# Patient Record
Sex: FEMALE | Race: WHITE | NOT HISPANIC OR LATINO | Employment: OTHER | RURAL
[De-identification: names, ages, dates, MRNs, and addresses within clinical notes are randomized per-mention and may not be internally consistent; named-entity substitution may affect disease eponyms.]

---

## 2017-03-22 RX ORDER — PRAVASTATIN SODIUM 80 MG/1
80 TABLET ORAL DAILY
Qty: 90 TABLET | Refills: 3 | Status: SHIPPED | OUTPATIENT
Start: 2017-03-22 | End: 2017-03-23 | Stop reason: SDUPTHER

## 2017-03-23 RX ORDER — PRAVASTATIN SODIUM 80 MG/1
80 TABLET ORAL DAILY
Qty: 90 TABLET | Refills: 3 | Status: SHIPPED | OUTPATIENT
Start: 2017-03-23 | End: 2017-05-23

## 2017-05-23 ENCOUNTER — OFFICE VISIT (OUTPATIENT)
Dept: CARDIOLOGY | Facility: CLINIC | Age: 57
End: 2017-05-23

## 2017-05-23 VITALS
HEIGHT: 64 IN | DIASTOLIC BLOOD PRESSURE: 87 MMHG | HEART RATE: 84 BPM | WEIGHT: 231 LBS | BODY MASS INDEX: 39.44 KG/M2 | SYSTOLIC BLOOD PRESSURE: 152 MMHG

## 2017-05-23 DIAGNOSIS — E11.65 TYPE 2 DIABETES MELLITUS WITH HYPERGLYCEMIA, WITHOUT LONG-TERM CURRENT USE OF INSULIN (HCC): ICD-10-CM

## 2017-05-23 DIAGNOSIS — I10 ESSENTIAL HYPERTENSION: ICD-10-CM

## 2017-05-23 DIAGNOSIS — E78.2 MIXED HYPERLIPIDEMIA: ICD-10-CM

## 2017-05-23 DIAGNOSIS — I20.9 ANGINA PECTORIS (HCC): Primary | ICD-10-CM

## 2017-05-23 PROCEDURE — 99214 OFFICE O/P EST MOD 30 MIN: CPT | Performed by: INTERNAL MEDICINE

## 2017-05-23 RX ORDER — ROSUVASTATIN CALCIUM 10 MG/1
10 TABLET, COATED ORAL DAILY
Qty: 90 TABLET | Refills: 3 | Status: SHIPPED | OUTPATIENT
Start: 2017-05-23 | End: 2018-07-02 | Stop reason: SDUPTHER

## 2017-05-23 RX ORDER — PREGABALIN 75 MG/1
75 CAPSULE ORAL 2 TIMES DAILY
COMMUNITY
End: 2018-07-09 | Stop reason: ALTCHOICE

## 2017-05-23 RX ORDER — MONTELUKAST SODIUM 10 MG/1
10 TABLET ORAL NIGHTLY
COMMUNITY
End: 2017-12-08

## 2017-05-23 RX ORDER — FEXOFENADINE HCL 180 MG/1
180 TABLET ORAL DAILY
COMMUNITY
End: 2017-12-08

## 2017-06-09 ENCOUNTER — HOSPITAL ENCOUNTER (OUTPATIENT)
Dept: CARDIOLOGY | Facility: HOSPITAL | Age: 57
Discharge: HOME | End: 2017-06-09
Attending: INTERNAL MEDICINE

## 2017-06-09 ENCOUNTER — APPOINTMENT (OUTPATIENT)
Dept: CARDIOLOGY | Facility: HOSPITAL | Age: 57
End: 2017-06-09
Attending: INTERNAL MEDICINE

## 2017-06-09 ENCOUNTER — HOSPITAL ENCOUNTER (OUTPATIENT)
Dept: CARDIOLOGY | Facility: HOSPITAL | Age: 57
Discharge: HOME | End: 2017-06-09
Attending: INTERNAL MEDICINE | Admitting: INTERNAL MEDICINE

## 2017-06-09 VITALS
DIASTOLIC BLOOD PRESSURE: 80 MMHG | BODY MASS INDEX: 39.27 KG/M2 | WEIGHT: 230 LBS | HEIGHT: 64 IN | HEART RATE: 96 BPM | SYSTOLIC BLOOD PRESSURE: 124 MMHG

## 2017-06-09 DIAGNOSIS — I20.9 ANGINA PECTORIS (HCC): ICD-10-CM

## 2017-06-09 LAB
BH CV STRESS BP STAGE 1: NORMAL
BH CV STRESS COMMENTS STAGE 1: NORMAL
BH CV STRESS DURATION MIN STAGE 1: 1
BH CV STRESS DURATION SEC STAGE 1: 50
BH CV STRESS GRADE STAGE 1: 10
BH CV STRESS HR STAGE 1: 137
BH CV STRESS METS STAGE 1: 5
BH CV STRESS O2 STAGE 1: 98
BH CV STRESS PROTOCOL 1: NORMAL
BH CV STRESS PROTOCOL 2 BP STAGE 1: NORMAL
BH CV STRESS PROTOCOL 2 BP STAGE 3: 114
BH CV STRESS PROTOCOL 2 BP STAGE 4: 110
BH CV STRESS PROTOCOL 2 COMMENTS STAGE 1: NORMAL
BH CV STRESS PROTOCOL 2 DOSE REGADENOSON STAGE 1: 0.4
BH CV STRESS PROTOCOL 2 DURATION MIN STAGE 1: 1
BH CV STRESS PROTOCOL 2 DURATION MIN STAGE 2: 1
BH CV STRESS PROTOCOL 2 DURATION MIN STAGE 3: 1
BH CV STRESS PROTOCOL 2 DURATION MIN STAGE 4: 1
BH CV STRESS PROTOCOL 2 DURATION SEC STAGE 1: 15
BH CV STRESS PROTOCOL 2 DURATION SEC STAGE 2: 0
BH CV STRESS PROTOCOL 2 DURATION SEC STAGE 3: 0
BH CV STRESS PROTOCOL 2 DURATION SEC STAGE 4: 0
BH CV STRESS PROTOCOL 2 HR STAGE 1: 118
BH CV STRESS PROTOCOL 2 HR STAGE 2: 116
BH CV STRESS PROTOCOL 2 HR STAGE 4: NORMAL
BH CV STRESS PROTOCOL 2 O2 STAGE 1: 98
BH CV STRESS PROTOCOL 2 O2 STAGE 2: 97
BH CV STRESS PROTOCOL 2 O2 STAGE 3: 97
BH CV STRESS PROTOCOL 2 O2 STAGE 4: 97
BH CV STRESS PROTOCOL 2 STAGE 1: 1
BH CV STRESS PROTOCOL 2 STAGE 2: 2
BH CV STRESS PROTOCOL 2 STAGE 3: 3
BH CV STRESS PROTOCOL 2 STAGE 4: 4
BH CV STRESS PROTOCOL 2: NORMAL
BH CV STRESS RECOVERY BP: NORMAL MMHG
BH CV STRESS RECOVERY HR: 103 BPM
BH CV STRESS RECOVERY O2: 97 %
BH CV STRESS SPEED STAGE 1: 1.7
BH CV STRESS STAGE 1: 1
LV EF NUC BP: 63 %
MAXIMAL PREDICTED HEART RATE: 163 BPM
PERCENT MAX PREDICTED HR: 72.39 %
STRESS BASELINE BP: NORMAL MMHG
STRESS BASELINE HR: 93 BPM
STRESS O2 SAT REST: 98 %
STRESS PERCENT HR: 85 %
STRESS POST ESTIMATED WORKLOAD: 1 METS
STRESS POST EXERCISE DUR MIN: 4 MIN
STRESS POST EXERCISE DUR SEC: 0 SEC
STRESS POST O2 SAT PEAK: 97 %
STRESS POST PEAK BP: NORMAL MMHG
STRESS POST PEAK HR: 118 BPM
STRESS TARGET HR: 139 BPM

## 2017-06-09 PROCEDURE — 78452 HT MUSCLE IMAGE SPECT MULT: CPT

## 2017-06-09 PROCEDURE — 93017 CV STRESS TEST TRACING ONLY: CPT

## 2017-06-09 PROCEDURE — 0 TECHNETIUM SESTAMIBI: Performed by: INTERNAL MEDICINE

## 2017-06-09 PROCEDURE — A9500 TC99M SESTAMIBI: HCPCS | Performed by: INTERNAL MEDICINE

## 2017-06-09 PROCEDURE — 93018 CV STRESS TEST I&R ONLY: CPT | Performed by: INTERNAL MEDICINE

## 2017-06-09 PROCEDURE — 78452 HT MUSCLE IMAGE SPECT MULT: CPT | Performed by: INTERNAL MEDICINE

## 2017-06-09 PROCEDURE — 25010000002 REGADENOSON 0.4 MG/5ML SOLUTION: Performed by: INTERNAL MEDICINE

## 2017-06-09 RX ADMIN — Medication 1 DOSE: at 09:35

## 2017-06-09 RX ADMIN — REGADENOSON 0.4 MG: 0.08 INJECTION, SOLUTION INTRAVENOUS at 09:38

## 2017-06-09 RX ADMIN — Medication 1 DOSE: at 07:45

## 2017-06-12 ENCOUNTER — TELEPHONE (OUTPATIENT)
Dept: CARDIOLOGY | Facility: CLINIC | Age: 57
End: 2017-06-12

## 2017-06-12 NOTE — TELEPHONE ENCOUNTER
Called patient and let her know her stress test was within normal limits. Patient verbalized understanding.

## 2017-08-04 RX ORDER — INDOMETHACIN 25 MG/1
CAPSULE ORAL
Qty: 90 CAPSULE | Refills: 0 | Status: SHIPPED | OUTPATIENT
Start: 2017-08-04 | End: 2017-09-10 | Stop reason: SDUPTHER

## 2017-09-11 RX ORDER — INDOMETHACIN 25 MG/1
CAPSULE ORAL
Qty: 90 CAPSULE | Refills: 0 | Status: SHIPPED | OUTPATIENT
Start: 2017-09-11 | End: 2017-11-30 | Stop reason: HOSPADM

## 2017-10-25 RX ORDER — INDOMETHACIN 25 MG/1
CAPSULE ORAL
Qty: 90 CAPSULE | Refills: 0 | OUTPATIENT
Start: 2017-10-25

## 2017-11-15 ENCOUNTER — TELEPHONE (OUTPATIENT)
Dept: CARDIOLOGY | Facility: CLINIC | Age: 57
End: 2017-11-15

## 2017-11-15 DIAGNOSIS — R06.09 DOE (DYSPNEA ON EXERTION): Primary | ICD-10-CM

## 2017-11-15 DIAGNOSIS — I25.119 CORONARY ARTERY DISEASE INVOLVING NATIVE CORONARY ARTERY OF NATIVE HEART WITH ANGINA PECTORIS (HCC): ICD-10-CM

## 2017-11-15 RX ORDER — NITROGLYCERIN 0.4 MG/1
0.4 TABLET SUBLINGUAL
Qty: 25 TABLET | Refills: 3 | Status: SHIPPED | OUTPATIENT
Start: 2017-11-15 | End: 2019-05-19 | Stop reason: SDUPTHER

## 2017-11-15 NOTE — TELEPHONE ENCOUNTER
"Pt left message to report she is having NAYLOR/CP \"walking from parking lot to building\". PCP has ordered echo and PFT's to be done at [facility]. Unable to reach pt for additional information. Normal stress 6/9/17. Further recommendations?  "

## 2017-11-29 ENCOUNTER — HOSPITAL ENCOUNTER (OUTPATIENT)
Facility: HOSPITAL | Age: 57
Setting detail: OBSERVATION
Discharge: HOME | End: 2017-11-30
Attending: INTERNAL MEDICINE | Admitting: INTERNAL MEDICINE

## 2017-11-29 ENCOUNTER — APPOINTMENT (OUTPATIENT)
Dept: GENERAL RADIOLOGY | Facility: HOSPITAL | Age: 57
End: 2017-11-29

## 2017-11-29 DIAGNOSIS — R06.09 DOE (DYSPNEA ON EXERTION): Primary | ICD-10-CM

## 2017-11-29 DIAGNOSIS — I25.119 CORONARY ARTERY DISEASE INVOLVING NATIVE CORONARY ARTERY OF NATIVE HEART WITH ANGINA PECTORIS (HCC): ICD-10-CM

## 2017-11-29 LAB
ALBUMIN SERPL-MCNC: 4.4 G/DL (ref 3.2–4.8)
ALBUMIN/GLOB SERPL: 1.5 G/DL (ref 1.5–2.5)
ALP SERPL-CCNC: 111 U/L (ref 25–100)
ALT SERPL W P-5'-P-CCNC: 19 U/L (ref 7–40)
ANION GAP SERPL CALCULATED.3IONS-SCNC: 9 MMOL/L (ref 3–11)
ANION GAP SERPL CALCULATED.3IONS-SCNC: 9 MMOL/L (ref 3–11)
AST SERPL-CCNC: 24 U/L (ref 0–33)
BASOPHILS # BLD AUTO: 0.01 10*3/MM3 (ref 0–0.2)
BASOPHILS NFR BLD AUTO: 0.1 % (ref 0–1)
BILIRUB SERPL-MCNC: 0.5 MG/DL (ref 0.3–1.2)
BNP SERPL-MCNC: 288 PG/ML (ref 0–100)
BUN BLD-MCNC: 27 MG/DL (ref 9–23)
BUN BLD-MCNC: 27 MG/DL (ref 9–23)
BUN/CREAT SERPL: 27 (ref 7–25)
BUN/CREAT SERPL: 27 (ref 7–25)
CALCIUM SPEC-SCNC: 9.5 MG/DL (ref 8.7–10.4)
CALCIUM SPEC-SCNC: 9.5 MG/DL (ref 8.7–10.4)
CHLORIDE SERPL-SCNC: 103 MMOL/L (ref 99–109)
CHLORIDE SERPL-SCNC: 103 MMOL/L (ref 99–109)
CO2 SERPL-SCNC: 27 MMOL/L (ref 20–31)
CO2 SERPL-SCNC: 27 MMOL/L (ref 20–31)
CREAT BLD-MCNC: 1 MG/DL (ref 0.6–1.3)
CREAT BLD-MCNC: 1 MG/DL (ref 0.6–1.3)
DEPRECATED RDW RBC AUTO: 48.6 FL (ref 37–54)
EOSINOPHIL # BLD AUTO: 0 10*3/MM3 (ref 0–0.3)
EOSINOPHIL NFR BLD AUTO: 0 % (ref 0–3)
ERYTHROCYTE [DISTWIDTH] IN BLOOD BY AUTOMATED COUNT: 15 % (ref 11.3–14.5)
GFR SERPL CREATININE-BSD FRML MDRD: 57 ML/MIN/1.73
GFR SERPL CREATININE-BSD FRML MDRD: 57 ML/MIN/1.73
GLOBULIN UR ELPH-MCNC: 2.9 GM/DL
GLUCOSE BLD-MCNC: 232 MG/DL (ref 70–100)
GLUCOSE BLD-MCNC: 232 MG/DL (ref 70–100)
GLUCOSE BLDC GLUCOMTR-MCNC: 225 MG/DL (ref 70–130)
GLUCOSE BLDC GLUCOMTR-MCNC: 350 MG/DL (ref 70–130)
HCT VFR BLD AUTO: 37.1 % (ref 34.5–44)
HGB BLD-MCNC: 11.9 G/DL (ref 11.5–15.5)
IMM GRANULOCYTES # BLD: 0.08 10*3/MM3 (ref 0–0.03)
IMM GRANULOCYTES NFR BLD: 0.7 % (ref 0–0.6)
LYMPHOCYTES # BLD AUTO: 1.76 10*3/MM3 (ref 0.6–4.8)
LYMPHOCYTES NFR BLD AUTO: 16.3 % (ref 24–44)
MCH RBC QN AUTO: 28.4 PG (ref 27–31)
MCHC RBC AUTO-ENTMCNC: 32.1 G/DL (ref 32–36)
MCV RBC AUTO: 88.5 FL (ref 80–99)
MONOCYTES # BLD AUTO: 0.95 10*3/MM3 (ref 0–1)
MONOCYTES NFR BLD AUTO: 8.8 % (ref 0–12)
NEUTROPHILS # BLD AUTO: 8.03 10*3/MM3 (ref 1.5–8.3)
NEUTROPHILS NFR BLD AUTO: 74.1 % (ref 41–71)
PLATELET # BLD AUTO: 243 10*3/MM3 (ref 150–450)
PMV BLD AUTO: 10 FL (ref 6–12)
POTASSIUM BLD-SCNC: 4.2 MMOL/L (ref 3.5–5.5)
POTASSIUM BLD-SCNC: 4.2 MMOL/L (ref 3.5–5.5)
PROT SERPL-MCNC: 7.3 G/DL (ref 5.7–8.2)
RBC # BLD AUTO: 4.19 10*6/MM3 (ref 3.89–5.14)
SODIUM BLD-SCNC: 139 MMOL/L (ref 132–146)
SODIUM BLD-SCNC: 139 MMOL/L (ref 132–146)
TSH SERPL DL<=0.05 MIU/L-ACNC: 0.52 MIU/ML (ref 0.35–5.35)
WBC NRBC COR # BLD: 10.83 10*3/MM3 (ref 3.5–10.8)

## 2017-11-29 PROCEDURE — 63710000001 INSULIN LISPRO (HUMAN) PER 5 UNITS: Performed by: NURSE PRACTITIONER

## 2017-11-29 PROCEDURE — 84443 ASSAY THYROID STIM HORMONE: CPT | Performed by: NURSE PRACTITIONER

## 2017-11-29 PROCEDURE — 82962 GLUCOSE BLOOD TEST: CPT

## 2017-11-29 PROCEDURE — 96372 THER/PROPH/DIAG INJ SC/IM: CPT

## 2017-11-29 PROCEDURE — 25010000002 HEPARIN (PORCINE) PER 1000 UNITS: Performed by: NURSE PRACTITIONER

## 2017-11-29 PROCEDURE — G0378 HOSPITAL OBSERVATION PER HR: HCPCS

## 2017-11-29 PROCEDURE — G0379 DIRECT REFER HOSPITAL OBSERV: HCPCS

## 2017-11-29 PROCEDURE — 99219 PR INITIAL OBSERVATION CARE/DAY 50 MINUTES: CPT | Performed by: INTERNAL MEDICINE

## 2017-11-29 PROCEDURE — 80053 COMPREHEN METABOLIC PANEL: CPT | Performed by: NURSE PRACTITIONER

## 2017-11-29 PROCEDURE — 80048 BASIC METABOLIC PNL TOTAL CA: CPT | Performed by: INTERNAL MEDICINE

## 2017-11-29 PROCEDURE — 85025 COMPLETE CBC W/AUTO DIFF WBC: CPT | Performed by: INTERNAL MEDICINE

## 2017-11-29 PROCEDURE — 71020 HC CHEST PA AND LATERAL: CPT

## 2017-11-29 PROCEDURE — 83880 ASSAY OF NATRIURETIC PEPTIDE: CPT | Performed by: INTERNAL MEDICINE

## 2017-11-29 RX ORDER — ONDANSETRON 2 MG/ML
4 INJECTION INTRAMUSCULAR; INTRAVENOUS EVERY 6 HOURS PRN
Status: DISCONTINUED | OUTPATIENT
Start: 2017-11-29 | End: 2017-11-30 | Stop reason: HOSPADM

## 2017-11-29 RX ORDER — CHLORDIAZEPOXIDE HYDROCHLORIDE 5 MG/1
5 CAPSULE, GELATIN COATED ORAL 3 TIMES DAILY PRN
Status: DISCONTINUED | OUTPATIENT
Start: 2017-11-29 | End: 2017-11-30 | Stop reason: HOSPADM

## 2017-11-29 RX ORDER — DOCUSATE SODIUM 100 MG/1
100 CAPSULE, LIQUID FILLED ORAL 2 TIMES DAILY
Status: DISCONTINUED | OUTPATIENT
Start: 2017-11-29 | End: 2017-11-30 | Stop reason: HOSPADM

## 2017-11-29 RX ORDER — DEXTROSE MONOHYDRATE 25 G/50ML
25 INJECTION, SOLUTION INTRAVENOUS
Status: DISCONTINUED | OUTPATIENT
Start: 2017-11-29 | End: 2017-11-30 | Stop reason: HOSPADM

## 2017-11-29 RX ORDER — NYSTATIN 100000 U/G
1 OINTMENT TOPICAL 3 TIMES DAILY PRN
COMMUNITY
End: 2022-03-10

## 2017-11-29 RX ORDER — CHLORDIAZEPOXIDE HYDROCHLORIDE 5 MG/1
5 CAPSULE, GELATIN COATED ORAL DAILY
COMMUNITY
End: 2018-11-20

## 2017-11-29 RX ORDER — HEPARIN SODIUM 5000 [USP'U]/ML
5000 INJECTION, SOLUTION INTRAVENOUS; SUBCUTANEOUS EVERY 12 HOURS SCHEDULED
Status: DISCONTINUED | OUTPATIENT
Start: 2017-11-29 | End: 2017-11-30 | Stop reason: HOSPADM

## 2017-11-29 RX ORDER — NYSTATIN 100000 U/G
1 OINTMENT TOPICAL 3 TIMES DAILY
Status: DISCONTINUED | OUTPATIENT
Start: 2017-11-29 | End: 2017-11-30 | Stop reason: HOSPADM

## 2017-11-29 RX ORDER — PREGABALIN 75 MG/1
75 CAPSULE ORAL EVERY 8 HOURS SCHEDULED
Status: DISCONTINUED | OUTPATIENT
Start: 2017-11-29 | End: 2017-11-30 | Stop reason: HOSPADM

## 2017-11-29 RX ORDER — NICOTINE POLACRILEX 4 MG
15 LOZENGE BUCCAL
Status: DISCONTINUED | OUTPATIENT
Start: 2017-11-29 | End: 2017-11-30 | Stop reason: HOSPADM

## 2017-11-29 RX ORDER — SPIRONOLACTONE 25 MG/1
25 TABLET ORAL 2 TIMES DAILY
Status: DISCONTINUED | OUTPATIENT
Start: 2017-11-29 | End: 2017-11-30 | Stop reason: HOSPADM

## 2017-11-29 RX ORDER — ROSUVASTATIN CALCIUM 10 MG/1
10 TABLET, COATED ORAL NIGHTLY
Status: DISCONTINUED | OUTPATIENT
Start: 2017-11-29 | End: 2017-11-30 | Stop reason: HOSPADM

## 2017-11-29 RX ORDER — CALCIUM CARBONATE 200(500)MG
2 TABLET,CHEWABLE ORAL 2 TIMES DAILY PRN
Status: DISCONTINUED | OUTPATIENT
Start: 2017-11-29 | End: 2017-11-30 | Stop reason: HOSPADM

## 2017-11-29 RX ORDER — CETIRIZINE HYDROCHLORIDE 10 MG/1
10 TABLET ORAL DAILY
Status: DISCONTINUED | OUTPATIENT
Start: 2017-11-30 | End: 2017-11-30 | Stop reason: HOSPADM

## 2017-11-29 RX ORDER — DIPHENOXYLATE HYDROCHLORIDE AND ATROPINE SULFATE 2.5; .025 MG/1; MG/1
1 TABLET ORAL DAILY
Status: DISCONTINUED | OUTPATIENT
Start: 2017-11-30 | End: 2017-11-30 | Stop reason: HOSPADM

## 2017-11-29 RX ORDER — TRIAMCINOLONE ACETONIDE 1 MG/G
1 CREAM TOPICAL 3 TIMES DAILY
Status: DISCONTINUED | OUTPATIENT
Start: 2017-11-29 | End: 2017-11-30 | Stop reason: HOSPADM

## 2017-11-29 RX ORDER — NITROGLYCERIN 0.4 MG/1
0.4 TABLET SUBLINGUAL
Status: DISCONTINUED | OUTPATIENT
Start: 2017-11-29 | End: 2017-11-30 | Stop reason: HOSPADM

## 2017-11-29 RX ORDER — SODIUM CHLORIDE 0.9 % (FLUSH) 0.9 %
1-10 SYRINGE (ML) INJECTION AS NEEDED
Status: DISCONTINUED | OUTPATIENT
Start: 2017-11-29 | End: 2017-11-30 | Stop reason: HOSPADM

## 2017-11-29 RX ORDER — ALLOPURINOL 300 MG/1
300 TABLET ORAL DAILY
Status: DISCONTINUED | OUTPATIENT
Start: 2017-11-30 | End: 2017-11-30 | Stop reason: HOSPADM

## 2017-11-29 RX ORDER — METHYLPREDNISOLONE 4 MG/1
4 TABLET ORAL DAILY
Status: DISCONTINUED | OUTPATIENT
Start: 2017-11-30 | End: 2017-11-30 | Stop reason: HOSPADM

## 2017-11-29 RX ORDER — METHYLPREDNISOLONE 4 MG/1
1 TABLET ORAL DAILY
COMMUNITY
End: 2017-11-30 | Stop reason: HOSPADM

## 2017-11-29 RX ORDER — TRIAMCINOLONE ACETONIDE 1 MG/G
1 CREAM TOPICAL DAILY PRN
COMMUNITY
End: 2018-09-12 | Stop reason: HOSPADM

## 2017-11-29 RX ORDER — FUROSEMIDE 40 MG/1
40 TABLET ORAL 2 TIMES DAILY
Status: DISCONTINUED | OUTPATIENT
Start: 2017-11-29 | End: 2017-11-30 | Stop reason: HOSPADM

## 2017-11-29 RX ORDER — ASPIRIN 81 MG/1
81 TABLET ORAL DAILY
Status: DISCONTINUED | OUTPATIENT
Start: 2017-11-29 | End: 2017-11-30 | Stop reason: HOSPADM

## 2017-11-29 RX ORDER — BISACODYL 10 MG
10 SUPPOSITORY, RECTAL RECTAL DAILY PRN
Status: DISCONTINUED | OUTPATIENT
Start: 2017-11-29 | End: 2017-11-30 | Stop reason: HOSPADM

## 2017-11-29 RX ORDER — ONDANSETRON 4 MG/1
4 TABLET, FILM COATED ORAL EVERY 6 HOURS PRN
Status: DISCONTINUED | OUTPATIENT
Start: 2017-11-29 | End: 2017-11-30 | Stop reason: HOSPADM

## 2017-11-29 RX ORDER — MONTELUKAST SODIUM 10 MG/1
10 TABLET ORAL NIGHTLY
Status: DISCONTINUED | OUTPATIENT
Start: 2017-11-29 | End: 2017-11-30 | Stop reason: HOSPADM

## 2017-11-29 RX ORDER — ACETAMINOPHEN 325 MG/1
650 TABLET ORAL EVERY 4 HOURS PRN
Status: DISCONTINUED | OUTPATIENT
Start: 2017-11-29 | End: 2017-11-30 | Stop reason: HOSPADM

## 2017-11-29 RX ADMIN — SPIRONOLACTONE 25 MG: 25 TABLET, FILM COATED ORAL at 20:50

## 2017-11-29 RX ADMIN — ROSUVASTATIN CALCIUM 10 MG: 10 TABLET, FILM COATED ORAL at 21:49

## 2017-11-29 RX ADMIN — ASPIRIN 81 MG: 81 TABLET, COATED ORAL at 20:50

## 2017-11-29 RX ADMIN — HEPARIN SODIUM 5000 UNITS: 5000 INJECTION, SOLUTION INTRAVENOUS; SUBCUTANEOUS at 21:49

## 2017-11-29 RX ADMIN — INSULIN LISPRO 7 UNITS: 100 INJECTION, SOLUTION INTRAVENOUS; SUBCUTANEOUS at 21:50

## 2017-11-29 RX ADMIN — CHLORDIAZEPOXIDE HYDROCHLORIDE 5 MG: 5 CAPSULE ORAL at 22:00

## 2017-11-30 ENCOUNTER — APPOINTMENT (OUTPATIENT)
Dept: CARDIOLOGY | Facility: HOSPITAL | Age: 57
End: 2017-11-30

## 2017-11-30 VITALS
HEART RATE: 95 BPM | HEIGHT: 64 IN | RESPIRATION RATE: 18 BRPM | DIASTOLIC BLOOD PRESSURE: 80 MMHG | TEMPERATURE: 97.5 F | WEIGHT: 235.23 LBS | BODY MASS INDEX: 40.16 KG/M2 | SYSTOLIC BLOOD PRESSURE: 126 MMHG | OXYGEN SATURATION: 97 %

## 2017-11-30 PROBLEM — I25.119 CORONARY ARTERY DISEASE INVOLVING NATIVE CORONARY ARTERY OF NATIVE HEART WITH ANGINA PECTORIS (HCC): Status: ACTIVE | Noted: 2017-11-29

## 2017-11-30 LAB
ARTICHOKE IGE QN: 66 MG/DL (ref 0–130)
BH CV ECHO MEAS - AO ROOT AREA (BSA CORRECTED): 1.5
BH CV ECHO MEAS - AO ROOT AREA: 7.5 CM^2
BH CV ECHO MEAS - AO ROOT DIAM: 3.1 CM
BH CV ECHO MEAS - BSA(HAYCOCK): 2.2 M^2
BH CV ECHO MEAS - BSA: 2.1 M^2
BH CV ECHO MEAS - BZI_BMI: 40.3 KILOGRAMS/M^2
BH CV ECHO MEAS - BZI_METRIC_HEIGHT: 162.6 CM
BH CV ECHO MEAS - BZI_METRIC_WEIGHT: 106.6 KG
BH CV ECHO MEAS - CONTRAST EF (2CH): 20.8 ML/M^2
BH CV ECHO MEAS - CONTRAST EF 4CH: 65.2 ML/M^2
BH CV ECHO MEAS - EDV(CUBED): 138.2 ML
BH CV ECHO MEAS - EDV(MOD-SP2): 125 ML
BH CV ECHO MEAS - EDV(MOD-SP4): 227 ML
BH CV ECHO MEAS - EDV(TEICH): 127.8 ML
BH CV ECHO MEAS - EF(CUBED): 27.7 %
BH CV ECHO MEAS - EF(MOD-SP2): 20.8 %
BH CV ECHO MEAS - EF(TEICH): 22.3 %
BH CV ECHO MEAS - ESV(CUBED): 99.9 ML
BH CV ECHO MEAS - ESV(MOD-SP2): 99 ML
BH CV ECHO MEAS - ESV(MOD-SP4): 79 ML
BH CV ECHO MEAS - ESV(TEICH): 99.3 ML
BH CV ECHO MEAS - FS: 10.3 %
BH CV ECHO MEAS - IVS/LVPW: 0.93
BH CV ECHO MEAS - IVSD: 1.1 CM
BH CV ECHO MEAS - LA DIMENSION: 3.9 CM
BH CV ECHO MEAS - LA/AO: 1.3
BH CV ECHO MEAS - LV DIASTOLIC VOL/BSA (35-75): 108.4 ML/M^2
BH CV ECHO MEAS - LV MASS(C)D: 232.4 GRAMS
BH CV ECHO MEAS - LV MASS(C)DI: 111 GRAMS/M^2
BH CV ECHO MEAS - LV SYSTOLIC VOL/BSA (12-30): 37.7 ML/M^2
BH CV ECHO MEAS - LVIDD: 5.2 CM
BH CV ECHO MEAS - LVIDS: 4.6 CM
BH CV ECHO MEAS - LVLD AP2: 7.9 CM
BH CV ECHO MEAS - LVLD AP4: 9 CM
BH CV ECHO MEAS - LVLS AP2: 7.8 CM
BH CV ECHO MEAS - LVLS AP4: 7.4 CM
BH CV ECHO MEAS - LVPWD: 1.2 CM
BH CV ECHO MEAS - MV A MAX VEL: 83.4 CM/SEC
BH CV ECHO MEAS - MV DEC TIME: 0.16 SEC
BH CV ECHO MEAS - MV E MAX VEL: 99.2 CM/SEC
BH CV ECHO MEAS - MV E/A: 1.2
BH CV ECHO MEAS - PA ACC SLOPE: 626 CM/SEC^2
BH CV ECHO MEAS - PA ACC TIME: 0.09 SEC
BH CV ECHO MEAS - PA PR(ACCEL): 37.2 MMHG
BH CV ECHO MEAS - RAP SYSTOLE: 10 MMHG
BH CV ECHO MEAS - RVDD: 2.5 CM
BH CV ECHO MEAS - RVSP: 41 MMHG
BH CV ECHO MEAS - SI(CUBED): 18.3 ML/M^2
BH CV ECHO MEAS - SI(MOD-SP2): 12.4 ML/M^2
BH CV ECHO MEAS - SI(MOD-SP4): 70.7 ML/M^2
BH CV ECHO MEAS - SI(TEICH): 13.6 ML/M^2
BH CV ECHO MEAS - SV(CUBED): 38.3 ML
BH CV ECHO MEAS - SV(MOD-SP2): 26 ML
BH CV ECHO MEAS - SV(MOD-SP4): 148 ML
BH CV ECHO MEAS - SV(TEICH): 28.5 ML
BH CV ECHO MEAS - TAPSE (>1.6): 1.8 CM2
BH CV ECHO MEAS - TR MAX VEL: 279 CM/SEC
BH CV XLRA - RV BASE: 2.8 CM
BH CV XLRA - RV LENGTH: 6.8 CM
BH CV XLRA - RV MID: 2.3 CM
CHOLEST SERPL-MCNC: 120 MG/DL (ref 0–200)
GLUCOSE BLDC GLUCOMTR-MCNC: 185 MG/DL (ref 70–130)
GLUCOSE BLDC GLUCOMTR-MCNC: 216 MG/DL (ref 70–130)
HBA1C MFR BLD: 8.2 % (ref 4.8–5.6)
HDLC SERPL-MCNC: 29 MG/DL (ref 40–60)
LEFT ATRIUM VOLUME INDEX: 27.3 ML/M2
LV EF 2D ECHO EST: 30 %
MAXIMAL PREDICTED HEART RATE: 162 BPM
STRESS TARGET HR: 138 BPM
TRIGL SERPL-MCNC: 262 MG/DL (ref 0–150)

## 2017-11-30 PROCEDURE — 25010000002 HEPARIN (PORCINE) PER 1000 UNITS: Performed by: INTERNAL MEDICINE

## 2017-11-30 PROCEDURE — 93306 TTE W/DOPPLER COMPLETE: CPT

## 2017-11-30 PROCEDURE — 99024 POSTOP FOLLOW-UP VISIT: CPT | Performed by: INTERNAL MEDICINE

## 2017-11-30 PROCEDURE — 82962 GLUCOSE BLOOD TEST: CPT

## 2017-11-30 PROCEDURE — 83036 HEMOGLOBIN GLYCOSYLATED A1C: CPT | Performed by: NURSE PRACTITIONER

## 2017-11-30 PROCEDURE — 0 IOPAMIDOL PER 1 ML: Performed by: INTERNAL MEDICINE

## 2017-11-30 PROCEDURE — 93458 L HRT ARTERY/VENTRICLE ANGIO: CPT | Performed by: INTERNAL MEDICINE

## 2017-11-30 PROCEDURE — C1769 GUIDE WIRE: HCPCS | Performed by: INTERNAL MEDICINE

## 2017-11-30 PROCEDURE — 25010000002 SULFUR HEXAFLUORIDE MICROSPH 60.7-25 MG RECONSTITUTED SUSPENSION: Performed by: INTERNAL MEDICINE

## 2017-11-30 PROCEDURE — G0378 HOSPITAL OBSERVATION PER HR: HCPCS

## 2017-11-30 PROCEDURE — 93306 TTE W/DOPPLER COMPLETE: CPT | Performed by: INTERNAL MEDICINE

## 2017-11-30 PROCEDURE — 80061 LIPID PANEL: CPT | Performed by: NURSE PRACTITIONER

## 2017-11-30 PROCEDURE — 93005 ELECTROCARDIOGRAM TRACING: CPT | Performed by: INTERNAL MEDICINE

## 2017-11-30 PROCEDURE — 63710000001 METHYLPREDNISOLONE 4 MG TABLET THERAPY PACK 21 EACH DISP PACK: Performed by: NURSE PRACTITIONER

## 2017-11-30 PROCEDURE — C1894 INTRO/SHEATH, NON-LASER: HCPCS | Performed by: INTERNAL MEDICINE

## 2017-11-30 RX ORDER — LOSARTAN POTASSIUM 25 MG/1
25 TABLET ORAL
Status: DISCONTINUED | OUTPATIENT
Start: 2017-11-30 | End: 2017-11-30 | Stop reason: HOSPADM

## 2017-11-30 RX ORDER — CARVEDILOL 3.12 MG/1
3.12 TABLET ORAL 2 TIMES DAILY
Qty: 60 TABLET | Refills: 3 | Status: SHIPPED | OUTPATIENT
Start: 2017-11-30 | End: 2018-02-08 | Stop reason: SDUPTHER

## 2017-11-30 RX ORDER — LIDOCAINE HYDROCHLORIDE 10 MG/ML
INJECTION, SOLUTION EPIDURAL; INFILTRATION; INTRACAUDAL; PERINEURAL AS NEEDED
Status: DISCONTINUED | OUTPATIENT
Start: 2017-11-30 | End: 2017-11-30 | Stop reason: HOSPADM

## 2017-11-30 RX ORDER — CARVEDILOL 3.12 MG/1
3.12 TABLET ORAL EVERY 12 HOURS SCHEDULED
Status: DISCONTINUED | OUTPATIENT
Start: 2017-11-30 | End: 2017-11-30 | Stop reason: HOSPADM

## 2017-11-30 RX ORDER — CARVEDILOL 3.12 MG/1
3.12 TABLET ORAL 2 TIMES DAILY
Status: DISCONTINUED | OUTPATIENT
Start: 2017-11-30 | End: 2017-11-30 | Stop reason: HOSPADM

## 2017-11-30 RX ORDER — NITROGLYCERIN 5 MG/ML
INJECTION, SOLUTION INTRAVENOUS AS NEEDED
Status: DISCONTINUED | OUTPATIENT
Start: 2017-11-30 | End: 2017-11-30 | Stop reason: HOSPADM

## 2017-11-30 RX ORDER — LOSARTAN POTASSIUM 25 MG/1
25 TABLET ORAL DAILY
Qty: 60 TABLET | Refills: 3 | Status: SHIPPED | OUTPATIENT
Start: 2017-11-30 | End: 2018-04-03 | Stop reason: SDUPTHER

## 2017-11-30 RX ADMIN — FUROSEMIDE 40 MG: 40 TABLET ORAL at 09:41

## 2017-11-30 RX ADMIN — Medication 1 TABLET: at 09:41

## 2017-11-30 RX ADMIN — SULFUR HEXAFLUORIDE 3 ML: KIT at 11:36

## 2017-11-30 RX ADMIN — METHYLPREDNISOLONE 4 MG: 4 TABLET ORAL at 09:41

## 2017-11-30 RX ADMIN — ALLOPURINOL 300 MG: 300 TABLET ORAL at 09:41

## 2017-11-30 RX ADMIN — CARVEDILOL 3.12 MG: 3.12 TABLET, FILM COATED ORAL at 19:46

## 2017-11-30 RX ADMIN — PREGABALIN 75 MG: 75 CAPSULE ORAL at 06:32

## 2017-11-30 RX ADMIN — SPIRONOLACTONE 25 MG: 25 TABLET, FILM COATED ORAL at 09:41

## 2017-11-30 RX ADMIN — ASPIRIN 81 MG: 81 TABLET, COATED ORAL at 09:41

## 2017-11-30 RX ADMIN — LOSARTAN POTASSIUM 25 MG: 25 TABLET, FILM COATED ORAL at 19:46

## 2017-12-01 ENCOUNTER — DOCUMENTATION (OUTPATIENT)
Dept: CARDIAC REHAB | Facility: HOSPITAL | Age: 57
End: 2017-12-01

## 2017-12-08 ENCOUNTER — APPOINTMENT (OUTPATIENT)
Dept: CARDIOLOGY | Facility: HOSPITAL | Age: 57
End: 2017-12-08
Attending: INTERNAL MEDICINE

## 2017-12-08 ENCOUNTER — HOSPITAL ENCOUNTER (OUTPATIENT)
Dept: CARDIOLOGY | Facility: HOSPITAL | Age: 57
Discharge: HOME | End: 2017-12-08
Admitting: NURSE PRACTITIONER

## 2017-12-08 ENCOUNTER — OFFICE VISIT (OUTPATIENT)
Dept: CARDIOLOGY | Facility: HOSPITAL | Age: 57
End: 2017-12-08

## 2017-12-08 VITALS
HEART RATE: 89 BPM | HEIGHT: 64 IN | WEIGHT: 228 LBS | RESPIRATION RATE: 19 BRPM | OXYGEN SATURATION: 99 % | BODY MASS INDEX: 38.93 KG/M2 | SYSTOLIC BLOOD PRESSURE: 118 MMHG | DIASTOLIC BLOOD PRESSURE: 69 MMHG | TEMPERATURE: 97.6 F

## 2017-12-08 DIAGNOSIS — I50.23 ACUTE ON CHRONIC SYSTOLIC CONGESTIVE HEART FAILURE (HCC): Primary | ICD-10-CM

## 2017-12-08 DIAGNOSIS — I10 ESSENTIAL HYPERTENSION: ICD-10-CM

## 2017-12-08 DIAGNOSIS — M79.10 MYALGIA: ICD-10-CM

## 2017-12-08 DIAGNOSIS — I25.10 CORONARY ARTERY DISEASE INVOLVING NATIVE CORONARY ARTERY OF NATIVE HEART WITHOUT ANGINA PECTORIS: ICD-10-CM

## 2017-12-08 DIAGNOSIS — I44.7 LEFT BUNDLE BRANCH BLOCK: ICD-10-CM

## 2017-12-08 DIAGNOSIS — I34.0 NON-RHEUMATIC MITRAL REGURGITATION: ICD-10-CM

## 2017-12-08 DIAGNOSIS — R06.02 SHORTNESS OF BREATH: ICD-10-CM

## 2017-12-08 LAB
ANION GAP SERPL CALCULATED.3IONS-SCNC: 12 MMOL/L (ref 3–11)
BUN BLD-MCNC: 20 MG/DL (ref 9–23)
BUN/CREAT SERPL: 28.6 (ref 7–25)
CALCIUM SPEC-SCNC: 9.1 MG/DL (ref 8.7–10.4)
CHLORIDE SERPL-SCNC: 99 MMOL/L (ref 99–109)
CO2 SERPL-SCNC: 25 MMOL/L (ref 20–31)
CREAT BLD-MCNC: 0.7 MG/DL (ref 0.6–1.3)
GFR SERPL CREATININE-BSD FRML MDRD: 86 ML/MIN/1.73
GLUCOSE BLD-MCNC: 103 MG/DL (ref 70–100)
MAGNESIUM SERPL-MCNC: 1.8 MG/DL (ref 1.3–2.7)
POTASSIUM BLD-SCNC: 3.7 MMOL/L (ref 3.5–5.5)
SODIUM BLD-SCNC: 136 MMOL/L (ref 132–146)

## 2017-12-08 PROCEDURE — 99214 OFFICE O/P EST MOD 30 MIN: CPT | Performed by: NURSE PRACTITIONER

## 2017-12-08 PROCEDURE — 83735 ASSAY OF MAGNESIUM: CPT | Performed by: NURSE PRACTITIONER

## 2017-12-08 PROCEDURE — 80048 BASIC METABOLIC PNL TOTAL CA: CPT | Performed by: NURSE PRACTITIONER

## 2017-12-08 PROCEDURE — 93005 ELECTROCARDIOGRAM TRACING: CPT | Performed by: NURSE PRACTITIONER

## 2017-12-08 PROCEDURE — 93010 ELECTROCARDIOGRAM REPORT: CPT | Performed by: INTERNAL MEDICINE

## 2017-12-08 RX ORDER — BUDESONIDE AND FORMOTEROL FUMARATE DIHYDRATE 80; 4.5 UG/1; UG/1
2 AEROSOL RESPIRATORY (INHALATION)
COMMUNITY
End: 2018-02-08

## 2017-12-08 NOTE — PROGRESS NOTES
Encounter Date:12/08/2017      Patient ID: Lucia Stevens is a 57 y.o. female.        Subjective:     Chief Complaint: Establish Care (s/p Hospitalization 11/29/17)     History of Present Illness  [facility] after recent hospitalization for acute systolic CHF. Echo showed a new decline in LVEF to 30%. She underewent [facility] which showed  of RCA with left to right collaterals and LVEF 25-30%. She is wearing a Life Vest. Since the hospitalization her symptoms have improved until today. She said yesterday she went shopping for 45 minutes and had some exertional dyspnea. Today she feels particularly weak and tired. She doesn't feel like she is gaining fluid but does feel more SOB today. She reports her symptoms were exacerbated by PFT 11/24/17 which showed a moderate-severe obstructive airway disease. She also reports diarrhea, which she thinks may be from the medications. She does have chronic issues with her stomach. She did brings her BP/weight log today and her //73. HR 82-95, weight this morning was 227 which is down from 234.5 on 12/2    Patient Active Problem List   Diagnosis   • Psoriasis   • Secondary Sjogren's syndrome   • Hiatal hernia   • Obesity   • Hypertension   • Ischemic heart disease   • NAYLOR (dyspnea on exertion)   • Coronary artery disease involving native coronary artery of native heart with angina pectoris         Past Surgical History:   Procedure Laterality Date   • CARDIAC CATHETERIZATION     • CARDIAC CATHETERIZATION N/A 11/30/2017    Procedure: Left Heart Cath;  Surgeon: Galina Leonard MD;  Location: [facility] CATH INVASIVE LOCATION;  Service:    • CERVICAL DISCECTOMY ANTERIOR     • CHOLECYSTECTOMY     • CORONARY ANGIOPLASTY     • CORONARY ANGIOPLASTY WITH STENT PLACEMENT     • CORONARY ANGIOPLASTY WITH STENT PLACEMENT     • POLYPECTOMY      Colon polyps status   • RECTOVAGINAL FISTULA REPAIR         Allergies   Allergen Reactions   •  Sulfa Antibiotics Anaphylaxis   • Lisinopril      Fatigue           Current Outpatient Prescriptions:   •  allopurinol (ZYLOPRIM) 300 MG tablet, Take 300 mg by mouth Daily., Disp: , Rfl:   •  aspirin 81 MG EC tablet, Take 81 mg by mouth daily., Disp: , Rfl:   •  budesonide-formoterol (SYMBICORT) 80-4.5 MCG/ACT inhaler, Inhale 2 puffs 2 (Two) Times a Day., Disp: , Rfl:   •  carvedilol (COREG) 3.125 MG tablet, Take 1 tablet by mouth 2 (Two) Times a Day., Disp: 60 tablet, Rfl: 3  •  chlordiazePOXIDE (LIBRIUM) 5 MG capsule, Take 5 mg by mouth Daily., Disp: , Rfl:   •  furosemide (LASIX) 40 MG tablet, 40 mg. 1/2 QAM, 1 AFTERNOON, Disp: , Rfl:   •  Liraglutide (VICTOZA) 18 MG/3ML solution pen-injector injection, Inject 0.6 mg under the skin Daily., Disp: , Rfl:   •  losartan (COZAAR) 25 MG tablet, Take 1 tablet by mouth Daily., Disp: 60 tablet, Rfl: 3  •  nitroglycerin (NITROSTAT) 0.4 MG SL tablet, Place 1 tablet under the tongue Every 5 (Five) Minutes As Needed for Chest Pain. Take no more than 3 doses in 15 minutes., Disp: 25 tablet, Rfl: 3  •  nystatin (MYCOSTATIN) 240465 UNIT/GM ointment, Apply 1 application topically 3 (Three) Times a Day As Needed., Disp: , Rfl:   •  nystatin (MYCOSTATIN) 619508 UNIT/ML suspension, Take 5 mL by mouth 3 (Three) Times a Day As Needed., Disp: , Rfl: 1  •  pregabalin (LYRICA) 75 MG capsule, Take 75 mg by mouth Daily., Disp: , Rfl:   •  rosuvastatin (CRESTOR) 10 MG tablet, Take 1 tablet by mouth Daily., Disp: 90 tablet, Rfl: 3  •  spironolactone (ALDACTONE) 25 MG tablet, Take 25 mg by mouth 2 (Two) Times a Day., Disp: , Rfl:   •  triamcinolone (KENALOG) 0.1 % cream, Apply 1 application topically Daily As Needed., Disp: , Rfl:     The following portions of the chart were reviewed and updated as appropriate: Allergies, current medications, past family history, social history, past medical history.     Review of Systems   Constitution: Positive for malaise/fatigue. Negative for chills,  "decreased appetite, diaphoresis, fever, weakness, night sweats, weight gain and weight loss.   HENT: Positive for congestion. Negative for hearing loss, hoarse voice and nosebleeds.    Eyes: Positive for blurred vision. Negative for visual disturbance and visual halos.   Cardiovascular: Positive for chest pain, dyspnea on exertion, leg swelling and orthopnea. Negative for claudication, cyanosis, irregular heartbeat, near-syncope, palpitations, paroxysmal nocturnal dyspnea and syncope.   Respiratory: Positive for cough and shortness of breath. Negative for hemoptysis, sleep disturbances due to breathing, snoring, sputum production and wheezing.    Endocrine: Positive for polydipsia.   Hematologic/Lymphatic: Negative for bleeding problem. Does not bruise/bleed easily.   Skin: Negative for dry skin, itching and rash.   Musculoskeletal: Positive for gout and joint pain. Negative for arthritis, joint swelling and myalgias.   Gastrointestinal: Positive for abdominal pain and diarrhea. Negative for bloating, constipation, flatus, heartburn, hematemesis, hematochezia, melena, nausea and vomiting.   Genitourinary: Negative for dysuria, frequency, hematuria, nocturia and urgency.   Neurological: Positive for excessive daytime sleepiness. Negative for dizziness, headaches, light-headedness and loss of balance.   Psychiatric/Behavioral: Negative for depression. The patient has insomnia and is nervous/anxious.    Allergic/Immunologic:        Seasonal allergies           Objective:     Vitals:    12/08/17 1520 12/08/17 1523 12/08/17 1524   BP: 121/76 138/74 118/69   BP Location: Right arm Left arm Left arm   Patient Position: Sitting Sitting Standing   Cuff Size: Adult     Pulse: 71  89   Resp: 19     Temp: 97.6 °F (36.4 °C)     TempSrc: Temporal Artery      SpO2: 99%     Weight: 103 kg (228 lb)     Height: 162.6 cm (64\")           Physical Exam   Constitutional: She is oriented to person, place, and time. She appears " well-developed and well-nourished. She is active and cooperative. No distress.   HENT:   Head: Normocephalic and atraumatic.   Mouth/Throat: Oropharynx is clear and moist.   Eyes: Conjunctivae and EOM are normal. Pupils are equal, round, and reactive to light.   Neck: Normal range of motion. Neck supple. No JVD present. No tracheal deviation present. No thyromegaly present.   Cardiovascular: Normal rate, regular rhythm, normal heart sounds and intact distal pulses.    Pulmonary/Chest: Effort normal. She has rales.   Abdominal: Soft. Bowel sounds are normal. She exhibits no distension. There is no tenderness.   Musculoskeletal: Normal range of motion.   Neurological: She is alert and oriented to person, place, and time.   Skin: Skin is warm, dry and intact.   Psychiatric: She has a normal mood and affect. Her behavior is normal.   Nursing note and vitals reviewed.      Lab and Diagnostic Review:      Echo 11/30/17  · Left ventricular systolic function is moderately decreased. Estimated EF = 30%.  · Left atrial cavity size is borderline dilated.  · Moderate-to-severe mitral valve regurgitation is present  · Mild to moderate tricuspid valve regurgitation is present.  · RVSP(TR) 41.0 mmHg      [facility] 11/30/17  FINAL IMPRESSION:  · Single-vessel CAD with chronic total occlusion of the right coronary artery served by excellent left-sided collaterals.  · Nonobstructive disease of the left coronary system.  · Cardiomyopathy with severe left regular systolic dysfunction, estimated ejection fraction 25-30%.    Labs 11/29/17  BUN 27, Cr 1.0, K 4.2, Na 139, GFR 57      EKG today shows NSR, LBBB, LAD, HR 81  Assessment and Plan:         1. Acute on chronic systolic congestive heart failure, LVEF 25-30%, NYHA III  - Mild fluid volume overload. Increase lasix to 40mg BID for 2 days  - Heart failure education today including signs and symptoms, the role of the heart failure center, daily weights, low sodium diet (less than 1500 mg per  day), and daily physical activity. Reviewed HF Zones with patient and family.  Patient to continue current medications as previously ordered.   - Continue LifeVest  - ECG 12 Lead; Future    2. Essential hypertension  - Controlled    3. Coronary artery disease involving native coronary artery of native heart without angina pectoris  - [facility] 11/30/17 showed chronic  of RCA with left-sided collaterals    4. Non-rheumatic mitral regurgitation  - Moderate to severe in the setting of acute CHF    5. Left bundle branch block  - Chronic    It has been a pleasure to participate in the care of this patient.  Patient was instructed to call the [facility] with any questions, concerns, or worsening symptoms.    * Please note that portions of this note were completed with a voice recognition program. Efforts were made to edit the dictation but occasionally words are transcribed.

## 2017-12-12 ENCOUNTER — TELEPHONE (OUTPATIENT)
Dept: CARDIOLOGY | Facility: CLINIC | Age: 57
End: 2017-12-12

## 2017-12-18 ENCOUNTER — TELEPHONE (OUTPATIENT)
Dept: CARDIOLOGY | Facility: CLINIC | Age: 57
End: 2017-12-18

## 2017-12-18 NOTE — TELEPHONE ENCOUNTER
Called patient and let her know that her STD paperwork in completed and she can  at any time. Patient stated she will  tomorrow at main office in [place].

## 2018-01-05 ENCOUNTER — OFFICE VISIT (OUTPATIENT)
Dept: CARDIOLOGY | Facility: HOSPITAL | Age: 58
End: 2018-01-05

## 2018-01-05 VITALS
BODY MASS INDEX: 39.09 KG/M2 | TEMPERATURE: 97.8 F | RESPIRATION RATE: 20 BRPM | HEART RATE: 99 BPM | OXYGEN SATURATION: 99 % | SYSTOLIC BLOOD PRESSURE: 101 MMHG | HEIGHT: 64 IN | WEIGHT: 229 LBS | DIASTOLIC BLOOD PRESSURE: 74 MMHG

## 2018-01-05 DIAGNOSIS — I10 ESSENTIAL HYPERTENSION: ICD-10-CM

## 2018-01-05 DIAGNOSIS — I34.0 NON-RHEUMATIC MITRAL REGURGITATION: ICD-10-CM

## 2018-01-05 DIAGNOSIS — I50.22 CHRONIC SYSTOLIC CONGESTIVE HEART FAILURE (HCC): Primary | ICD-10-CM

## 2018-01-05 DIAGNOSIS — I25.10 CORONARY ARTERY DISEASE INVOLVING NATIVE CORONARY ARTERY OF NATIVE HEART WITHOUT ANGINA PECTORIS: ICD-10-CM

## 2018-01-05 PROCEDURE — 99214 OFFICE O/P EST MOD 30 MIN: CPT | Performed by: NURSE PRACTITIONER

## 2018-01-05 RX ORDER — FUROSEMIDE 40 MG/1
40 TABLET ORAL DAILY
Qty: 30 TABLET
Start: 2018-01-05 | End: 2018-05-08 | Stop reason: SDUPTHER

## 2018-01-05 RX ORDER — SPIRONOLACTONE 25 MG/1
25 TABLET ORAL DAILY
Start: 2018-01-05

## 2018-01-05 RX ORDER — PHENAZOPYRIDINE HYDROCHLORIDE 200 MG/1
200 TABLET, FILM COATED ORAL 3 TIMES DAILY
COMMUNITY
End: 2018-02-08

## 2018-01-05 NOTE — PROGRESS NOTES
Encounter Date:01/05/2018      Patient ID: Lucia Stevens is a 57 y.o. female.        Subjective:     Chief Complaint: Follow-up CHF     History of Present Illness  Ms. Stevens [facility] for ongoing evaluation of systolic heart failure. She is still wearing her lifevest. She complains today of fatigue. She feels blood pressure is too low and this makes her tired. She also has a  with multiple myeloma and wants to get better so she can care for him. Her home blood pressures are low 100s with only a few of them in the 90s. HR in the 80s-90s. Weight has been stable. Although she has fatigue, she feels much better compared to last month    Patient Active Problem List   Diagnosis   • Psoriasis   • Secondary Sjogren's syndrome   • Hiatal hernia   • Obesity   • Hypertension   • Ischemic heart disease   • NAYLOR (dyspnea on exertion)   • Coronary artery disease involving native coronary artery of native heart with angina pectoris         Past Surgical History:   Procedure Laterality Date   • CARDIAC CATHETERIZATION     • CARDIAC CATHETERIZATION N/A 11/30/2017    Procedure: Left Heart Cath;  Surgeon: Galina Leonard MD;  Location: [facility] CATH INVASIVE LOCATION;  Service:    • CERVICAL DISCECTOMY ANTERIOR     • CHOLECYSTECTOMY     • CORONARY ANGIOPLASTY     • CORONARY ANGIOPLASTY WITH STENT PLACEMENT     • CORONARY ANGIOPLASTY WITH STENT PLACEMENT     • POLYPECTOMY      Colon polyps status   • RECTOVAGINAL FISTULA REPAIR         Allergies   Allergen Reactions   • Sulfa Antibiotics Anaphylaxis   • Lisinopril      Fatigue           Current Outpatient Prescriptions:   •  allopurinol (ZYLOPRIM) 300 MG tablet, Take 300 mg by mouth Daily., Disp: , Rfl:   •  aspirin 81 MG EC tablet, Take 81 mg by mouth daily., Disp: , Rfl:   •  budesonide-formoterol (SYMBICORT) 80-4.5 MCG/ACT inhaler, Inhale 2 puffs 2 (Two) Times a Day., Disp: , Rfl:   •  carvedilol (COREG) 3.125 MG tablet, Take 1 tablet by mouth 2  (Two) Times a Day., Disp: 60 tablet, Rfl: 3  •  chlordiazePOXIDE (LIBRIUM) 5 MG capsule, Take 5 mg by mouth 2 (Two) Times a Day., Disp: , Rfl:   •  furosemide (LASIX) 40 MG tablet, Take 1 tablet by mouth Daily. May take an additional tablet PRN for weight gain of 3lbs in one day or 5lbs in 1 wk, Disp: 30 tablet, Rfl:   •  Liraglutide (VICTOZA) 18 MG/3ML solution pen-injector injection, Inject 0.6 mg under the skin Daily., Disp: , Rfl:   •  losartan (COZAAR) 25 MG tablet, Take 1 tablet by mouth Daily., Disp: 60 tablet, Rfl: 3  •  nitroglycerin (NITROSTAT) 0.4 MG SL tablet, Place 1 tablet under the tongue Every 5 (Five) Minutes As Needed for Chest Pain. Take no more than 3 doses in 15 minutes., Disp: 25 tablet, Rfl: 3  •  nystatin (MYCOSTATIN) 760462 UNIT/GM ointment, Apply 1 application topically 3 (Three) Times a Day As Needed., Disp: , Rfl:   •  nystatin (MYCOSTATIN) 766540 UNIT/ML suspension, Take 5 mL by mouth 3 (Three) Times a Day As Needed., Disp: , Rfl: 1  •  phenazopyridine (PYRIDIUM) 200 MG tablet, Take 200 mg by mouth 3 (Three) Times a Day., Disp: , Rfl:   •  pregabalin (LYRICA) 75 MG capsule, Take 75 mg by mouth Daily., Disp: , Rfl:   •  rosuvastatin (CRESTOR) 10 MG tablet, Take 1 tablet by mouth Daily., Disp: 90 tablet, Rfl: 3  •  spironolactone (ALDACTONE) 25 MG tablet, Take 1 tablet by mouth Daily., Disp: , Rfl:   •  triamcinolone (KENALOG) 0.1 % cream, Apply 1 application topically Daily As Needed., Disp: , Rfl:     The following portions of the chart were reviewed and updated as appropriate: Allergies, current medications, past family history, social history, past medical history.     Review of Systems   Constitution: Positive for weakness and malaise/fatigue. Negative for chills, decreased appetite, diaphoresis, fever, night sweats, weight gain and weight loss.   HENT: Negative for congestion, hearing loss, hoarse voice and nosebleeds.    Eyes: Negative for blurred vision, visual disturbance and  "visual halos.   Cardiovascular: Positive for dyspnea on exertion. Negative for chest pain, claudication, cyanosis, irregular heartbeat, leg swelling, near-syncope, orthopnea, palpitations, paroxysmal nocturnal dyspnea and syncope.   Respiratory: Positive for cough and shortness of breath. Negative for hemoptysis, sleep disturbances due to breathing, snoring, sputum production and wheezing.    Endocrine: Positive for polyuria.   Hematologic/Lymphatic: Negative for bleeding problem. Does not bruise/bleed easily.   Skin: Negative for dry skin, itching and rash.   Musculoskeletal: Positive for gout and joint pain. Negative for arthritis, joint swelling and myalgias.   Gastrointestinal: Negative for bloating, abdominal pain, constipation, diarrhea, flatus, heartburn, hematemesis, hematochezia, melena, nausea and vomiting.   Genitourinary: Positive for dysuria and frequency. Negative for hematuria, nocturia and urgency.   Neurological: Positive for excessive daytime sleepiness. Negative for dizziness, headaches, light-headedness and loss of balance.   Psychiatric/Behavioral: Negative for depression. The patient is nervous/anxious. The patient does not have insomnia.            Objective:     Vitals:    01/05/18 1521 01/05/18 1523 01/05/18 1524 01/05/18 1611   BP: 132/63 131/66 98/55 101/74   BP Location: Right arm Left arm Left arm Left arm   Patient Position: Sitting Sitting Standing Sitting   Cuff Size: Adult      Pulse: 93 92 99    Resp: 20      Temp: 97.8 °F (36.6 °C)      TempSrc: Temporal Artery       SpO2: 99%      Weight: 104 kg (229 lb)      Height: 162.6 cm (64\")            Physical Exam   Constitutional: She is oriented to person, place, and time. She appears well-developed and well-nourished. She is active and cooperative. No distress.   HENT:   Head: Normocephalic and atraumatic.   Mouth/Throat: Oropharynx is clear and moist.   Eyes: Conjunctivae and EOM are normal. Pupils are equal, round, and reactive to " light.   Neck: Normal range of motion. Neck supple. No JVD present. No tracheal deviation present. No thyromegaly present.   Cardiovascular: Normal rate, regular rhythm, normal heart sounds and intact distal pulses.    Pulmonary/Chest: Effort normal. She has no rales.   Abdominal: Soft. Bowel sounds are normal. She exhibits no distension. There is no tenderness.   Musculoskeletal: Normal range of motion.   Neurological: She is alert and oriented to person, place, and time.   Skin: Skin is warm, dry and intact.   Psychiatric: She has a normal mood and affect. Her behavior is normal.   Nursing note and vitals reviewed.      Lab and Diagnostic Review:      Results for orders placed or performed in visit on 12/08/17   Basic Metabolic Panel   Result Value Ref Range    Glucose 103 (H) 70 - 100 mg/dL    BUN 20 9 - 23 mg/dL    Creatinine 0.70 0.60 - 1.30 mg/dL    Sodium 136 132 - 146 mmol/L    Potassium 3.7 3.5 - 5.5 mmol/L    Chloride 99 99 - 109 mmol/L    CO2 25.0 20.0 - 31.0 mmol/L    Calcium 9.1 8.7 - 10.4 mg/dL    eGFR Non African Amer 86 >60 mL/min/1.73    BUN/Creatinine Ratio 28.6 (H) 7.0 - 25.0    Anion Gap 12.0 (H) 3.0 - 11.0 mmol/L   Magnesium   Result Value Ref Range    Magnesium 1.8 1.3 - 2.7 mg/dL     Echo 11/30/17  · Left ventricular systolic function is moderately decreased. Estimated EF = 30%.  · Left atrial cavity size is borderline dilated.  · Moderate-to-severe mitral valve regurgitation is present  · Mild to moderate tricuspid valve regurgitation is present.  · RVSP(TR) 41.0 mmHg      Assessment and Plan:         1. Chronic systolic congestive heart failure  - Euvolemic  - Decrease aldactone to 25mg daily. Decrease lasix to 40mg daily    2. Essential hypertension  - Now low end of normal. Reduce meds as above    3. Coronary artery disease involving native coronary artery of native heart without angina pectoris  - No angina    4. Non-rheumatic mitral regurgitation  - No s/s of CHF    It has been a  pleasure to participate in the care of this patient.  Patient was instructed to call the [facility] with any questions, concerns, or worsening symptoms.      * Please note that portions of this note were completed with a voice recognition program. Efforts were made to edit the dictation but occasionally words are transcribed.

## 2018-01-09 DIAGNOSIS — I34.0 MITRAL VALVE INSUFFICIENCY, UNSPECIFIED ETIOLOGY: ICD-10-CM

## 2018-01-09 DIAGNOSIS — I50.22 CHRONIC SYSTOLIC HEART FAILURE (HCC): Primary | ICD-10-CM

## 2018-01-17 ENCOUNTER — DOCUMENTATION (OUTPATIENT)
Dept: CARDIAC REHAB | Facility: HOSPITAL | Age: 58
End: 2018-01-17

## 2018-01-17 NOTE — PROGRESS NOTES
Pt. Referred for Phase II Cardiac Rehab. Staff discussed benefits of exercise, program protocol, and educational material provided. Teach back verified.  Permission granted from patient for staff to fax referral information to outlying program at this time.  Staff to fax referral info to [facility].

## 2018-01-22 ENCOUNTER — TELEPHONE (OUTPATIENT)
Dept: CARDIOLOGY | Facility: CLINIC | Age: 58
End: 2018-01-22

## 2018-01-22 NOTE — TELEPHONE ENCOUNTER
Cardiac rehab called to have order sent over for cardiac rehab. Called and left VM to call heart and valve as Rosmery De Paz had ordered and they can get order faxed to them. Provided then with number

## 2018-01-25 ENCOUNTER — HOSPITAL ENCOUNTER (OUTPATIENT)
Dept: HOSPITAL 22 - PT | Age: 58
LOS: 82 days | Discharge: HOME | End: 2018-04-17
Payer: COMMERCIAL

## 2018-01-25 DIAGNOSIS — I50.9: Primary | ICD-10-CM

## 2018-01-25 PROCEDURE — 93798 PHYS/QHP OP CAR RHAB W/ECG: CPT

## 2018-02-05 ENCOUNTER — TELEPHONE (OUTPATIENT)
Dept: CARDIOLOGY | Facility: HOSPITAL | Age: 58
End: 2018-02-05

## 2018-02-05 NOTE — TELEPHONE ENCOUNTER
"----- Message from Ana Perea sent at 2/5/2018 11:25 AM EST -----  Regarding: FW: swelling  Contact: [phone]   Rosmery  I called the Patient to follow up on her phone call, she describes a 7lbs weigh in 3 days with swelling and pitting edema in her hands and feet.  She denies any SoA, orthopnea or Abdominal bloating.  Yesterday and today she has taken 60mg of lasix and 25mg of spironolactone with no relief.   (the patient reports the her  has been hospitalized and she has not been eating a \"heart healthy diet\" as she is supposed to).  Ana   ----- Message -----     From: Rufina Herrera     Sent: 2/5/2018  10:52 AM       To: Ana Perea  Subject: swelling                                         Swelling in hands, feet, legs. Been going on a couple weeks      Spoke with patient who reports no dyspnea. She was instructed to continue lasix 60 mg qd and increase aldactone back to 50 mg once daily (former dose). Patient to be seen in HF center tomorrow if no improvement in symptoms. Patient verbalized understanding.       "

## 2018-02-08 ENCOUNTER — OFFICE VISIT (OUTPATIENT)
Dept: CARDIOLOGY | Facility: HOSPITAL | Age: 58
End: 2018-02-08

## 2018-02-08 VITALS
TEMPERATURE: 97.7 F | DIASTOLIC BLOOD PRESSURE: 60 MMHG | HEART RATE: 92 BPM | BODY MASS INDEX: 39.44 KG/M2 | SYSTOLIC BLOOD PRESSURE: 110 MMHG | HEIGHT: 64 IN | RESPIRATION RATE: 18 BRPM | WEIGHT: 231 LBS | OXYGEN SATURATION: 98 %

## 2018-02-08 DIAGNOSIS — I25.10 CORONARY ARTERY DISEASE INVOLVING NATIVE CORONARY ARTERY OF NATIVE HEART WITHOUT ANGINA PECTORIS: ICD-10-CM

## 2018-02-08 DIAGNOSIS — I50.22 CHRONIC SYSTOLIC HEART FAILURE (HCC): Primary | ICD-10-CM

## 2018-02-08 DIAGNOSIS — E78.2 MIXED HYPERLIPIDEMIA: ICD-10-CM

## 2018-02-08 DIAGNOSIS — I10 ESSENTIAL HYPERTENSION: ICD-10-CM

## 2018-02-08 PROCEDURE — 99214 OFFICE O/P EST MOD 30 MIN: CPT | Performed by: NURSE PRACTITIONER

## 2018-02-08 RX ORDER — CARVEDILOL 6.25 MG/1
6.25 TABLET ORAL 2 TIMES DAILY
Qty: 60 TABLET | Refills: 11 | Status: SHIPPED | OUTPATIENT
Start: 2018-02-08 | End: 2019-02-24 | Stop reason: SDUPTHER

## 2018-02-08 RX ORDER — FLUTICASONE PROPIONATE 50 MCG
2 SPRAY, SUSPENSION (ML) NASAL DAILY PRN
COMMUNITY
End: 2022-03-10

## 2018-02-08 NOTE — PROGRESS NOTES
Encounter Date:02/08/2018      Patient ID: Lucia Stevens is a 57 y.o. female.        Subjective:     Chief Complaint: Follow-up (WEIGHT GAIN AND SHORTNESS OF AIR)     History of Present Illness patient presents to the heart and valve center today for ongoing evaluation of her NICM. She notes improvement in symptoms after recently increasing lasix. She is wearing her life vest and denies any inappropriate shocks. She is participating in cardiac rehab. She notes ongoing fatigue but notes that she has been staying at the hospital who has been very ill. She reports not following a heart healthy diet due to being in the hospital with her . She notes compliance with her medications. She does note dyspnea on exertion that improves with rest.     Patient Active Problem List   Diagnosis   • Psoriasis   • Secondary Sjogren's syndrome   • Hiatal hernia   • Obesity   • Hypertension   • Ischemic heart disease   • NAYLOR (dyspnea on exertion)   • Coronary artery disease involving native coronary artery of native heart with angina pectoris       Past Surgical History:   Procedure Laterality Date   • CARDIAC CATHETERIZATION     • CARDIAC CATHETERIZATION N/A 11/30/2017    Procedure: Left Heart Cath;  Surgeon: Galina Leonard MD;  Location: [facility] CATH INVASIVE LOCATION;  Service:    • CERVICAL DISCECTOMY ANTERIOR     • CHOLECYSTECTOMY     • CORONARY ANGIOPLASTY     • CORONARY ANGIOPLASTY WITH STENT PLACEMENT     • CORONARY ANGIOPLASTY WITH STENT PLACEMENT     • POLYPECTOMY      Colon polyps status   • RECTOVAGINAL FISTULA REPAIR         Allergies   Allergen Reactions   • Sulfa Antibiotics Anaphylaxis   • Lisinopril Other (See Comments)     Fatigue           Current Outpatient Prescriptions:   •  fluticasone (FLONASE) 50 MCG/ACT nasal spray, 1 spray by Each Nare route Daily., Disp: , Rfl:   •  allopurinol (ZYLOPRIM) 300 MG tablet, Take 300 mg by mouth Daily., Disp: , Rfl:   •  aspirin 81 MG EC tablet, Take 81 mg by mouth daily.,  Disp: , Rfl:   •  carvedilol (COREG) 3.125 MG tablet, Take 1 tablet by mouth 2 (Two) Times a Day., Disp: 60 tablet, Rfl: 11  •  chlordiazePOXIDE (LIBRIUM) 5 MG capsule, Take 5 mg by mouth 2 (Two) Times a Day., Disp: , Rfl:   •  furosemide (LASIX) 40 MG tablet, Take 1 tablet by mouth Daily. May take an additional tablet PRN for weight gain of 3lbs in one day or 5lbs in 1 wk  May take an additional tablet PRN for weight gain of 3lbs in one day or 5lbs in 1 wk), Disp: 30 tablet, Rfl:   •  Liraglutide (VICTOZA) 18 MG/3ML solution pen-injector injection, Inject 0.6 mg under the skin Daily., Disp: , Rfl:   •  losartan (COZAAR) 25 MG tablet, Take 1 tablet by mouth Daily., Disp: 60 tablet, Rfl: 3  •  nitroglycerin (NITROSTAT) 0.4 MG SL tablet, Place 1 tablet under the tongue Every 5 (Five) Minutes As Needed for Chest Pain. Take no more than 3 doses in 15 minutes., Disp: 25 tablet, Rfl: 3  •  nystatin (MYCOSTATIN) 896315 UNIT/GM ointment, Apply 1 application topically 3 (Three) Times a Day As Needed., Disp: , Rfl:   •  nystatin (MYCOSTATIN) 765913 UNIT/ML suspension, Take 5 mL by mouth 3 (Three) Times a Day As Needed., Disp: , Rfl: 1  •  pregabalin (LYRICA) 75 MG capsule, Take 75 mg by mouth 2 (Two) Times a Day., Disp: , Rfl:   •  rosuvastatin (CRESTOR) 10 MG tablet, Take 1 tablet by mouth Daily. (Patient taking differently: Take 10 mg by mouth Every Night.), Disp: 90 tablet, Rfl: 3  •  spironolactone (ALDACTONE) 25 MG tablet, Take 1 tablet by mouth Daily. (Patient taking differently: Take 50 mg by mouth Daily.), Disp: , Rfl:   •  triamcinolone (KENALOG) 0.1 % cream, Apply 1 application topically Daily As Needed., Disp: , Rfl:     The following portions of the chart were reviewed and updated as appropriate: Allergies, current medications, past family history, social history, past medical history.     Review of Systems   Constitution: Positive for malaise/fatigue and weight gain (6-7 LBS IN 1 MONTH). Negative for chills,  "decreased appetite, diaphoresis, fever, weakness, night sweats and weight loss.   HENT: Negative for congestion, hearing loss, hoarse voice and nosebleeds.    Eyes: Negative for blurred vision, visual disturbance and visual halos.   Cardiovascular: Positive for dyspnea on exertion and leg swelling. Negative for chest pain, claudication, cyanosis, irregular heartbeat, near-syncope, orthopnea, palpitations, paroxysmal nocturnal dyspnea and syncope.   Respiratory: Negative for cough, hemoptysis, shortness of breath, sleep disturbances due to breathing, snoring, sputum production and wheezing.    Endocrine: Positive for polydipsia.   Hematologic/Lymphatic: Negative for bleeding problem. Does not bruise/bleed easily.   Skin: Negative for dry skin, itching and rash.   Musculoskeletal: Positive for gout and joint pain. Negative for arthritis, falls, joint swelling and myalgias.   Gastrointestinal: Negative for bloating, abdominal pain, constipation, diarrhea, flatus, heartburn, hematemesis, hematochezia, melena, nausea and vomiting.   Genitourinary: Negative for dysuria, frequency, hematuria, nocturia and urgency.   Neurological: Positive for excessive daytime sleepiness. Negative for dizziness, headaches, light-headedness and loss of balance.   Psychiatric/Behavioral: Negative for depression. The patient does not have insomnia and is not nervous/anxious.            Objective:     Vitals:    02/08/18 0854 02/08/18 0856 02/08/18 0857   BP: 111/61 112/64 110/60   BP Location: Right arm Left arm Left arm   Patient Position: Sitting Sitting Standing   Cuff Size: Adult     Pulse: 84 85 92   Resp: 18     Temp: 97.7 °F (36.5 °C)     TempSrc: Temporal Artery      SpO2: 98%     Weight: 105 kg (231 lb)     Height: 162.6 cm (64\")           Physical Exam   Constitutional: She is oriented to person, place, and time. She appears well-developed and well-nourished. She is active and cooperative. No distress.   HENT:   Head: Normocephalic " and atraumatic.   Mouth/Throat: Oropharynx is clear and moist.   Eyes: Conjunctivae and EOM are normal. Pupils are equal, round, and reactive to light.   Neck: Normal range of motion. Neck supple. No JVD present. No tracheal deviation present. No thyromegaly present.   Cardiovascular: Normal rate, regular rhythm, normal heart sounds and intact distal pulses.    Pulmonary/Chest: Effort normal and breath sounds normal.   Abdominal: Soft. Bowel sounds are normal. She exhibits no distension. There is no tenderness.   Musculoskeletal: Normal range of motion.   Neurological: She is alert and oriented to person, place, and time.   Skin: Skin is warm, dry and intact.   Life vest in place   Psychiatric: She has a normal mood and affect. Her behavior is normal.   Nursing note and vitals reviewed.      Lab and Diagnostic Review:      Lab Results   Component Value Date    GLUCOSE 103 (H) 12/08/2017    CALCIUM 9.1 12/08/2017     12/08/2017    K 3.7 12/08/2017    CO2 25.0 12/08/2017    CL 99 12/08/2017    BUN 20 12/08/2017    CREATININE 0.70 12/08/2017    EGFRIFNONA 86 12/08/2017    BCR 28.6 (H) 12/08/2017    ANIONGAP 12.0 (H) 12/08/2017         Assessment and Plan:         1. Chronic systolic heart failure  nyha II  Continue wearing life vest  Echo in 2 weeks to assess LV function  Increase coreg 6.25 mg bid   Heart failure education today including signs and symptoms, the role of the heart failure center, daily weights, low sodium diet (less than 1500 mg per day), and daily physical activity. Reviewed HF Zones with patient and family.  Patient to continue current medications as previously ordered.     2. Essential hypertension  Well controlled  HTN Education provided today including signs and symptoms, medication management, daily blood pressure monitoring. Patient encouraged to call the Heart and Valve center with any abnormal readings.   3. Coronary artery disease involving native coronary artery of native heart without  angina pectoris  Without angina  Continue asa, coreg, crestor    4. Mixed hyperlipidemia  Currently on statin therapy    It has been a pleasure to participate in the care of this patient.  Patient was instructed to call the [facility] and Valve Center with any questions, concerns, or worsening symptoms.      * Please note that portions of this note were completed with a voice recognition program. Efforts were made to edit the dictation but occasionally words are transcribed.

## 2018-02-08 NOTE — PATIENT INSTRUCTIONS
Increase coreg to 6.25 mg twice a day  Decrease lasix to 40 mg once daily  Continue aldactone 50 mg once daily  Check bp twice a day

## 2018-02-15 ENCOUNTER — TELEPHONE (OUTPATIENT)
Dept: CARDIOLOGY | Facility: HOSPITAL | Age: 58
End: 2018-02-15

## 2018-02-15 NOTE — TELEPHONE ENCOUNTER
"----- Message from Ana Perea sent at 2/15/2018 11:18 AM EST -----  Patient reports that she feels better but feels more \"sleepy\" at times.. but overall she feels a lot better, she states \"that  now she even has energy to clean her house\".    DATE                      BP:           HR                                   BP:            HR  2/12/18      (2 pm)  108/65       95                 (10pm)        94/48         84  2/13/18    (7:30am) 108/69      77                (9:30pm )   105/68         83  2/14/18   (8:00am )  118/61     84                (7:10 pm)    108/61        81  2/15/18    (8:00am) 101/64       87    Ana  ----- Message -----     From: YEIMI Slaughter     Sent: 2/15/2018  10:50 AM       To: Ana Leonardo, will you go over bps with her? Thanks  ----- Message -----     From: YEIMI Slaughter     Sent: 2/13/2018       To: YEIMI Slaughter    Coreg 6.25  Lasix 40, aldactone 50  Review bps      "

## 2018-02-26 ENCOUNTER — HOSPITAL ENCOUNTER (OUTPATIENT)
Dept: CARDIOLOGY | Facility: HOSPITAL | Age: 58
Discharge: HOME | End: 2018-02-26
Admitting: NURSE PRACTITIONER

## 2018-02-26 DIAGNOSIS — I34.0 MITRAL VALVE INSUFFICIENCY, UNSPECIFIED ETIOLOGY: ICD-10-CM

## 2018-02-26 DIAGNOSIS — I50.22 CHRONIC SYSTOLIC HEART FAILURE (HCC): ICD-10-CM

## 2018-02-26 LAB
BH CV ECHO MEAS - AO ROOT AREA (BSA CORRECTED): 1.3
BH CV ECHO MEAS - AO ROOT AREA: 5.6 CM^2
BH CV ECHO MEAS - AO ROOT DIAM: 2.7 CM
BH CV ECHO MEAS - BSA(HAYCOCK): 2.2 M^2
BH CV ECHO MEAS - BSA: 2.1 M^2
BH CV ECHO MEAS - BZI_BMI: 39.7 KILOGRAMS/M^2
BH CV ECHO MEAS - BZI_METRIC_HEIGHT: 162.6 CM
BH CV ECHO MEAS - BZI_METRIC_WEIGHT: 104.8 KG
BH CV ECHO MEAS - CONTRAST EF (2CH): 35.5 ML/M^2
BH CV ECHO MEAS - CONTRAST EF 4CH: 27.5 ML/M^2
BH CV ECHO MEAS - EDV(CUBED): 225.1 ML
BH CV ECHO MEAS - EDV(MOD-SP2): 203 ML
BH CV ECHO MEAS - EDV(MOD-SP4): 204 ML
BH CV ECHO MEAS - EDV(TEICH): 185.8 ML
BH CV ECHO MEAS - EF(CUBED): 43.5 %
BH CV ECHO MEAS - EF(MOD-SP2): 35.5 %
BH CV ECHO MEAS - EF(MOD-SP4): 27.5 %
BH CV ECHO MEAS - EF(TEICH): 35.5 %
BH CV ECHO MEAS - ESV(CUBED): 127.1 ML
BH CV ECHO MEAS - ESV(MOD-SP2): 131 ML
BH CV ECHO MEAS - ESV(MOD-SP4): 148 ML
BH CV ECHO MEAS - ESV(TEICH): 119.8 ML
BH CV ECHO MEAS - FS: 17.3 %
BH CV ECHO MEAS - IVS/LVPW: 1
BH CV ECHO MEAS - IVSD: 1 CM
BH CV ECHO MEAS - LA DIMENSION: 4 CM
BH CV ECHO MEAS - LA/AO: 1.5
BH CV ECHO MEAS - LV DIASTOLIC VOL/BSA (35-75): 98.1 ML/M^2
BH CV ECHO MEAS - LV MASS(C)D: 257.2 GRAMS
BH CV ECHO MEAS - LV MASS(C)DI: 123.7 GRAMS/M^2
BH CV ECHO MEAS - LV MAX PG: 3.9 MMHG
BH CV ECHO MEAS - LV MEAN PG: 2.3 MMHG
BH CV ECHO MEAS - LV SYSTOLIC VOL/BSA (12-30): 71.2 ML/M^2
BH CV ECHO MEAS - LV V1 MAX: 99.1 CM/SEC
BH CV ECHO MEAS - LV V1 MEAN: 71.9 CM/SEC
BH CV ECHO MEAS - LV V1 VTI: 21 CM
BH CV ECHO MEAS - LVIDD: 6.1 CM
BH CV ECHO MEAS - LVIDS: 5 CM
BH CV ECHO MEAS - LVLD AP2: 7.5 CM
BH CV ECHO MEAS - LVLD AP4: 8.4 CM
BH CV ECHO MEAS - LVLS AP2: 6.8 CM
BH CV ECHO MEAS - LVLS AP4: 7.9 CM
BH CV ECHO MEAS - LVOT AREA (M): 3.1 CM^2
BH CV ECHO MEAS - LVOT AREA: 3.2 CM^2
BH CV ECHO MEAS - LVOT DIAM: 2 CM
BH CV ECHO MEAS - LVPWD: 1 CM
BH CV ECHO MEAS - MV A MAX VEL: 110.1 CM/SEC
BH CV ECHO MEAS - MV DEC SLOPE: 611.1 CM/SEC^2
BH CV ECHO MEAS - MV DEC TIME: 0.13 SEC
BH CV ECHO MEAS - MV E MAX VEL: 112.5 CM/SEC
BH CV ECHO MEAS - MV E/A: 1
BH CV ECHO MEAS - MV P1/2T MAX VEL: 104.3 CM/SEC
BH CV ECHO MEAS - MV P1/2T: 50 MSEC
BH CV ECHO MEAS - MVA P1/2T LCG: 2.1 CM^2
BH CV ECHO MEAS - MVA(P1/2T): 4.4 CM^2
BH CV ECHO MEAS - PA ACC SLOPE: 569.6 CM/SEC^2
BH CV ECHO MEAS - PA ACC TIME: 0.11 SEC
BH CV ECHO MEAS - PA PR(ACCEL): 29.1 MMHG
BH CV ECHO MEAS - RAP SYSTOLE: 3 MMHG
BH CV ECHO MEAS - RV MAX PG: 1.8 MMHG
BH CV ECHO MEAS - RV V1 MAX: 67.6 CM/SEC
BH CV ECHO MEAS - RVSP: 23 MMHG
BH CV ECHO MEAS - SI(CUBED): 47.1 ML/M^2
BH CV ECHO MEAS - SI(LVOT): 32.3 ML/M^2
BH CV ECHO MEAS - SI(MOD-SP2): 34.6 ML/M^2
BH CV ECHO MEAS - SI(MOD-SP4): 26.9 ML/M^2
BH CV ECHO MEAS - SI(TEICH): 31.7 ML/M^2
BH CV ECHO MEAS - SV(CUBED): 98 ML
BH CV ECHO MEAS - SV(LVOT): 67.2 ML
BH CV ECHO MEAS - SV(MOD-SP2): 72 ML
BH CV ECHO MEAS - SV(MOD-SP4): 56 ML
BH CV ECHO MEAS - SV(TEICH): 66 ML
BH CV ECHO MEAS - TAPSE (>1.6): 2 CM2
BH CV ECHO MEAS - TR MAX VEL: 221.6 CM/SEC
BH CV XLRA - RV BASE: 3.6 CM
BH CV XLRA - RV LENGTH: 7.7 CM
BH CV XLRA - RV MID: 3.4 CM
BH CV XLRA - TDI S': 8.8 CM/SEC
LV EF 2D ECHO EST: 25 %

## 2018-02-26 PROCEDURE — 93306 TTE W/DOPPLER COMPLETE: CPT | Performed by: INTERNAL MEDICINE

## 2018-02-26 PROCEDURE — 93306 TTE W/DOPPLER COMPLETE: CPT

## 2018-02-26 PROCEDURE — 25010000002 SULFUR HEXAFLUORIDE MICROSPH 60.7-25 MG RECONSTITUTED SUSPENSION: Performed by: NURSE PRACTITIONER

## 2018-02-26 RX ADMIN — SULFUR HEXAFLUORIDE 3 ML: KIT at 10:00

## 2018-02-27 ENCOUNTER — OFFICE VISIT (OUTPATIENT)
Dept: CARDIOLOGY | Facility: CLINIC | Age: 58
End: 2018-02-27

## 2018-02-27 VITALS
HEART RATE: 83 BPM | SYSTOLIC BLOOD PRESSURE: 108 MMHG | DIASTOLIC BLOOD PRESSURE: 73 MMHG | WEIGHT: 229 LBS | HEIGHT: 64 IN | BODY MASS INDEX: 39.09 KG/M2

## 2018-02-27 DIAGNOSIS — E78.5 DYSLIPIDEMIA: ICD-10-CM

## 2018-02-27 DIAGNOSIS — I10 ESSENTIAL HYPERTENSION: ICD-10-CM

## 2018-02-27 DIAGNOSIS — I25.5 ISCHEMIC CARDIOMYOPATHY: ICD-10-CM

## 2018-02-27 DIAGNOSIS — I25.119 CORONARY ARTERY DISEASE INVOLVING NATIVE CORONARY ARTERY OF NATIVE HEART WITH ANGINA PECTORIS (HCC): Primary | ICD-10-CM

## 2018-02-27 PROCEDURE — 99214 OFFICE O/P EST MOD 30 MIN: CPT | Performed by: INTERNAL MEDICINE

## 2018-02-27 NOTE — PROGRESS NOTES
Lucia Stevens  1960  [phone]  [phone]        PCP: Johan Marks MD  [address] [facility] [address]  [facility] [address]    IDENTIFICATION: A 57 y.o. female   employee of [employer] and is a resident of [place], [place].   CHIEF COMPLAINT: CAD followup.    PROBLEM LIST:  1. Ischemic heart disease:  a. GXT myocardial perfusion study, 06/07/2005, revealing a moderate sized reversible defect in anteroseptal region with diminished myocardial thickening and hypokinesis. LVEF 58%.  b. Cardiac catheterization, June 2005, Dr. Talbot, due to moderate sized reversible anteroseptal defect; LVEF 38%: JADE stenting of PDA (2.5x13 mm Cypher).  c. JADE stenting of mid-RCA, 08/08/2011: 15 mm Promus JADE; LVEF 55% to 60%.  d. 2/26/18 echo EF 25%, mild-mod MR  2. Hypertension.  3. Cardio myopathy  a. 11/30/17 echo: EF 30%, LA borderline dilated, moderate to severe MR, mild-to-moderate TR RVSP 41 mmHg  b. LifeVest initiated 11/2017  c. 2/26/18 echo: EF 25%, thin-walled ventricle, mild to moderate MR, RVSP less than 35 mmHg  4. HLD  a. 11/30/17 lipids:   HDL 29 LDL 66  5. DM  a. 11/30/17 A1c 8.2  6. Remote tobacco abuse:   a. Quit 2011  7. Hiatal hernia.  8. Sjogren’s syndrome.  9. Psoriasis.  10. Colon polyps status polypectomy.  11. Depression.  12. Surgical history:  a. Appendectomy.  b. Cholecystectomy.  c. Rectovaginal fistula repair.  d. Cervical discectomy.     Allergies  Allergies   Allergen Reactions   • Sulfa Antibiotics Anaphylaxis   • Lisinopril Other (See Comments)     Fatigue         Current Medications    Current Outpatient Prescriptions:   •  allopurinol (ZYLOPRIM) 300 MG tablet, Take 300 mg by mouth Daily., Disp: , Rfl:   •  aspirin 81 MG EC tablet, Take 81 mg by mouth daily., Disp: , Rfl:   •  carvedilol (COREG) 6.25 MG tablet, Take 1 tablet by mouth 2 (Two) Times a Day., Disp: 60 tablet, Rfl: 11  •  chlordiazePOXIDE (LIBRIUM) 5 MG capsule, Take 5 mg by mouth 2 (Two) Times a Day.,  Disp: , Rfl:   •  fluticasone (FLONASE) 50 MCG/ACT nasal spray, 1 spray by Each Nare route Daily., Disp: , Rfl:   •  furosemide (LASIX) 40 MG tablet, Take 1 tablet by mouth Daily. May take an additional tablet PRN for weight gain of 3lbs in one day or 5lbs in 1 wk (Patient taking differently: Take 60 mg by mouth Daily. May take an additional tablet PRN for weight gain of 3lbs in one day or 5lbs in 1 wk), Disp: 30 tablet, Rfl:   •  Liraglutide (VICTOZA) 18 MG/3ML solution pen-injector injection, Inject 0.6 mg under the skin Daily., Disp: , Rfl:   •  losartan (COZAAR) 25 MG tablet, Take 1 tablet by mouth Daily., Disp: 60 tablet, Rfl: 3  •  nitroglycerin (NITROSTAT) 0.4 MG SL tablet, Place 1 tablet under the tongue Every 5 (Five) Minutes As Needed for Chest Pain. Take no more than 3 doses in 15 minutes., Disp: 25 tablet, Rfl: 3  •  nystatin (MYCOSTATIN) 344948 UNIT/GM ointment, Apply 1 application topically 3 (Three) Times a Day As Needed., Disp: , Rfl:   •  nystatin (MYCOSTATIN) 237194 UNIT/ML suspension, Take 5 mL by mouth 3 (Three) Times a Day As Needed., Disp: , Rfl: 1  •  pregabalin (LYRICA) 75 MG capsule, Take 75 mg by mouth 2 (Two) Times a Day., Disp: , Rfl:   •  rosuvastatin (CRESTOR) 10 MG tablet, Take 1 tablet by mouth Daily. (Patient taking differently: Take 10 mg by mouth Every Night.), Disp: 90 tablet, Rfl: 3  •  spironolactone (ALDACTONE) 25 MG tablet, Take 1 tablet by mouth Daily. (Patient taking differently: Take 50 mg by mouth Daily.), Disp: , Rfl:   •  triamcinolone (KENALOG) 0.1 % cream, Apply 1 application topically Daily As Needed., Disp: , Rfl:     History of Present Illness     Patient is here to follow up.  She's been wearing her LifeVest for the last 3 months, and following with Rosmery JALLOH at the heart and valve center.  She had her repeat echocardiogram completed yesterday 2/26/18, which showed  EF of 25% as compared to 30% in Novemeber. She does feel less dyspneic however    ROS:  All  "systems have been reviewed and are negative with the exception of those mentioned in the HPI.    OBJECTIVE:  Vitals:    02/27/18 1347   BP: 108/73   BP Location: Left arm   Patient Position: Sitting   Pulse: 83   Weight: 104 kg (229 lb)   Height: 162.6 cm (64\")     Physical Exam   Constitutional: She appears well-developed and well-nourished.   Neck: Normal range of motion. Neck supple. No hepatojugular reflux and no JVD present. Carotid bruit is not present. No tracheal deviation present. No thyromegaly present.   Cardiovascular: Normal rate, regular rhythm, S1 normal, S2 normal, intact distal pulses and normal pulses.  PMI is not displaced.  Exam reveals no gallop, no distant heart sounds, no friction rub, no midsystolic click and no opening snap.    No murmur heard.  Pulses:       Radial pulses are 2+ on the right side, and 2+ on the left side.        Dorsalis pedis pulses are 2+ on the right side, and 2+ on the left side.        Posterior tibial pulses are 2+ on the right side, and 2+ on the left side.   Pulmonary/Chest: Effort normal and breath sounds normal. She has no wheezes. She has no rales.   Abdominal: Soft. Bowel sounds are normal. She exhibits no mass. There is no tenderness. There is no guarding.       Diagnostic Data:  Procedures      ASSESSMENT:   Diagnosis Plan   1. Coronary artery disease involving native coronary artery of native heart with angina pectoris     2. Ischemic cardiomyopathy     3. Essential hypertension     4. Dyslipidemia         PLAN:  Cad cont rf modification and medical mngmt.     Pt will need ICD. We will schedule her for this procedure. Continue current medical management otherwise w ASA< statin, BB, ARB, and spironolactone. Pt to continue to wear LifeVest until her BiV/ ICD is placed.  Needs ASAp due to potential of losing her job for which she needs for insurance.    HTN controlled at home    Hl on statin     Recommended against NSAID use. Recommended to try Tumeric for " inflammation.       Johan Marks MD, thank you for referring Ms. Stevens for evaluation.  I have forwarded my electronically generated recommendations to you for review.  Please do not hesitate to call with any questions.    Scribed for Virgilio Borden MD by Ale Sierra PA-C. 2/27/2018  1:55 PM   I, Virgilio Borden MD, personally performed the services described in this documentation as scribed by the above named individual in my presence, and it is both accurate and complete.  2/27/2018  2:20 PM      Virgilio Borden MD, FACC

## 2018-03-06 ENCOUNTER — TELEPHONE (OUTPATIENT)
Dept: CARDIOLOGY | Facility: CLINIC | Age: 58
End: 2018-03-06

## 2018-03-06 NOTE — TELEPHONE ENCOUNTER
Patient called and stated she still has not heard about setting up the procedure. Informed her that I confirmed Sandrine has her information and the MD is reviewing and they should be in touch soon. Patient is concerned if it is not soon she may need to go back to work. Advised her to give a few more days and if needed we can take to Dr Borden about her returning to work.

## 2018-03-07 ENCOUNTER — DOCUMENTATION (OUTPATIENT)
Dept: CARDIOLOGY | Facility: CLINIC | Age: 58
End: 2018-03-07

## 2018-03-07 NOTE — PROGRESS NOTES
Ok to proceed with BIV ICD per Dr. Ellison for primary prevention for ICM.    02/26/18  Echo  EF 25% thin walled LV, mild/mod MR   11/30/17  [facility]   Cath single vessel CAD, total occlusion RCA with collateral, non obstructive on left. (2011 stent to RCA, 2005 stent to PDA)   11/30/17  Echo  EF 30% mod/sev MR, mild/mod TR   06/09/17  Stress EF 63%     QRS 158ms LBBB on recent EKG, Coreg 6.25mg BID, Losartan 25mg daily for 90+ days.

## 2018-03-13 ENCOUNTER — PREP FOR SURGERY (OUTPATIENT)
Dept: OTHER | Facility: HOSPITAL | Age: 58
End: 2018-03-13

## 2018-03-13 DIAGNOSIS — I25.5 ISCHEMIC CARDIOMYOPATHY: Primary | ICD-10-CM

## 2018-03-13 RX ORDER — NITROGLYCERIN 0.4 MG/1
0.4 TABLET SUBLINGUAL
Status: CANCELLED | OUTPATIENT
Start: 2018-03-13

## 2018-03-13 RX ORDER — ACETAMINOPHEN 325 MG/1
650 TABLET ORAL EVERY 4 HOURS PRN
Status: CANCELLED | OUTPATIENT
Start: 2018-03-13

## 2018-03-13 RX ORDER — PROMETHAZINE HYDROCHLORIDE 25 MG/ML
12.5 INJECTION, SOLUTION INTRAMUSCULAR; INTRAVENOUS EVERY 4 HOURS PRN
Status: CANCELLED | OUTPATIENT
Start: 2018-03-13

## 2018-03-13 RX ORDER — SODIUM CHLORIDE 0.9 % (FLUSH) 0.9 %
1-10 SYRINGE (ML) INJECTION AS NEEDED
Status: CANCELLED | OUTPATIENT
Start: 2018-03-13

## 2018-03-13 RX ORDER — CEFAZOLIN SODIUM IN 0.9 % NACL 2 G/100 ML
2 PLASTIC BAG, INJECTION (ML) INTRAVENOUS
Status: CANCELLED | OUTPATIENT
Start: 2018-03-14 | End: 2018-03-15

## 2018-03-26 ENCOUNTER — APPOINTMENT (OUTPATIENT)
Dept: CARDIOLOGY | Facility: HOSPITAL | Age: 58
End: 2018-03-26

## 2018-03-27 ENCOUNTER — HOSPITAL ENCOUNTER (OUTPATIENT)
Dept: CARDIOLOGY | Facility: HOSPITAL | Age: 58
Discharge: HOME | End: 2018-03-27
Attending: INTERNAL MEDICINE | Admitting: INTERNAL MEDICINE

## 2018-03-27 ENCOUNTER — APPOINTMENT (OUTPATIENT)
Dept: PREADMISSION TESTING | Facility: HOSPITAL | Age: 58
End: 2018-03-27

## 2018-03-27 DIAGNOSIS — I42.8 OTHER CARDIOMYOPATHY (HCC): ICD-10-CM

## 2018-03-27 DIAGNOSIS — I42.8 OTHER CARDIOMYOPATHY (HCC): Primary | ICD-10-CM

## 2018-03-27 DIAGNOSIS — I25.5 ISCHEMIC CARDIOMYOPATHY: ICD-10-CM

## 2018-03-27 LAB
ANION GAP SERPL CALCULATED.3IONS-SCNC: 5 MMOL/L (ref 3–11)
BUN BLD-MCNC: 25 MG/DL (ref 9–23)
BUN/CREAT SERPL: 31.3 (ref 7–25)
CALCIUM SPEC-SCNC: 9.1 MG/DL (ref 8.7–10.4)
CHLORIDE SERPL-SCNC: 105 MMOL/L (ref 99–109)
CO2 SERPL-SCNC: 28 MMOL/L (ref 20–31)
CREAT BLD-MCNC: 0.8 MG/DL (ref 0.6–1.3)
DEPRECATED RDW RBC AUTO: 49.2 FL (ref 37–54)
ERYTHROCYTE [DISTWIDTH] IN BLOOD BY AUTOMATED COUNT: 14.8 % (ref 11.3–14.5)
GFR SERPL CREATININE-BSD FRML MDRD: 74 ML/MIN/1.73
GLUCOSE BLD-MCNC: 116 MG/DL (ref 70–100)
HBA1C MFR BLD: 7.3 % (ref 4.8–5.6)
HCT VFR BLD AUTO: 37.1 % (ref 34.5–44)
HGB BLD-MCNC: 11.9 G/DL (ref 11.5–15.5)
INR PPP: 1.03 (ref 0.91–1.09)
LV EF 2D ECHO EST: 20 %
MAXIMAL PREDICTED HEART RATE: 162 BPM
MCH RBC QN AUTO: 29.3 PG (ref 27–31)
MCHC RBC AUTO-ENTMCNC: 32.1 G/DL (ref 32–36)
MCV RBC AUTO: 91.4 FL (ref 80–99)
PLATELET # BLD AUTO: 212 10*3/MM3 (ref 150–450)
PMV BLD AUTO: 9.5 FL (ref 6–12)
POTASSIUM BLD-SCNC: 4.2 MMOL/L (ref 3.5–5.5)
PROTHROMBIN TIME: 10.8 SECONDS (ref 9.6–11.5)
RBC # BLD AUTO: 4.06 10*6/MM3 (ref 3.89–5.14)
SODIUM BLD-SCNC: 138 MMOL/L (ref 132–146)
STRESS TARGET HR: 138 BPM
WBC NRBC COR # BLD: 7.03 10*3/MM3 (ref 3.5–10.8)

## 2018-03-27 PROCEDURE — 36415 COLL VENOUS BLD VENIPUNCTURE: CPT

## 2018-03-27 PROCEDURE — 83036 HEMOGLOBIN GLYCOSYLATED A1C: CPT | Performed by: NURSE PRACTITIONER

## 2018-03-27 PROCEDURE — 85610 PROTHROMBIN TIME: CPT | Performed by: NURSE PRACTITIONER

## 2018-03-27 PROCEDURE — 80048 BASIC METABOLIC PNL TOTAL CA: CPT | Performed by: NURSE PRACTITIONER

## 2018-03-27 PROCEDURE — 93306 TTE W/DOPPLER COMPLETE: CPT | Performed by: INTERNAL MEDICINE

## 2018-03-27 PROCEDURE — 93306 TTE W/DOPPLER COMPLETE: CPT

## 2018-03-27 PROCEDURE — 85027 COMPLETE CBC AUTOMATED: CPT | Performed by: NURSE PRACTITIONER

## 2018-03-27 NOTE — DISCHARGE INSTRUCTIONS
The following instructions given during Pre Admission Testing visit:    Do not eat or drink anything after MN except for sips of water with your a.m. Prescription meds unless otherwise instructed by your physician.    Glasses and jewelry may be worn, but dentures must be removed prior to cath/procedure.    Leave any items you consider valuable at home.    Family members may wait in [facility] waiting area during procedure.    Bring all medications in their original containers the day of procedure.    Bring photo ID and insurance cards on the day of procedure.    Need to make arrangements for transportation prior to discharge.    The following handouts were given:     Heart Cath pathway (if applicable)   Cardiac Cath booklet published by [employer]    OR appropriate [facility] procedure booklet    If applicable, pt instructed to bring CPAP mask and tubing the day of procedure.

## 2018-03-27 NOTE — PAT
Patient to apply to surgical area (as instructed) the night before procedure and the AM of procedure.

## 2018-03-28 ENCOUNTER — HOSPITAL ENCOUNTER (OUTPATIENT)
Facility: HOSPITAL | Age: 58
Discharge: HOME | End: 2018-03-29
Attending: INTERNAL MEDICINE | Admitting: INTERNAL MEDICINE

## 2018-03-28 DIAGNOSIS — I25.5 ISCHEMIC CARDIOMYOPATHY: ICD-10-CM

## 2018-03-28 LAB
GLUCOSE BLDC GLUCOMTR-MCNC: 108 MG/DL (ref 70–130)
GLUCOSE BLDC GLUCOMTR-MCNC: 145 MG/DL (ref 70–130)
GLUCOSE BLDC GLUCOMTR-MCNC: 156 MG/DL (ref 70–130)

## 2018-03-28 PROCEDURE — 99152 MOD SED SAME PHYS/QHP 5/>YRS: CPT | Performed by: INTERNAL MEDICINE

## 2018-03-28 PROCEDURE — C1730 CATH, EP, 19 OR FEW ELECT: HCPCS | Performed by: INTERNAL MEDICINE

## 2018-03-28 PROCEDURE — 63710000001 INSULIN LISPRO (HUMAN) PER 5 UNITS: Performed by: NURSE PRACTITIONER

## 2018-03-28 PROCEDURE — 25010000003 CEFAZOLIN IN DEXTROSE 2-4 GM/100ML-% SOLUTION: Performed by: NURSE PRACTITIONER

## 2018-03-28 PROCEDURE — 25010000002 MIDAZOLAM PER 1 MG: Performed by: INTERNAL MEDICINE

## 2018-03-28 PROCEDURE — C1898 LEAD, PMKR, OTHER THAN TRANS: HCPCS | Performed by: INTERNAL MEDICINE

## 2018-03-28 PROCEDURE — 25010000002 FENTANYL CITRATE (PF) 100 MCG/2ML SOLUTION: Performed by: INTERNAL MEDICINE

## 2018-03-28 PROCEDURE — 25010000003 CEFAZOLIN IN DEXTROSE 2-4 GM/100ML-% SOLUTION: Performed by: INTERNAL MEDICINE

## 2018-03-28 PROCEDURE — C1894 INTRO/SHEATH, NON-LASER: HCPCS | Performed by: INTERNAL MEDICINE

## 2018-03-28 PROCEDURE — C1900 LEAD, CORONARY VENOUS: HCPCS | Performed by: INTERNAL MEDICINE

## 2018-03-28 PROCEDURE — 82962 GLUCOSE BLOOD TEST: CPT

## 2018-03-28 PROCEDURE — 33249 INSJ/RPLCMT DEFIB W/LEAD(S): CPT | Performed by: INTERNAL MEDICINE

## 2018-03-28 PROCEDURE — C1882 AICD, OTHER THAN SING/DUAL: HCPCS | Performed by: INTERNAL MEDICINE

## 2018-03-28 PROCEDURE — C1777 LEAD, AICD, ENDO SINGLE COIL: HCPCS | Performed by: INTERNAL MEDICINE

## 2018-03-28 PROCEDURE — 93641 EP EVL 1/2CHMB PAC CVDFB TST: CPT | Performed by: INTERNAL MEDICINE

## 2018-03-28 PROCEDURE — C1892 INTRO/SHEATH,FIXED,PEEL-AWAY: HCPCS | Performed by: INTERNAL MEDICINE

## 2018-03-28 PROCEDURE — 25010000002 ONDANSETRON PER 1 MG: Performed by: INTERNAL MEDICINE

## 2018-03-28 PROCEDURE — 33225 L VENTRIC PACING LEAD ADD-ON: CPT | Performed by: INTERNAL MEDICINE

## 2018-03-28 PROCEDURE — 93010 ELECTROCARDIOGRAM REPORT: CPT | Performed by: INTERNAL MEDICINE

## 2018-03-28 PROCEDURE — G0378 HOSPITAL OBSERVATION PER HR: HCPCS

## 2018-03-28 PROCEDURE — C1769 GUIDE WIRE: HCPCS | Performed by: INTERNAL MEDICINE

## 2018-03-28 PROCEDURE — 93005 ELECTROCARDIOGRAM TRACING: CPT | Performed by: INTERNAL MEDICINE

## 2018-03-28 DEVICE — PACE/SENSE LEAD
Type: IMPLANTABLE DEVICE | Status: FUNCTIONAL
Brand: ACUITY™ X4 SPIRAL L

## 2018-03-28 DEVICE — STEROID-ELUTING BIPOLAR PACE/SENSE AND DEFIBRILLATION LEAD
Type: IMPLANTABLE DEVICE | Status: FUNCTIONAL
Brand: ENDOTAK RELIANCE® SG

## 2018-03-28 DEVICE — IMPLANTABLE DEVICE: Type: IMPLANTABLE DEVICE | Status: FUNCTIONAL

## 2018-03-28 DEVICE — CARDIAC RESYNCHRONIZATION THERAPY DEFIBRILLATOR
Type: IMPLANTABLE DEVICE | Status: FUNCTIONAL
Brand: VIGILANT™ X4 CRT-D

## 2018-03-28 RX ORDER — FENTANYL CITRATE 50 UG/ML
INJECTION, SOLUTION INTRAMUSCULAR; INTRAVENOUS AS NEEDED
Status: DISCONTINUED | OUTPATIENT
Start: 2018-03-28 | End: 2018-03-28 | Stop reason: HOSPADM

## 2018-03-28 RX ORDER — ROSUVASTATIN CALCIUM 10 MG/1
10 TABLET, COATED ORAL NIGHTLY
Status: DISCONTINUED | OUTPATIENT
Start: 2018-03-28 | End: 2018-03-29 | Stop reason: HOSPADM

## 2018-03-28 RX ORDER — CEFAZOLIN SODIUM 2 G/100ML
2 INJECTION, SOLUTION INTRAVENOUS
Status: COMPLETED | OUTPATIENT
Start: 2018-03-28 | End: 2018-03-28

## 2018-03-28 RX ORDER — CEFAZOLIN SODIUM 2 G/100ML
2 INJECTION, SOLUTION INTRAVENOUS EVERY 8 HOURS
Status: COMPLETED | OUTPATIENT
Start: 2018-03-28 | End: 2018-03-29

## 2018-03-28 RX ORDER — SODIUM CHLORIDE 0.9 % (FLUSH) 0.9 %
1-10 SYRINGE (ML) INJECTION AS NEEDED
Status: DISCONTINUED | OUTPATIENT
Start: 2018-03-28 | End: 2018-03-29 | Stop reason: HOSPADM

## 2018-03-28 RX ORDER — NICOTINE POLACRILEX 4 MG
15 LOZENGE BUCCAL
Status: DISCONTINUED | OUTPATIENT
Start: 2018-03-28 | End: 2018-03-29 | Stop reason: HOSPADM

## 2018-03-28 RX ORDER — PREGABALIN 75 MG/1
75 CAPSULE ORAL 2 TIMES DAILY
Status: DISCONTINUED | OUTPATIENT
Start: 2018-03-28 | End: 2018-03-29 | Stop reason: HOSPADM

## 2018-03-28 RX ORDER — NITROGLYCERIN 0.4 MG/1
0.4 TABLET SUBLINGUAL
Status: DISCONTINUED | OUTPATIENT
Start: 2018-03-28 | End: 2018-03-28 | Stop reason: HOSPADM

## 2018-03-28 RX ORDER — ASPIRIN 81 MG/1
81 TABLET ORAL DAILY
Status: DISCONTINUED | OUTPATIENT
Start: 2018-03-29 | End: 2018-03-29 | Stop reason: HOSPADM

## 2018-03-28 RX ORDER — SODIUM CHLORIDE 0.9 % (FLUSH) 0.9 %
1-10 SYRINGE (ML) INJECTION AS NEEDED
Status: DISCONTINUED | OUTPATIENT
Start: 2018-03-28 | End: 2018-03-28 | Stop reason: HOSPADM

## 2018-03-28 RX ORDER — MIDAZOLAM HYDROCHLORIDE 1 MG/ML
INJECTION INTRAMUSCULAR; INTRAVENOUS AS NEEDED
Status: DISCONTINUED | OUTPATIENT
Start: 2018-03-28 | End: 2018-03-28 | Stop reason: HOSPADM

## 2018-03-28 RX ORDER — SPIRONOLACTONE 25 MG/1
25 TABLET ORAL 2 TIMES DAILY
Status: DISCONTINUED | OUTPATIENT
Start: 2018-03-29 | End: 2018-03-29 | Stop reason: HOSPADM

## 2018-03-28 RX ORDER — ONDANSETRON 2 MG/ML
4 INJECTION INTRAMUSCULAR; INTRAVENOUS EVERY 6 HOURS PRN
Status: DISCONTINUED | OUTPATIENT
Start: 2018-03-28 | End: 2018-03-29 | Stop reason: HOSPADM

## 2018-03-28 RX ORDER — DEXTROSE MONOHYDRATE 25 G/50ML
25 INJECTION, SOLUTION INTRAVENOUS
Status: DISCONTINUED | OUTPATIENT
Start: 2018-03-28 | End: 2018-03-29 | Stop reason: HOSPADM

## 2018-03-28 RX ORDER — FUROSEMIDE 40 MG/1
40 TABLET ORAL DAILY
Status: DISCONTINUED | OUTPATIENT
Start: 2018-03-29 | End: 2018-03-29 | Stop reason: HOSPADM

## 2018-03-28 RX ORDER — OXYCODONE HYDROCHLORIDE AND ACETAMINOPHEN 5; 325 MG/1; MG/1
1 TABLET ORAL EVERY 4 HOURS PRN
Status: DISCONTINUED | OUTPATIENT
Start: 2018-03-28 | End: 2018-03-29 | Stop reason: HOSPADM

## 2018-03-28 RX ORDER — LOSARTAN POTASSIUM 25 MG/1
25 TABLET ORAL DAILY
Status: DISCONTINUED | OUTPATIENT
Start: 2018-03-28 | End: 2018-03-29 | Stop reason: HOSPADM

## 2018-03-28 RX ORDER — ALLOPURINOL 300 MG/1
300 TABLET ORAL DAILY
Status: DISCONTINUED | OUTPATIENT
Start: 2018-03-28 | End: 2018-03-29 | Stop reason: HOSPADM

## 2018-03-28 RX ORDER — FLUTICASONE PROPIONATE 50 MCG
2 SPRAY, SUSPENSION (ML) NASAL DAILY
Status: DISCONTINUED | OUTPATIENT
Start: 2018-03-28 | End: 2018-03-29 | Stop reason: HOSPADM

## 2018-03-28 RX ORDER — CARVEDILOL 6.25 MG/1
6.25 TABLET ORAL 2 TIMES DAILY
Status: DISCONTINUED | OUTPATIENT
Start: 2018-03-28 | End: 2018-03-29 | Stop reason: HOSPADM

## 2018-03-28 RX ORDER — LIDOCAINE HYDROCHLORIDE 10 MG/ML
INJECTION, SOLUTION INFILTRATION; PERINEURAL AS NEEDED
Status: DISCONTINUED | OUTPATIENT
Start: 2018-03-28 | End: 2018-03-28 | Stop reason: HOSPADM

## 2018-03-28 RX ORDER — ACETAMINOPHEN 325 MG/1
650 TABLET ORAL EVERY 4 HOURS PRN
Status: DISCONTINUED | OUTPATIENT
Start: 2018-03-28 | End: 2018-03-28 | Stop reason: HOSPADM

## 2018-03-28 RX ORDER — CHLORDIAZEPOXIDE HYDROCHLORIDE 5 MG/1
5 CAPSULE, GELATIN COATED ORAL 2 TIMES DAILY
Status: DISCONTINUED | OUTPATIENT
Start: 2018-03-28 | End: 2018-03-29 | Stop reason: HOSPADM

## 2018-03-28 RX ORDER — PROMETHAZINE HYDROCHLORIDE 25 MG/ML
12.5 INJECTION, SOLUTION INTRAMUSCULAR; INTRAVENOUS EVERY 4 HOURS PRN
Status: DISCONTINUED | OUTPATIENT
Start: 2018-03-28 | End: 2018-03-28 | Stop reason: HOSPADM

## 2018-03-28 RX ORDER — ONDANSETRON 2 MG/ML
INJECTION INTRAMUSCULAR; INTRAVENOUS AS NEEDED
Status: DISCONTINUED | OUTPATIENT
Start: 2018-03-28 | End: 2018-03-28 | Stop reason: HOSPADM

## 2018-03-28 RX ORDER — BUPIVACAINE HYDROCHLORIDE 5 MG/ML
INJECTION, SOLUTION PERINEURAL AS NEEDED
Status: DISCONTINUED | OUTPATIENT
Start: 2018-03-28 | End: 2018-03-28 | Stop reason: HOSPADM

## 2018-03-28 RX ADMIN — PREGABALIN 75 MG: 75 CAPSULE ORAL at 22:33

## 2018-03-28 RX ADMIN — CEFAZOLIN SODIUM 2 G: 2 INJECTION, SOLUTION INTRAVENOUS at 15:00

## 2018-03-28 RX ADMIN — ROSUVASTATIN CALCIUM 10 MG: 10 TABLET ORAL at 19:46

## 2018-03-28 RX ADMIN — CARVEDILOL 6.25 MG: 6.25 TABLET, FILM COATED ORAL at 19:46

## 2018-03-28 RX ADMIN — FLUTICASONE PROPIONATE 2 SPRAY: 50 SPRAY, METERED NASAL at 19:45

## 2018-03-28 RX ADMIN — CEFAZOLIN SODIUM 2 G: 2 INJECTION, SOLUTION INTRAVENOUS at 19:45

## 2018-03-28 RX ADMIN — ALLOPURINOL 300 MG: 300 TABLET ORAL at 19:46

## 2018-03-28 RX ADMIN — CHLORDIAZEPOXIDE HYDROCHLORIDE 5 MG: 5 CAPSULE ORAL at 22:33

## 2018-03-28 RX ADMIN — LOSARTAN POTASSIUM 25 MG: 25 TABLET, FILM COATED ORAL at 19:47

## 2018-03-29 ENCOUNTER — APPOINTMENT (OUTPATIENT)
Dept: GENERAL RADIOLOGY | Facility: HOSPITAL | Age: 58
End: 2018-03-29

## 2018-03-29 VITALS
WEIGHT: 231.92 LBS | SYSTOLIC BLOOD PRESSURE: 104 MMHG | DIASTOLIC BLOOD PRESSURE: 69 MMHG | BODY MASS INDEX: 39.6 KG/M2 | TEMPERATURE: 97.4 F | HEIGHT: 64 IN | HEART RATE: 74 BPM | RESPIRATION RATE: 16 BRPM | OXYGEN SATURATION: 97 %

## 2018-03-29 LAB — GLUCOSE BLDC GLUCOMTR-MCNC: 117 MG/DL (ref 70–130)

## 2018-03-29 PROCEDURE — 93005 ELECTROCARDIOGRAM TRACING: CPT | Performed by: INTERNAL MEDICINE

## 2018-03-29 PROCEDURE — 25010000003 CEFAZOLIN IN DEXTROSE 2-4 GM/100ML-% SOLUTION: Performed by: INTERNAL MEDICINE

## 2018-03-29 PROCEDURE — 71046 X-RAY EXAM CHEST 2 VIEWS: CPT

## 2018-03-29 PROCEDURE — 82962 GLUCOSE BLOOD TEST: CPT

## 2018-03-29 PROCEDURE — 99024 POSTOP FOLLOW-UP VISIT: CPT | Performed by: INTERNAL MEDICINE

## 2018-03-29 PROCEDURE — 93010 ELECTROCARDIOGRAM REPORT: CPT | Performed by: INTERNAL MEDICINE

## 2018-03-29 PROCEDURE — G0378 HOSPITAL OBSERVATION PER HR: HCPCS

## 2018-03-29 RX ADMIN — SPIRONOLACTONE 25 MG: 25 TABLET, FILM COATED ORAL at 09:48

## 2018-03-29 RX ADMIN — CHLORDIAZEPOXIDE HYDROCHLORIDE 5 MG: 5 CAPSULE ORAL at 09:48

## 2018-03-29 RX ADMIN — CARVEDILOL 6.25 MG: 6.25 TABLET, FILM COATED ORAL at 09:48

## 2018-03-29 RX ADMIN — FUROSEMIDE 40 MG: 40 TABLET ORAL at 09:48

## 2018-03-29 RX ADMIN — CEFAZOLIN SODIUM 2 G: 2 INJECTION, SOLUTION INTRAVENOUS at 03:37

## 2018-03-29 RX ADMIN — PREGABALIN 75 MG: 75 CAPSULE ORAL at 09:48

## 2018-04-03 ENCOUNTER — TELEPHONE (OUTPATIENT)
Dept: CARDIOLOGY | Facility: CLINIC | Age: 58
End: 2018-04-03

## 2018-04-03 ENCOUNTER — OFFICE VISIT (OUTPATIENT)
Dept: CARDIOLOGY | Facility: HOSPITAL | Age: 58
End: 2018-04-03

## 2018-04-03 ENCOUNTER — CLINICAL SUPPORT (OUTPATIENT)
Dept: CARDIOLOGY | Facility: CLINIC | Age: 58
End: 2018-04-03

## 2018-04-03 VITALS
HEART RATE: 85 BPM | OXYGEN SATURATION: 100 % | TEMPERATURE: 98.2 F | DIASTOLIC BLOOD PRESSURE: 54 MMHG | SYSTOLIC BLOOD PRESSURE: 107 MMHG | WEIGHT: 231 LBS | HEIGHT: 64 IN | RESPIRATION RATE: 16 BRPM | BODY MASS INDEX: 39.44 KG/M2

## 2018-04-03 DIAGNOSIS — I25.10 CORONARY ARTERY DISEASE INVOLVING NATIVE CORONARY ARTERY OF NATIVE HEART WITHOUT ANGINA PECTORIS: ICD-10-CM

## 2018-04-03 DIAGNOSIS — I34.0 NON-RHEUMATIC MITRAL REGURGITATION: ICD-10-CM

## 2018-04-03 DIAGNOSIS — I42.9 CARDIOMYOPATHY, UNSPECIFIED TYPE (HCC): Primary | ICD-10-CM

## 2018-04-03 DIAGNOSIS — I50.22 CHRONIC SYSTOLIC CONGESTIVE HEART FAILURE (HCC): Primary | ICD-10-CM

## 2018-04-03 DIAGNOSIS — I95.2 HYPOTENSION DUE TO DRUGS: ICD-10-CM

## 2018-04-03 PROCEDURE — 99024 POSTOP FOLLOW-UP VISIT: CPT | Performed by: INTERNAL MEDICINE

## 2018-04-03 PROCEDURE — 99214 OFFICE O/P EST MOD 30 MIN: CPT | Performed by: NURSE PRACTITIONER

## 2018-04-03 RX ORDER — LOSARTAN POTASSIUM 25 MG/1
TABLET ORAL
Qty: 60 TABLET | Refills: 3
Start: 2018-04-03 | End: 2019-01-14 | Stop reason: SDUPTHER

## 2018-04-03 NOTE — TELEPHONE ENCOUNTER
Patient called back to see if she needs to come in today to be seen. Per Dr. Ellison she needs to have a device check today and f/u with Dr. Borden for BP issues. Dr. Borden is not in the office today so she is going to the [facility] today at 1:45.

## 2018-04-03 NOTE — TELEPHONE ENCOUNTER
"Patient had a BIV ICD placed on 3/28/18. She has been experiencing \"a twitching sensation on the left side of her chest, around her breast and back.\"She has been short of breath with BP of 77/44 (in the left arm) and 96/60 (in the right arm.)She said that she had called Lesley with research as well this morning b/c she had questions about the home monitor. She said that Lesley was going to talk to a rep with [employer] and Dr. Ellison. I instructed the patient to call me back if she does not hear anything back this morning.  "

## 2018-04-03 NOTE — PROGRESS NOTES
Encounter Date:04/03/2018      Patient ID: Lucia Stevens is a 57 y.o. female.        Subjective:     Chief Complaint: Follow-up   SHF, hypotension  History of Present Illness patient presents to the office today for ongoing evaluation of her chronic systolic heart failure and recent hypotension. Patient underwent BIV ICD implantation on 3/28/18. She notes that she had been experiencing diaphragmatic stimulation and has been evaluated by research today and changes were made to her device per device rep. She also had her wound check prior to this appt. Incision left infraclavicular space, CDI, open to air.   She reports worsening fatigue over the last few days and notes that she does not have energy to do anything. She notes daytime sleepiness. She presents today with her home hr,bp and weight log. Weight has been stable, hr 80s and bps 77//58. Patient notes significant dizziness when bp is less than 100.     Patient Active Problem List   Diagnosis   • Psoriasis   • Secondary Sjogren's syndrome   • Hiatal hernia   • Obesity   • Hypertension   • Ischemic heart disease   • NAYLOR (dyspnea on exertion)   • Coronary artery disease involving native coronary artery of native heart with angina pectoris   • Ischemic cardiomyopathy       Past Surgical History:   Procedure Laterality Date   • CARDIAC CATHETERIZATION     • CARDIAC CATHETERIZATION N/A 11/30/2017    Procedure: Left Heart Cath;  Surgeon: Galina Leonard MD;  Location:  [facility];  Service:    • CARDIAC ELECTROPHYSIOLOGY PROCEDURE N/A 3/28/2018    Procedure: Device Implant BiV ICD;  Surgeon: Sarbjit Ellison DO;  Location:  [facility];  Service: Cardiovascular   • CERVICAL DISCECTOMY ANTERIOR     • CHOLECYSTECTOMY     • CORONARY ANGIOPLASTY     • CORONARY ANGIOPLASTY WITH STENT PLACEMENT     • CORONARY ANGIOPLASTY WITH STENT PLACEMENT     • CORONARY STENT PLACEMENT     • OTHER SURGICAL HISTORY      growth removed from small  intestine as a child   • POLYPECTOMY      Colon polyps status   • RECTOVAGINAL FISTULA REPAIR     • TONSILLECTOMY         Allergies   Allergen Reactions   • Sulfa Antibiotics Anaphylaxis         Current Outpatient Prescriptions:   •  allopurinol (ZYLOPRIM) 300 MG tablet, Take 300 mg by mouth Daily., Disp: , Rfl:   •  aspirin 81 MG EC tablet, Take 81 mg by mouth daily., Disp: , Rfl:   •  carvedilol (COREG) 6.25 MG tablet, Take 1 tablet by mouth 2 (Two) Times a Day., Disp: 60 tablet, Rfl: 11  •  chlordiazePOXIDE (LIBRIUM) 5 MG capsule, Take 5 mg by mouth 2 (Two) Times a Day., Disp: , Rfl:   •  fluticasone (FLONASE) 50 MCG/ACT nasal spray, 2 sprays by Each Nare route Daily., Disp: , Rfl:   •  furosemide (LASIX) 40 MG tablet, Take 1 tablet by mouth Daily. May take an additional tablet PRN for weight gain of 3lbs in one day or 5lbs in 1 wk, Disp: 30 tablet, Rfl:   •  Liraglutide (VICTOZA) 18 MG/3ML solution pen-injector injection, Inject 1.2 mg under the skin Daily., Disp: , Rfl:   •  losartan (COZAAR) 25 MG tablet, Take 1 tablet by mouth Daily., Disp: 60 tablet, Rfl: 3  •  nitroglycerin (NITROSTAT) 0.4 MG SL tablet, Place 1 tablet under the tongue Every 5 (Five) Minutes As Needed for Chest Pain. Take no more than 3 doses in 15 minutes., Disp: 25 tablet, Rfl: 3  •  nystatin (MYCOSTATIN) 213673 UNIT/GM ointment, Apply 1 application topically 3 (Three) Times a Day As Needed., Disp: , Rfl:   •  nystatin (MYCOSTATIN) 370103 UNIT/ML suspension, Take 5 mL by mouth 3 (Three) Times a Day As Needed., Disp: , Rfl: 1  •  pregabalin (LYRICA) 75 MG capsule, Take 75 mg by mouth 2 (Two) Times a Day., Disp: , Rfl:   •  rosuvastatin (CRESTOR) 10 MG tablet, Take 1 tablet by mouth Daily. (Patient taking differently: Take 10 mg by mouth Every Night.), Disp: 90 tablet, Rfl: 3  •  spironolactone (ALDACTONE) 25 MG tablet, Take 1 tablet by mouth Daily. (Patient taking differently: Take 25 mg by mouth 2 (Two) Times a Day.), Disp: , Rfl:   •   triamcinolone (KENALOG) 0.1 % cream, Apply 1 application topically Daily As Needed., Disp: , Rfl:     The following portions of the chart were reviewed and updated as appropriate: Allergies, current medications, past family history, social history, past medical history.     Review of Systems   Constitution: Positive for weakness and malaise/fatigue. Negative for chills, decreased appetite, diaphoresis, fever, night sweats, weight gain and weight loss.   HENT: Positive for congestion. Negative for hearing loss, hoarse voice and nosebleeds.    Eyes: Negative for blurred vision, visual disturbance and visual halos.   Cardiovascular: Positive for dyspnea on exertion. Negative for chest pain, claudication, cyanosis, irregular heartbeat, leg swelling, near-syncope, orthopnea, palpitations, paroxysmal nocturnal dyspnea and syncope.   Respiratory: Positive for shortness of breath. Negative for cough, hemoptysis, sleep disturbances due to breathing, snoring, sputum production and wheezing.    Endocrine: Positive for polydipsia.   Hematologic/Lymphatic: Negative for bleeding problem. Does not bruise/bleed easily.   Skin: Negative for dry skin, itching and rash.   Musculoskeletal: Positive for gout and joint pain. Negative for arthritis, joint swelling and myalgias.   Gastrointestinal: Positive for nausea. Negative for bloating, abdominal pain, constipation, diarrhea, flatus, heartburn, hematemesis, hematochezia, melena and vomiting.   Genitourinary: Negative for dysuria, frequency, hematuria, nocturia and urgency.   Neurological: Positive for excessive daytime sleepiness and light-headedness. Negative for dizziness, headaches and loss of balance.   Psychiatric/Behavioral: Negative for depression. The patient does not have insomnia and is not nervous/anxious.            Objective:     Vitals:    04/03/18 1355 04/03/18 1356 04/03/18 1359   BP: 105/56 113/56 107/54   BP Location: Right arm Left arm Left arm   Patient Position:  "Sitting Sitting Standing   Pulse: 80 79 85   Resp: 16     Temp: 98.2 °F (36.8 °C)     TempSrc: Temporal Artery      SpO2: 100%     Weight: 105 kg (231 lb)     Height: 162.6 cm (64\")           Physical Exam   Constitutional: She is oriented to person, place, and time. She appears well-developed and well-nourished. She is active and cooperative. No distress.   HENT:   Head: Normocephalic and atraumatic.   Mouth/Throat: Oropharynx is clear and moist.   Eyes: Conjunctivae and EOM are normal. Pupils are equal, round, and reactive to light.   Neck: Normal range of motion. Neck supple. No JVD present. No tracheal deviation present. No thyromegaly present.   Cardiovascular: Normal rate, regular rhythm, normal heart sounds and intact distal pulses.    Pulmonary/Chest: Effort normal and breath sounds normal.   Abdominal: Soft. Bowel sounds are normal. She exhibits no distension. There is no tenderness.   Musculoskeletal: Normal range of motion.   Neurological: She is alert and oriented to person, place, and time.   Skin: Skin is warm, dry and intact.   Incision to left infraclavicular, cdi, no erythema, ecchymosis noted   Psychiatric: She has a normal mood and affect. Her behavior is normal.   Nursing note and vitals reviewed.      Lab and Diagnostic Review:      Lab Results   Component Value Date    GLUCOSE 116 (H) 03/27/2018    CALCIUM 9.1 03/27/2018     03/27/2018    K 4.2 03/27/2018    CO2 28.0 03/27/2018     03/27/2018    BUN 25 (H) 03/27/2018    CREATININE 0.80 03/27/2018    EGFRIFNONA 74 03/27/2018    BCR 31.3 (H) 03/27/2018    ANIONGAP 5.0 03/27/2018     Lab Results   Component Value Date    WBC 7.03 03/27/2018    HGB 11.9 03/27/2018    HCT 37.1 03/27/2018    MCV 91.4 03/27/2018     03/27/2018           Assessment and Plan:         1. Chronic systolic congestive heart failure  euvolemic  S/p BIV ICD implantation  Continue coreg, lasix, aldactone, losartan  Decrease losartan to 12. 5 mg qd   Heart " failure education today including signs and symptoms, the role of the heart failure center, daily weights, low sodium diet (less than 1500 mg per day), and daily physical activity. Reviewed HF Zones with patient and family.  Patient to continue current medications as previously ordered.   2. Hypotension due to drugs  Decrease losartan to 12. 5 mg once a day   HTN Education provided today including signs and symptoms, medication management, daily blood pressure monitoring. Patient encouraged to call the [facility] and Val[facility] center with any abnormal readings.   Patient to monitor bp readings closely. She will receive a follow up from [facility] in 3 days to review bp readings.   3. Coronary artery disease involving native coronary artery of native heart without angina pectoris  Without angina   Continue asa, coreg, crestor  4. Non-rheumatic mitral regurgitation  Will continue to monitor    It has been a pleasure to participate in the care of this patient.  Patient was instructed to call the [facility] with any questions, concerns, or worsening symptoms.        * Please note that portions of this note were completed with a voice recognition program. Efforts were made to edit the dictation but occasionally words are transcribed.

## 2018-04-10 ENCOUNTER — TELEPHONE (OUTPATIENT)
Dept: CARDIOLOGY | Facility: HOSPITAL | Age: 58
End: 2018-04-10

## 2018-04-10 ENCOUNTER — TELEPHONE (OUTPATIENT)
Dept: CARDIOLOGY | Facility: CLINIC | Age: 58
End: 2018-04-10

## 2018-04-10 NOTE — TELEPHONE ENCOUNTER
Patient called and informed me that she hopes to return to work after appt with heart and valve on 5/7/18. Informed patient we received a letter about her short term disability and would let Dr Borden review and note her next appt is on 5/7/18.

## 2018-04-10 NOTE — TELEPHONE ENCOUNTER
----- Message from Ana Perea sent at 4/6/2018  4:26 PM EDT -----  I called the patient to check on her blood pressure readings, she reports 105/52,103/50,104/55 heart rates 86,83,76, she is taking 12.5mg losartan daily and spirolactone 25mg daily, she feels tired and lightheaded.        Her appointment was moved to 5/7/18 per  He request because her short tert disability runs out on the 28th.  Ana       Patient to decrease lasix to 20 mg qd and continue previous medications as ordered. Patient verbalized understanding.

## 2018-04-16 NOTE — TELEPHONE ENCOUNTER
Patient called back today to see if paper work was faxed. Informed her I faxed on Friday and if they did not receive or if further paperwork is needed have them fax to [phone] and will determine if we or Dr Ellison needs to fill out.

## 2018-04-20 ENCOUNTER — TELEPHONE (OUTPATIENT)
Dept: CARDIOLOGY | Facility: HOSPITAL | Age: 58
End: 2018-04-20

## 2018-04-20 NOTE — TELEPHONE ENCOUNTER
----- Message from Jaylyn Thompson MA sent at 4/20/2018 10:22 AM EDT -----  Contacted pt.  She is currently with  who is admitted at [facility] so she has been out of her normal routine and mostly just sitting.  She did not have her log with her to review bps, but states her bp is normally around 112/60, with one low record of about 105/50.  She reports she is not having any shortness of breath, but did take 1 extra lasix 40mg last night due to increased pedal edema.  She hopes her  will be discharged home today or tomorrow, and will be able to resume normal routine.  Will call with further questions/concerns.  Next appointment confirmed 5/7/18 at 9:45.     ----- Message -----  From: YEIMI Slaughter  Sent: 4/20/2018   8:44 AM  To: Jaylyn Thompson MA    Doreen, can you review bps and ask how she is feeling? Dyspnea? Pedal edema? thanks  ----- Message -----  From: YEIMI Slaughter  Sent: 4/6/2018  To: YEIMI Slaughter    Review bps on losartan 12. 5  Coreg 6.25 bid, lasix 40, aldactone 25

## 2018-05-08 ENCOUNTER — OFFICE VISIT (OUTPATIENT)
Dept: CARDIOLOGY | Facility: HOSPITAL | Age: 58
End: 2018-05-08

## 2018-05-08 VITALS
RESPIRATION RATE: 18 BRPM | BODY MASS INDEX: 39.09 KG/M2 | HEART RATE: 88 BPM | WEIGHT: 229 LBS | DIASTOLIC BLOOD PRESSURE: 57 MMHG | TEMPERATURE: 98.5 F | OXYGEN SATURATION: 98 % | HEIGHT: 64 IN | SYSTOLIC BLOOD PRESSURE: 120 MMHG

## 2018-05-08 DIAGNOSIS — E78.2 MIXED HYPERLIPIDEMIA: ICD-10-CM

## 2018-05-08 DIAGNOSIS — I50.22 CHRONIC SYSTOLIC CONGESTIVE HEART FAILURE (HCC): Primary | ICD-10-CM

## 2018-05-08 DIAGNOSIS — I10 ESSENTIAL HYPERTENSION: ICD-10-CM

## 2018-05-08 DIAGNOSIS — I25.10 CORONARY ARTERY DISEASE INVOLVING NATIVE CORONARY ARTERY OF NATIVE HEART WITHOUT ANGINA PECTORIS: ICD-10-CM

## 2018-05-08 PROCEDURE — 99214 OFFICE O/P EST MOD 30 MIN: CPT | Performed by: NURSE PRACTITIONER

## 2018-05-08 RX ORDER — FUROSEMIDE 40 MG/1
20 TABLET ORAL 2 TIMES DAILY
Start: 2018-05-08

## 2018-05-08 NOTE — PROGRESS NOTES
Encounter Date:2018      Patient ID: Lucia Stevens is a 57 y.o. female.        Subjective:     Chief Complaint: Follow-up   NICM,SHF  History of Present Illness patient presents to the office today for ongoing evaluation of her systolic heart failure. She notes that she just buried her  on Friday. She notes significant swelling in BLEs after standing at the  home last week. She notes resolution after an extra dose of lasix.  She notes intermittent twinges in left upper chest near ICD site. She notes compliance with her medications. She notes fatigue, dyspnea on exertion that improves with rest. Denies chest pain, palps, syncope, orthopnea, pnd, abdominal fullness, claudication, cough.     Patient Active Problem List   Diagnosis   • Psoriasis   • Secondary Sjogren's syndrome   • Hiatal hernia   • Obesity   • Hypertension   • Ischemic heart disease   • NAYLOR (dyspnea on exertion)   • Coronary artery disease involving native coronary artery of native heart with angina pectoris   • Ischemic cardiomyopathy       Past Surgical History:   Procedure Laterality Date   • CARDIAC CATHETERIZATION     • CARDIAC CATHETERIZATION N/A 2017    Procedure: Left Heart Cath;  Surgeon: Galina Leonard MD;  Location:  [facility];  Service:    • CARDIAC ELECTROPHYSIOLOGY PROCEDURE N/A 3/28/2018    Procedure: Device Implant BiV ICD;  Surgeon: Sarbjit Ellison DO;  Location:  [facility];  Service: Cardiovascular   • CERVICAL DISCECTOMY ANTERIOR     • CHOLECYSTECTOMY     • CORONARY ANGIOPLASTY     • CORONARY ANGIOPLASTY WITH STENT PLACEMENT     • CORONARY ANGIOPLASTY WITH STENT PLACEMENT     • CORONARY STENT PLACEMENT     • OTHER SURGICAL HISTORY      growth removed from small intestine as a child   • POLYPECTOMY      Colon polyps status   • RECTOVAGINAL FISTULA REPAIR     • TONSILLECTOMY         Allergies   Allergen Reactions   • Sulfa Antibiotics Anaphylaxis         Current Outpatient  Prescriptions:   •  allopurinol (ZYLOPRIM) 300 MG tablet, Take 150 mg by mouth Daily., Disp: , Rfl:   •  aspirin 81 MG EC tablet, Take 81 mg by mouth daily., Disp: , Rfl:   •  carvedilol (COREG) 6.25 MG tablet, Take 1 tablet by mouth 2 (Two) Times a Day., Disp: 60 tablet, Rfl: 11  •  chlordiazePOXIDE (LIBRIUM) 5 MG capsule, Take 5 mg by mouth 2 (Two) Times a Day., Disp: , Rfl:   •  fluticasone (FLONASE) 50 MCG/ACT nasal spray, 2 sprays by Each Nare route Daily., Disp: , Rfl:   •  Liraglutide (VICTOZA) 18 MG/3ML solution pen-injector injection, Inject 1.2 mg under the skin Daily., Disp: , Rfl:   •  losartan (COZAAR) 25 MG tablet, 1/2 tablet per day, Disp: 60 tablet, Rfl: 3  •  nitroglycerin (NITROSTAT) 0.4 MG SL tablet, Place 1 tablet under the tongue Every 5 (Five) Minutes As Needed for Chest Pain. Take no more than 3 doses in 15 minutes., Disp: 25 tablet, Rfl: 3  •  nystatin (MYCOSTATIN) 374423 UNIT/GM ointment, Apply 1 application topically 3 (Three) Times a Day As Needed., Disp: , Rfl:   •  nystatin (MYCOSTATIN) [id number] UNIT/ML suspension, Take 5 mL by mouth 3 (Three) Times a Day As Needed., Disp: , Rfl: 1  •  pregabalin (LYRICA) 75 MG capsule, Take 75 mg by mouth 2 (Two) Times a Day., Disp: , Rfl:   •  rosuvastatin (CRESTOR) 10 MG tablet, Take 1 tablet by mouth Daily. (Patient taking differently: Take 10 mg by mouth Every Night.), Disp: 90 tablet, Rfl: 3  •  spironolactone (ALDACTONE) 25 MG tablet, Take 1 tablet by mouth Daily. (Patient taking differently: Take 25 mg by mouth 2 (Two) Times a Day.), Disp: , Rfl:   •  triamcinolone (KENALOG) 0.1 % cream, Apply 1 application topically Daily As Needed., Disp: , Rfl:   •  furosemide (LASIX) 40 MG tablet, Take 0.5 tablets by mouth 2 (Two) Times a Day. May take an additional tablet PRN for weight gain of 3lbs in one day or 5lbs in 1 wk, Disp: , Rfl:     The following portions of the chart were reviewed and updated as appropriate: Allergies, current medications, past  "family history, social history, past medical history.     Review of Systems   Constitution: Negative for chills, decreased appetite, diaphoresis, fever, weakness, malaise/fatigue, night sweats, weight gain and weight loss.   HENT: Negative for congestion, hearing loss, hoarse voice and nosebleeds.         Postnasal drip   Eyes: Negative for blurred vision, visual disturbance and visual halos.   Cardiovascular: Negative for chest pain, claudication, cyanosis, dyspnea on exertion, irregular heartbeat, leg swelling, near-syncope, orthopnea, palpitations, paroxysmal nocturnal dyspnea and syncope.   Respiratory: Negative for cough, hemoptysis, shortness of breath, sleep disturbances due to breathing, snoring, sputum production and wheezing.    Hematologic/Lymphatic: Negative for bleeding problem. Does not bruise/bleed easily.   Skin: Negative for dry skin, itching and rash.   Musculoskeletal: Negative for arthritis, joint pain, joint swelling and myalgias.   Gastrointestinal: Negative for bloating, abdominal pain, constipation, diarrhea, flatus, heartburn, hematemesis, hematochezia, melena, nausea and vomiting.   Genitourinary: Negative for dysuria, frequency, hematuria, nocturia and urgency.   Neurological: Negative for excessive daytime sleepiness, dizziness, headaches, light-headedness and loss of balance.   Psychiatric/Behavioral: Negative for depression. The patient does not have insomnia and is not nervous/anxious.            Objective:     Vitals:    05/08/18 1102 05/08/18 1105 05/08/18 1106   BP: 121/58 114/58 120/57   BP Location: Left arm Right arm Right arm   Patient Position: Sitting Sitting Standing   Cuff Size: Adult     Pulse: 83 83 88   Resp: 18     Temp: 98.5 °F (36.9 °C)     TempSrc: Temporal Artery      SpO2: 98%     Weight: 104 kg (229 lb)     Height: 162.6 cm (64\")           Physical Exam   Constitutional: She is oriented to person, place, and time. She appears well-developed and well-nourished. She " is active and cooperative. No distress.   HENT:   Head: Normocephalic and atraumatic.   Mouth/Throat: Oropharynx is clear and moist.   Eyes: Conjunctivae and EOM are normal. Pupils are equal, round, and reactive to light.   Neck: Normal range of motion. Neck supple. No JVD present. No tracheal deviation present. No thyromegaly present.   Cardiovascular: Normal rate, regular rhythm, normal heart sounds and intact distal pulses.    Pulmonary/Chest: Effort normal and breath sounds normal.   Abdominal: Soft. Bowel sounds are normal. She exhibits no distension. There is no tenderness.   Musculoskeletal: Normal range of motion.   Neurological: She is alert and oriented to person, place, and time.   Skin: Skin is warm, dry and intact.   Psychiatric: She has a normal mood and affect. Her behavior is normal.   Nursing note and vitals reviewed.      Lab and Diagnostic Review:    .  Lab Results   Component Value Date    GLUCOSE 116 (H) 03/27/2018    CALCIUM 9.1 03/27/2018     03/27/2018    K 4.2 03/27/2018    CO2 28.0 03/27/2018     03/27/2018    BUN 25 (H) 03/27/2018    CREATININE 0.80 03/27/2018    EGFRIFNONA 74 03/27/2018    BCR 31.3 (H) 03/27/2018    ANIONGAP 5.0 03/27/2018           Assessment and Plan:         1. Chronic systolic congestive heart failure  euvolemic  S/p BIV ICD  Continue coreg, losartan, aldactone, lasix  Heart failure education today including signs and symptoms, the role of the heart failure center, daily weights, low sodium diet (less than 1500 mg per day), and daily physical activity. Reviewed HF Zones with patient and family.  Patient to continue current medications as previously ordered.     2. Coronary artery disease involving native coronary artery of native heart without angina pectoris  Without angina  Continue asa,coreg, creston    3. Essential hypertension  Under good control  HTN Education provided today including signs and symptoms, medication management, daily blood pressure  monitoring. Patient encouraged to call the [facility] and Val[facility] center with any abnormal readings.     4. Mixed hyperlipidemia  Currently on statin therapy    Patient plans to return to work next week. Encouraged patient to pace herself and not overdo it when she returns to work    It has been a pleasure to participate in the care of this patient.  Patient was instructed to call the [facility] with any questions, concerns, or worsening symptoms.        * Please note that portions of this note were completed with a voice recognition program. Efforts were made to edit the dictation but occasionally words are transcribed.

## 2018-05-23 ENCOUNTER — TELEPHONE (OUTPATIENT)
Dept: CARDIOLOGY | Facility: HOSPITAL | Age: 58
End: 2018-05-23

## 2018-06-01 ENCOUNTER — TELEPHONE (OUTPATIENT)
Dept: CARDIOLOGY | Facility: HOSPITAL | Age: 58
End: 2018-06-01

## 2018-06-01 NOTE — TELEPHONE ENCOUNTER
5/25/18: patient noted improvement in her symptoms of dyspnea and pedal edema but notes that symptoms have not resolved. Patient to continue lasix 40 mg q am/20 mg qpm for one week. Patient verbalized understanding.   6/1/18: follow up call to patient: Left voicemail.

## 2018-06-04 ENCOUNTER — TELEPHONE (OUTPATIENT)
Dept: CARDIOLOGY | Facility: HOSPITAL | Age: 58
End: 2018-06-04

## 2018-06-04 NOTE — TELEPHONE ENCOUNTER
-spoke with patient who notes that she has improvement in her symptoms after taking an extra 20 mg of lasix. Patient to continue lasix 40 mg daily and may take an extra dose of lasix 20 mg if needed for worsening dyspnea, edema.       ---- Message from Ana Perea sent at 6/4/2018  4:33 PM EDT -----  Contact: patient      On the 3rd day BP went down to 100/50 ( felt tired ) on two different attempts.  She also wanted you to know that her PCP wants her to take steroids and go to the pain clinic, she wants your opinion.    Ana    ----- Message -----  From: Sharee Salinas  Sent: 6/4/2018   3:04 PM  To: Ana Perea    Patient was returning Rosmery's call. She feels better after taking fluid pill but can only take it 2 days in a row because it makes her BP go down.

## 2018-06-27 DIAGNOSIS — I25.5 ISCHEMIC CARDIOMYOPATHY: Primary | ICD-10-CM

## 2018-07-02 RX ORDER — ROSUVASTATIN CALCIUM 10 MG/1
TABLET, COATED ORAL
Qty: 90 TABLET | Refills: 3 | Status: SHIPPED | OUTPATIENT
Start: 2018-07-02 | End: 2018-07-25 | Stop reason: DRUGHIGH

## 2018-07-09 ENCOUNTER — OFFICE VISIT (OUTPATIENT)
Dept: CARDIOLOGY | Facility: CLINIC | Age: 58
End: 2018-07-09

## 2018-07-09 VITALS
BODY MASS INDEX: 38.07 KG/M2 | DIASTOLIC BLOOD PRESSURE: 74 MMHG | SYSTOLIC BLOOD PRESSURE: 110 MMHG | WEIGHT: 223 LBS | HEART RATE: 82 BPM | HEIGHT: 64 IN

## 2018-07-09 DIAGNOSIS — I25.119 CORONARY ARTERY DISEASE INVOLVING NATIVE CORONARY ARTERY OF NATIVE HEART WITH ANGINA PECTORIS (HCC): ICD-10-CM

## 2018-07-09 DIAGNOSIS — I25.5 ISCHEMIC CARDIOMYOPATHY: Primary | ICD-10-CM

## 2018-07-09 DIAGNOSIS — Z95.810 BIVENTRICULAR ICD (IMPLANTABLE CARDIOVERTER-DEFIBRILLATOR) IN PLACE: ICD-10-CM

## 2018-07-09 PROCEDURE — 93284 PRGRMG EVAL IMPLANTABLE DFB: CPT | Performed by: INTERNAL MEDICINE

## 2018-07-09 PROCEDURE — 99213 OFFICE O/P EST LOW 20 MIN: CPT | Performed by: INTERNAL MEDICINE

## 2018-07-09 NOTE — PROGRESS NOTES
Subjective:   Lucia Stevens  1960  [phone]  [phone]    07/09/2018    [facility] CARDIOLOGY    Johan Marks MD  [address] [facility] [address]  [facility], [place] [address]    REFERRING DOCTOR: Dr Virgilio Borden      Patient ID: Lucia Stevens is a 58 y.o. female.    Chief Complaint:   Chief Complaint   Patient presents with   • Shortness of Breath   • Chronic systolic congestive heart failure     Problem List:  PROBLEM LIST:  1. Ischemic heart disease:  a. GXT myocardial perfusion study, 06/07/2005, revealing a moderate sized reversible defect in anteroseptal region with diminished myocardial thickening and hypokinesis. LVEF 58%.  b. Cardiac catheterization, June 2005, Dr. Talbot, due to moderate sized reversible anteroseptal defect; LVEF 38%: JADE stenting of PDA (2.5x13 mm Cypher).  c. JADE stenting of mid-RCA, 08/08/2011: 15 mm Promus JADE; LVEF 55% to 60%.  d. 11/30/17  [facility]   Cath single vessel CAD, total occlusion RCA with collateral, non obstructive on left. (2011 stent to RCA, 2005 stent to PDA)  e. 06/09/17  Stress EF 63%  f. 2/26/18 echo EF 25%, mild-mod MR  g. EF 20% on Echo with mod MR 3/27/2018  2. Hypertension.  3. Cardiomyopathy  a. 11/30/17 echo: EF 30%, LA borderline dilated, moderate to severe MR, mild-to-moderate TR RVSP 41 mmHg  b. LifeVest initiated 11/2017  c. 2/26/18 echo: EF 25%, thin-walled ventricle, mild to moderate MR, RVSP less than 35 mmHg  d. EF 20% 3/27/2018  e. S/p BiV ICD [place] April 2018  4. HLD  a. 11/30/17 lipids:   HDL 29 LDL 66  5. DM  a. 11/30/17 A1c 8.2  6. Remote tobacco abuse:   a. Quit 2011  7. Hiatal hernia.  8. Sjogren’s syndrome.  9. Psoriasis.  10. Colon polyps status polypectomy.  11. Depression.  12. Surgical history:  a. Appendectomy.  b. Cholecystectomy.  c. Rectovaginal fistula repair.  d. Cervical discectomy.     Allergies   Allergen Reactions   • Sulfa Antibiotics Anaphylaxis       Current Outpatient Prescriptions:    •  allopurinol (ZYLOPRIM) 300 MG tablet, Take 100 mg by mouth Daily., Disp: , Rfl:   •  aspirin 81 MG EC tablet, Take 81 mg by mouth daily., Disp: , Rfl:   •  carvedilol (COREG) 6.25 MG tablet, Take 1 tablet by mouth 2 (Two) Times a Day., Disp: 60 tablet, Rfl: 11  •  chlordiazePOXIDE (LIBRIUM) 5 MG capsule, Take 5 mg by mouth 2 (Two) Times a Day., Disp: , Rfl:   •  fluticasone (FLONASE) 50 MCG/ACT nasal spray, 2 sprays by Each Nare route Daily., Disp: , Rfl:   •  furosemide (LASIX) 40 MG tablet, Take 0.5 tablets by mouth 2 (Two) Times a Day. May take an additional tablet PRN for weight gain of 3lbs in one day or 5lbs in 1 wk, Disp: , Rfl:   •  Liraglutide (VICTOZA) 18 MG/3ML solution pen-injector injection, Inject 1.2 mg under the skin Daily., Disp: , Rfl:   •  losartan (COZAAR) 25 MG tablet, 1/2 tablet per day, Disp: 60 tablet, Rfl: 3  •  nitroglycerin (NITROSTAT) 0.4 MG SL tablet, Place 1 tablet under the tongue Every 5 (Five) Minutes As Needed for Chest Pain. Take no more than 3 doses in 15 minutes., Disp: 25 tablet, Rfl: 3  •  nystatin (MYCOSTATIN) 343160 UNIT/GM ointment, Apply 1 application topically 3 (Three) Times a Day As Needed., Disp: , Rfl:   •  rosuvastatin (CRESTOR) 10 MG tablet, TAKE ONE TABLET BY MOUTH ONCE DAILY, Disp: 90 tablet, Rfl: 3  •  spironolactone (ALDACTONE) 25 MG tablet, Take 1 tablet by mouth Daily., Disp: , Rfl:   •  triamcinolone (KENALOG) 0.1 % cream, Apply 1 application topically Daily As Needed., Disp: , Rfl:   •  nystatin (MYCOSTATIN) [id number] UNIT/ML suspension, Take 5 mL by mouth 3 (Three) Times a Day As Needed., Disp: , Rfl: 1    History of Present Illness  Patient is a 58-year-old female with a history of coronary disease/ischemic Eber myopathy status post biventricular ICD in April 2018 here today for follow-up visit.  Unfortunately one month post device implantation her  passed away.  She has been dealing with some shortness of breath as well as lower extremity  "swelling.  She is currently set as biventricular configuration of 2-can. Was doing well at first post device implant but now with LE swelling and stamina decreased and SOB increased. Now back to work as well. No sig chest pain noted. LE edema improving with taking additional lasix and breathing is better as well. Functional Class II at this time , worse with stairs but tries not to do them.     No issues with chest pain, fevers, chills, night sweats, PND, orthopnea or palpitations. No recent ER visits or hospital stays.      The following portions of the patient's history were reviewed and updated as appropriate: allergies, current medications, past family history, past medical history, past social history, past surgical history and problem list.    ROS   14 point ROS negative except as outlined in problem list, HPI and other parts of the note.    Procedures       Objective:       Vitals:    07/09/18 1058   BP: 110/74   BP Location: Right arm   Patient Position: Sitting   Pulse: 82   Weight: 101 kg (223 lb)   Height: 162.6 cm (64\")       GENERAL: Well-developed, well-nourished patient in no acute distress.  HEENT: Normocephalic, atraumatic, PERRLA. Moist mucous membranes.  NECK: No JVD present at 30°. No carotid bruits auscultated.  LUNGS: Clear to auscultation.  CARDIOVASCULAR: Heart has a regular rate and rhythm. No murmurs, gallops or rubs noted.   ABDOMEN: Soft, nontender. Positive bowel sounds.  MUSCULOSKELETAL: No gross deformities. No clubbing, cyanosis, or lower extremity edema.  SKIN: Pink, warm  Neuro: Nonfocal exam. Gait intact  Ext: No bruising. Trace LE edema  ICD site ok    The patient's old records including ambulatory rhythm recordings (ECGs, Holter/event monitor) were reviewed and discussed.      Lab Review:   Results for orders placed or performed during the hospital encounter of 03/28/18   POC Glucose Once   Result Value Ref Range    Glucose 108 70 - 130 mg/dL   POC Glucose Once   Result Value Ref " Range    Glucose 156 (H) 70 - 130 mg/dL   POC Glucose Once   Result Value Ref Range    Glucose 145 (H) 70 - 130 mg/dL   POC Glucose Once   Result Value Ref Range    Glucose 117 70 - 130 mg/dL     Biventricular [employer] device check.  A sense bi-V paced.  RA paced 4% bi-V paced 100%.  Set at DDDR 70 bpm.  P waves 1.9 mV R waves 14.4 mV.  Threshold impedances within normal limits.  Shock impedance 73 ohms.  Threshold in the LV of 1.2 full at 0.5 ms.  Set at 2/can.  Impedance 778 ohms.  No events or changes made.  Decreased to chronic threshold.  We did add rate responsiveness today.        Diagnosis:   1. Ischemic cardiomyopathy  2. Coronary artery disease involving native coronary artery of native heart with angina pectoris (CMS/HCC)  3. Biventricular ICD (implantable cardioverter-defibrillator) in place      Assessment & Plan:   1. CAD , Ischemic Cardiomyopathy with EF < 35% now s/p BiV ICD. On optimal medical Rx. May need to adjust lasix further if recurrent fluid overload and consider increasing to 40 mg BID. For now continue on lasix 20 mg BID and take additional as needed per symptoms and weight since gets leg cramps at higher dosage.   2. BiV ICD [employer] with normal check today. Seems that the 2/CAN is the best in regards to RV, LV timing and as well as Threshold  3. Increased TG --> Follow with Dr Borden  4. Follow up with me in Sept as per research protocol, follow up with Dr Borden and HF clinic in the next couple weeks         CC: Virgilio Ellison,   07/09/18  12:14 PM      EMR Dragon/Transcription disclaimer:  This note was created with the use of Dragon.

## 2018-07-24 NOTE — PROGRESS NOTES
Encounter Date:07/25/2018      Patient ID: Lucia Stevens is a 58 y.o. female.        Subjective:     Chief Complaint: follow-up systolic heart failure   History of Present Illness patient presents to the office today for ongoing evaluation of her chronic systolic heart failure. She is class II-III with her activities but notes ongoing dyspnea, fatigue and pedal edema. She notes a 5 lb weight gain over the weekend. She took 40 mg of lasix and lost 5 lbs overnight. She notes that she had been doing well and was participating in cardiac rehab. She reports that she is so fatigued after working 8 hours that she is unable to complete cardiac rehab. She notes that she experiences dyspnea with exertion while walking into work each morning. She notes ongoing pedal edema but notes when she takes higher doses of lasix she experiences myalgias. Denies chest pain.   Recent device check with Dr Ellison shows appropriate functioning of BIV-ICD.   She presents today with her home bp, hr and bs log. BPs have been 104-113/50-70s, hrs 60-70s. Weight +/-2-3 lbs on average    Patient Active Problem List   Diagnosis   • Psoriasis   • Secondary Sjogren's syndrome (CMS/HCC)   • Hiatal hernia   • Obesity   • Hypertension   • Ischemic heart disease   • NAYLOR (dyspnea on exertion)   • Coronary artery disease involving native coronary artery of native heart with angina pectoris (CMS/HCC)   • Ischemic cardiomyopathy   • Biventricular ICD (implantable cardioverter-defibrillator) in place       Past Surgical History:   Procedure Laterality Date   • CARDIAC CATHETERIZATION     • CARDIAC CATHETERIZATION N/A 11/30/2017    Procedure: Left Heart Cath;  Surgeon: Galina Leonard MD;  Location:  [facility];  Service:    • CARDIAC ELECTROPHYSIOLOGY PROCEDURE N/A 3/28/2018    Procedure: Device Implant BiV ICD;  Surgeon: Sarbjit Ellison DO;  Location:  [facility];  Service: Cardiovascular   • CERVICAL DISCECTOMY ANTERIOR     •  CHOLECYSTECTOMY     • CORONARY ANGIOPLASTY     • CORONARY ANGIOPLASTY WITH STENT PLACEMENT     • CORONARY ANGIOPLASTY WITH STENT PLACEMENT     • CORONARY STENT PLACEMENT     • OTHER SURGICAL HISTORY      growth removed from small intestine as a child   • POLYPECTOMY      Colon polyps status   • RECTOVAGINAL FISTULA REPAIR     • TONSILLECTOMY         Allergies   Allergen Reactions   • Sulfa Antibiotics Anaphylaxis         Current Outpatient Prescriptions:   •  allopurinol (ZYLOPRIM) 100 MG tablet, Take 100 mg by mouth Daily., Disp: , Rfl:   •  aspirin 81 MG EC tablet, Take 81 mg by mouth daily., Disp: , Rfl:   •  carvedilol (COREG) 6.25 MG tablet, Take 1 tablet by mouth 2 (Two) Times a Day., Disp: 60 tablet, Rfl: 11  •  cefdinir (OMNICEF) 300 MG capsule, Take 300 mg by mouth Every 12 (Twelve) Hours. For 10 days, Disp: , Rfl:   •  chlordiazePOXIDE (LIBRIUM) 5 MG capsule, Take 5 mg by mouth 2 (Two) Times a Day., Disp: , Rfl:   •  fluticasone (FLONASE) 50 MCG/ACT nasal spray, 2 sprays by Each Nare route Daily., Disp: , Rfl:   •  furosemide (LASIX) 20 MG tablet, Take 20 mg by mouth As Needed. For swelling, Disp: , Rfl:   •  furosemide (LASIX) 40 MG tablet, Take 0.5 tablets by mouth 2 (Two) Times a Day. May take an additional tablet PRN for weight gain of 3lbs in one day or 5lbs in 1 wk, Disp: , Rfl:   •  Liraglutide (VICTOZA) 18 MG/3ML solution pen-injector injection, Inject 1.2 mg under the skin Daily., Disp: , Rfl:   •  losartan (COZAAR) 25 MG tablet, 1/2 tablet per day, Disp: 60 tablet, Rfl: 3  •  nitroglycerin (NITROSTAT) 0.4 MG SL tablet, Place 1 tablet under the tongue Every 5 (Five) Minutes As Needed for Chest Pain. Take no more than 3 doses in 15 minutes., Disp: 25 tablet, Rfl: 3  •  nystatin (MYCOSTATIN) 979031 UNIT/GM ointment, Apply 1 application topically 3 (Three) Times a Day As Needed., Disp: , Rfl:   •  nystatin (MYCOSTATIN) 215552 UNIT/ML suspension, Take 5 mL by mouth 3 (Three) Times a Day As Needed.,  Disp: , Rfl: 1  •  spironolactone (ALDACTONE) 25 MG tablet, Take 1 tablet by mouth Daily., Disp: , Rfl:   •  triamcinolone (KENALOG) 0.1 % cream, Apply 1 application topically Daily As Needed., Disp: , Rfl:   •  rosuvastatin (CRESTOR) 10 MG tablet, Take 1 tablet by mouth Daily., Disp: 90 tablet, Rfl: 3    The following portions of the chart were reviewed and updated as appropriate: Allergies, current medications, past family history, social history, past medical history.     Review of Systems   Constitution: Positive for weakness and malaise/fatigue. Negative for chills, decreased appetite, diaphoresis, fever, night sweats, weight gain and weight loss.   HENT: Negative for congestion, hearing loss, hoarse voice and nosebleeds.    Eyes: Negative for blurred vision, visual disturbance and visual halos.   Cardiovascular: Positive for dyspnea on exertion and leg swelling. Negative for chest pain, claudication, cyanosis, irregular heartbeat, near-syncope, orthopnea, palpitations, paroxysmal nocturnal dyspnea and syncope.   Respiratory: Positive for shortness of breath. Negative for cough, hemoptysis, sleep disturbances due to breathing, snoring, sputum production and wheezing.    Endocrine: Positive for heat intolerance.   Hematologic/Lymphatic: Negative for bleeding problem. Does not bruise/bleed easily.   Skin: Negative for dry skin, itching and rash.   Musculoskeletal: Positive for joint pain. Negative for arthritis, joint swelling and myalgias.   Gastrointestinal: Positive for diarrhea. Negative for bloating, abdominal pain, constipation, flatus, heartburn, hematemesis, hematochezia, melena, nausea and vomiting.   Genitourinary: Negative for dysuria, frequency, hematuria, nocturia and urgency.   Neurological: Positive for excessive daytime sleepiness and light-headedness. Negative for dizziness, headaches and loss of balance.   Psychiatric/Behavioral: Negative for depression. The patient does not have insomnia and is  "not nervous/anxious.            Objective:     Vitals:    07/25/18 0903 07/25/18 0905 07/25/18 0906   BP: 119/60 111/57 106/55   BP Location: Right arm Left arm Left arm   Patient Position: Sitting Sitting Standing   Pulse: 71  77   Resp: 16     Temp: 98.1 °F (36.7 °C)     TempSrc: Temporal Artery      SpO2: 99%     Weight: 102 kg (223 lb 12.8 oz)     Height: 162.6 cm (64.02\")           Physical Exam   Constitutional: She is oriented to person, place, and time. She appears well-developed and well-nourished. She is active and cooperative. No distress.   HENT:   Head: Normocephalic and atraumatic.   Mouth/Throat: Oropharynx is clear and moist.   Eyes: Pupils are equal, round, and reactive to light. Conjunctivae and EOM are normal.   Neck: Normal range of motion. Neck supple. No JVD present. No tracheal deviation present. No thyromegaly present.   Cardiovascular: Normal rate, regular rhythm, normal heart sounds and intact distal pulses.    Pulmonary/Chest: Effort normal. She has rales.   Abdominal: Soft. Bowel sounds are normal. She exhibits no distension. There is no tenderness.   Musculoskeletal: Normal range of motion. She exhibits edema (1+ pitting edema noted BLEs).   Neurological: She is alert and oriented to person, place, and time.   Skin: Skin is warm, dry and intact.   Psychiatric: She has a normal mood and affect. Her behavior is normal.   Nursing note and vitals reviewed.      Lab and Diagnostic Review:       6/20/2018: Total cholesterol 151, HDL 23, triglycerides 502, LDL unable to calculate, glucose 1:30, BUN 21, creatinine 0.75, estimated GFR 88, sodium 140, potassium 3.6, chloride 104, carbon dioxide 25, calcium 9.3, total protein 6.6, human 4.1, total bilirubin 0.5, alkaline phosphatase 87, AST 21, ALT 16, TSH 2.34    Assessment and Plan:         1. Acute on chronic systolic congestive heart failure (CMS/HCC)  Increase lasix to 40 mg q am and 20 mg qpm for next 2 days  Patient to receive a follow up " call from HF in 2 days to evaluate s/s  Unable to up titrate coreg or losartan due to hypotension on higher doses   BIV-ICD  Heart failure education today including signs and symptoms, the role of the heart failure center, daily weights, low sodium diet (less than 1500 mg per day), and daily physical activity. Reviewed HF Zones with patient and family.  Patient to continue current medications as previously ordered.     2. Essential hypertension  Well controlled on coreg, losartan  HTN Education provided today including signs and symptoms, medication management, daily blood pressure monitoring. Patient encouraged to call the [facility] center with any abnormal readings.     3. Coronary artery disease involving native coronary artery of native heart without angina pectoris  Continue asa, coreg, crestor  Without angina  4. Dyslipidemia  Increase crestor to 20 mg qd  Encouraged healthy diet, daily exercise    It has been a pleasure to participate in the care of this patient.  Patient was instructed to call the [facility] with any questions, concerns, or worsening symptoms.      * Please note that portions of this note were completed with a voice recognition program. Efforts were made to edit the dictation but occasionally words are transcribed.

## 2018-07-25 ENCOUNTER — OFFICE VISIT (OUTPATIENT)
Dept: CARDIOLOGY | Facility: HOSPITAL | Age: 58
End: 2018-07-25

## 2018-07-25 VITALS
TEMPERATURE: 98.1 F | RESPIRATION RATE: 16 BRPM | BODY MASS INDEX: 38.21 KG/M2 | HEART RATE: 77 BPM | HEIGHT: 64 IN | WEIGHT: 223.8 LBS | OXYGEN SATURATION: 99 % | SYSTOLIC BLOOD PRESSURE: 106 MMHG | DIASTOLIC BLOOD PRESSURE: 55 MMHG

## 2018-07-25 DIAGNOSIS — I10 ESSENTIAL HYPERTENSION: ICD-10-CM

## 2018-07-25 DIAGNOSIS — E78.5 DYSLIPIDEMIA: ICD-10-CM

## 2018-07-25 DIAGNOSIS — I50.23 ACUTE ON CHRONIC SYSTOLIC CONGESTIVE HEART FAILURE (HCC): Primary | ICD-10-CM

## 2018-07-25 DIAGNOSIS — I25.10 CORONARY ARTERY DISEASE INVOLVING NATIVE CORONARY ARTERY OF NATIVE HEART WITHOUT ANGINA PECTORIS: ICD-10-CM

## 2018-07-25 PROCEDURE — 99214 OFFICE O/P EST MOD 30 MIN: CPT | Performed by: NURSE PRACTITIONER

## 2018-07-25 RX ORDER — FUROSEMIDE 20 MG/1
20 TABLET ORAL AS NEEDED
COMMUNITY
End: 2018-09-12 | Stop reason: SDUPTHER

## 2018-07-25 RX ORDER — ALLOPURINOL 100 MG/1
200 TABLET ORAL DAILY
COMMUNITY

## 2018-07-25 RX ORDER — CEFDINIR 300 MG/1
300 CAPSULE ORAL EVERY 12 HOURS
COMMUNITY
Start: 2018-07-20 | End: 2018-08-04

## 2018-07-25 RX ORDER — ROSUVASTATIN CALCIUM 20 MG/1
20 TABLET, COATED ORAL DAILY
Qty: 90 TABLET | Refills: 3 | Status: SHIPPED | OUTPATIENT
Start: 2018-07-25 | End: 2019-09-16 | Stop reason: SDUPTHER

## 2018-07-27 ENCOUNTER — TELEPHONE (OUTPATIENT)
Dept: CARDIOLOGY | Facility: HOSPITAL | Age: 58
End: 2018-07-27

## 2018-07-27 NOTE — TELEPHONE ENCOUNTER
Spoke with patient who notes that her s/s of fluid overload are improving. Legs are less swollen and dypsnea is improving. Patient to continue lasix 40 mg in am and 20 mg in pm over the weekend. Patient instructed to call the office with any worsening symptoms.

## 2018-08-13 ENCOUNTER — HOSPITAL ENCOUNTER (OUTPATIENT)
Age: 58
End: 2018-08-13
Payer: COMMERCIAL

## 2018-08-13 VITALS
OXYGEN SATURATION: 98 % | SYSTOLIC BLOOD PRESSURE: 121 MMHG | DIASTOLIC BLOOD PRESSURE: 46 MMHG | HEART RATE: 77 BPM | RESPIRATION RATE: 18 BRPM

## 2018-08-13 VITALS — BODY MASS INDEX: 37.8 KG/M2

## 2018-08-13 VITALS
DIASTOLIC BLOOD PRESSURE: 46 MMHG | HEART RATE: 77 BPM | RESPIRATION RATE: 18 BRPM | SYSTOLIC BLOOD PRESSURE: 121 MMHG | OXYGEN SATURATION: 98 %

## 2018-08-13 DIAGNOSIS — M47.816: ICD-10-CM

## 2018-08-13 DIAGNOSIS — M19.90: Primary | ICD-10-CM

## 2018-08-13 DIAGNOSIS — M51.36: ICD-10-CM

## 2018-08-13 PROCEDURE — 99202 OFFICE O/P NEW SF 15 MIN: CPT

## 2018-08-13 PROCEDURE — 99213 OFFICE O/P EST LOW 20 MIN: CPT

## 2018-08-17 ENCOUNTER — TELEPHONE (OUTPATIENT)
Dept: CARDIOLOGY | Facility: CLINIC | Age: 58
End: 2018-08-17

## 2018-08-17 NOTE — PC.NURSE
verbal approval obtained for voltaren gel from pharmacist and Margarita Amin NP. Message left with Rasheed JEAN-BAPTISTE for Dr Stapleton to approve LESI. Rasheed stated it will be approx 24hrs.

## 2018-08-17 NOTE — TELEPHONE ENCOUNTER
Dr Sousa's office called and stated patient needs to have a Lumbar epidural steroid injection. Can she be cleared for this?     Fax:[phone]

## 2018-09-12 ENCOUNTER — OFFICE VISIT (OUTPATIENT)
Dept: GASTROENTEROLOGY | Facility: CLINIC | Age: 58
End: 2018-09-12

## 2018-09-12 VITALS
HEIGHT: 64 IN | OXYGEN SATURATION: 99 % | HEART RATE: 74 BPM | DIASTOLIC BLOOD PRESSURE: 82 MMHG | WEIGHT: 227 LBS | RESPIRATION RATE: 16 BRPM | TEMPERATURE: 98.5 F | SYSTOLIC BLOOD PRESSURE: 140 MMHG | BODY MASS INDEX: 38.76 KG/M2

## 2018-09-12 DIAGNOSIS — R11.2 NAUSEA AND VOMITING, INTRACTABILITY OF VOMITING NOT SPECIFIED, UNSPECIFIED VOMITING TYPE: Primary | ICD-10-CM

## 2018-09-12 PROCEDURE — 99214 OFFICE O/P EST MOD 30 MIN: CPT | Performed by: INTERNAL MEDICINE

## 2018-09-12 RX ORDER — ONDANSETRON 4 MG/1
4 TABLET, FILM COATED ORAL EVERY 8 HOURS PRN
COMMUNITY

## 2018-09-12 RX ORDER — RIZATRIPTAN BENZOATE 10 MG/1
10 TABLET ORAL ONCE AS NEEDED
COMMUNITY

## 2018-09-12 NOTE — PROGRESS NOTES
PCP: Johan Marks MD    Chief Complaint   Patient presents with   • Diarrhea   • Vomiting       History of Present Illness:   HPI  Ms. Stevens returns to the office for an evaluation.  She has experienced some issues with nausea and vomiting that can be quite severe.  She will also have some loose stool at that time.  Ms. Stevens admits to significant stress over the last year.  Her  passed and she has had more problems with heart issues.  The patient denies any difficult or painful swallowing.  She may have some bloating at times.  There is no reported unexplained weight loss.  The patient does admit to improvement in symptoms if she eats smaller portion meals.  Past Medical History:   Diagnosis Date   • Arthritis    • CHF (congestive heart failure) (CMS/HCC)    • Coronary artery disease     2 stents   • Depression    • Diabetes mellitus (CMS/[facility])     type 2 dm, 3 years, checks blood sugar every am   • Gout    • Hyperlipidemia    • Psoriasis    • Sjoegren syndrome (CMS/HCC)    • SOB (shortness of breath) on exertion    • Tobacco abuse 2011    cessation    • Wears dentures     full set   • Wears glasses        Past Surgical History:   Procedure Laterality Date   • CARDIAC CATHETERIZATION     • CARDIAC CATHETERIZATION N/A 11/30/2017    Procedure: Left Heart Cath;  Surgeon: Galina Leonard MD;  Location:  [facility];  Service:    • CARDIAC ELECTROPHYSIOLOGY PROCEDURE N/A 3/28/2018    Procedure: Device Implant BiV ICD;  Surgeon: Sarbjit Ellison DO;  Location:  [facility];  Service: Cardiovascular   • CERVICAL DISCECTOMY ANTERIOR     • CHOLECYSTECTOMY     • CORONARY ANGIOPLASTY     • CORONARY ANGIOPLASTY WITH STENT PLACEMENT     • CORONARY ANGIOPLASTY WITH STENT PLACEMENT     • CORONARY STENT PLACEMENT     • OTHER SURGICAL HISTORY      growth removed from small intestine as a child   • POLYPECTOMY      Colon polyps status   • RECTOVAGINAL FISTULA REPAIR     •  TONSILLECTOMY           Current Outpatient Prescriptions:   •  allopurinol (ZYLOPRIM) 100 MG tablet, Take 100 mg by mouth Daily., Disp: , Rfl:   •  aspirin 81 MG EC tablet, Take 81 mg by mouth daily., Disp: , Rfl:   •  carvedilol (COREG) 6.25 MG tablet, Take 1 tablet by mouth 2 (Two) Times a Day., Disp: 60 tablet, Rfl: 11  •  chlordiazePOXIDE (LIBRIUM) 5 MG capsule, Take 5 mg by mouth 2 (Two) Times a Day., Disp: , Rfl:   •  diclofenac (VOLTAREN) 1 % gel gel, Apply 4 g topically to the appropriate area as directed 4 (Four) Times a Day As Needed., Disp: , Rfl:   •  fluticasone (FLONASE) 50 MCG/ACT nasal spray, 2 sprays by Each Nare route Daily., Disp: , Rfl:   •  furosemide (LASIX) 40 MG tablet, Take 0.5 tablets by mouth 2 (Two) Times a Day. May take an additional tablet PRN for weight gain of 3lbs in one day or 5lbs in 1 wk, Disp: , Rfl:   •  Liraglutide (VICTOZA) 18 MG/3ML solution pen-injector injection, Inject 1.2 mg under the skin Daily., Disp: , Rfl:   •  losartan (COZAAR) 25 MG tablet, 1/2 tablet per day, Disp: 60 tablet, Rfl: 3  •  nitroglycerin (NITROSTAT) 0.4 MG SL tablet, Place 1 tablet under the tongue Every 5 (Five) Minutes As Needed for Chest Pain. Take no more than 3 doses in 15 minutes., Disp: 25 tablet, Rfl: 3  •  nystatin (MYCOSTATIN) 277685 UNIT/GM ointment, Apply 1 application topically 3 (Three) Times a Day As Needed., Disp: , Rfl:   •  ondansetron (ZOFRAN) 4 MG tablet, Take 4 mg by mouth Every 8 (Eight) Hours As Needed for Nausea or Vomiting., Disp: , Rfl:   •  rizatriptan (MAXALT) 10 MG tablet, Take 10 mg by mouth 1 (One) Time As Needed for Migraine. May repeat in 2 hours if needed, Disp: , Rfl:   •  rosuvastatin (CRESTOR) 20 MG tablet, Take 1 tablet by mouth Daily., Disp: 90 tablet, Rfl: 3  •  spironolactone (ALDACTONE) 25 MG tablet, Take 1 tablet by mouth Daily., Disp: , Rfl:     Allergies   Allergen Reactions   • Sulfa Antibiotics Anaphylaxis       Family History   Problem Relation Age of  Onset   • Heart disease Mother    • Heart attack Father    • No Known Problems Brother    • Cancer Maternal Grandmother    • Brain cancer Maternal Grandmother    • Stomach cancer Maternal Grandfather    • Heart failure Paternal Grandmother    • Heart attack Paternal Grandfather        Social History     Social History   • Marital status:      Spouse name: N/A   • Number of children: N/A   • Years of education: N/A     Occupational History   • Not on file.     Social History Main Topics   • Smoking status: Former Smoker     Years: 20.00     Types: Cigarettes     Quit date: 2011   • Smokeless tobacco: Never Used   • Alcohol use 1.2 - 2.4 oz/week     1 - 2 Glasses of wine, 1 - 2 Cans of beer per week      Comment: OCCAS   • Drug use: No   • Sexual activity: Defer     Other Topics Concern   • Not on file     Social History Narrative    Patient lives a at home with her     Caffeine: 2 -3 servings per day       Review of Systems   Constitutional: Positive for chills and fatigue. Negative for appetite change, fever and unexpected weight change.   HENT: Negative for dental problem, mouth sores, postnasal drip, sneezing, trouble swallowing and voice change.    Eyes: Negative for pain, redness and itching.   Respiratory: Negative for cough, shortness of breath and wheezing.    Cardiovascular: Negative for chest pain, palpitations and leg swelling.   Gastrointestinal: Positive for diarrhea (Last episodes 08/14/2018 & 08/23/2018), nausea and vomiting (Last episodes 08/14/2018 & 08/23/2018). Negative for abdominal distention, abdominal pain ([place]), anal bleeding, blood in stool, constipation (1 episode) and rectal pain.   Endocrine: Negative for cold intolerance, heat intolerance, polydipsia and polyuria.   Genitourinary: Negative for dysuria, enuresis, flank pain, hematuria and urgency.   Musculoskeletal: Negative for arthralgias, back pain, joint swelling and myalgias.   Skin: Negative for color change,  pallor and rash.   Allergic/Immunologic: Negative for environmental allergies, food allergies and immunocompromised state.   Neurological: Positive for dizziness and light-headedness. Negative for tremors, seizures, facial asymmetry, numbness and headaches.   Psychiatric/Behavioral: Negative for behavioral problems, dysphoric mood, hallucinations and self-injury.   All other systems reviewed and are negative.      Vitals:    09/12/18 0920   BP: 140/82   Pulse: 74   Resp: 16   Temp: 98.5 °F (36.9 °C)   SpO2: 99%       Physical Exam   Constitutional: She is oriented to person, place, and time. She appears well-nourished. No distress.   HENT:   Head: Normocephalic and atraumatic.   Mouth/Throat: Oropharynx is clear and moist. No oropharyngeal exudate.   Eyes: EOM are normal. No scleral icterus.   Neck: Neck supple. No thyromegaly present.   Cardiovascular: Normal rate, regular rhythm and normal heart sounds.    No murmur heard.  Pulmonary/Chest: Effort normal. She has no wheezes. She has no rales.   Decreased breath sounds in the bases   Abdominal: Soft. Bowel sounds are normal. There is no rebound and no guarding.   Musculoskeletal: She exhibits edema. She exhibits no tenderness.   Lymphadenopathy:     She has no cervical adenopathy.   Neurological: She is alert and oriented to person, place, and time. She exhibits normal muscle tone.   Skin: Skin is dry. No erythema.   Psychiatric: She has a normal mood and affect. Her behavior is normal. Thought content normal.   Vitals reviewed.      Lucia was seen today for diarrhea and vomiting.    Diagnoses and all orders for this visit:    Nausea and vomiting, intractability of vomiting not specified, unspecified vomiting type  -     NM Gastric Emptying; Future     The  clinical history suggest the possibility of gastroparesis.  Also cannot exclude medication effect.  There are no worrisome signs of unexplained weight loss or anemia.    History of heart failure- patient with  recent defibrillator placement.  She does experience some shortness of breath with exertion and has difficulty lying flat in bed.      Plan: Will proceed with a 4 hour gastric empty study.           Further recommendations pending study.      I spent over 50% of the office visit counseling and answering questions from the patient.

## 2018-09-24 ENCOUNTER — OFFICE VISIT (OUTPATIENT)
Dept: CARDIOLOGY | Facility: HOSPITAL | Age: 58
End: 2018-09-24

## 2018-09-24 ENCOUNTER — CLINICAL SUPPORT NO REQUIREMENTS (OUTPATIENT)
Dept: CARDIOLOGY | Facility: CLINIC | Age: 58
End: 2018-09-24

## 2018-09-24 ENCOUNTER — HOSPITAL ENCOUNTER (OUTPATIENT)
Dept: CARDIOLOGY | Facility: HOSPITAL | Age: 58
Discharge: HOME | End: 2018-09-24
Attending: INTERNAL MEDICINE | Admitting: INTERNAL MEDICINE

## 2018-09-24 VITALS
RESPIRATION RATE: 18 BRPM | WEIGHT: 222.4 LBS | SYSTOLIC BLOOD PRESSURE: 111 MMHG | BODY MASS INDEX: 37.97 KG/M2 | TEMPERATURE: 97.8 F | HEIGHT: 64 IN | HEART RATE: 82 BPM | DIASTOLIC BLOOD PRESSURE: 65 MMHG | OXYGEN SATURATION: 100 %

## 2018-09-24 VITALS — WEIGHT: 227 LBS | BODY MASS INDEX: 38.76 KG/M2 | HEIGHT: 64 IN

## 2018-09-24 DIAGNOSIS — E78.2 MIXED HYPERLIPIDEMIA: ICD-10-CM

## 2018-09-24 DIAGNOSIS — I25.5 ISCHEMIC CARDIOMYOPATHY: ICD-10-CM

## 2018-09-24 DIAGNOSIS — I50.22 CHRONIC SYSTOLIC CONGESTIVE HEART FAILURE (HCC): Primary | ICD-10-CM

## 2018-09-24 DIAGNOSIS — I10 ESSENTIAL HYPERTENSION: ICD-10-CM

## 2018-09-24 DIAGNOSIS — I25.10 CORONARY ARTERY DISEASE INVOLVING NATIVE CORONARY ARTERY OF NATIVE HEART WITHOUT ANGINA PECTORIS: ICD-10-CM

## 2018-09-24 LAB
BH CV ECHO MEAS - AO ROOT AREA: 7 CM^2
BH CV ECHO MEAS - AO ROOT DIAM: 3 CM
BH CV ECHO MEAS - EDV(CUBED): 68.9 ML
BH CV ECHO MEAS - EDV(TEICH): 74.2 ML
BH CV ECHO MEAS - EF(CUBED): 38.1 %
BH CV ECHO MEAS - EF(TEICH): 31.7 %
BH CV ECHO MEAS - ESV(CUBED): 42.6 ML
BH CV ECHO MEAS - ESV(TEICH): 50.6 ML
BH CV ECHO MEAS - FS: 14.8 %
BH CV ECHO MEAS - IVS/LVPW: 0.88
BH CV ECHO MEAS - IVSD: 1.2 CM
BH CV ECHO MEAS - LAT PEAK E' VEL: 5 CM/SEC
BH CV ECHO MEAS - LV MASS(C)D: 150.1 GRAMS
BH CV ECHO MEAS - LVIDD: 4.1 CM
BH CV ECHO MEAS - LVIDS: 3.5 CM
BH CV ECHO MEAS - LVOT AREA: 3.5 CM^2
BH CV ECHO MEAS - LVOT DIAM: 2.1 CM
BH CV ECHO MEAS - LVPWD: 1.2 CM
BH CV ECHO MEAS - MED PEAK E' VEL: 4.9 CM/SEC
BH CV ECHO MEAS - MV A MAX VEL: 65.4 CM/SEC
BH CV ECHO MEAS - MV DEC TIME: 0.2 SEC
BH CV ECHO MEAS - MV E MAX VEL: 62.6 CM/SEC
BH CV ECHO MEAS - MV E/A: 0.96
BH CV ECHO MEAS - SV(CUBED): 26.3 ML
BH CV ECHO MEAS - SV(TEICH): 23.6 ML
BH CV ECHO MEAS - TR MAX PG: 19.7 MMHG
BH CV ECHO MEAS - TR MAX VEL: 222 CM/SEC
BH CV ECHO MEASUREMENTS AVERAGE E/E' RATIO: 12.65
BH CV VAS BP LEFT ARM: NORMAL MMHG
BH CV XLRA - RV BASE: 2.8 CM
BH CV XLRA - RV LENGTH: 7.1 CM
BH CV XLRA - RV MID: 2.4 CM
LEFT ATRIUM VOLUME INDEX: 22.5 ML/M2
LV EF 2D ECHO EST: 40 %

## 2018-09-24 PROCEDURE — 93325 DOPPLER ECHO COLOR FLOW MAPG: CPT | Performed by: INTERNAL MEDICINE

## 2018-09-24 PROCEDURE — 93295 DEV INTERROG REMOTE 1/2/MLT: CPT | Performed by: INTERNAL MEDICINE

## 2018-09-24 PROCEDURE — 93321 DOPPLER ECHO F-UP/LMTD STD: CPT

## 2018-09-24 PROCEDURE — 93325 DOPPLER ECHO COLOR FLOW MAPG: CPT

## 2018-09-24 PROCEDURE — 93308 TTE F-UP OR LMTD: CPT

## 2018-09-24 PROCEDURE — 93321 DOPPLER ECHO F-UP/LMTD STD: CPT | Performed by: INTERNAL MEDICINE

## 2018-09-24 PROCEDURE — 93308 TTE F-UP OR LMTD: CPT | Performed by: INTERNAL MEDICINE

## 2018-09-24 PROCEDURE — 99214 OFFICE O/P EST MOD 30 MIN: CPT | Performed by: NURSE PRACTITIONER

## 2018-09-24 PROCEDURE — 93296 REM INTERROG EVL PM/IDS: CPT | Performed by: INTERNAL MEDICINE

## 2018-10-01 ENCOUNTER — HOSPITAL ENCOUNTER (OUTPATIENT)
Dept: NUCLEAR MEDICINE | Facility: HOSPITAL | Age: 58
Discharge: HOME | End: 2018-10-01
Attending: INTERNAL MEDICINE

## 2018-10-01 DIAGNOSIS — R11.2 NAUSEA AND VOMITING, INTRACTABILITY OF VOMITING NOT SPECIFIED, UNSPECIFIED VOMITING TYPE: ICD-10-CM

## 2018-10-01 PROCEDURE — 78264 GASTRIC EMPTYING IMG STUDY: CPT

## 2018-10-01 PROCEDURE — 0 TECHNETIUM SULFUR COLLOID: Performed by: INTERNAL MEDICINE

## 2018-10-01 PROCEDURE — A9541 TC99M SULFUR COLLOID: HCPCS | Performed by: INTERNAL MEDICINE

## 2018-10-01 RX ADMIN — TECHNETIUM TC 99M SULFUR COLLOID 1 DOSE: KIT at 08:35

## 2018-10-05 ENCOUNTER — TELEPHONE (OUTPATIENT)
Dept: GASTROENTEROLOGY | Facility: CLINIC | Age: 58
End: 2018-10-05

## 2018-10-05 NOTE — TELEPHONE ENCOUNTER
Dr Guevara,  Patient called back regarding her gastric emptying study results. She stated that she is still having constipation and diarrhea. Also which medication do you think can be causing the nausea.

## 2018-10-08 ENCOUNTER — TELEPHONE (OUTPATIENT)
Dept: CARDIOLOGY | Facility: CLINIC | Age: 58
End: 2018-10-08

## 2018-10-08 NOTE — TELEPHONE ENCOUNTER
I called and spoke to the patient. She has an appointment with Rosmery De Paz on Wednedsday. I instructed her to keep that appointment and send through a device transmission today when she gets home. She still is not feeling well and is feeling some tingling around the area of the ICD.

## 2018-10-08 NOTE — TELEPHONE ENCOUNTER
"Patient called requesting a device check. She states she has been feeling a stinging sensation around her pacemaker. She states when she \"sticks her hand in it\" she can feel that its warmer on one then the other. She also states\" the side that feels normal is the side that feels warm\". I checked her device and it was normal. I told her we would check her when she comes in for her appointment with Dr Borden or if it got worse to call us. She verbalized understanding.  "

## 2018-10-09 NOTE — PROGRESS NOTES
Encounter Date:10/10/2018      Patient ID: Lucia Stevens is a 58 y.o. female.        Subjective:     Chief Complaint: Follow-up   [facility]  History of Present Illness  Patient presents to the office today for ongoing evaluation of her CAD and chronic systolic heart failure. She notes significant fatigue. She reports that she does not have the energy to do simple chores at home. She notes that she has been sleeping all night and then taking a 3-4 hour nap during the day. She notes dyspnea on exertion when she overdoes it. She denies cp. BPs at home have been 110s. She notes that she is compliant with her medications.    Patient Active Problem List   Diagnosis   • Psoriasis   • Secondary Sjogren's syndrome (CMS/HCC)   • Hiatal hernia   • Obesity   • Hypertension   • Ischemic heart disease   • NAYLOR (dyspnea on exertion)   • Coronary artery disease involving native coronary artery of native heart with angina pectoris (CMS/HCC)   • Ischemic cardiomyopathy   • Biventricular ICD (implantable cardioverter-defibrillator) in place       Past Surgical History:   Procedure Laterality Date   • CARDIAC CATHETERIZATION     • CARDIAC CATHETERIZATION N/A 11/30/2017    Procedure: Left Heart Cath;  Surgeon: Galina Leonard MD;  Location:  [facility];  Service:    • CARDIAC ELECTROPHYSIOLOGY PROCEDURE N/A 3/28/2018    Procedure: Device Implant BiV ICD;  Surgeon: Sarbjit Ellison DO;  Location:  [facility];  Service: Cardiovascular   • CERVICAL DISCECTOMY ANTERIOR     • CHOLECYSTECTOMY     • CORONARY ANGIOPLASTY     • CORONARY ANGIOPLASTY WITH STENT PLACEMENT     • CORONARY ANGIOPLASTY WITH STENT PLACEMENT     • CORONARY STENT PLACEMENT     • OTHER SURGICAL HISTORY      growth removed from small intestine as a child   • POLYPECTOMY      Colon polyps status   • RECTOVAGINAL FISTULA REPAIR     • TONSILLECTOMY         Allergies   Allergen Reactions   • Sulfa Antibiotics Anaphylaxis         Current Outpatient  Prescriptions:   •  allopurinol (ZYLOPRIM) 100 MG tablet, Take 100 mg by mouth Daily., Disp: , Rfl:   •  aspirin 81 MG EC tablet, Take 81 mg by mouth daily., Disp: , Rfl:   •  carvedilol (COREG) 6.25 MG tablet, Take 1 tablet by mouth 2 (Two) Times a Day., Disp: 60 tablet, Rfl: 11  •  chlordiazePOXIDE (LIBRIUM) 5 MG capsule, Take 5 mg by mouth Daily., Disp: , Rfl:   •  diclofenac (VOLTAREN) 1 % gel gel, Apply 4 g topically to the appropriate area as directed 4 (Four) Times a Day As Needed., Disp: , Rfl:   •  fluticasone (FLONASE) 50 MCG/ACT nasal spray, 2 sprays by Each Nare route Daily As Needed., Disp: , Rfl:   •  furosemide (LASIX) 40 MG tablet, Take 0.5 tablets by mouth 2 (Two) Times a Day. May take an additional tablet PRN for weight gain of 3lbs in one day or 5lbs in 1 wk, Disp: , Rfl:   •  Liraglutide (VICTOZA) 18 MG/3ML solution pen-injector injection, Inject 1.2 mg under the skin Daily., Disp: , Rfl:   •  losartan (COZAAR) 25 MG tablet, 1/2 tablet per day, Disp: 60 tablet, Rfl: 3  •  nitroglycerin (NITROSTAT) 0.4 MG SL tablet, Place 1 tablet under the tongue Every 5 (Five) Minutes As Needed for Chest Pain. Take no more than 3 doses in 15 minutes., Disp: 25 tablet, Rfl: 3  •  nystatin (MYCOSTATIN) 798983 UNIT/GM ointment, Apply 1 application topically 3 (Three) Times a Day As Needed., Disp: , Rfl:   •  ondansetron (ZOFRAN) 4 MG tablet, Take 4 mg by mouth Every 8 (Eight) Hours As Needed for Nausea or Vomiting., Disp: , Rfl:   •  potassium chloride (MICRO-K) 10 MEQ CR capsule, Take 10 mEq by mouth Daily., Disp: , Rfl:   •  rizatriptan (MAXALT) 10 MG tablet, Take 10 mg by mouth 1 (One) Time As Needed for Migraine. May repeat in 2 hours if needed, Disp: , Rfl:   •  rosuvastatin (CRESTOR) 20 MG tablet, Take 1 tablet by mouth Daily., Disp: 90 tablet, Rfl: 3  •  spironolactone (ALDACTONE) 25 MG tablet, Take 1 tablet by mouth Daily., Disp: , Rfl:     The following portions of the chart were reviewed and updated as  "appropriate: Allergies, current medications, past family history, social history, past medical history.     Review of Systems   Constitution: Positive for malaise/fatigue. Negative for chills, decreased appetite, diaphoresis, fever, weakness, night sweats, weight gain and weight loss.   HENT: Negative for congestion, hearing loss, hoarse voice and nosebleeds.    Eyes: Negative for blurred vision, visual disturbance and visual halos.   Cardiovascular: Positive for dyspnea on exertion and leg swelling. Negative for chest pain, claudication, cyanosis, irregular heartbeat, near-syncope, orthopnea, palpitations, paroxysmal nocturnal dyspnea and syncope.   Respiratory: Negative for cough, hemoptysis, shortness of breath, sleep disturbances due to breathing, snoring, sputum production and wheezing.    Hematologic/Lymphatic: Negative for bleeding problem. Does not bruise/bleed easily.   Skin: Negative for dry skin, itching and rash.   Musculoskeletal: Negative for arthritis, falls, joint pain, joint swelling and myalgias.   Gastrointestinal: Positive for diarrhea, nausea and vomiting. Negative for bloating, abdominal pain, constipation, flatus, heartburn, hematemesis, hematochezia and melena.   Genitourinary: Negative for dysuria, frequency, hematuria, nocturia and urgency.   Neurological: Negative for excessive daytime sleepiness, dizziness, headaches, light-headedness and loss of balance.   Psychiatric/Behavioral: Negative for depression. The patient does not have insomnia and is not nervous/anxious.            Objective:     Vitals:    10/10/18 1447 10/10/18 1450 10/10/18 1451   BP: 118/57 117/56 108/51   BP Location: Right arm Left arm Left arm   Patient Position: Sitting Sitting Sitting   Pulse: 87  99   Resp: 16     Temp: 98.5 °F (36.9 °C)     TempSrc: Temporal Artery      SpO2: 100%     Weight: 102 kg (224 lb 9.6 oz)     Height: 163 cm (64.17\")           Physical Exam   Constitutional: She is oriented to person, " place, and time. She appears well-developed and well-nourished. She is active and cooperative. No distress.   HENT:   Head: Normocephalic and atraumatic.   Mouth/Throat: Oropharynx is clear and moist.   Eyes: Pupils are equal, round, and reactive to light. Conjunctivae and EOM are normal.   Neck: Normal range of motion. Neck supple. No JVD present. No tracheal deviation present. No thyromegaly present.   Cardiovascular: Normal rate, regular rhythm, normal heart sounds and intact distal pulses.    Pulmonary/Chest: Effort normal and breath sounds normal.   Abdominal: Soft. Bowel sounds are normal. She exhibits no distension. There is no tenderness.   Musculoskeletal: Normal range of motion.   Neurological: She is alert and oriented to person, place, and time.   Skin: Skin is warm, dry and intact.   Psychiatric: She has a normal mood and affect. Her behavior is normal.   Nursing note and vitals reviewed.      Lab and Diagnostic Review:      Lab Results   Component Value Date    GLUCOSE 116 (H) 03/27/2018    CALCIUM 9.1 03/27/2018     03/27/2018    K 4.2 03/27/2018    CO2 28.0 03/27/2018     03/27/2018    BUN 25 (H) 03/27/2018    CREATININE 0.80 03/27/2018    EGFRIFNONA 74 03/27/2018    BCR 31.3 (H) 03/27/2018    ANIONGAP 5.0 03/27/2018     Echo: 9/24/18  · Left ventricular systolic function is moderately decreased. Estimated EF = 40%.  · The following left ventricular wall segments are hypokinetic: mid anterior, apical anterior, basal anterolateral, mid anterolateral, apical lateral, basal inferolateral, mid inferolateral, apical inferior, mid inferior, apical septal, basal inferoseptal, mid inferoseptal, apex hypokinetic, mid anteroseptal, basal anterior, basal inferior and basal inferoseptal.  · Left ventricular diastolic dysfunction (grade I) consistent with impaired relaxation.  · Left ventricular wall thickness is consistent with mild concentric hypertrophy.  · There is no evidence of pericardial  effusion.  · No evidence of pulmonary hypertension is present.  · Normal right ventricular cavity size, wall thickness, systolic function and septal motion noted.  · No significant structural valvular abnormality demonstrated.    Assessment and Plan:         1. Chronic systolic congestive heart failure (CMS/HCC)  euvolemic  Heart failure education today including signs and symptoms, the role of the heart failure center, daily weights, low sodium diet (less than 1500 mg per day), and daily physical activity. Reviewed HF Zones with patient and family.  Patient to continue current medications as previously ordered.     2. Labile blood pressure    - 24 Hour Blood Pressure Monitor; Future    3. Coronary artery disease involving native coronary artery of native heart without angina pectoris  Without angina  Continue asa, coreg, crestor    4. Mixed hyperlipidemia  On statin therapy    It has been a pleasure to participate in the care of this patient.  Patient was instructed to call the [facility] with any questions, concerns, or worsening symptoms.        * Please note that portions of this note were completed with a voice recognition program. Efforts were made to edit the dictation but occasionally words are transcribed.

## 2018-10-10 ENCOUNTER — OFFICE VISIT (OUTPATIENT)
Dept: CARDIOLOGY | Facility: HOSPITAL | Age: 58
End: 2018-10-10

## 2018-10-10 ENCOUNTER — HOSPITAL ENCOUNTER (OUTPATIENT)
Dept: CARDIOLOGY | Facility: HOSPITAL | Age: 58
Discharge: HOME | End: 2018-10-10
Admitting: NURSE PRACTITIONER

## 2018-10-10 VITALS
DIASTOLIC BLOOD PRESSURE: 51 MMHG | RESPIRATION RATE: 16 BRPM | SYSTOLIC BLOOD PRESSURE: 108 MMHG | OXYGEN SATURATION: 100 % | BODY MASS INDEX: 38.35 KG/M2 | TEMPERATURE: 98.5 F | WEIGHT: 224.6 LBS | HEIGHT: 64 IN | HEART RATE: 99 BPM

## 2018-10-10 DIAGNOSIS — E78.2 MIXED HYPERLIPIDEMIA: ICD-10-CM

## 2018-10-10 DIAGNOSIS — I50.22 CHRONIC SYSTOLIC CONGESTIVE HEART FAILURE (HCC): Primary | ICD-10-CM

## 2018-10-10 DIAGNOSIS — R09.89 LABILE BLOOD PRESSURE: ICD-10-CM

## 2018-10-10 DIAGNOSIS — I25.10 CORONARY ARTERY DISEASE INVOLVING NATIVE CORONARY ARTERY OF NATIVE HEART WITHOUT ANGINA PECTORIS: ICD-10-CM

## 2018-10-10 PROCEDURE — 93786 AMBL BP MNTR W/SW REC ONLY: CPT

## 2018-10-10 PROCEDURE — 99214 OFFICE O/P EST MOD 30 MIN: CPT | Performed by: NURSE PRACTITIONER

## 2018-10-10 RX ORDER — POTASSIUM CHLORIDE 750 MG/1
10 CAPSULE, EXTENDED RELEASE ORAL DAILY
COMMUNITY

## 2018-10-12 ENCOUNTER — HOSPITAL ENCOUNTER (OUTPATIENT)
Age: 58
End: 2018-10-12
Payer: COMMERCIAL

## 2018-10-12 DIAGNOSIS — M51.36: Primary | ICD-10-CM

## 2018-10-12 PROCEDURE — 72131 CT LUMBAR SPINE W/O DYE: CPT

## 2018-10-15 ENCOUNTER — OFFICE VISIT (OUTPATIENT)
Dept: CARDIOLOGY | Facility: CLINIC | Age: 58
End: 2018-10-15

## 2018-10-15 VITALS
WEIGHT: 227.2 LBS | DIASTOLIC BLOOD PRESSURE: 68 MMHG | BODY MASS INDEX: 38.79 KG/M2 | HEART RATE: 90 BPM | HEIGHT: 64 IN | SYSTOLIC BLOOD PRESSURE: 110 MMHG

## 2018-10-15 DIAGNOSIS — I25.119 CORONARY ARTERY DISEASE INVOLVING NATIVE CORONARY ARTERY OF NATIVE HEART WITH ANGINA PECTORIS (HCC): Primary | ICD-10-CM

## 2018-10-15 DIAGNOSIS — I25.5 ISCHEMIC CARDIOMYOPATHY: ICD-10-CM

## 2018-10-15 DIAGNOSIS — Z95.810 BIVENTRICULAR ICD (IMPLANTABLE CARDIOVERTER-DEFIBRILLATOR) IN PLACE: ICD-10-CM

## 2018-10-15 PROCEDURE — 99213 OFFICE O/P EST LOW 20 MIN: CPT | Performed by: NURSE PRACTITIONER

## 2018-10-15 NOTE — PROGRESS NOTES
Subjective:   Lucia Stevens  1960  [phone]  [phone]    07/09/2018    [facility] CARDIOLOGY    Kendrick, Johan Islas MD  [address] [facility] [address]  [facility] [address]    REFERRING DOCTOR: Dr Virgilio Borden      Patient ID: Lucia Stevens is a 58 y.o. female.    Chief Complaint:   Chief Complaint   Patient presents with   • Cardiomyopathy     PROBLEM LIST:  1. Ischemic heart disease:  a. GXT myocardial perfusion study, 06/07/2005, revealing a moderate sized reversible defect in anteroseptal region with diminished myocardial thickening and hypokinesis. LVEF 58%.  b. Cardiac catheterization, June 2005, Dr. Talbot, due to moderate sized reversible anteroseptal defect; LVEF 38%: JADE stenting of PDA (2.5x13 mm Cypher).  c. JADE stenting of mid-RCA, 08/08/2011: 15 mm Promus JADE; LVEF 55% to 60%.  d. 11/30/17  [facility]   Cath single vessel CAD, total occlusion RCA with collateral, non obstructive on left. (2011 stent to RCA, 2005 stent to PDA)  e. 06/09/17  Stress EF 63%  f. 2/26/18 echo EF 25%, mild-mod MR  g. EF 20% on Echo with mod MR 3/27/2018  2. Hypertension.  3. Cardiomyopathy  a. 11/30/17 echo: EF 30%, LA borderline dilated, moderate to severe MR, mild-to-moderate TR RVSP 41 mmHg  b. LifeVest initiated 11/2017  c. 2/26/18 echo: EF 25%, thin-walled ventricle, mild to moderate MR, RVSP less than 35 mmHg  d. EF 20% 3/27/2018  e. S/p BiV ICD [place] April 2018  4. HLD  a. 11/30/17 lipids:   HDL 29 LDL 66  5. DM  a. 11/30/17 A1c 8.2  6. Remote tobacco abuse:   a. Quit 2011  7. Hiatal hernia.  8. Sjogren’s syndrome.  9. Psoriasis.  10. Colon polyps status polypectomy.  11. Depression.  12. Surgical history:  a. Appendectomy.  b. Cholecystectomy.  c. Rectovaginal fistula repair.  d. Cervical discectomy.     Allergies   Allergen Reactions   • Sulfa Antibiotics Anaphylaxis       Current Outpatient Prescriptions:   •  allopurinol (ZYLOPRIM) 100 MG tablet, Take 100 mg by mouth  Daily., Disp: , Rfl:   •  aspirin 81 MG EC tablet, Take 81 mg by mouth daily., Disp: , Rfl:   •  carvedilol (COREG) 6.25 MG tablet, Take 1 tablet by mouth 2 (Two) Times a Day., Disp: 60 tablet, Rfl: 11  •  chlordiazePOXIDE (LIBRIUM) 5 MG capsule, Take 5 mg by mouth Daily., Disp: , Rfl:   •  diclofenac (VOLTAREN) 1 % gel gel, Apply 4 g topically to the appropriate area as directed 4 (Four) Times a Day As Needed., Disp: , Rfl:   •  fluticasone (FLONASE) 50 MCG/ACT nasal spray, 2 sprays by Each Nare route Daily As Needed., Disp: , Rfl:   •  furosemide (LASIX) 40 MG tablet, Take 0.5 tablets by mouth 2 (Two) Times a Day. May take an additional tablet PRN for weight gain of 3lbs in one day or 5lbs in 1 wk, Disp: , Rfl:   •  Liraglutide (VICTOZA) 18 MG/3ML solution pen-injector injection, Inject 1.2 mg under the skin Daily., Disp: , Rfl:   •  losartan (COZAAR) 25 MG tablet, 1/2 tablet per day, Disp: 60 tablet, Rfl: 3  •  nitroglycerin (NITROSTAT) 0.4 MG SL tablet, Place 1 tablet under the tongue Every 5 (Five) Minutes As Needed for Chest Pain. Take no more than 3 doses in 15 minutes., Disp: 25 tablet, Rfl: 3  •  nystatin (MYCOSTATIN) 900128 UNIT/GM ointment, Apply 1 application topically 3 (Three) Times a Day As Needed., Disp: , Rfl:   •  ondansetron (ZOFRAN) 4 MG tablet, Take 4 mg by mouth Every 8 (Eight) Hours As Needed for Nausea or Vomiting., Disp: , Rfl:   •  potassium chloride (MICRO-K) 10 MEQ CR capsule, Take 10 mEq by mouth Daily., Disp: , Rfl:   •  rizatriptan (MAXALT) 10 MG tablet, Take 10 mg by mouth 1 (One) Time As Needed for Migraine. May repeat in 2 hours if needed, Disp: , Rfl:   •  rosuvastatin (CRESTOR) 20 MG tablet, Take 1 tablet by mouth Daily., Disp: 90 tablet, Rfl: 3  •  spironolactone (ALDACTONE) 25 MG tablet, Take 1 tablet by mouth Daily., Disp: , Rfl:     History of Present Illness: Ms. Stevens is a 58-year-old female with a history of coronary disease/ischemic cardiomyopathy, s/p biventricular ICD  "in April 2018. She presents today for follow up visit. She states that she has not been feeling well since BiV ICD implant. She reports also dealing with some chronic back pain and n/v which has been worked up with gastro. She is currently on short term and will follow up with YEIMI Gaxiola and is scheduled to return to work on 1025. She was last seen 7/9/18 at which time she was set at biventricular configuration of 2-can and rate responsiveness was added. No other changes were done. She still reports chronic fatigue- having to sleep up to two days in a row. She states she may be a little better since last visit. She reports some swelling in lower extremities if in dependent position for prolonged amount of time. Weight stable. No issues with chest pain, fevers, chills, night sweats, PND, orthopnea or palpitations. No recent ER visits or hospital stays. She is on optimal medical therapy with BB and ARB.     The following portions of the patient's history were reviewed and updated as appropriate: allergies, current medications, past family history, past medical history, past social history, past surgical history and problem list.    ROS   14 point ROS negative except as outlined in problem list, HPI and other parts of the note.    Procedures       Objective:       Vitals:    10/15/18 1000   BP: 110/68   BP Location: Right arm   Patient Position: Sitting   Pulse: 90   Weight: 103 kg (227 lb 3.2 oz)   Height: 162.6 cm (64\")       GENERAL: Well-developed, well-nourished patient in no acute distress.  HEENT: Normocephalic, atraumatic, PERRLA. Moist mucous membranes.  NECK: No JVD present at 30°. No carotid bruits auscultated.  LUNGS: Clear to auscultation. Non labored.  CARDIOVASCULAR: Heart has a regular rate and rhythm. No murmurs, gallops or rubs noted.   ABDOMEN: Soft, nontender. Positive bowel sounds.  MUSCULOSKELETAL: No gross deformities. No clubbing, cyanosis, or lower extremity edema.  SKIN: Pink, warm. " ICD site- no issues  Neuro: Nonfocal exam. Gait intact  Ext: No bruising. Trace LE edema    The patient's old records including ambulatory rhythm recordings (ECGs, Holter/event monitor) were reviewed and discussed.      Lab Review:   Results for orders placed or performed during the hospital encounter of 09/24/18   Adult Transthoracic Echo Limited W/ Cont if Necessary Per Protocol   Result Value Ref Range    BH CV VAS BP LEFT /64 mmHg    IVSd 1.2 cm    LVIDd 4.1 cm    LVIDs 3.5 cm    LVPWd 1.2 cm    IVS/LVPW 0.88     FS 14.8 %    EDV(Teich) 74.2 ml    ESV(Teich) 50.6 ml    EF(Teich) 31.7 %    EDV(cubed) 68.9 ml    ESV(cubed) 42.6 ml    EF(cubed) 38.1 %    LV mass(C)d 150.1 grams    SV(Teich) 23.6 ml    SV(cubed) 26.3 ml    Ao root diam 3.0 cm    Ao root area 7.0 cm^2    LVOT diam 2.1 cm    LVOT area 3.5 cm^2    MV E max kaiser 62.6 cm/sec    MV A max kaiser 65.4 cm/sec    MV E/A 0.96     MV dec time 0.2 sec    TR max kaiser 222.0 cm/sec    BH CV ECHO TONI - TR MAX PG 19.7 mmHg    Lat Peak E' Kaiser 5.0 cm/sec    Med Peak E' Kaiser 4.9 cm/sec    Avg E/e' ratio 12.65     RV Base 2.80 cm    RV Length 7.10 cm    RV Mid 2.40 cm    LA Volume Index 22.5 mL/m2    Echo EF Estimated 40 %     Biventricular Re[employer] device check 10/15/18: RA 4%, %, %, normal threshold and impedance, battery life 10+ years. No events.   Diagnosis:   1. Ischemic cardiomyopathy  2. Coronary artery disease involving native coronary artery of native heart with angina pectoris (CMS/HCC)  3. Biventricular ICD (implantable cardioverter-defibrillator) in place  Assessment & Plan:   1. CAD, Ischemic Cardiomyopathy with EF < 35% now s/p BiV ICD. On optimal medical Rx. Appears to be euvolemic.   2. BiV ICD with normal check today. Seems that the 2/CAN is the best in regards to RV, LV timing and as well as Threshold so will leave for now.   3. Follow up in 6 months. Follow up with Dr. Drew and in heart failure clinic.     Continue current  meds.   Follow up in 6 months.       CC: Virgilio Figueroa, YEIMI  10/15/18  10:12 AM

## 2018-10-16 ENCOUNTER — DOCUMENTATION (OUTPATIENT)
Dept: CARDIOLOGY | Facility: HOSPITAL | Age: 58
End: 2018-10-16

## 2018-10-16 NOTE — PROGRESS NOTES
[]A 24 Hr. Ambulatory Blood Pressure Monitor was worn from 10/10/18 to 10/11/18.  Results received and given to Rosmery JALLOH for review and management. Copy of results also sent to Medical Records.

## 2018-10-22 ENCOUNTER — TELEPHONE (OUTPATIENT)
Dept: CARDIOLOGY | Facility: HOSPITAL | Age: 58
End: 2018-10-22

## 2018-10-22 NOTE — TELEPHONE ENCOUNTER
Reviewed ambulatory bp monitor results with patient   Avg bp was 127/60 with average hr of 87 bpm   No medication changes at this time. Patient is scheduled to see Dr Borden 10/24/18.

## 2018-10-24 ENCOUNTER — OFFICE VISIT (OUTPATIENT)
Dept: CARDIOLOGY | Facility: CLINIC | Age: 58
End: 2018-10-24

## 2018-10-24 VITALS
DIASTOLIC BLOOD PRESSURE: 72 MMHG | HEIGHT: 64 IN | SYSTOLIC BLOOD PRESSURE: 122 MMHG | HEART RATE: 72 BPM | OXYGEN SATURATION: 98 % | WEIGHT: 223.4 LBS | BODY MASS INDEX: 38.14 KG/M2

## 2018-10-24 DIAGNOSIS — I50.22 CHRONIC SYSTOLIC CONGESTIVE HEART FAILURE (HCC): ICD-10-CM

## 2018-10-24 DIAGNOSIS — E11.65 TYPE 2 DIABETES MELLITUS WITH HYPERGLYCEMIA, WITHOUT LONG-TERM CURRENT USE OF INSULIN (HCC): ICD-10-CM

## 2018-10-24 DIAGNOSIS — E78.2 MIXED HYPERLIPIDEMIA: ICD-10-CM

## 2018-10-24 DIAGNOSIS — I25.10 CORONARY ARTERY DISEASE INVOLVING NATIVE CORONARY ARTERY OF NATIVE HEART WITHOUT ANGINA PECTORIS: Primary | ICD-10-CM

## 2018-10-24 DIAGNOSIS — I10 ESSENTIAL HYPERTENSION: ICD-10-CM

## 2018-10-24 DIAGNOSIS — I34.0 NON-RHEUMATIC MITRAL REGURGITATION: ICD-10-CM

## 2018-10-24 PROCEDURE — 99213 OFFICE O/P EST LOW 20 MIN: CPT | Performed by: INTERNAL MEDICINE

## 2018-10-24 NOTE — PROGRESS NOTES
Lucia CHA Hilda  1960  [phone]  [phone]    OFFICE FOLLOW UP    VISIT DATE: 10/24/2018     PCP: Johan Marks MD  [address] [facility] [address] [facility] [address]  [facility] [address]    IDENTIFICATION: A 58 y.o. female   employee of [employer] and is a resident of [place], [place].   CHIEF COMPLAINT: CAD followup.    PROBLEM LIST:  1. Ischemic heart disease:  a. GXT myocardial perfusion study, 06/07/2005, revealing a moderate sized reversible defect in anteroseptal region with diminished myocardial thickening and hypokinesis. LVEF 58%.  b. Cardiac catheterization, June 2005, Dr. Talbot, due to moderate sized reversible anteroseptal defect; LVEF 38%: JADE stenting of PDA (2.5x13 mm Cypher).  c. JADE stenting of mid-RCA, 08/08/2011: 15 mm Promus JADE; LVEF 55% to 60%.  d. 2/26/18 echo EF 25%, mild-mod MR  2. Hypertension.  3. Cardiomyopathy  a. 11/30/17 echo: EF 30%, LA borderline dilated, moderate to severe MR, mild-to-moderate TR RVSP 41 mmHg  b. LifeVest initiated 11/2017  c. 2/26/18 echo: EF 25%, thin-walled ventricle, mild to moderate MR, RVSP less than 35 mmHg  d. 30/28/18 [facility]. BiVICD implantation [place]  e. 3/27/18 echo: EF 20%  f. 9/24/18 echo: EF 40%, multiple LV wall segments hypokinetic, grade 1 diastolic dysfunction, mild concentric LVH, no significant valvular abnormalities, no pulmonary hypertension  4. HLD  a. 11/30/17 lipids:   HDL 29 LDL 66  b. 6/20/18   HDL 23 LDL cannot be calculated  5. DM  a. 11/30/17 A1c 8.2  6. Remote tobacco abuse:   a. Quit 2011  7. Hiatal hernia.  8. Sjogren’s syndrome.  9. Psoriasis.  10. Colon polyps status polypectomy.  11. Depression.  12. Surgical history:  a. Appendectomy.  b. Cholecystectomy.  c. Rectovaginal fistula repair.  d. Cervical discectomy.     Allergies  Allergies   Allergen Reactions   • Sulfa Antibiotics Anaphylaxis       Current Medications    Current Outpatient Prescriptions:   •  allopurinol (ZYLOPRIM) 100 MG tablet, Take 100  mg by mouth Daily., Disp: , Rfl:   •  aspirin 81 MG EC tablet, Take 81 mg by mouth daily., Disp: , Rfl:   •  carvedilol (COREG) 6.25 MG tablet, Take 1 tablet by mouth 2 (Two) Times a Day., Disp: 60 tablet, Rfl: 11  •  chlordiazePOXIDE (LIBRIUM) 5 MG capsule, Take 5 mg by mouth Daily., Disp: , Rfl:   •  diclofenac (VOLTAREN) 1 % gel gel, Apply 4 g topically to the appropriate area as directed 4 (Four) Times a Day As Needed., Disp: , Rfl:   •  fluticasone (FLONASE) 50 MCG/ACT nasal spray, 2 sprays by Each Nare route Daily As Needed., Disp: , Rfl:   •  furosemide (LASIX) 40 MG tablet, Take 0.5 tablets by mouth 2 (Two) Times a Day. May take an additional tablet PRN for weight gain of 3lbs in one day or 5lbs in 1 wk, Disp: , Rfl:   •  Liraglutide (VICTOZA) 18 MG/3ML solution pen-injector injection, Inject 1.2 mg under the skin Daily., Disp: , Rfl:   •  losartan (COZAAR) 25 MG tablet, 1/2 tablet per day, Disp: 60 tablet, Rfl: 3  •  nitroglycerin (NITROSTAT) 0.4 MG SL tablet, Place 1 tablet under the tongue Every 5 (Five) Minutes As Needed for Chest Pain. Take no more than 3 doses in 15 minutes., Disp: 25 tablet, Rfl: 3  •  nystatin (MYCOSTATIN) 360635 UNIT/GM ointment, Apply 1 application topically 3 (Three) Times a Day As Needed., Disp: , Rfl:   •  ondansetron (ZOFRAN) 4 MG tablet, Take 4 mg by mouth Every 8 (Eight) Hours As Needed for Nausea or Vomiting., Disp: , Rfl:   •  potassium chloride (MICRO-K) 10 MEQ CR capsule, Take 10 mEq by mouth Every Other Day., Disp: , Rfl:   •  rizatriptan (MAXALT) 10 MG tablet, Take 10 mg by mouth 1 (One) Time As Needed for Migraine. May repeat in 2 hours if needed, Disp: , Rfl:   •  rosuvastatin (CRESTOR) 20 MG tablet, Take 1 tablet by mouth Daily., Disp: 90 tablet, Rfl: 3  •  spironolactone (ALDACTONE) 25 MG tablet, Take 1 tablet by mouth Daily., Disp: , Rfl:     History of Present Illness     Patient is here to follow up.  She's been wearing her LifeVest for the last 3 months, and  [facility].  She had her repeat echocardiogram completed yesterday 2/26/18, which showed  EF of 25% as compared to 30% in Novemeber. She does feel less dyspneic however    ROS:  All systems have been reviewed and are negative with the exception of those mentioned in the HPI.    OBJECTIVE:  Vitals:    10/24/18 0843   BP: 122/72   BP Location: Right arm   Patient Position: Sitting   Pulse: 72   SpO2: 98%   Weight: 101 kg (223 lb 6.4 oz)   Height: 162.6 cm (64\")     Physical Exam   Constitutional: She appears well-developed and well-nourished.   Neck: Normal range of motion. Neck supple. No hepatojugular reflux and no JVD present. Carotid bruit is not present. No tracheal deviation present. No thyromegaly present.   Cardiovascular: Normal rate, regular rhythm, S1 normal, S2 normal, intact distal pulses and normal pulses.  PMI is not displaced.  Exam reveals no gallop, no distant heart sounds, no friction rub, no midsystolic click and no opening snap.    No murmur heard.  Pulses:       Radial pulses are 2+ on the right side, and 2+ on the left side.        Dorsalis pedis pulses are 2+ on the right side, and 2+ on the left side.        Posterior tibial pulses are 2+ on the right side, and 2+ on the left side.   Pulmonary/Chest: Effort normal and breath sounds normal. She has no wheezes. She has no rales.   Abdominal: Soft. Bowel sounds are normal. She exhibits no mass. There is no tenderness. There is no guarding.       Diagnostic Data:  Procedures      ASSESSMENT:  No diagnosis found.    PLAN:  Cad cont rf modification and medical mngmt.     Pt will need ICD. We will schedule her for this procedure. Continue current medical management otherwise w ASA< statin, BB, ARB, and spironolactone. Pt to continue to wear LifeVest until her BiV/ ICD is placed.  Needs ASAp due to potential of losing her job for which she needs for insurance.    HTN controlled at home    Hl on " statin     Recommended against NSAID use. Recommended to try Tumeric for inflammation.       Johan Marks MD, thank you for referring Ms. Stevens for evaluation.  I have forwarded my electronically generated recommendations to you for review.  Please do not hesitate to call with any questions.    Scribed for Virgilio Borden MD by Ale Sierra PA-C. 10/24/2018  8:46 AM       Virgilio Borden MD, FACC

## 2018-10-24 NOTE — PROGRESS NOTES
Subjective:   Lucia Stevens  1960  [phone]  [phone]    10/24/2018      [facility] CARDIOLOGY    Johan Addison MD  [address]  [facility] [address]    REFERRING DOCTOR: Geo addison md      Patient ID: Lucia Stevens is a 58 y.o.  2019 female from [place].    Chief Complaint:   Chief Complaint   Patient presents with   • Coronary Artery Disease     Problem List:  PROBLEM LIST:  1. Ischemic heart disease:  a. 6/05 [facility] Dr. Talbot, due to moderate sized reversible anteroseptal defect; LVEF 38%: JADE stenting of PDA (2.5x13 mm Cypher).  b. 8/8/2011 JADE stenting of mid-RCA: 15 mm Promus JADE; LVEF 55% to 60%.  c. 11/30/17  [facility]   Cath single vessel CAD, total occlusion RCA with collateral, non obstructive on left. (2011 stent to RCA, 2005 stent to PDA)    2. Hypertension.  3. Cardiomyopathy  a. 2/26/18 echo: EF 25%, thin-walled ventricle, mild to moderate MR, RVSP less than 35 mmHg  b. 4/2018 S/p BiV ICD [place] [employer]  4. HLD  a. 11/30/17 lipids:   HDL 29 LDL 66  5. DM  a. 3/18 7.3 A1c  6. Remote tobacco abuse:   a. Quit 2011  7. Hiatal hernia.  8. Sjogren’s syndrome.  9. Psoriasis.  10. Colon polyps status polypectomy.  11. Depression.  12. Surgical history:  a. Appendectomy.  b. Cholecystectomy.  c. Rectovaginal fistula repair.  d. Cervical discectomy.     Allergies   Allergen Reactions   • Sulfa Antibiotics Anaphylaxis       Current Outpatient Prescriptions:   •  allopurinol (ZYLOPRIM) 100 MG tablet, Take 100 mg by mouth Daily., Disp: , Rfl:   •  aspirin 81 MG EC tablet, Take 81 mg by mouth daily., Disp: , Rfl:   •  carvedilol (COREG) 6.25 MG tablet, Take 1 tablet by mouth 2 (Two) Times a Day., Disp: 60 tablet, Rfl: 11  •  chlordiazePOXIDE (LIBRIUM) 5 MG capsule, Take 5 mg by mouth Daily., Disp: , Rfl:   •  diclofenac (VOLTAREN) 1 % gel gel, Apply 4 g topically to the appropriate area as directed 4 (Four) Times a Day As Needed., Disp: , Rfl:    •  fluticasone (FLONASE) 50 MCG/ACT nasal spray, 2 sprays by Each Nare route Daily As Needed., Disp: , Rfl:   •  furosemide (LASIX) 40 MG tablet, Take 0.5 tablets by mouth 2 (Two) Times a Day. May take an additional tablet PRN for weight gain of 3lbs in one day or 5lbs in 1 wk, Disp: , Rfl:   •  Liraglutide (VICTOZA) 18 MG/3ML solution pen-injector injection, Inject 1.2 mg under the skin Daily., Disp: , Rfl:   •  losartan (COZAAR) 25 MG tablet, 1/2 tablet per day, Disp: 60 tablet, Rfl: 3  •  nitroglycerin (NITROSTAT) 0.4 MG SL tablet, Place 1 tablet under the tongue Every 5 (Five) Minutes As Needed for Chest Pain. Take no more than 3 doses in 15 minutes., Disp: 25 tablet, Rfl: 3  •  nystatin (MYCOSTATIN) 592614 UNIT/GM ointment, Apply 1 application topically 3 (Three) Times a Day As Needed., Disp: , Rfl:   •  ondansetron (ZOFRAN) 4 MG tablet, Take 4 mg by mouth Every 8 (Eight) Hours As Needed for Nausea or Vomiting., Disp: , Rfl:   •  potassium chloride (MICRO-K) 10 MEQ CR capsule, Take 10 mEq by mouth Every Other Day., Disp: , Rfl:   •  rizatriptan (MAXALT) 10 MG tablet, Take 10 mg by mouth 1 (One) Time As Needed for Migraine. May repeat in 2 hours if needed, Disp: , Rfl:   •  rosuvastatin (CRESTOR) 20 MG tablet, Take 1 tablet by mouth Daily., Disp: 90 tablet, Rfl: 3  •  spironolactone (ALDACTONE) 25 MG tablet, Take 1 tablet by mouth Daily., Disp: , Rfl:     History of Present Illness  Patient is a 58-year-old female with a history of coronary disease/ischemic Eber myopathy status post biventricular ICD in April 2018 here today for follow-up visit.  Unfortunately one month post device implantation her  passed away.  She has been dealing with some shortness of breath as well as lower extremity swelling.  She is currently set as biventricular configuration of 2-can. Was doing well at first post device implant but now with LE swelling and stamina decreased and SOB increased. Now back to work as well. No sig  "chest pain noted. LE edema improving with taking additional lasix and breathing is better as well. Functional Class II at this time , worse with stairs but tries not to do them.  Now with depression, fatigue and concerned she cannot return to work.  She has not looked into obtaining disability.     No issues with chest pain, fevers, chills, night sweats, PND, orthopnea or palpitations. No recent ER visits or hospital stays.      The following portions of the patient's history were reviewed and updated as appropriate: allergies, current medications, past family history, past medical history, past social history, past surgical history and problem list.    ROS   14 point ROS negative except as outlined in problem list, HPI and other parts of the note.    Procedures       Objective:       Vitals:    10/24/18 0843   BP: 122/72   BP Location: Right arm   Patient Position: Sitting   Pulse: 72   SpO2: 98%   Weight: 101 kg (223 lb 6.4 oz)   Height: 162.6 cm (64\")       GENERAL: Well-developed, well-nourished patient in no acute distress.  HEENT: Normocephalic, atraumatic, PERRLA. Moist mucous membranes.  NECK: No JVD present at 30°. No carotid bruits auscultated.  LUNGS: Clear to auscultation.  CARDIOVASCULAR: Heart has a regular rate and rhythm. No murmurs, gallops or rubs noted.   ABDOMEN: Soft, nontender. Positive bowel sounds.  MUSCULOSKELETAL: No gross deformities. No clubbing, cyanosis, or lower extremity edema.  SKIN: Pink, warm  Neuro: Nonfocal exam. Gait intact  Ext: No bruising. Trace LE edema  ICD site ok    The patient's old records including ambulatory rhythm recordings (ECGs, Holter/event monitor) were reviewed and discussed.      Lab Review:       Diagnosis:   1. Ischemic cardiomyopathy  2. Coronary artery disease involving native coronary artery of native heart with angina pectoris (CMS/HCC)  3. Biventricular ICD (implantable cardioverter-defibrillator) in place      Assessment & Plan:   1. CAD , Ischemic " Cardiomyopathy with EF < 35% now s/p BiV ICD. On optimal medical Rx. May need to adjust lasix further if recurrent fluid overload and consider increasing to 40 mg BID. For now continue on lasix 20 mg BID and take additional as needed per symptoms and weight since gets leg cramps at higher dosage.   2. BiV ICD [place] with normal check previously.   3. Increased TG --> DM control discussed.  4. Pt was given release from work until 11/25 and I discussed with her that she needs to address longer term options.               Virgilio Borden MD  10/24/18  8:50 AM      EMR Dragon/Transcription disclaimer:  This note was created with the use of Dragon.

## 2018-11-02 ENCOUNTER — TELEPHONE (OUTPATIENT)
Dept: CARDIOLOGY | Facility: CLINIC | Age: 58
End: 2018-11-02

## 2018-11-02 NOTE — TELEPHONE ENCOUNTER
Called patient and let her know I have not received and disability paper work. Please have then fax back for review and to fill out. She will have a few prior to picking up completed recs.

## 2018-11-05 ENCOUNTER — TELEPHONE (OUTPATIENT)
Dept: CARDIOLOGY | Facility: CLINIC | Age: 58
End: 2018-11-05

## 2018-11-05 NOTE — TELEPHONE ENCOUNTER
Patient called to confirm I received her disability paperwork. Informed her I did and will mail tomorrow to company as well as her a copy. Patient verbalized understanding.

## 2018-11-20 ENCOUNTER — OFFICE VISIT (OUTPATIENT)
Dept: CARDIOLOGY | Facility: HOSPITAL | Age: 58
End: 2018-11-20

## 2018-11-20 VITALS
OXYGEN SATURATION: 98 % | HEIGHT: 64 IN | HEART RATE: 77 BPM | WEIGHT: 223 LBS | BODY MASS INDEX: 38.07 KG/M2 | RESPIRATION RATE: 18 BRPM | SYSTOLIC BLOOD PRESSURE: 129 MMHG | TEMPERATURE: 98.8 F | DIASTOLIC BLOOD PRESSURE: 69 MMHG

## 2018-11-20 DIAGNOSIS — I50.22 CHRONIC SYSTOLIC CONGESTIVE HEART FAILURE (HCC): Primary | ICD-10-CM

## 2018-11-20 DIAGNOSIS — I25.10 CORONARY ARTERY DISEASE INVOLVING NATIVE CORONARY ARTERY OF NATIVE HEART WITHOUT ANGINA PECTORIS: ICD-10-CM

## 2018-11-20 DIAGNOSIS — E78.2 MIXED HYPERLIPIDEMIA: ICD-10-CM

## 2018-11-20 DIAGNOSIS — I10 ESSENTIAL HYPERTENSION: ICD-10-CM

## 2018-11-20 PROCEDURE — 99214 OFFICE O/P EST MOD 30 MIN: CPT | Performed by: NURSE PRACTITIONER

## 2018-11-20 RX ORDER — TURMERIC ROOT EXTRACT 500 MG
1 TABLET ORAL DAILY
COMMUNITY
End: 2019-02-05

## 2018-11-20 RX ORDER — DULOXETIN HYDROCHLORIDE 30 MG/1
30 CAPSULE, DELAYED RELEASE ORAL DAILY
COMMUNITY
Start: 2018-10-24 | End: 2019-02-05

## 2018-11-20 NOTE — PROGRESS NOTES
Encounter Date:11/20/2018      Patient ID: Lucia Stevens is a 58 y.o. female.        Subjective:     Chief Complaint: Follow-up   [facility]  History of Present Illness patient presents to the office today for ongoing evaluation of her chronic systolic heart failure. She notes that she is feeling better overall from a cardiac standpoint. She notes rhinorrhea, sinus congestion, pnd. She notes that she tires easily and she notes dyspnea on exertion that improves with rest. Denies chest pain, dizziness, pedal edema.     Patient Active Problem List   Diagnosis   • Psoriasis   • Secondary Sjogren's syndrome (CMS/HCC)   • Hiatal hernia   • Obesity   • Hypertension   • Ischemic heart disease   • NAYLOR (dyspnea on exertion)   • Coronary artery disease involving native coronary artery of native heart with angina pectoris (CMS/HCC)   • Ischemic cardiomyopathy   • Biventricular ICD (implantable cardioverter-defibrillator) in place       Past Surgical History:   Procedure Laterality Date   • CARDIAC CATHETERIZATION     • CERVICAL DISCECTOMY ANTERIOR     • CHOLECYSTECTOMY     • CORONARY ANGIOPLASTY     • CORONARY ANGIOPLASTY WITH STENT PLACEMENT     • CORONARY ANGIOPLASTY WITH STENT PLACEMENT     • CORONARY STENT PLACEMENT     • OTHER SURGICAL HISTORY      growth removed from small intestine as a child   • POLYPECTOMY      Colon polyps status   • RECTOVAGINAL FISTULA REPAIR     • TONSILLECTOMY         Allergies   Allergen Reactions   • Sulfa Antibiotics Anaphylaxis         Current Outpatient Medications:   •  allopurinol (ZYLOPRIM) 100 MG tablet, Take 100 mg by mouth Daily., Disp: , Rfl:   •  aspirin 81 MG EC tablet, Take 81 mg by mouth daily., Disp: , Rfl:   •  carvedilol (COREG) 6.25 MG tablet, Take 1 tablet by mouth 2 (Two) Times a Day., Disp: 60 tablet, Rfl: 11  •  diclofenac (VOLTAREN) 1 % gel gel, Apply 4 g topically to the appropriate area as directed 4 (Four) Times a Day As Needed., Disp: , Rfl:   •  DULoxetine (CYMBALTA) 30 MG  capsule, Take 30 mg by mouth Daily., Disp: , Rfl:   •  fluticasone (FLONASE) 50 MCG/ACT nasal spray, 2 sprays by Each Nare route Daily As Needed., Disp: , Rfl:   •  furosemide (LASIX) 40 MG tablet, Take 0.5 tablets by mouth 2 (Two) Times a Day. May take an additional tablet PRN for weight gain of 3lbs in one day or 5lbs in 1 wk, Disp: , Rfl:   •  Liraglutide (VICTOZA) 18 MG/3ML solution pen-injector injection, Inject 1.2 mg under the skin Daily., Disp: , Rfl:   •  losartan (COZAAR) 25 MG tablet, 1/2 tablet per day, Disp: 60 tablet, Rfl: 3  •  nitroglycerin (NITROSTAT) 0.4 MG SL tablet, Place 1 tablet under the tongue Every 5 (Five) Minutes As Needed for Chest Pain. Take no more than 3 doses in 15 minutes., Disp: 25 tablet, Rfl: 3  •  nystatin (MYCOSTATIN) 553475 UNIT/GM ointment, Apply 1 application topically 3 (Three) Times a Day As Needed., Disp: , Rfl:   •  ondansetron (ZOFRAN) 4 MG tablet, Take 4 mg by mouth Every 8 (Eight) Hours As Needed for Nausea or Vomiting., Disp: , Rfl:   •  potassium chloride (MICRO-K) 10 MEQ CR capsule, Take 10 mEq by mouth Every Other Day., Disp: , Rfl:   •  rizatriptan (MAXALT) 10 MG tablet, Take 10 mg by mouth 1 (One) Time As Needed for Migraine. May repeat in 2 hours if needed, Disp: , Rfl:   •  rosuvastatin (CRESTOR) 20 MG tablet, Take 1 tablet by mouth Daily., Disp: 90 tablet, Rfl: 3  •  spironolactone (ALDACTONE) 25 MG tablet, Take 1 tablet by mouth Daily., Disp: , Rfl:   •  Turmeric 500 MG tablet, Take 1 tablet by mouth Daily., Disp: , Rfl:     The following portions of the chart were reviewed and updated as appropriate: Allergies, current medications, past family history, social history, past medical history.     Review of Systems   Constitution: Positive for malaise/fatigue. Negative for chills, decreased appetite, diaphoresis, fever, weakness, night sweats, weight gain and weight loss.   HENT: Negative for congestion, hearing loss, hoarse voice and nosebleeds.    Eyes:  "Negative for blurred vision, visual disturbance and visual halos.   Cardiovascular: Positive for dyspnea on exertion. Negative for chest pain, claudication, cyanosis, irregular heartbeat, leg swelling, near-syncope, orthopnea, palpitations, paroxysmal nocturnal dyspnea and syncope.   Respiratory: Negative for cough, hemoptysis, shortness of breath, sleep disturbances due to breathing, snoring, sputum production and wheezing.    Hematologic/Lymphatic: Negative for bleeding problem. Does not bruise/bleed easily.   Skin: Negative for dry skin, itching and rash.   Musculoskeletal: Positive for gout. Negative for arthritis, falls, joint pain, joint swelling and myalgias.   Gastrointestinal: Positive for diarrhea. Negative for bloating, abdominal pain, constipation, flatus, heartburn, hematemesis, hematochezia, melena, nausea and vomiting.   Genitourinary: Negative for dysuria, frequency, hematuria, nocturia and urgency.   Neurological: Positive for excessive daytime sleepiness. Negative for dizziness, headaches, light-headedness and loss of balance.   Psychiatric/Behavioral: Negative for depression. The patient does not have insomnia and is not nervous/anxious.            Objective:     Vitals:    11/20/18 1407   BP: 129/69   BP Location: Left arm   Patient Position: Sitting   Cuff Size: Large Adult   Pulse: 77   Resp: 18   Temp: 98.8 °F (37.1 °C)   TempSrc: Temporal   SpO2: 98%   Weight: 101 kg (223 lb)   Height: 162.6 cm (64\")         Physical Exam   Constitutional: She is oriented to person, place, and time. She appears well-developed and well-nourished. She is active and cooperative. No distress.   HENT:   Head: Normocephalic and atraumatic.   Mouth/Throat: Oropharynx is clear and moist.   Eyes: Conjunctivae and EOM are normal. Pupils are equal, round, and reactive to light.   Neck: Normal range of motion. Neck supple. No JVD present. No tracheal deviation present. No thyromegaly present.   Cardiovascular: Normal " rate, regular rhythm, normal heart sounds and intact distal pulses.   Pulmonary/Chest: Effort normal and breath sounds normal.   Abdominal: Soft. Bowel sounds are normal. She exhibits no distension. There is no tenderness.   Musculoskeletal: Normal range of motion.   Neurological: She is alert and oriented to person, place, and time.   Skin: Skin is warm, dry and intact.   Psychiatric: She has a normal mood and affect. Her behavior is normal.   Nursing note and vitals reviewed.      Lab and Diagnostic Review:      Lab Results   Component Value Date    GLUCOSE 116 (H) 03/27/2018    CALCIUM 9.1 03/27/2018     03/27/2018    K 4.2 03/27/2018    CO2 28.0 03/27/2018     03/27/2018    BUN 25 (H) 03/27/2018    CREATININE 0.80 03/27/2018    EGFRIFNONA 74 03/27/2018    BCR 31.3 (H) 03/27/2018    ANIONGAP 5.0 03/27/2018         Assessment and Plan:         1. Chronic systolic congestive heart failure (CMS/HCC)  euvolemic  Heart failure education today including signs and symptoms, the role of the heart failure center, daily weights, low sodium diet (less than 1500 mg per day), and daily physical activity. Reviewed HF Zones with patient and family.  Patient to continue current medications as previously ordered.     2. Coronary artery disease involving native coronary artery of native heart without angina pectoris  Without angina  Continue asa, coreg, losartan,crestor    3. Essential hypertension  Under good control  HTN Education provided today including signs and symptoms, medication management, daily blood pressure monitoring. Patient encouraged to call the [facility] and Valve center with any abnormal readings.   4. Mixed hyperlipidemia  Statin therapy    It has been a pleasure to participate in the care of this patient.  Patient was instructed to call the [facility] with any questions, concerns, or worsening symptoms.        * Please note that portions of this note were completed with a voice recognition  program. Efforts were made to edit the dictation but occasionally words are transcribed.

## 2018-11-26 RX ORDER — LOSARTAN POTASSIUM 25 MG/1
TABLET ORAL
Qty: 60 TABLET | Refills: 3 | Status: SHIPPED | OUTPATIENT
Start: 2018-11-26 | End: 2019-02-05

## 2018-11-29 ENCOUNTER — CLINICAL SUPPORT NO REQUIREMENTS (OUTPATIENT)
Dept: CARDIOLOGY | Facility: CLINIC | Age: 58
End: 2018-11-29

## 2018-11-29 DIAGNOSIS — I25.5 ISCHEMIC CARDIOMYOPATHY: ICD-10-CM

## 2019-01-11 NOTE — PROGRESS NOTES
Encounter Date:01/14/2019      Patient ID: Lucia Stevens is a 58 y.o. female.        Subjective:     Chief Complaint: Follow-up (chronic systolic heart failure)     History of Present Illness patient presents to the office today for ongoing evaluation of her chronic systolic heart failure. She notes worsening dyspnea, weight gain, fatigue and pedal edema over the last week or so. She notes that she took 40 mg together yesterday of lasix instead of 20 mg bid. She notes that she voided frequently throughout the day. She notes however very little improvement in symptoms. She denies cp, presyncope, syncope, orthopnea, pnd, palpitations.     Patient Active Problem List   Diagnosis   • Psoriasis   • Secondary Sjogren's syndrome (CMS/HCC)   • Hiatal hernia   • Obesity   • Hypertension   • Ischemic heart disease   • NAYLOR (dyspnea on exertion)   • Coronary artery disease involving native coronary artery of native heart with angina pectoris (CMS/HCC)   • Ischemic cardiomyopathy   • Biventricular ICD (implantable cardioverter-defibrillator) in place       Past Surgical History:   Procedure Laterality Date   • CARDIAC CATHETERIZATION     • CARDIAC CATHETERIZATION N/A 11/30/2017    Procedure: Left Heart Cath;  Surgeon: Galina Leonard MD;  Location:  [facility];  Service:    • CARDIAC ELECTROPHYSIOLOGY PROCEDURE N/A 3/28/2018    Procedure: Device Implant BiV ICD;  Surgeon: Sarbjit Ellison DO;  Location:  [facility];  Service: Cardiovascular   • CERVICAL DISCECTOMY ANTERIOR     • CHOLECYSTECTOMY     • CORONARY ANGIOPLASTY     • CORONARY ANGIOPLASTY WITH STENT PLACEMENT     • CORONARY ANGIOPLASTY WITH STENT PLACEMENT     • CORONARY STENT PLACEMENT     • OTHER SURGICAL HISTORY      growth removed from small intestine as a child   • POLYPECTOMY      Colon polyps status   • RECTOVAGINAL FISTULA REPAIR     • TONSILLECTOMY         Allergies   Allergen Reactions   • Sulfa Antibiotics Anaphylaxis          Current Outpatient Medications:   •  allopurinol (ZYLOPRIM) 100 MG tablet, Take 100 mg by mouth Daily., Disp: , Rfl:   •  aspirin 81 MG EC tablet, Take 81 mg by mouth daily., Disp: , Rfl:   •  carvedilol (COREG) 6.25 MG tablet, Take 1 tablet by mouth 2 (Two) Times a Day., Disp: 60 tablet, Rfl: 11  •  diclofenac (VOLTAREN) 1 % gel gel, Apply 4 g topically to the appropriate area as directed 4 (Four) Times a Day As Needed., Disp: , Rfl:   •  DULoxetine (CYMBALTA) 30 MG capsule, Take 30 mg by mouth Daily., Disp: , Rfl:   •  fluticasone (FLONASE) 50 MCG/ACT nasal spray, 2 sprays by Each Nare route Daily As Needed., Disp: , Rfl:   •  furosemide (LASIX) 40 MG tablet, Take 0.5 tablets by mouth 2 (Two) Times a Day. May take an additional tablet PRN for weight gain of 3lbs in one day or 5lbs in 1 wk, Disp: , Rfl:   •  Liraglutide (VICTOZA) 18 MG/3ML solution pen-injector injection, Inject 1.2 mg under the skin Daily., Disp: , Rfl:   •  losartan (COZAAR) 25 MG tablet, TAKE ONE TABLET BY MOUTH ONCE DAILY, Disp: 60 tablet, Rfl: 3  •  nitroglycerin (NITROSTAT) 0.4 MG SL tablet, Place 1 tablet under the tongue Every 5 (Five) Minutes As Needed for Chest Pain. Take no more than 3 doses in 15 minutes., Disp: 25 tablet, Rfl: 3  •  nystatin (MYCOSTATIN) 218923 UNIT/GM ointment, Apply 1 application topically 3 (Three) Times a Day As Needed., Disp: , Rfl:   •  ondansetron (ZOFRAN) 4 MG tablet, Take 4 mg by mouth Every 8 (Eight) Hours As Needed for Nausea or Vomiting., Disp: , Rfl:   •  potassium chloride (MICRO-K) 10 MEQ CR capsule, Take 10 mEq by mouth Every Other Day., Disp: , Rfl:   •  rifaximin (XIFAXAN) 550 MG tablet, Take 550 mg by mouth 3 (Three) Times a Day., Disp: , Rfl:   •  rizatriptan (MAXALT) 10 MG tablet, Take 10 mg by mouth 1 (One) Time As Needed for Migraine. May repeat in 2 hours if needed, Disp: , Rfl:   •  rosuvastatin (CRESTOR) 20 MG tablet, Take 1 tablet by mouth Daily., Disp: 90 tablet, Rfl: 3  •   spironolactone (ALDACTONE) 25 MG tablet, Take 1 tablet by mouth Daily., Disp: , Rfl:   •  Turmeric 500 MG tablet, Take 1 tablet by mouth Daily., Disp: , Rfl:     The following portions of the chart were reviewed and updated as appropriate: Allergies, current medications, past family history, social history, past medical history.     Review of Systems   Constitution: Positive for malaise/fatigue. Negative for chills, decreased appetite, diaphoresis, fever, weakness, night sweats, weight gain and weight loss.   HENT: Positive for congestion. Negative for hearing loss, hoarse voice and nosebleeds.    Eyes: Negative for blurred vision, visual disturbance and visual halos.   Cardiovascular: Positive for dyspnea on exertion and leg swelling. Negative for chest pain, claudication, cyanosis, irregular heartbeat, near-syncope, orthopnea, palpitations, paroxysmal nocturnal dyspnea and syncope.   Respiratory: Negative for cough, hemoptysis, shortness of breath, sleep disturbances due to breathing, snoring, sputum production and wheezing.    Endocrine: Positive for polydipsia.   Hematologic/Lymphatic: Negative for bleeding problem. Does not bruise/bleed easily.   Skin: Negative for dry skin, itching and rash.   Musculoskeletal: Positive for gout and joint pain. Negative for arthritis, falls, joint swelling and myalgias.   Gastrointestinal: Positive for bloating and diarrhea. Negative for abdominal pain, constipation, flatus, heartburn, hematemesis, hematochezia, melena, nausea and vomiting.   Genitourinary: Positive for frequency. Negative for dysuria, hematuria, nocturia and urgency.   Neurological: Positive for excessive daytime sleepiness and headaches. Negative for dizziness, light-headedness and loss of balance.   Psychiatric/Behavioral: Negative for depression. The patient does not have insomnia and is not nervous/anxious.            Objective:     Vitals:    01/14/19 1013 01/14/19 1014   BP: 142/67 123/59   BP Location:  "Right arm Right arm   Patient Position: Sitting Standing   Pulse: 85 91   Resp: 14    Temp: 98 °F (36.7 °C)    SpO2: 99%    Weight: 104 kg (229 lb 6.4 oz)    Height: 162.6 cm (64\")          Physical Exam   Constitutional: She is oriented to person, place, and time. She appears well-developed and well-nourished. She is active and cooperative. No distress.   HENT:   Head: Normocephalic and atraumatic.   Mouth/Throat: Oropharynx is clear and moist.   Eyes: Conjunctivae and EOM are normal. Pupils are equal, round, and reactive to light.   Neck: Normal range of motion. Neck supple. No JVD present. No tracheal deviation present. No thyromegaly present.   Cardiovascular: Normal rate, regular rhythm, normal heart sounds and intact distal pulses.   Pulmonary/Chest: Effort normal. She has rales.   Abdominal: Soft. Bowel sounds are normal. She exhibits distension. There is no tenderness.   Musculoskeletal: Normal range of motion.   Neurological: She is alert and oriented to person, place, and time.   Skin: Skin is warm, dry and intact.   Psychiatric: She has a normal mood and affect. Her behavior is normal.   Nursing note and vitals reviewed.      Lab and Diagnostic Review:      Lab Results   Component Value Date    GLUCOSE 116 (H) 03/27/2018    CALCIUM 9.1 03/27/2018     03/27/2018    K 4.2 03/27/2018    CO2 28.0 03/27/2018     03/27/2018    BUN 25 (H) 03/27/2018    CREATININE 0.80 03/27/2018    EGFRIFNONA 74 03/27/2018    BCR 31.3 (H) 03/27/2018    ANIONGAP 5.0 03/27/2018   reviewed scanned labs from June 2018    Assessment and Plan:         1. Acute on chronic systolic congestive heart failure (CMS/HCC)  Begin alternating lasix 40 mg qam with lasix 20 mg bid for next few days  Patient unable to take lasix 40 mg daily (as a combined dose) due to hypotension  Heart failure education today including signs and symptoms, the role of the heart failure center, daily weights, low sodium diet (less than 1500 mg per day), " and daily physical activity. Reviewed HF Zones with patient and family.  Patient to continue current medications as previously ordered.     2. Essential hypertension  Under good control  HTN Education provided today including signs and symptoms, medication management, daily blood pressure monitoring. Patient encouraged to call the Heart and Val[facility] center with any abnormal readings.     3. Coronary artery disease involving native coronary artery of native heart without angina pectoris  Without angina      4. Dyslipidemia  On statin therapy    It has been a pleasure to participate in the care of this patient.  Patient was instructed to call the [facility] with any questions, concerns, or worsening symptoms.        * Please note that portions of this note were completed with a voice recognition program. Efforts were made to edit the dictation but occasionally words are transcribed.

## 2019-01-14 ENCOUNTER — OFFICE VISIT (OUTPATIENT)
Dept: CARDIOLOGY | Facility: HOSPITAL | Age: 59
End: 2019-01-14

## 2019-01-14 VITALS
WEIGHT: 229.4 LBS | RESPIRATION RATE: 14 BRPM | TEMPERATURE: 98 F | HEART RATE: 91 BPM | HEIGHT: 64 IN | SYSTOLIC BLOOD PRESSURE: 123 MMHG | OXYGEN SATURATION: 99 % | BODY MASS INDEX: 39.16 KG/M2 | DIASTOLIC BLOOD PRESSURE: 59 MMHG

## 2019-01-14 DIAGNOSIS — I25.10 CORONARY ARTERY DISEASE INVOLVING NATIVE CORONARY ARTERY OF NATIVE HEART WITHOUT ANGINA PECTORIS: ICD-10-CM

## 2019-01-14 DIAGNOSIS — I10 ESSENTIAL HYPERTENSION: ICD-10-CM

## 2019-01-14 DIAGNOSIS — E78.5 DYSLIPIDEMIA: ICD-10-CM

## 2019-01-14 DIAGNOSIS — I50.23 ACUTE ON CHRONIC SYSTOLIC CONGESTIVE HEART FAILURE (HCC): Primary | ICD-10-CM

## 2019-01-14 PROCEDURE — 99214 OFFICE O/P EST MOD 30 MIN: CPT | Performed by: NURSE PRACTITIONER

## 2019-01-24 ENCOUNTER — OFFICE VISIT (OUTPATIENT)
Dept: NEUROLOGY | Facility: CLINIC | Age: 59
End: 2019-01-24

## 2019-01-24 VITALS
BODY MASS INDEX: 39.2 KG/M2 | SYSTOLIC BLOOD PRESSURE: 126 MMHG | WEIGHT: 229.6 LBS | DIASTOLIC BLOOD PRESSURE: 74 MMHG | HEIGHT: 64 IN

## 2019-01-24 DIAGNOSIS — R11.15 INTRACTABLE CYCLICAL VOMITING WITH NAUSEA: Primary | ICD-10-CM

## 2019-01-24 PROCEDURE — 99204 OFFICE O/P NEW MOD 45 MIN: CPT | Performed by: PSYCHIATRY & NEUROLOGY

## 2019-01-24 RX ORDER — AMITRIPTYLINE HYDROCHLORIDE 25 MG/1
25 TABLET, FILM COATED ORAL NIGHTLY
Qty: 30 TABLET | Refills: 2 | Status: SHIPPED | OUTPATIENT
Start: 2019-01-24 | End: 2019-02-05

## 2019-01-24 NOTE — PROGRESS NOTES
Subjective:    CC: Lucia Stevens is seen today in consultation at the request of Roshan GALINDO MD for Severe episodic vomiting, diarrhea and  Migraine (sudden voimiting and nausea (abdominal migraines))       HPI:  A shin is a 58-year-old female with past medical history of congestive heart failure, coronary artery disease, type 2 diabetes, hypertension and depression referred to the clinic the for evaluation of sudden onset of episodes characterized by vomiting associated with diarrhea.  She started having these episodes about 5 years ago.  The first episode was the most severe one in which she had sudden onset of a spinning sensation associated with diarrhea and vomiting.  Following this, she went to ER and underwent MRI brain to rule out possibility of stroke.  MRI did not reveal any acute ischemia.  She was then discharged home.  Following this, she has been having approximately 5-6 episodes of sudden onset of the prolonged nausea, vomiting and diarrhea which would usually start in the morning.  She denies any warning signs.  The episode would last for a few hours and after that she will be really exhausted, tired and would take about one to one or 2 days for her to recover.  She denies associated headache or the any warning sign which will help her determine that it is going to come on.  She has had the ENT evaluation in the past which was unremarkable.  CT sinus was unremarkable as well.  She has seen a GI ×2 and gastric emptying study was done which was unremarkable.  Recently she underwent upper GI endoscopy as well - result of which are awaited.  She had her last episode in November and the prior to that, it was in September and  in August 2018.  No episodes in 2019 so far.  She does report the mild 2-3 out of 10 intensity headache almost on a daily basis.  He is on not associated with the light or sound sensitivity or nausea or vomiting.  It involves right or left frontal and temporal area.  She  reports that her sleep is not good and she does not feel refreshed when she wakes up.  She is tired throughout the day and feels like taking frequent naps during daytime.  She also reports sudden sleep episodes while watching TV or reading.  She has never been evaluated for sleep apnea.      The following portions of the patient's history were reviewed today and updated as of 01/24/2019  : allergies, social history and problem list.  This document will be scanned to patient's chart.      Current Outpatient Medications:   •  allopurinol (ZYLOPRIM) 100 MG tablet, Take 100 mg by mouth Daily., Disp: , Rfl:   •  aspirin 81 MG EC tablet, Take 81 mg by mouth daily., Disp: , Rfl:   •  carvedilol (COREG) 6.25 MG tablet, Take 1 tablet by mouth 2 (Two) Times a Day., Disp: 60 tablet, Rfl: 11  •  diclofenac (VOLTAREN) 1 % gel gel, Apply 4 g topically to the appropriate area as directed 4 (Four) Times a Day As Needed., Disp: , Rfl:   •  DULoxetine (CYMBALTA) 30 MG capsule, Take 30 mg by mouth Daily., Disp: , Rfl:   •  fluticasone (FLONASE) 50 MCG/ACT nasal spray, 2 sprays by Each Nare route Daily As Needed., Disp: , Rfl:   •  furosemide (LASIX) 40 MG tablet, Take 0.5 tablets by mouth 2 (Two) Times a Day. May take an additional tablet PRN for weight gain of 3lbs in one day or 5lbs in 1 wk, Disp: , Rfl:   •  Liraglutide (VICTOZA) 18 MG/3ML solution pen-injector injection, Inject 1.2 mg under the skin Daily., Disp: , Rfl:   •  losartan (COZAAR) 25 MG tablet, TAKE ONE TABLET BY MOUTH ONCE DAILY, Disp: 60 tablet, Rfl: 3  •  nitroglycerin (NITROSTAT) 0.4 MG SL tablet, Place 1 tablet under the tongue Every 5 (Five) Minutes As Needed for Chest Pain. Take no more than 3 doses in 15 minutes., Disp: 25 tablet, Rfl: 3  •  nystatin (MYCOSTATIN) 235956 UNIT/GM ointment, Apply 1 application topically 3 (Three) Times a Day As Needed., Disp: , Rfl:   •  ondansetron (ZOFRAN) 4 MG tablet, Take 4 mg by mouth Every 8 (Eight) Hours As Needed for  Nausea or Vomiting., Disp: , Rfl:   •  potassium chloride (MICRO-K) 10 MEQ CR capsule, Take 10 mEq by mouth Every Other Day., Disp: , Rfl:   •  rifaximin (XIFAXAN) 550 MG tablet, Take 550 mg by mouth 3 (Three) Times a Day., Disp: , Rfl:   •  rizatriptan (MAXALT) 10 MG tablet, Take 10 mg by mouth 1 (One) Time As Needed for Migraine. May repeat in 2 hours if needed, Disp: , Rfl:   •  rosuvastatin (CRESTOR) 20 MG tablet, Take 1 tablet by mouth Daily., Disp: 90 tablet, Rfl: 3  •  spironolactone (ALDACTONE) 25 MG tablet, Take 1 tablet by mouth Daily., Disp: , Rfl:   •  Turmeric 500 MG tablet, Take 1 tablet by mouth Daily., Disp: , Rfl:   •  amitriptyline (ELAVIL) 25 MG tablet, Take 1 tablet by mouth Every Night., Disp: 30 tablet, Rfl: 2   Past Medical History:   Diagnosis Date   • Arthritis    • CHF (congestive heart failure) (CMS/HCC)    • Coronary artery disease     2 stents   • Depression    • Diabetes mellitus (CMS/HCC)     type 2 dm, 3 years, checks blood sugar every am   • Gout    • Hyperlipidemia    • Psoriasis    • Sjoegren syndrome (CMS/HCC)    • SOB (shortness of breath) on exertion    • Tobacco abuse 2011    cessation    • Wears dentures     full set   • Wears glasses       Past Surgical History:   Procedure Laterality Date   • CARDIAC CATHETERIZATION     • CARDIAC CATHETERIZATION N/A 11/30/2017    Procedure: Left Heart Cath;  Surgeon: Galina Leonard MD;  Location:  [facility];  Service:    • CARDIAC ELECTROPHYSIOLOGY PROCEDURE N/A 3/28/2018    Procedure: Device Implant BiV ICD;  Surgeon: Sarbjit Ellison DO;  Location:  [facility];  Service: Cardiovascular   • CERVICAL DISCECTOMY ANTERIOR     • CHOLECYSTECTOMY     • CORONARY ANGIOPLASTY     • CORONARY ANGIOPLASTY WITH STENT PLACEMENT     • CORONARY ANGIOPLASTY WITH STENT PLACEMENT     • CORONARY STENT PLACEMENT     • OTHER SURGICAL HISTORY      growth removed from small intestine as a child   • POLYPECTOMY      Colon polyps  "status   • RECTOVAGINAL FISTULA REPAIR     • TONSILLECTOMY        Family History   Problem Relation Age of Onset   • Heart disease Mother    • Heart attack Father    • No Known Problems Brother    • Cancer Maternal Grandmother    • Brain cancer Maternal Grandmother    • Stomach cancer Maternal Grandfather    • Heart failure Paternal Grandmother    • Heart attack Paternal Grandfather       Review of Systems   Constitutional: Positive for activity change and fatigue.   HENT: Positive for ear pain, nosebleeds and rhinorrhea.    Eyes: Positive for discharge.   Respiratory: Positive for cough, chest tightness and wheezing.    Cardiovascular: Positive for chest pain and leg swelling.   Gastrointestinal: Positive for diarrhea, nausea and vomiting.   Endocrine: Positive for cold intolerance, heat intolerance and polydipsia.   Musculoskeletal: Positive for back pain, neck pain and neck stiffness.   Neurological: Positive for dizziness, headache and confusion.   Psychiatric/Behavioral: Positive for sleep disturbance and depressed mood. The patient is nervous/anxious.        All other systems reviewed and are negative     Objective:    /74   Ht 162.6 cm (64\")   Wt 104 kg (229 lb 9.6 oz)   BMI 39.41 kg/m²     Neurology Exam:    General apperance: NAD.     Mental status: Alert, awake and oriented to time place and person.    Recent and Remote memory: Can recall 3/3 objects at 5 minutes. Can recall historical events.     Attention span and Concentration: Serial 7s: Normal.     Fund of knowledge:  Normal.     Language and Speech: No aphasia or dysarthria.    Naming , Repitition and Comprehension:  Can name objects, repeat a sentence and follow commands. Speech is clear and fluent with good repetition, comprehension, and naming.    Cranial Nerves:   CN II: Visual fields are full. Intact. Fundi - Normal, No papillederma, Pupils - SIVA  CN III, IV and VI: Extraocular movements are intact. Normal saccades.   CN V: Facial " sensation is intact.   CN VII: Muscles of facial expression reveal no asymmetry. Intact.   CN VIII: Hearing is intact. Whispered voice intact.   CN IX and X: Palate elevates symmetrically. Intact  CN XI: Shoulder shrug is intact.   CN XII: Tongue is midline without evidence of atrophy or fasciculation.     Motor:  Right UE muscle strength 5/5. Normal tone.     Left UE muscle strength 5/5. Normal tone.      Right LE muscle strength5/5. Normal tone.     Left LE muscle strength 5/5. Normal tone.      Sensory: Normal light touch, vibration and pinprick sensation bilaterally.    DTRs: 2+ bilaterally in upper and lower extremities.    Babinski: Negative bilaterally.    Co-ordination: Normal finger-to-nose, heel to shin B/L.    Rhomberg: Negative.    Gait: Normal.    Cardiovascular: Regular rate and rhythm without murmur, gallop or rub.    Assessment and Plan:  1. Intractable cyclical vomiting with nausea  Likely cyclical vomiting syndrome.  Since she is experiencing intractable episodes approximately 6 times in a year, I will start her on amitriptyline 25 mg at bedtime as a preventative therapy.  Since she does not have any warning sign, it will be difficult for her to take  any medications at the time of onset of the this to prevent it from coming.  She has been prescribed Zofran sublingually which is okay to take to reduce nausea and vomiting during the episode.  MRI brain was reviewed and it was unremarkable.  She likely has untreated sleep apnea which can be evaluated the in future as well.  I'll see her back in 2 months for reassessment.  If she happens to have the any more such episodes in between then I have advised her to call my office.    No Follow-up on file.     Shahid iLnares MD

## 2019-01-25 ENCOUNTER — TELEPHONE (OUTPATIENT)
Dept: CARDIOLOGY | Facility: HOSPITAL | Age: 59
End: 2019-01-25

## 2019-01-25 ENCOUNTER — HOSPITAL ENCOUNTER (OUTPATIENT)
Age: 59
End: 2019-01-25
Payer: COMMERCIAL

## 2019-01-25 DIAGNOSIS — M81.0: ICD-10-CM

## 2019-01-25 DIAGNOSIS — N60.19: ICD-10-CM

## 2019-01-25 DIAGNOSIS — Z12.31: Primary | ICD-10-CM

## 2019-01-25 PROCEDURE — 77067 SCR MAMMO BI INCL CAD: CPT

## 2019-01-25 PROCEDURE — 77080 DXA BONE DENSITY AXIAL: CPT

## 2019-01-25 NOTE — TELEPHONE ENCOUNTER
Patient notes that she had been alternating lasix 40/20 mg with improvement in symptoms. She notes however that she recently stopped that due to other health issues. She notes worsening pedal edema over the last few days. She will resume alternating lasix 40 mg with 20 mg every other day. Patient instructed to call office with any questions, concerns or change in symptoms.

## 2019-02-04 ENCOUNTER — TELEPHONE (OUTPATIENT)
Dept: CARDIOLOGY | Facility: HOSPITAL | Age: 59
End: 2019-02-04

## 2019-02-04 NOTE — TELEPHONE ENCOUNTER
----- Message from Ana Perea sent at 2/4/2019  3:10 PM EST -----  Patient gained 8 lbs in 3 days. Legs and ankles are swollen, she gets very short of breath with walking around her house (and she sounds short of breath while talking to me on the phone), she has been taking and extra 1/2 dose of her Lasix every other day with her regular scheduled dose but it has not helped.    I gave her an appointment to come in to the office to see you to be evaluated tomorrow for fluid overload and possible in office IV diuresis.      Ana Perea/[facility]

## 2019-02-04 NOTE — PROGRESS NOTES
Encounter Date:02/05/2019      Patient ID: Lucia Stevens is a 58 y.o. female.        Subjective:     Chief Complaint: Follow-up (c/o of fluid overload)     History of Present Illness patient presents to the office today for ongoing evaluation of her chronic systolic heart failure. She notes that she has been experiencing worsening dyspnea, abdominal fullness,fatigue and pedal edema since Friday. She notes that she increased her lasix dose by 20 mg as she had done in the past with no relief in symptoms. She also notes 2 nights of orthopnea. Denies cp.   Patient Active Problem List   Diagnosis   • Psoriasis   • Secondary Sjogren's syndrome (CMS/HCC)   • Hiatal hernia   • Obesity   • Hypertension   • Ischemic heart disease   • NAYLOR (dyspnea on exertion)   • Coronary artery disease involving native coronary artery of native heart with angina pectoris (CMS/HCC)   • Ischemic cardiomyopathy   • Biventricular ICD (implantable cardioverter-defibrillator) in place       Past Surgical History:   Procedure Laterality Date   • CARDIAC CATHETERIZATION     • CARDIAC CATHETERIZATION N/A 11/30/2017    Procedure: Left Heart Cath;  Surgeon: Galina Leonard MD;  Location:  [facility];  Service:    • CARDIAC ELECTROPHYSIOLOGY PROCEDURE N/A 3/28/2018    Procedure: Device Implant BiV ICD;  Surgeon: Sarbjit Ellison DO;  Location:  [facility];  Service: Cardiovascular   • CERVICAL DISCECTOMY ANTERIOR     • CHOLECYSTECTOMY     • CORONARY ANGIOPLASTY     • CORONARY ANGIOPLASTY WITH STENT PLACEMENT     • CORONARY ANGIOPLASTY WITH STENT PLACEMENT     • CORONARY STENT PLACEMENT     • OTHER SURGICAL HISTORY      growth removed from small intestine as a child   • POLYPECTOMY      Colon polyps status   • RECTOVAGINAL FISTULA REPAIR     • TONSILLECTOMY         Allergies   Allergen Reactions   • Sulfa Antibiotics Anaphylaxis         Current Outpatient Medications:   •  chlordiazePOXIDE (LIBRIUM) 5 MG capsule, Take 5 mg  by mouth Daily., Disp: , Rfl:   •  losartan (COZAAR) 25 MG tablet, Take 12.5 mg by mouth Daily., Disp: , Rfl:   •  allopurinol (ZYLOPRIM) 100 MG tablet, Take 100 mg by mouth Daily., Disp: , Rfl:   •  aspirin 81 MG EC tablet, Take 81 mg by mouth daily., Disp: , Rfl:   •  carvedilol (COREG) 6.25 MG tablet, Take 1 tablet by mouth 2 (Two) Times a Day., Disp: 60 tablet, Rfl: 11  •  fluticasone (FLONASE) 50 MCG/ACT nasal spray, 2 sprays by Each Nare route Daily As Needed., Disp: , Rfl:   •  furosemide (LASIX) 40 MG tablet, Take 0.5 tablets by mouth 2 (Two) Times a Day. May take an additional tablet PRN for weight gain of 3lbs in one day or 5lbs in 1 wk, Disp: , Rfl:   •  Liraglutide (VICTOZA) 18 MG/3ML solution pen-injector injection, Inject 1.2 mg under the skin Daily., Disp: , Rfl:   •  nitroglycerin (NITROSTAT) 0.4 MG SL tablet, Place 1 tablet under the tongue Every 5 (Five) Minutes As Needed for Chest Pain. Take no more than 3 doses in 15 minutes., Disp: 25 tablet, Rfl: 3  •  nystatin (MYCOSTATIN) 250912 UNIT/GM ointment, Apply 1 application topically 3 (Three) Times a Day As Needed., Disp: , Rfl:   •  ondansetron (ZOFRAN) 4 MG tablet, Take 4 mg by mouth Every 8 (Eight) Hours As Needed for Nausea or Vomiting., Disp: , Rfl:   •  potassium chloride (MICRO-K) 10 MEQ CR capsule, Take 10 mEq by mouth Daily., Disp: , Rfl:   •  rizatriptan (MAXALT) 10 MG tablet, Take 10 mg by mouth 1 (One) Time As Needed for Migraine. May repeat in 2 hours if needed, Disp: , Rfl:   •  rosuvastatin (CRESTOR) 20 MG tablet, Take 1 tablet by mouth Daily., Disp: 90 tablet, Rfl: 3  •  spironolactone (ALDACTONE) 25 MG tablet, Take 1 tablet by mouth Daily., Disp: , Rfl:     The following portions of the chart were reviewed and updated as appropriate: Allergies, current medications, past family history, social history, past medical history.     Review of Systems   Constitution: Positive for malaise/fatigue. Negative for chills, decreased appetite,  "diaphoresis, fever, weakness, night sweats, weight gain and weight loss.   HENT: Positive for congestion. Negative for hearing loss, hoarse voice and nosebleeds.    Eyes: Negative for blurred vision, visual disturbance and visual halos.   Cardiovascular: Positive for dyspnea on exertion, leg swelling and orthopnea. Negative for chest pain, claudication, cyanosis, irregular heartbeat, near-syncope, palpitations, paroxysmal nocturnal dyspnea and syncope.   Respiratory: Positive for cough and shortness of breath. Negative for hemoptysis, sleep disturbances due to breathing, snoring, sputum production and wheezing.    Endocrine: Positive for polydipsia.   Hematologic/Lymphatic: Negative for bleeding problem. Does not bruise/bleed easily.   Skin: Negative for dry skin, itching and rash.   Musculoskeletal: Positive for gout. Negative for arthritis, joint pain, joint swelling and myalgias.   Gastrointestinal: Positive for bloating. Negative for abdominal pain, constipation, diarrhea, flatus, heartburn, hematemesis, hematochezia, melena, nausea and vomiting.   Genitourinary: Positive for frequency. Negative for dysuria, hematuria, nocturia and urgency.   Neurological: Positive for excessive daytime sleepiness and dizziness. Negative for headaches, light-headedness and loss of balance.   Psychiatric/Behavioral: Positive for altered mental status and memory loss. Negative for depression. The patient does not have insomnia and is not nervous/anxious.            Objective:     Vitals:    02/05/19 1022 02/05/19 1026   BP: 125/73 143/67   BP Location: Left arm Left arm   Patient Position: Sitting Standing   Pulse: 86 101   Resp: 20    Temp: 98.3 °F (36.8 °C)    TempSrc: Temporal    SpO2: 99%    Weight: 105 kg (232 lb)    Height: 162.6 cm (64.02\")          Physical Exam   Constitutional: She is oriented to person, place, and time. She appears well-developed and well-nourished. She is active and cooperative. No distress.   HENT: "   Head: Normocephalic and atraumatic.   Mouth/Throat: Oropharynx is clear and moist.   Eyes: Conjunctivae and EOM are normal. Pupils are equal, round, and reactive to light.   Neck: Normal range of motion. Neck supple. No JVD present. No tracheal deviation present. No thyromegaly present.   Cardiovascular: Normal rate, regular rhythm, normal heart sounds and intact distal pulses.   Pulmonary/Chest: Effort normal. She has rales.   Abdominal: Bowel sounds are normal. She exhibits distension. There is tenderness.   Musculoskeletal: Normal range of motion. She exhibits edema (2+ pitting edema noted BLES).   Neurological: She is alert and oriented to person, place, and time.   Skin: Skin is warm, dry and intact.   Psychiatric: She has a normal mood and affect. Her behavior is normal.   Nursing note and vitals reviewed.      Lab and Diagnostic Review:   Lab Results   Component Value Date    WBC 6.79 02/05/2019    HGB 11.8 02/05/2019    HCT 36.5 02/05/2019    MCV 88.8 02/05/2019     02/05/2019     BNP 7  Lab Results   Component Value Date    GLUCOSE 110 (H) 02/05/2019    CALCIUM 9.7 02/05/2019     02/05/2019    K 3.7 02/05/2019    CO2 26.0 02/05/2019     02/05/2019    BUN 19 02/05/2019    CREATININE 0.82 02/05/2019    EGFRIFNONA 71 02/05/2019    BCR 23.2 02/05/2019    ANIONGAP 10.0 02/05/2019           Assessment and Plan:         1. Acute on chronic systolic heart failure (CMS/HCC)  IV diuresis today in office. Patient received 60 mg lasix (NDC 1484-4302-30)  today through a # butterfly in the left AC over slow IV push. During IV diuresis, vitals were monitored and stable. Please see IV diuresis record for those vitals. Patient voided 1300 ml in the office prior to discharge from the office. Butterfly was d/c'd and area was free of erythema, ecchymosis, or drainage.  Patient was  instructed to record urinary output for the next 24 hours. Patient will receive a follow up call from the HF center in 24  hours to evaluate urinary output and reassess signs and symptoms.   - Basic Metabolic Panel; Future  - BNP  - Basic Metabolic Panel    2. Coronary artery disease involving native coronary artery of native heart without angina pectoris  Without angina   Continue asa, coreg, crestor    3. Other fatigue    - CBC & Differential  - CBC Auto Differential    4. Essential hypertension  Well controlled  HTN Education provided today including signs and symptoms, medication management, daily blood pressure monitoring. Patient encouraged to call the [facility] with any abnormal readings.     It has been a pleasure to participate in the care of this patient.  Patient was instructed to call the [facility] with any questions, concerns, or worsening symptoms.    * Please note that portions of this note were completed with a voice recognition program. Efforts were made to edit the dictation but occasionally words are transcribed.

## 2019-02-05 ENCOUNTER — OFFICE VISIT (OUTPATIENT)
Dept: CARDIOLOGY | Facility: HOSPITAL | Age: 59
End: 2019-02-05

## 2019-02-05 VITALS
RESPIRATION RATE: 20 BRPM | WEIGHT: 232 LBS | SYSTOLIC BLOOD PRESSURE: 143 MMHG | TEMPERATURE: 98.3 F | BODY MASS INDEX: 39.61 KG/M2 | HEIGHT: 64 IN | OXYGEN SATURATION: 99 % | DIASTOLIC BLOOD PRESSURE: 67 MMHG | HEART RATE: 101 BPM

## 2019-02-05 DIAGNOSIS — R53.83 OTHER FATIGUE: ICD-10-CM

## 2019-02-05 DIAGNOSIS — I25.10 CORONARY ARTERY DISEASE INVOLVING NATIVE CORONARY ARTERY OF NATIVE HEART WITHOUT ANGINA PECTORIS: ICD-10-CM

## 2019-02-05 DIAGNOSIS — I50.23 ACUTE ON CHRONIC SYSTOLIC HEART FAILURE (HCC): Primary | ICD-10-CM

## 2019-02-05 DIAGNOSIS — I10 ESSENTIAL HYPERTENSION: ICD-10-CM

## 2019-02-05 LAB
ANION GAP SERPL CALCULATED.3IONS-SCNC: 10 MMOL/L (ref 3–11)
BASOPHILS # BLD AUTO: 0.02 10*3/MM3 (ref 0–0.2)
BASOPHILS NFR BLD AUTO: 0.3 % (ref 0–1)
BNP SERPL-MCNC: 7 PG/ML (ref 0–100)
BUN BLD-MCNC: 19 MG/DL (ref 9–23)
BUN/CREAT SERPL: 23.2 (ref 7–25)
CALCIUM SPEC-SCNC: 9.7 MG/DL (ref 8.7–10.4)
CHLORIDE SERPL-SCNC: 100 MMOL/L (ref 99–109)
CO2 SERPL-SCNC: 26 MMOL/L (ref 20–31)
CREAT BLD-MCNC: 0.82 MG/DL (ref 0.6–1.3)
DEPRECATED RDW RBC AUTO: 45.5 FL (ref 37–54)
EOSINOPHIL # BLD AUTO: 0.2 10*3/MM3 (ref 0–0.3)
EOSINOPHIL NFR BLD AUTO: 2.9 % (ref 0–3)
ERYTHROCYTE [DISTWIDTH] IN BLOOD BY AUTOMATED COUNT: 14 % (ref 11.3–14.5)
GFR SERPL CREATININE-BSD FRML MDRD: 71 ML/MIN/1.73
GLUCOSE BLD-MCNC: 110 MG/DL (ref 70–100)
HCT VFR BLD AUTO: 36.5 % (ref 34.5–44)
HGB BLD-MCNC: 11.8 G/DL (ref 11.5–15.5)
IMM GRANULOCYTES # BLD AUTO: 0.02 10*3/MM3 (ref 0–0.03)
IMM GRANULOCYTES NFR BLD AUTO: 0.3 % (ref 0–0.6)
LYMPHOCYTES # BLD AUTO: 1.47 10*3/MM3 (ref 0.6–4.8)
LYMPHOCYTES NFR BLD AUTO: 21.6 % (ref 24–44)
MCH RBC QN AUTO: 28.7 PG (ref 27–31)
MCHC RBC AUTO-ENTMCNC: 32.3 G/DL (ref 32–36)
MCV RBC AUTO: 88.8 FL (ref 80–99)
MONOCYTES # BLD AUTO: 0.71 10*3/MM3 (ref 0–1)
MONOCYTES NFR BLD AUTO: 10.5 % (ref 0–12)
NEUTROPHILS # BLD AUTO: 4.39 10*3/MM3 (ref 1.5–8.3)
NEUTROPHILS NFR BLD AUTO: 64.7 % (ref 41–71)
PLATELET # BLD AUTO: 186 10*3/MM3 (ref 150–450)
PMV BLD AUTO: 10 FL (ref 6–12)
POTASSIUM BLD-SCNC: 3.7 MMOL/L (ref 3.5–5.5)
RBC # BLD AUTO: 4.11 10*6/MM3 (ref 3.89–5.14)
SODIUM BLD-SCNC: 136 MMOL/L (ref 132–146)
WBC NRBC COR # BLD: 6.79 10*3/MM3 (ref 3.5–10.8)

## 2019-02-05 PROCEDURE — 85025 COMPLETE CBC W/AUTO DIFF WBC: CPT | Performed by: NURSE PRACTITIONER

## 2019-02-05 PROCEDURE — 83880 ASSAY OF NATRIURETIC PEPTIDE: CPT | Performed by: NURSE PRACTITIONER

## 2019-02-05 PROCEDURE — 80048 BASIC METABOLIC PNL TOTAL CA: CPT | Performed by: NURSE PRACTITIONER

## 2019-02-05 PROCEDURE — 99214 OFFICE O/P EST MOD 30 MIN: CPT | Performed by: NURSE PRACTITIONER

## 2019-02-05 RX ORDER — LOSARTAN POTASSIUM 25 MG/1
12.5 TABLET ORAL DAILY
COMMUNITY
End: 2019-02-20

## 2019-02-05 RX ORDER — CHLORDIAZEPOXIDE HYDROCHLORIDE 5 MG/1
5 CAPSULE, GELATIN COATED ORAL DAILY
COMMUNITY
End: 2022-03-10 | Stop reason: ALTCHOICE

## 2019-02-06 ENCOUNTER — TELEPHONE (OUTPATIENT)
Dept: CARDIOLOGY | Facility: HOSPITAL | Age: 59
End: 2019-02-06

## 2019-02-06 NOTE — TELEPHONE ENCOUNTER
S/p iv diuresis  She notes that pedal edema has improved somewhat. She notes that she voided roughly 500 ml after leaving the office yesterday for a total of 1800 ml. She is to take lasix 40 mg q am and 20 mg qpm for next 2 days. Patient verbalized understanding.

## 2019-02-07 ENCOUNTER — TELEPHONE (OUTPATIENT)
Dept: CARDIOLOGY | Facility: HOSPITAL | Age: 59
End: 2019-02-07

## 2019-02-07 NOTE — TELEPHONE ENCOUNTER
Patient notes mild improvement in her dypsnea, abdominal fullness. She notes that she is voiding frequently throughout the day. Patient to continue medications as previously ordered.

## 2019-02-13 ENCOUNTER — HOSPITAL ENCOUNTER (OUTPATIENT)
Age: 59
End: 2019-02-13
Payer: COMMERCIAL

## 2019-02-13 DIAGNOSIS — R06.02: Primary | ICD-10-CM

## 2019-02-13 PROCEDURE — 71046 X-RAY EXAM CHEST 2 VIEWS: CPT

## 2019-02-14 NOTE — PROGRESS NOTES
Encounter Date:02/15/2019      Patient ID: Lucia Stevens is a 58 y.o. female.        Subjective:     Chief Complaint: Follow-up   F  History of Present Illness patient presents to the office today for follow up on IV diuresis. Patient notes after Iv diuresis, she voided frequently but notes no real improvement in her symptoms. She notes ongoing fatigue, dyspnea. She notes minimal pedal edema. She denies cp. She notes that she recently had repeat labs and chest xray with her pcp earlier this week. She notes pedal edema has improved significantly. She notes however that she has no energy to do anything. She notes that she is exhausted even after bathing or dressing.     Patient Active Problem List   Diagnosis   • Psoriasis   • Secondary Sjogren's syndrome (CMS/HCC)   • Hiatal hernia   • Obesity   • Hypertension   • Ischemic heart disease   • NAYLOR (dyspnea on exertion)   • Coronary artery disease involving native coronary artery of native heart with angina pectoris (CMS/HCC)   • Ischemic cardiomyopathy   • Biventricular ICD (implantable cardioverter-defibrillator) in place        Past Surgical History:   Procedure Laterality Date   • CARDIAC CATHETERIZATION     • CARDIAC CATHETERIZATION N/A 11/30/2017    Procedure: Left Heart Cath;  Surgeon: Galina Leonard MD;  Location:  [facility];  Service:    • CARDIAC ELECTROPHYSIOLOGY PROCEDURE N/A 3/28/2018    Procedure: Device Implant BiV ICD;  Surgeon: Sarbjit Ellison DO;  Location:  [facility];  Service: Cardiovascular   • CERVICAL DISCECTOMY ANTERIOR     • CHOLECYSTECTOMY     • CORONARY ANGIOPLASTY     • CORONARY ANGIOPLASTY WITH STENT PLACEMENT     • CORONARY ANGIOPLASTY WITH STENT PLACEMENT     • CORONARY STENT PLACEMENT     • OTHER SURGICAL HISTORY      growth removed from small intestine as a child   • POLYPECTOMY      Colon polyps status   • RECTOVAGINAL FISTULA REPAIR     • TONSILLECTOMY         Allergies   Allergen Reactions   • Sulfa  Antibiotics Anaphylaxis         Current Outpatient Medications:   •  allopurinol (ZYLOPRIM) 100 MG tablet, Take 100 mg by mouth Daily., Disp: , Rfl:   •  aspirin 81 MG EC tablet, Take 81 mg by mouth daily., Disp: , Rfl:   •  carvedilol (COREG) 6.25 MG tablet, Take 1 tablet by mouth 2 (Two) Times a Day., Disp: 60 tablet, Rfl: 11  •  chlordiazePOXIDE (LIBRIUM) 5 MG capsule, Take 5 mg by mouth Daily., Disp: , Rfl:   •  fluticasone (FLONASE) 50 MCG/ACT nasal spray, 2 sprays by Each Nare route Daily As Needed., Disp: , Rfl:   •  furosemide (LASIX) 40 MG tablet, Take 0.5 tablets by mouth 2 (Two) Times a Day. May take an additional tablet PRN for weight gain of 3lbs in one day or 5lbs in 1 wk, Disp: , Rfl:   •  Liraglutide (VICTOZA) 18 MG/3ML solution pen-injector injection, Inject 1.2 mg under the skin Daily., Disp: , Rfl:   •  losartan (COZAAR) 25 MG tablet, Take 12.5 mg by mouth Daily., Disp: , Rfl:   •  nitroglycerin (NITROSTAT) 0.4 MG SL tablet, Place 1 tablet under the tongue Every 5 (Five) Minutes As Needed for Chest Pain. Take no more than 3 doses in 15 minutes., Disp: 25 tablet, Rfl: 3  •  nystatin (MYCOSTATIN) 505526 UNIT/GM ointment, Apply 1 application topically 3 (Three) Times a Day As Needed., Disp: , Rfl:   •  ondansetron (ZOFRAN) 4 MG tablet, Take 4 mg by mouth Every 8 (Eight) Hours As Needed for Nausea or Vomiting., Disp: , Rfl:   •  potassium chloride (MICRO-K) 10 MEQ CR capsule, Take 10 mEq by mouth Daily., Disp: , Rfl:   •  rizatriptan (MAXALT) 10 MG tablet, Take 10 mg by mouth 1 (One) Time As Needed for Migraine. May repeat in 2 hours if needed, Disp: , Rfl:   •  rosuvastatin (CRESTOR) 20 MG tablet, Take 1 tablet by mouth Daily., Disp: 90 tablet, Rfl: 3  •  spironolactone (ALDACTONE) 25 MG tablet, Take 1 tablet by mouth Daily., Disp: , Rfl:     The following portions of the chart were reviewed and updated as appropriate: Allergies, current medications, past family history, social history, past medical  "history.     Review of Systems   Constitution: Positive for malaise/fatigue. Negative for chills, decreased appetite, diaphoresis, fever, weakness, night sweats, weight gain and weight loss.   HENT: Positive for congestion. Negative for hearing loss, hoarse voice and nosebleeds.    Eyes: Negative for blurred vision, visual disturbance and visual halos.   Cardiovascular: Positive for dyspnea on exertion and leg swelling. Negative for chest pain, claudication, cyanosis, irregular heartbeat, near-syncope, orthopnea, palpitations, paroxysmal nocturnal dyspnea and syncope.   Respiratory: Positive for cough. Negative for hemoptysis, shortness of breath, sleep disturbances due to breathing, snoring, sputum production and wheezing.    Endocrine: Positive for cold intolerance, heat intolerance and polyuria.   Hematologic/Lymphatic: Negative for bleeding problem. Does not bruise/bleed easily.   Skin: Negative for dry skin, itching and rash.   Musculoskeletal: Positive for gout. Negative for arthritis, falls, joint pain, joint swelling and myalgias.   Gastrointestinal: Positive for bloating and abdominal pain. Negative for constipation, diarrhea, flatus, heartburn, hematemesis, hematochezia, melena, nausea and vomiting.   Genitourinary: Positive for frequency. Negative for dysuria, hematuria, nocturia and urgency.   Neurological: Positive for excessive daytime sleepiness, dizziness, headaches and light-headedness. Negative for loss of balance.   Psychiatric/Behavioral: Positive for altered mental status and memory loss. Negative for depression. The patient has insomnia. The patient is not nervous/anxious.            Objective:     Vitals:    02/15/19 1236   BP: 132/73   BP Location: Right arm   Patient Position: Sitting   Pulse: 91   Resp: 16   Temp: 98.8 °F (37.1 °C)   TempSrc: Temporal   SpO2: 99%   Weight: 106 kg (233 lb)   Height: 162.6 cm (64.02\")         Physical Exam   Constitutional: She is oriented to person, place, " and time. She appears well-developed and well-nourished. She is active and cooperative. No distress.   HENT:   Head: Normocephalic and atraumatic.   Mouth/Throat: Oropharynx is clear and moist.   Eyes: Conjunctivae and EOM are normal. Pupils are equal, round, and reactive to light.   Neck: Normal range of motion. Neck supple. No JVD present. No tracheal deviation present. No thyromegaly present.   Cardiovascular: Normal rate, regular rhythm, normal heart sounds and intact distal pulses.   Pulmonary/Chest: Effort normal and breath sounds normal.   Abdominal: Soft. Bowel sounds are normal. She exhibits no distension. There is no tenderness.   Musculoskeletal: Normal range of motion.   Neurological: She is alert and oriented to person, place, and time.   Skin: Skin is warm, dry and intact.   Psychiatric: She has a normal mood and affect. Her behavior is normal.   Nursing note and vitals reviewed.      Lab and Diagnostic Review:      Lab Results   Component Value Date    GLUCOSE 110 (H) 02/05/2019    CALCIUM 9.7 02/05/2019     02/05/2019    K 3.7 02/05/2019    CO2 26.0 02/05/2019     02/05/2019    BUN 19 02/05/2019    CREATININE 0.82 02/05/2019    EGFRIFNONA 71 02/05/2019    BCR 23.2 02/05/2019    ANIONGAP 10.0 02/05/2019     Lab Results   Component Value Date    WBC 6.79 02/05/2019    HGB 11.8 02/05/2019    HCT 36.5 02/05/2019    MCV 88.8 02/05/2019     02/05/2019       Will request labs and cxr from PCP to review  Assessment and Plan:         1. Chronic systolic congestive heart failure (CMS/HCC)  euvolemic  Heart failure education today including signs and symptoms, the role of the heart failure center, daily weights, low sodium diet (less than 1500 mg per day), and daily physical activity. Reviewed HF Zones with patient and family.  Patient to continue current medications as previously ordered.   2. Essential hypertension  Under good control  HTN Education provided today including signs and  symptoms, medication management, daily blood pressure monitoring. Patient encouraged to call the [facility] with any abnormal readings.     3. Dyslipidemia  Currently on statin therapy    4. Biventricular ICD (implantable cardioverter-defibrillator) in place  Device check on Monday with Nik Luke PA-C    It has been a pleasure to participate in the care of this patient.  Patient was instructed to call the [facility] with any questions, concerns, or worsening symptoms.        * Please note that portions of this note were completed with a voice recognition program. Efforts were made to edit the dictation but occasionally words are transcribed.

## 2019-02-15 ENCOUNTER — OFFICE VISIT (OUTPATIENT)
Dept: CARDIOLOGY | Facility: HOSPITAL | Age: 59
End: 2019-02-15

## 2019-02-15 VITALS
RESPIRATION RATE: 16 BRPM | HEIGHT: 64 IN | DIASTOLIC BLOOD PRESSURE: 73 MMHG | OXYGEN SATURATION: 99 % | SYSTOLIC BLOOD PRESSURE: 132 MMHG | TEMPERATURE: 98.8 F | BODY MASS INDEX: 39.78 KG/M2 | WEIGHT: 233 LBS | HEART RATE: 91 BPM

## 2019-02-15 DIAGNOSIS — I50.22 CHRONIC SYSTOLIC CONGESTIVE HEART FAILURE (HCC): Primary | ICD-10-CM

## 2019-02-15 DIAGNOSIS — Z95.810 BIVENTRICULAR ICD (IMPLANTABLE CARDIOVERTER-DEFIBRILLATOR) IN PLACE: ICD-10-CM

## 2019-02-15 DIAGNOSIS — I10 ESSENTIAL HYPERTENSION: ICD-10-CM

## 2019-02-15 DIAGNOSIS — E78.5 DYSLIPIDEMIA: ICD-10-CM

## 2019-02-15 PROCEDURE — 99214 OFFICE O/P EST MOD 30 MIN: CPT | Performed by: NURSE PRACTITIONER

## 2019-02-18 ENCOUNTER — OFFICE VISIT (OUTPATIENT)
Dept: CARDIOLOGY | Facility: CLINIC | Age: 59
End: 2019-02-18

## 2019-02-18 VITALS
DIASTOLIC BLOOD PRESSURE: 72 MMHG | SYSTOLIC BLOOD PRESSURE: 112 MMHG | WEIGHT: 232 LBS | HEIGHT: 64 IN | BODY MASS INDEX: 39.61 KG/M2 | HEART RATE: 89 BPM

## 2019-02-18 DIAGNOSIS — I25.5 ISCHEMIC CARDIOMYOPATHY: ICD-10-CM

## 2019-02-18 DIAGNOSIS — R06.09 DOE (DYSPNEA ON EXERTION): ICD-10-CM

## 2019-02-18 DIAGNOSIS — Z95.810 BIVENTRICULAR ICD (IMPLANTABLE CARDIOVERTER-DEFIBRILLATOR) IN PLACE: Primary | ICD-10-CM

## 2019-02-18 PROCEDURE — 93284 PRGRMG EVAL IMPLANTABLE DFB: CPT | Performed by: PHYSICIAN ASSISTANT

## 2019-02-18 PROCEDURE — 99214 OFFICE O/P EST MOD 30 MIN: CPT | Performed by: PHYSICIAN ASSISTANT

## 2019-02-18 NOTE — PROGRESS NOTES
[facility]   OFFICE NOTE      Lucia Stevens  1960  PCP: Johan Marks MD    SUBJECTIVE:   Lucia Stevens is a 58 y.o. female seen for a follow up visit regarding the following:     CC:IHD    PROBLEM LIST:  1. Ischemic heart disease:  a. GXT myocardial perfusion study, 06/07/2005, revealing a moderate sized reversible defect in anteroseptal region with diminished myocardial thickening and hypokinesis. LVEF 58%.  b. Cardiac catheterization, June 2005, Dr. Talbot, due to moderate sized reversible anteroseptal defect; LVEF 38%: JADE stenting of PDA (2.5x13 mm Cypher).  c. JADE stenting of mid-RCA, 08/08/2011: 15 mm Promus JADE; LVEF 55% to 60%.  d. 11/30/17  [facility]   Cath single vessel CAD, total occlusion RCA with collateral, non obstructive on left. (2011 stent to RCA, 2005 stent to PDA)  e. 06/09/17  Stress EF 63%  f. 2/26/18 echo EF 25%, mild-mod MR  g. EF 20% on Echo with mod MR 3/27/2018  2. Hypertension.  3. Cardiomyopathy  a. 11/30/17 echo: EF 30%, LA borderline dilated, moderate to severe MR, mild-to-moderate TR RVSP 41 mmHg  b. LifeVest initiated 11/2017  c. 2/26/18 echo: EF 25%, thin-walled ventricle, mild to moderate MR, RVSP less than 35 mmHg  d. EF 20% 3/27/2018  e. EF 40% 9/18  4. HLD  a. 11/30/17 lipids:   HDL 29 LDL 66  5. DM  a. 11/30/17 A1c 8.2  6. Remote tobacco abuse:   a. Quit 2011  7. Hiatal hernia.  8. Sjogren’s syndrome.  9. Psoriasis.  10. Colon polyps status polypectomy.  11. Depression.  12. Surgical history:  a. Appendectomy.  b. Cholecystectomy.  c. Rectovaginal fistula repair.  d. Cervical discectomy  HPI:   The patient is a 58-year-old feel presents today for a follow-up regarding history ICM, chronic congestive heart failure, hypertension, and [employer] BIVICD.  The patient states that she was doing well up into approximately 2 months ago she began having worsening shortness of breath fatigue and Lotrimin edema.  She does  try to previous weight in cardiac rehabilitation.  She feels that she was on the right track but then things have become more difficult for her.  She has been following freely with heart failure clinic and adjusting her Lasix therapy.  She did try to watch her sodium.  She reports her blood pressure seems to be controlled.  She denies any palpitations, ICD shocks or syncope events.  She denies any chest pain that she had previously with her RCA stent.  She feels that she is not making significant progress with her exercise and she actually feels worse and is unable to walk from one room to another without becoming short of breath.        ROS:  Review of Symptoms:  General: no recent weight loss/gain, weakness or fatigue  Skin: no rashes, lumps, or other skin changes  HEENT: no dizziness, lightheadedness, or vision changes  Respiratory: no cough or hemoptysis  Cardiovascular: no palpitations, and tachycardia  Gastrointestinal: no black/tarry stools or diarrhea  Urinary: no change in frequency or urgency  Peripheral Vascular: no claudication or leg cramps  Musculoskeletal: no muscle or joint pain/stiffness  Psychiatric: no depression or excessive stress  Neurological: no sensory or motor loss, no syncope  Hematologic: no anemia, easy bruising or bleeding  Endocrine: no thyroid problems, nor heat or cold intolerance    Cardiac PMH: (Old records have been reviewed and summarized below)      Past Medical History, Past Surgical History, Family history, Social History, and Medications were all reviewed with the patient today and updated as necessary.       Current Outpatient Medications:   •  allopurinol (ZYLOPRIM) 100 MG tablet, Take 100 mg by mouth Daily., Disp: , Rfl:   •  aspirin 81 MG EC tablet, Take 81 mg by mouth daily., Disp: , Rfl:   •  carvedilol (COREG) 6.25 MG tablet, Take 1 tablet by mouth 2 (Two) Times a Day., Disp: 60 tablet, Rfl: 11  •  chlordiazePOXIDE (LIBRIUM) 5 MG capsule, Take 5 mg by mouth Daily.,  Disp: , Rfl:   •  fluticasone (FLONASE) 50 MCG/ACT nasal spray, 2 sprays by Each Nare route Daily As Needed., Disp: , Rfl:   •  furosemide (LASIX) 40 MG tablet, Take 0.5 tablets by mouth 2 (Two) Times a Day. May take an additional tablet PRN for weight gain of 3lbs in one day or 5lbs in 1 wk, Disp: , Rfl:   •  Liraglutide (VICTOZA) 18 MG/3ML solution pen-injector injection, Inject 1.2 mg under the skin Daily., Disp: , Rfl:   •  losartan (COZAAR) 25 MG tablet, Take 12.5 mg by mouth Daily., Disp: , Rfl:   •  nitroglycerin (NITROSTAT) 0.4 MG SL tablet, Place 1 tablet under the tongue Every 5 (Five) Minutes As Needed for Chest Pain. Take no more than 3 doses in 15 minutes., Disp: 25 tablet, Rfl: 3  •  nystatin (MYCOSTATIN) 601774 UNIT/GM ointment, Apply 1 application topically 3 (Three) Times a Day As Needed., Disp: , Rfl:   •  ondansetron (ZOFRAN) 4 MG tablet, Take 4 mg by mouth Every 8 (Eight) Hours As Needed for Nausea or Vomiting., Disp: , Rfl:   •  potassium chloride (MICRO-K) 10 MEQ CR capsule, Take 10 mEq by mouth Daily., Disp: , Rfl:   •  rizatriptan (MAXALT) 10 MG tablet, Take 10 mg by mouth 1 (One) Time As Needed for Migraine. May repeat in 2 hours if needed, Disp: , Rfl:   •  rosuvastatin (CRESTOR) 20 MG tablet, Take 1 tablet by mouth Daily., Disp: 90 tablet, Rfl: 3  •  spironolactone (ALDACTONE) 25 MG tablet, Take 1 tablet by mouth Daily., Disp: , Rfl:       Allergies   Allergen Reactions   • Sulfa Antibiotics Anaphylaxis     Patient Active Problem List   Diagnosis   • Psoriasis   • Secondary Sjogren's syndrome (CMS/HCC)   • Hiatal hernia   • Obesity   • Hypertension   • Ischemic heart disease   • NAYLOR (dyspnea on exertion)   • Coronary artery disease involving native coronary artery of native heart with angina pectoris (CMS/HCC)   • Ischemic cardiomyopathy   • Biventricular ICD (implantable cardioverter-defibrillator) in place     Past Medical History:   Diagnosis Date   • Arthritis    • CHF (congestive  heart failure) (CMS/[facility])    • Coronary artery disease     2 stents   • Depression    • Diabetes mellitus (CMS/[facility])     type 2 dm, 3 years, checks blood sugar every am   • Gout    • Hyperlipidemia    • Psoriasis    • Sjoegren syndrome (CMS/HCC)    • SOB (shortness of breath) on exertion    • Tobacco abuse 2011    cessation    • Wears dentures     full set   • Wears glasses      Past Surgical History:   Procedure Laterality Date   • CARDIAC CATHETERIZATION     • CARDIAC CATHETERIZATION N/A 2017    Procedure: Left Heart Cath;  Surgeon: Galina Leonard MD;  Location:  [facility];  Service:    • CARDIAC ELECTROPHYSIOLOGY PROCEDURE N/A 3/28/2018    Procedure: Device Implant BiV ICD;  Surgeon: Sarbjit Ellison DO;  Location:  [facility];  Service: Cardiovascular   • CERVICAL DISCECTOMY ANTERIOR     • CHOLECYSTECTOMY     • CORONARY ANGIOPLASTY     • CORONARY ANGIOPLASTY WITH STENT PLACEMENT     • CORONARY ANGIOPLASTY WITH STENT PLACEMENT     • CORONARY STENT PLACEMENT     • OTHER SURGICAL HISTORY      growth removed from small intestine as a child   • POLYPECTOMY      Colon polyps status   • RECTOVAGINAL FISTULA REPAIR     • TONSILLECTOMY       Family History   Problem Relation Age of Onset   • Heart disease Mother    • Heart attack Father    • No Known Problems Brother    • Cancer Maternal Grandmother    • Brain cancer Maternal Grandmother    • Stomach cancer Maternal Grandfather    • Heart failure Paternal Grandmother    • Heart attack Paternal Grandfather      Social History     Tobacco Use   • Smoking status: Former Smoker     Years: 20.00     Types: Cigarettes     Last attempt to quit: 2011     Years since quittin.1   • Smokeless tobacco: Never Used   Substance Use Topics   • Alcohol use: Yes     Alcohol/week: 1.2 - 2.4 oz     Types: 1 - 2 Glasses of wine, 1 - 2 Cans of beer per week     Comment: OCCAS         PHYSICAL EXAM:    /72 (BP Location: Left arm, Patient Position:  "Sitting)   Pulse 89   Ht 162.6 cm (64\")   Wt 105 kg (232 lb)   BMI 39.82 kg/m²        Wt Readings from Last 5 Encounters:   02/18/19 105 kg (232 lb)   02/15/19 106 kg (233 lb)   02/05/19 105 kg (232 lb)   01/24/19 104 kg (229 lb 9.6 oz)   01/14/19 104 kg (229 lb 6.4 oz)       BP Readings from Last 5 Encounters:   02/18/19 112/72   02/15/19 132/73   02/05/19 143/67   01/24/19 126/74   01/14/19 123/59       General appearance - Alert, well appearing, and in no distress   Mental status - Affect appropriate to mood.  Eyes - Sclerae anicteric,  ENMT - Hearing grossly normal bilaterally, Dental hygiene good.  Neck - Carotids upstroke normal bilaterally, no bruits, no JVD.  Resp - Clear to auscultation, no wheezes, rales or rhonchi, symmetric air entry.  Heart - Normal rate, regular rhythm, normal S1, S2, no murmurs, rubs, clicks or gallops.  GI - Soft, nontender, nondistended, no masses or organomegaly.  Neurological - Grossly intact - normal speech, no focal findings  Musculoskeletal - No joint tenderness, deformity or swelling, no muscular tenderness noted.  Extremities - Peripheral pulses normal, +  pedal edema, no clubbing or cyanosis.  Skin - Normal coloration and turgor.  Psych -  oriented to person, place, and time.    Medical problems and test results were reviewed with the patient today.     Recent Results (from the past 672 hour(s))   BNP    Collection Time: 02/05/19 11:19 AM   Result Value Ref Range    BNP 7.0 0.0 - 100.0 pg/mL   CBC Auto Differential    Collection Time: 02/05/19 11:20 AM   Result Value Ref Range    WBC 6.79 3.50 - 10.80 10*3/mm3    RBC 4.11 3.89 - 5.14 10*6/mm3    Hemoglobin 11.8 11.5 - 15.5 g/dL    Hematocrit 36.5 34.5 - 44.0 %    MCV 88.8 80.0 - 99.0 fL    MCH 28.7 27.0 - 31.0 pg    MCHC 32.3 32.0 - 36.0 g/dL    RDW 14.0 11.3 - 14.5 %    RDW-SD 45.5 37.0 - 54.0 fl    MPV 10.0 6.0 - 12.0 fL    Platelets 186 150 - 450 10*3/mm3    Neutrophil % 64.7 41.0 - 71.0 %    Lymphocyte % 21.6 (L) " 24.0 - 44.0 %    Monocyte % 10.5 0.0 - 12.0 %    Eosinophil % 2.9 0.0 - 3.0 %    Basophil % 0.3 0.0 - 1.0 %    Immature Grans % 0.3 0.0 - 0.6 %    Neutrophils, Absolute 4.39 1.50 - 8.30 10*3/mm3    Lymphocytes, Absolute 1.47 0.60 - 4.80 10*3/mm3    Monocytes, Absolute 0.71 0.00 - 1.00 10*3/mm3    Eosinophils, Absolute 0.20 0.00 - 0.30 10*3/mm3    Basophils, Absolute 0.02 0.00 - 0.20 10*3/mm3    Immature Grans, Absolute 0.02 0.00 - 0.03 10*3/mm3   Basic Metabolic Panel    Collection Time: 02/05/19 12:20 PM   Result Value Ref Range    Glucose 110 (H) 70 - 100 mg/dL    BUN 19 9 - 23 mg/dL    Creatinine 0.82 0.60 - 1.30 mg/dL    Sodium 136 132 - 146 mmol/L    Potassium 3.7 3.5 - 5.5 mmol/L    Chloride 100 99 - 109 mmol/L    CO2 26.0 20.0 - 31.0 mmol/L    Calcium 9.7 8.7 - 10.4 mg/dL    eGFR Non African Amer 71 >60 mL/min/1.73    BUN/Creatinine Ratio 23.2 7.0 - 25.0    Anion Gap 10.0 3.0 - 11.0 mmol/L     Echocardiogram 6/18    · Left ventricular systolic function is moderately decreased. Estimated EF = 40%.  · The following left ventricular wall segments are hypokinetic: mid anterior, apical anterior, basal anterolateral, mid anterolateral, apical lateral, basal inferolateral, mid inferolateral, apical inferior, mid inferior, apical septal, basal inferoseptal, mid inferoseptal, apex hypokinetic, mid anteroseptal, basal anterior, basal inferior and basal inferoseptal.  · Left ventricular diastolic dysfunction (grade I) consistent with impaired relaxation.  · Left ventricular wall thickness is consistent with mild concentric hypertrophy.  · There is no evidence of pericardial effusion.  · No evidence of pulmonary hypertension is present.  · Normal right ventricular cavity size, wall thickness, systolic function and septal motion noted.  · No significant structural valvular abnormality demonstrated         Device Interrogation:  [employer] BIVICD.  A paced 4%.  RV and LV paced 100%.  DDDR 70.  P wave 2.5 mV.  R wave  13.5 mV.  Atrial threshold 1.5 V at 0.5 ms.  RV threshold 0.5 V at 0.5 ms.  LV threshold 0.8 V at 0.5 ms.  Atrial impedance 572 ohms.  RV impedance 823 ohms.  LV impedance 839 ohms.  Shock impedance of 79 ohms.  Battery voltage 10 years remaining.  No mode switching.  Charge time 10.1 seconds.  Smart delay recommends LV only pacing.    ASSESSMENT   1. ICM: Echo 6/18 40%, Coreg, Cozaar and Aldactone  2. Chronic SHF Class III: Limit sodium, fluid intake. Monitor daily weights.    3. BIVICD: Normal function. Reprogrammed for CRT pacing with smart delay.  4. HTN: Controlled.     PLAN  · Continue current medical therapy with follow-up with heart failure clinic. Continue lasix with extra dose as needed.   · Maximize medical therapy, consider Entresto.   · Follow up 2 months with Echocardiogram.   2/18/2019  2:30 PM    Will Ti DESAI

## 2019-02-20 ENCOUNTER — TELEPHONE (OUTPATIENT)
Dept: CARDIOLOGY | Facility: HOSPITAL | Age: 59
End: 2019-02-20

## 2019-02-20 NOTE — TELEPHONE ENCOUNTER
Patient notes that she is feeling better after her changes to her BIV ICD. Patient to discontinue losartan, begin entresto 24/26 bid. RX sent to patient's pharmacy.

## 2019-02-25 RX ORDER — CARVEDILOL 6.25 MG/1
TABLET ORAL
Qty: 180 TABLET | Refills: 3 | Status: SHIPPED | OUTPATIENT
Start: 2019-02-25 | End: 2020-02-12

## 2019-02-25 NOTE — TELEPHONE ENCOUNTER
Patient requesting refill of carvedilol. Patient last seen here 2/15/19 with follow up appointment scheduled 3/4/19. Will refill for 90 days with refills for year per YEIMI Gaxiola.    Romeo Romero, PharmD  Pharmacy Resident  2/25/2019  9:29 AM

## 2019-02-28 ENCOUNTER — DOCUMENTATION (OUTPATIENT)
Dept: CARDIOLOGY | Facility: HOSPITAL | Age: 59
End: 2019-02-28

## 2019-02-28 NOTE — PROGRESS NOTES
PA has been denied for entresto 24/26 mg bid.   Requested peer to peer with [employer] for approval of medication.

## 2019-03-01 ENCOUNTER — TELEPHONE (OUTPATIENT)
Dept: CARDIOLOGY | Facility: HOSPITAL | Age: 59
End: 2019-03-01

## 2019-03-01 NOTE — TELEPHONE ENCOUNTER
Notified patient that entresto has been approved through insurance for one year  Confirmation number # [id number]

## 2019-03-04 ENCOUNTER — OFFICE VISIT (OUTPATIENT)
Dept: CARDIOLOGY | Facility: HOSPITAL | Age: 59
End: 2019-03-04

## 2019-03-04 VITALS
BODY MASS INDEX: 38.76 KG/M2 | DIASTOLIC BLOOD PRESSURE: 64 MMHG | TEMPERATURE: 97.8 F | SYSTOLIC BLOOD PRESSURE: 110 MMHG | WEIGHT: 227 LBS | HEIGHT: 64 IN | RESPIRATION RATE: 18 BRPM | OXYGEN SATURATION: 99 % | HEART RATE: 94 BPM

## 2019-03-04 DIAGNOSIS — I50.22 CHRONIC SYSTOLIC HEART FAILURE (HCC): Primary | ICD-10-CM

## 2019-03-04 DIAGNOSIS — I10 ESSENTIAL HYPERTENSION: ICD-10-CM

## 2019-03-04 DIAGNOSIS — E78.2 MIXED HYPERLIPIDEMIA: ICD-10-CM

## 2019-03-04 DIAGNOSIS — I25.10 CORONARY ARTERY DISEASE INVOLVING NATIVE CORONARY ARTERY OF NATIVE HEART WITHOUT ANGINA PECTORIS: ICD-10-CM

## 2019-03-04 PROCEDURE — 99214 OFFICE O/P EST MOD 30 MIN: CPT | Performed by: NURSE PRACTITIONER

## 2019-03-04 NOTE — PROGRESS NOTES
Encounter Date:03/04/2019      Patient ID: Lucia Stevens is a 58 y.o. female.        Subjective:     Chief Complaint: Follow-up   [facility]  History of Present Illness patient presents to the office today for ongoing evaluation of her chronic systolic heart failure. She notes she is feeling better since her changes have been made to her BIV. Changed to CRT pacing with smart delay.  She just started her entresto over the weekend. She notes fatigue and dyspnea on exertion but reports improvement in those symptoms overall. She notes occasional dizziness with sudden position changes. Denies cp, syncope, palpitations, pedal edema.     Patient Active Problem List   Diagnosis   • Psoriasis   • Secondary Sjogren's syndrome (CMS/HCC)   • Hiatal hernia   • Obesity   • Hypertension   • Ischemic heart disease   • NAYLOR (dyspnea on exertion)   • Coronary artery disease involving native coronary artery of native heart with angina pectoris (CMS/HCC)   • Ischemic cardiomyopathy   • Biventricular ICD (implantable cardioverter-defibrillator) in place       Past Surgical History:   Procedure Laterality Date   • CARDIAC CATHETERIZATION     • CARDIAC CATHETERIZATION N/A 11/30/2017    Procedure: Left Heart Cath;  Surgeon: Galina Leonard MD;  Location:  [facility] CATH INVASIVE [facility];  Service:    • CARDIAC ELECTROPHYSIOLOGY PROCEDURE N/A 3/28/2018    Procedure: Device Implant BiV ICD;  Surgeon: Sarbjit Ellison DO;  Location: [facility] EP INVASIVE LOCATION;  Service: Cardiovascular   • CERVICAL DISCECTOMY ANTERIOR     • CHOLECYSTECTOMY     • CORONARY ANGIOPLASTY     • CORONARY ANGIOPLASTY WITH STENT PLACEMENT     • CORONARY ANGIOPLASTY WITH STENT PLACEMENT     • CORONARY STENT PLACEMENT     • OTHER SURGICAL HISTORY      growth removed from small intestine as a child   • POLYPECTOMY      Colon polyps status   • RECTOVAGINAL FISTULA REPAIR     • TONSILLECTOMY         Allergies   Allergen Reactions   • Sulfa Antibiotics Anaphylaxis         Current  Outpatient Medications:   •  allopurinol (ZYLOPRIM) 100 MG tablet, Take 100 mg by mouth Daily., Disp: , Rfl:   •  aspirin 81 MG EC tablet, Take 81 mg by mouth daily., Disp: , Rfl:   •  carvedilol (COREG) 6.25 MG tablet, TAKE 1 TABLET BY MOUTH TWICE DAILY, Disp: 180 tablet, Rfl: 3  •  chlordiazePOXIDE (LIBRIUM) 5 MG capsule, Take 5 mg by mouth Daily., Disp: , Rfl:   •  fluticasone (FLONASE) 50 MCG/ACT nasal spray, 2 sprays by Each Nare route Daily As Needed., Disp: , Rfl:   •  furosemide (LASIX) 40 MG tablet, Take 0.5 tablets by mouth 2 (Two) Times a Day. May take an additional tablet PRN for weight gain of 3lbs in one day or 5lbs in 1 wk, Disp: , Rfl:   •  Liraglutide (VICTOZA) 18 MG/3ML solution pen-injector injection, Inject 1.2 mg under the skin Daily., Disp: , Rfl:   •  nitroglycerin (NITROSTAT) 0.4 MG SL tablet, Place 1 tablet under the tongue Every 5 (Five) Minutes As Needed for Chest Pain. Take no more than 3 doses in 15 minutes., Disp: 25 tablet, Rfl: 3  •  nystatin (MYCOSTATIN) 707910 UNIT/GM ointment, Apply 1 application topically 3 (Three) Times a Day As Needed., Disp: , Rfl:   •  ondansetron (ZOFRAN) 4 MG tablet, Take 4 mg by mouth Every 8 (Eight) Hours As Needed for Nausea or Vomiting., Disp: , Rfl:   •  potassium chloride (MICRO-K) 10 MEQ CR capsule, Take 10 mEq by mouth Daily., Disp: , Rfl:   •  rizatriptan (MAXALT) 10 MG tablet, Take 10 mg by mouth 1 (One) Time As Needed for Migraine. May repeat in 2 hours if needed, Disp: , Rfl:   •  rosuvastatin (CRESTOR) 20 MG tablet, Take 1 tablet by mouth Daily., Disp: 90 tablet, Rfl: 3  •  sacubitril-valsartan (ENTRESTO) 24-26 MG tablet, Take 1 tablet by mouth 2 (Two) Times a Day., Disp: 60 tablet, Rfl: 3  •  spironolactone (ALDACTONE) 25 MG tablet, Take 1 tablet by mouth Daily., Disp: , Rfl:     The following portions of the chart were reviewed and updated as appropriate: Allergies, current medications, past family history, social history, past medical  "history.     Review of Systems   Constitution: Positive for malaise/fatigue. Negative for chills, decreased appetite, diaphoresis, fever, weakness, night sweats, weight gain and weight loss.   HENT: Negative for congestion, hearing loss, hoarse voice and nosebleeds.    Eyes: Negative for blurred vision, visual disturbance and visual halos.   Cardiovascular: Positive for dyspnea on exertion. Negative for chest pain, claudication, cyanosis, irregular heartbeat, leg swelling, near-syncope, orthopnea, palpitations, paroxysmal nocturnal dyspnea and syncope.   Respiratory: Negative for cough, hemoptysis, shortness of breath, sleep disturbances due to breathing, snoring, sputum production and wheezing.    Hematologic/Lymphatic: Negative for bleeding problem. Does not bruise/bleed easily.   Skin: Negative for dry skin, itching and rash.   Musculoskeletal: Positive for gout. Negative for arthritis, falls, joint pain, joint swelling and myalgias.   Gastrointestinal: Negative for bloating, abdominal pain, constipation, diarrhea, flatus, heartburn, hematemesis, hematochezia, melena, nausea and vomiting.   Genitourinary: Negative for dysuria, frequency, hematuria, nocturia and urgency.   Neurological: Positive for dizziness. Negative for excessive daytime sleepiness, headaches, light-headedness and loss of balance.   Psychiatric/Behavioral: Negative for depression. The patient does not have insomnia and is not nervous/anxious.            Objective:     Vitals:    03/04/19 1047 03/04/19 1048   BP: 117/58 110/64   BP Location: Left arm Left arm   Patient Position: Sitting Standing   Pulse: 88 94   Resp: 18    Temp: 97.8 °F (36.6 °C)    TempSrc: Temporal    SpO2: 99%    Weight: 103 kg (227 lb)    Height: 162.6 cm (64.02\")          Physical Exam   Constitutional: She is oriented to person, place, and time. She appears well-developed and well-nourished. She is active and cooperative. No distress.   HENT:   Head: Normocephalic and " atraumatic.   Mouth/Throat: Oropharynx is clear and moist.   Eyes: Conjunctivae and EOM are normal. Pupils are equal, round, and reactive to light.   Neck: Normal range of motion. Neck supple. No JVD present. No tracheal deviation present. No thyromegaly present.   Cardiovascular: Normal rate, regular rhythm, normal heart sounds and intact distal pulses.   Pulmonary/Chest: Effort normal and breath sounds normal.   Abdominal: Soft. Bowel sounds are normal. She exhibits no distension. There is no tenderness.   Musculoskeletal: Normal range of motion.   Neurological: She is alert and oriented to person, place, and time.   Skin: Skin is warm, dry and intact.   Psychiatric: She has a normal mood and affect. Her behavior is normal.   Nursing note and vitals reviewed.      Lab and Diagnostic Review:      Lab Results   Component Value Date    GLUCOSE 110 (H) 02/05/2019    CALCIUM 9.7 02/05/2019     02/05/2019    K 3.7 02/05/2019    CO2 26.0 02/05/2019     02/05/2019    BUN 19 02/05/2019    CREATININE 0.82 02/05/2019    EGFRIFNONA 71 02/05/2019    BCR 23.2 02/05/2019    ANIONGAP 10.0 02/05/2019         Assessment and Plan:         1. Chronic systolic heart failure (CMS/HCC)  euvolemic   Continue entresto, coreg, aldactone, lasix   - Basic Metabolic Panel; Future at end of this week (too soon to check labs as entresto was started less than 48 hours ago)  Heart failure education today including signs and symptoms, the role of the heart failure center, daily weights, low sodium diet (less than 1500 mg per day), and daily physical activity. Reviewed HF Zones with patient and family.  Patient to continue current medications as previously ordered.   Echo upcoming 4/22/19  2. Coronary artery disease involving native coronary artery of native heart without angina pectoris  Without angina   Continue asa, coreg, crestor    3. Essential hypertension  Under good control  HTN Education provided today including signs and  symptoms, medication management, daily blood pressure monitoring. Patient encouraged to call the [facility] center with any abnormal readings.     4. Mixed hyperlipidemia  Statin therapy    It has been a pleasure to participate in the care of this patient.  Patient was instructed to call the [facility] with any questions, concerns, or worsening symptoms.        * Please note that portions of this note were completed with a voice recognition program. Efforts were made to edit the dictation but occasionally words are transcribed.

## 2019-03-08 ENCOUNTER — HOSPITAL ENCOUNTER (OUTPATIENT)
Age: 59
End: 2019-03-08
Payer: COMMERCIAL

## 2019-03-08 DIAGNOSIS — I50.22: Primary | ICD-10-CM

## 2019-03-08 LAB
ANION GAP SERPL CALC-SCNC: 14.5 MEQ/L (ref 5–15)
BUN SERPL-MCNC: 16 MG/DL (ref 7–18)
CALCIUM SPEC-MCNC: 9.2 MG/DL (ref 8.5–10.1)
CHLORIDE SPEC-SCNC: 102 MMOL/L (ref 98–107)
CO2 SERPL-SCNC: 29 MMOL/L (ref 21–32)
CREAT BLD-SCNC: 0.94 MG/DL (ref 0.55–1.02)
ESTIMATED GLOMERULAR FILT RATE: 61 ML/MIN (ref 60–?)
GFR (AFRICAN AMERICAN): 74 ML/MIN (ref 60–?)
GLUCOSE: 104 MG/DL (ref 74–106)
POTASSIUM: 4.5 MMOL/L (ref 3.5–5.1)
SODIUM SPEC-SCNC: 141 MMOL/L (ref 136–145)

## 2019-03-08 PROCEDURE — 36415 COLL VENOUS BLD VENIPUNCTURE: CPT

## 2019-03-08 PROCEDURE — 80048 BASIC METABOLIC PNL TOTAL CA: CPT

## 2019-03-09 ENCOUNTER — RESULTS ENCOUNTER (OUTPATIENT)
Dept: CARDIOLOGY | Facility: HOSPITAL | Age: 59
End: 2019-03-09

## 2019-03-09 DIAGNOSIS — I50.22 CHRONIC SYSTOLIC HEART FAILURE (HCC): ICD-10-CM

## 2019-03-12 ENCOUNTER — TELEPHONE (OUTPATIENT)
Dept: CARDIOLOGY | Facility: HOSPITAL | Age: 59
End: 2019-03-12

## 2019-03-12 NOTE — TELEPHONE ENCOUNTER
Labs at [facility]: 3/8/19  Na 141, K 4.5, Chloride 102, C02 29, Bun 16, Creatinine 0.94, GFR 61, Glucose 104, Calcium 9.2   Reviewed labs with patient. Continue medications as ordered.

## 2019-04-11 ENCOUNTER — OFFICE VISIT (OUTPATIENT)
Dept: NEUROLOGY | Facility: CLINIC | Age: 59
End: 2019-04-11

## 2019-04-11 VITALS
BODY MASS INDEX: 38.41 KG/M2 | SYSTOLIC BLOOD PRESSURE: 118 MMHG | HEIGHT: 64 IN | WEIGHT: 225 LBS | DIASTOLIC BLOOD PRESSURE: 74 MMHG

## 2019-04-11 DIAGNOSIS — R11.15 INTRACTABLE CYCLICAL VOMITING WITH NAUSEA: Primary | ICD-10-CM

## 2019-04-11 PROCEDURE — 99213 OFFICE O/P EST LOW 20 MIN: CPT | Performed by: PSYCHIATRY & NEUROLOGY

## 2019-04-11 RX ORDER — AMITRIPTYLINE HYDROCHLORIDE 25 MG/1
25 TABLET, FILM COATED ORAL NIGHTLY
Qty: 30 TABLET | Refills: 6 | Status: SHIPPED | OUTPATIENT
Start: 2019-04-11 | End: 2020-07-06

## 2019-04-11 NOTE — PROGRESS NOTES
Subjective:    CC: Lcuia Stevens is in clinic today for follow up for cyclical vomiting syndrome    HPI:  She is in clinic for regular follow-up.  Since her last visit, she has not had any episodes of vomiting associated with diarrhea.  She has been taking amitriptyline 25 mg at bedtime and that has helped significantly with improving sleep.  She also has not had mild to moderate frequent headaches that she was experiencing after starting amitriptyline.  Overall she is happy with amitriptyline use.  She also had upper GI endoscopy as well as abdominal ultrasound since the last visit and both were unremarkable.    The following portions of the patient's history were reviewed and updated as of 04/11/2019: allergies, social history and problem list.       Current Outpatient Medications:   •  allopurinol (ZYLOPRIM) 100 MG tablet, Take 100 mg by mouth Daily., Disp: , Rfl:   •  aspirin 81 MG EC tablet, Take 81 mg by mouth daily., Disp: , Rfl:   •  carvedilol (COREG) 6.25 MG tablet, TAKE 1 TABLET BY MOUTH TWICE DAILY, Disp: 180 tablet, Rfl: 3  •  chlordiazePOXIDE (LIBRIUM) 5 MG capsule, Take 5 mg by mouth Daily., Disp: , Rfl:   •  fluticasone (FLONASE) 50 MCG/ACT nasal spray, 2 sprays by Each Nare route Daily As Needed., Disp: , Rfl:   •  furosemide (LASIX) 40 MG tablet, Take 0.5 tablets by mouth 2 (Two) Times a Day. May take an additional tablet PRN for weight gain of 3lbs in one day or 5lbs in 1 wk, Disp: , Rfl:   •  Liraglutide (VICTOZA) 18 MG/3ML solution pen-injector injection, Inject 1.2 mg under the skin Daily., Disp: , Rfl:   •  nitroglycerin (NITROSTAT) 0.4 MG SL tablet, Place 1 tablet under the tongue Every 5 (Five) Minutes As Needed for Chest Pain. Take no more than 3 doses in 15 minutes., Disp: 25 tablet, Rfl: 3  •  nystatin (MYCOSTATIN) 407252 UNIT/GM ointment, Apply 1 application topically 3 (Three) Times a Day As Needed., Disp: , Rfl:   •  ondansetron (ZOFRAN) 4 MG tablet, Take 4 mg by mouth Every 8 (Eight)  Hours As Needed for Nausea or Vomiting., Disp: , Rfl:   •  potassium chloride (MICRO-K) 10 MEQ CR capsule, Take 10 mEq by mouth Daily., Disp: , Rfl:   •  rizatriptan (MAXALT) 10 MG tablet, Take 10 mg by mouth 1 (One) Time As Needed for Migraine. May repeat in 2 hours if needed, Disp: , Rfl:   •  rosuvastatin (CRESTOR) 20 MG tablet, Take 1 tablet by mouth Daily., Disp: 90 tablet, Rfl: 3  •  sacubitril-valsartan (ENTRESTO) 24-26 MG tablet, Take 1 tablet by mouth 2 (Two) Times a Day., Disp: 60 tablet, Rfl: 3  •  spironolactone (ALDACTONE) 25 MG tablet, Take 1 tablet by mouth Daily., Disp: , Rfl:   •  amitriptyline (ELAVIL) 25 MG tablet, Take 1 tablet by mouth Every Night., Disp: 30 tablet, Rfl: 6   Past Medical History:   Diagnosis Date   • Arthritis    • CHF (congestive heart failure) (CMS/HCC)    • Coronary artery disease     2 stents   • Depression    • Diabetes mellitus (CMS/HCC)     type 2 dm, 3 years, checks blood sugar every am   • Gout    • Hyperlipidemia    • Psoriasis    • Sjoegren syndrome (CMS/HCC)    • SOB (shortness of breath) on exertion    • Tobacco abuse 2011    cessation    • Wears dentures     full set   • Wears glasses       Past Surgical History:   Procedure Laterality Date   • CARDIAC CATHETERIZATION     • CARDIAC CATHETERIZATION N/A 11/30/2017    Procedure: Left Heart Cath;  Surgeon: Galina Leonard MD;  Location:  [facility] CATH INVASIVE LOCATION;  Service:    • CARDIAC ELECTROPHYSIOLOGY PROCEDURE N/A 3/28/2018    Procedure: Device Implant BiV ICD;  Surgeon: Sarbjit Ellison DO;  Location:  [facility] EP INVASIVE LOCATION;  Service: Cardiovascular   • CERVICAL DISCECTOMY ANTERIOR     • CHOLECYSTECTOMY     • CORONARY ANGIOPLASTY     • CORONARY ANGIOPLASTY WITH STENT PLACEMENT     • CORONARY ANGIOPLASTY WITH STENT PLACEMENT     • CORONARY STENT PLACEMENT     • OTHER SURGICAL HISTORY      growth removed from small intestine as a child   • POLYPECTOMY      Colon polyps status   • RECTOVAGINAL FISTULA REPAIR    "  • TONSILLECTOMY        Family History   Problem Relation Age of Onset   • Heart disease Mother    • Heart attack Father    • No Known Problems Brother    • Cancer Maternal Grandmother    • Brain cancer Maternal Grandmother    • Stomach cancer Maternal Grandfather    • Heart failure Paternal Grandmother    • Heart attack Paternal Grandfather         Review of Systems   Constitutional: Positive for fatigue.   HENT: Positive for ear pain.    Respiratory: Positive for shortness of breath.    Cardiovascular: Positive for leg swelling.   Gastrointestinal: Positive for abdominal pain.   Endocrine: Positive for polydipsia.   Musculoskeletal: Positive for joint swelling and neck stiffness.   Neurological: Positive for dizziness and light-headedness.   Psychiatric/Behavioral: Positive for decreased concentration.     Objective:    /74   Ht 162.6 cm (64\")   Wt 102 kg (225 lb)   BMI 38.62 kg/m²     Neurology Exam:  General apperance: NAD.     Mental status: Alert, awake and oriented to time place and person.    Recent and Remote memory: Can recall 3/3 objects at 5 minutes. Can recall historical events.     Attention span and Concentration: Serial 7s: Normal.     Fund of knowledge:  Normal.     Language and Speech: No aphasia or dysarthria.    Naming , Repitition and Comprehension:  Can name objects, repeat a sentence and follow commands. Speech is clear and fluent with good repetition, comprehension, and naming.    CN II to XII: Intact.    Opthalmoscopic Exam: No papilledema.    Motor:  Right UE muscle strength 5/5. Normal tone.     Left UE muscle strength 5/5. Normal tone.      Right LE muscle strength5/5. Normal tone.     Left LE muscle strength 5/5. Normal tone.      Sensory: Normal light touch, vibration and pinprick sensation bilaterally.    DTRs: 2+ bilaterally.    Babinski: Negative bilaterally.    Co-ordination: Normal finger-to-nose, heel to shin B/L.    Rhomberg: Negative.    Gait: " Normal.    Cardiovascular: Regular rate and rhythm without murmur, gallop or rub.    Assessment and Plan:    1. Intractable cyclical vomiting with nausea  No more episodes in year 2019.  Tolerating amitriptyline well and it has helped significantly with improving the sleep.  She reports that mild to moderate headache that she was getting has resolved as well.  I have advised her to continue with amitriptyline for now.  I will see her back in 6 months for follow-up.       I spent 15 minutes face to face with the patient and spent 10 minutes of this time  in management, instructions and education regarding above mentioned diagnosis and also on counseling and discussing about taking medication regularly, possible side effects with medication use, importance of good sleep hygiene, good hydration and regular exercise.    No Follow-up on file.

## 2019-04-22 ENCOUNTER — HOSPITAL ENCOUNTER (OUTPATIENT)
Dept: CARDIOLOGY | Facility: HOSPITAL | Age: 59
Discharge: HOME | End: 2019-04-22
Admitting: PHYSICIAN ASSISTANT

## 2019-04-22 ENCOUNTER — OFFICE VISIT (OUTPATIENT)
Dept: CARDIOLOGY | Facility: CLINIC | Age: 59
End: 2019-04-22

## 2019-04-22 ENCOUNTER — TELEPHONE (OUTPATIENT)
Dept: CARDIOLOGY | Facility: HOSPITAL | Age: 59
End: 2019-04-22

## 2019-04-22 VITALS
BODY MASS INDEX: 38.58 KG/M2 | WEIGHT: 226 LBS | HEIGHT: 64 IN | SYSTOLIC BLOOD PRESSURE: 98 MMHG | DIASTOLIC BLOOD PRESSURE: 68 MMHG | HEART RATE: 78 BPM

## 2019-04-22 VITALS — HEIGHT: 64 IN | WEIGHT: 225 LBS | BODY MASS INDEX: 38.41 KG/M2

## 2019-04-22 DIAGNOSIS — R06.09 DOE (DYSPNEA ON EXERTION): Primary | ICD-10-CM

## 2019-04-22 DIAGNOSIS — I10 ESSENTIAL HYPERTENSION: ICD-10-CM

## 2019-04-22 DIAGNOSIS — R06.09 DOE (DYSPNEA ON EXERTION): ICD-10-CM

## 2019-04-22 DIAGNOSIS — Z95.810 BIVENTRICULAR ICD (IMPLANTABLE CARDIOVERTER-DEFIBRILLATOR) IN PLACE: ICD-10-CM

## 2019-04-22 DIAGNOSIS — I25.5 ISCHEMIC CARDIOMYOPATHY: ICD-10-CM

## 2019-04-22 DIAGNOSIS — I25.9 ISCHEMIC HEART DISEASE: ICD-10-CM

## 2019-04-22 LAB
BH CV ECHO MEAS - AO ROOT AREA (BSA CORRECTED): 1.6
BH CV ECHO MEAS - AO ROOT AREA: 8.7 CM^2
BH CV ECHO MEAS - AO ROOT DIAM: 3.3 CM
BH CV ECHO MEAS - BSA(HAYCOCK): 2.2 M^2
BH CV ECHO MEAS - BSA: 2.1 M^2
BH CV ECHO MEAS - BZI_BMI: 38.6 KILOGRAMS/M^2
BH CV ECHO MEAS - BZI_METRIC_HEIGHT: 162.6 CM
BH CV ECHO MEAS - BZI_METRIC_WEIGHT: 102.1 KG
BH CV ECHO MEAS - EDV(CUBED): 85.3 ML
BH CV ECHO MEAS - EDV(MOD-SP2): 80 ML
BH CV ECHO MEAS - EDV(MOD-SP4): 85 ML
BH CV ECHO MEAS - EDV(TEICH): 87.8 ML
BH CV ECHO MEAS - EF(CUBED): 48.8 %
BH CV ECHO MEAS - EF(MOD-BP): 45 %
BH CV ECHO MEAS - EF(MOD-SP2): 46.3 %
BH CV ECHO MEAS - EF(MOD-SP4): 45.9 %
BH CV ECHO MEAS - EF(TEICH): 41.2 %
BH CV ECHO MEAS - ESV(CUBED): 43.7 ML
BH CV ECHO MEAS - ESV(MOD-SP2): 43 ML
BH CV ECHO MEAS - ESV(MOD-SP4): 46 ML
BH CV ECHO MEAS - ESV(TEICH): 51.6 ML
BH CV ECHO MEAS - FS: 20 %
BH CV ECHO MEAS - IVS/LVPW: 1
BH CV ECHO MEAS - IVSD: 1.1 CM
BH CV ECHO MEAS - LV DIASTOLIC VOL/BSA (35-75): 41.3 ML/M^2
BH CV ECHO MEAS - LV MASS(C)D: 158.2 GRAMS
BH CV ECHO MEAS - LV MASS(C)DI: 76.9 GRAMS/M^2
BH CV ECHO MEAS - LV SYSTOLIC VOL/BSA (12-30): 22.4 ML/M^2
BH CV ECHO MEAS - LVIDD: 4.4 CM
BH CV ECHO MEAS - LVIDS: 3.5 CM
BH CV ECHO MEAS - LVLD AP2: 7.3 CM
BH CV ECHO MEAS - LVLD AP4: 7.4 CM
BH CV ECHO MEAS - LVLS AP2: 6.5 CM
BH CV ECHO MEAS - LVLS AP4: 6.7 CM
BH CV ECHO MEAS - LVPWD: 1 CM
BH CV ECHO MEAS - RAP SYSTOLE: 3 MMHG
BH CV ECHO MEAS - RVSP: 24 MMHG
BH CV ECHO MEAS - SI(CUBED): 20.2 ML/M^2
BH CV ECHO MEAS - SI(MOD-SP2): 18 ML/M^2
BH CV ECHO MEAS - SI(MOD-SP4): 19 ML/M^2
BH CV ECHO MEAS - SI(TEICH): 17.6 ML/M^2
BH CV ECHO MEAS - SV(CUBED): 41.6 ML
BH CV ECHO MEAS - SV(MOD-SP2): 37 ML
BH CV ECHO MEAS - SV(MOD-SP4): 39 ML
BH CV ECHO MEAS - SV(TEICH): 36.2 ML
BH CV ECHO MEAS - TR MAX PG: 21 MMHG
BH CV ECHO MEAS - TR MAX VEL: 226.7 CM/SEC
BH CV VAS BP LEFT ARM: NORMAL MMHG
LV EF 2D ECHO EST: 50 %

## 2019-04-22 PROCEDURE — 93321 DOPPLER ECHO F-UP/LMTD STD: CPT | Performed by: INTERNAL MEDICINE

## 2019-04-22 PROCEDURE — 93308 TTE F-UP OR LMTD: CPT | Performed by: INTERNAL MEDICINE

## 2019-04-22 PROCEDURE — 93308 TTE F-UP OR LMTD: CPT

## 2019-04-22 PROCEDURE — 93321 DOPPLER ECHO F-UP/LMTD STD: CPT

## 2019-04-22 PROCEDURE — 93325 DOPPLER ECHO COLOR FLOW MAPG: CPT | Performed by: INTERNAL MEDICINE

## 2019-04-22 PROCEDURE — 93325 DOPPLER ECHO COLOR FLOW MAPG: CPT

## 2019-04-22 PROCEDURE — 99214 OFFICE O/P EST MOD 30 MIN: CPT | Performed by: PHYSICIAN ASSISTANT

## 2019-04-22 NOTE — TELEPHONE ENCOUNTER
[facility] Telephone Note for:    Lucia Stevens  Home Phone [phone]   Mobile [phone]       Reason for Call: Patient states that she saw Dr. Cruz today and states that she thought she would have a monitor check but states that when she had her Echo today that her blood pressure was running low.    Symptoms:  Dizziness when she bends over    Other Pertinent Information (Weight, Vitals, etc.):  Patient states that she was told to play around with the medication times and see if that helps. Patient wants to double check with [facility].

## 2019-04-22 NOTE — TELEPHONE ENCOUNTER
Spoke with patient and instructed patient to cut entresto in half and talk 1/2 tablet bid. Patient to continue recording bp readings and call the office if bps remain low.

## 2019-04-22 NOTE — PROGRESS NOTES
[facility] at [facility]   OFFICE NOTE      Lucai Stevens  1960  PCP: Johan Marks MD    SUBJECTIVE:   Lucia Stevens is a 58 y.o. female seen for a follow up visit regarding the following:     CC:CHF  PROBLEM LIST:  1. Ischemic heart disease:  a. GXT myocardial perfusion study, 06/07/2005, revealing a moderate sized reversible defect in anteroseptal region with diminished myocardial thickening and hypokinesis. LVEF 58%.  b. Cardiac catheterization, June 2005, Dr. Talbot, due to moderate sized reversible anteroseptal defect; LVEF 38%: JADE stenting of PDA (2.5x13 mm Cypher).  c. JADE stenting of mid-RCA, 08/08/2011: 15 mm Promus JADE; LVEF 55% to 60%.  d. 11/30/17  [facility]   Cath single vessel CAD, total occlusion RCA with collateral, non obstructive on left. (2011 stent to RCA, 2005 stent to PDA)  e. 06/09/17  Stress EF 63%  f. 2/26/18 echo EF 25%, mild-mod MR  g. EF 20% on Echo with mod MR 3/27/2018  2. Hypertension.  3. Cardiomyopathy  a. 11/30/17 echo: EF 30%, LA borderline dilated, moderate to severe MR, mild-to-moderate TR RVSP 41 mmHg  b. LifeVest initiated 11/2017  c. 2/26/18 echo: EF 25%, thin-walled ventricle, mild to moderate MR, RVSP less than 35 mmHg  d. EF 20% 3/27/2018  e. EF 40% 9/18  4. HLD  a. 11/30/17 lipids:   HDL 29 LDL 66  b. Crestor therapy  5. DM  a. 11/30/17 A1c 8.2  6. Remote tobacco abuse:   a. Quit 2011  7. Hiatal hernia.  8. Sjogren’s syndrome.  9. Psoriasis.  10. Colon polyps status polypectomy.  11. Depression.  12. Surgical history:  a. Appendectomy.  b. Cholecystectomy.  c. Rectovaginal fistula repair.  d. Cervical discectomy      HPI:   58-year-old female seen for active treatment of chronic congestive heart failure, cardiomyopathy,CAD,  hypertension, and recent addition of Entresto therapy.  She has started Entresto since last visit with her office.  Her blood pressure has been somewhat marginal but she seems to be tolerating other than  occasionally she gets dizzy and going from sitting to standing position.  She states her shortness of breath is greatly improved.  She denies any worsening orthopnea PND or peripheral edema.  She denies any chest pain suggesting his pectoris.  She states otherwise her medication she is tolerating well.  She checks in with heart failure clinic on a regular basis.  She has the ICD shocks.  She denies palpitations.        ROS:  Review of Symptoms:  General: no recent weight loss/gain, weakness or fatigue  Skin: no rashes, lumps, or other skin changes  HEENT: + dizziness, lightheadedness, or vision changes  Respiratory: no cough or hemoptysis  Cardiovascular: no palpitations, and tachycardia  Gastrointestinal: no black/tarry stools or diarrhea  Urinary: no change in frequency or urgency  Peripheral Vascular: no claudication or leg cramps  Musculoskeletal: no muscle or joint pain/stiffness  Psychiatric: no depression or excessive stress  Neurological: no sensory or motor loss, no syncope  Hematologic: no anemia, easy bruising or bleeding  Endocrine: no thyroid problems, nor heat or cold intolerance    Cardiac PMH: (Old records have been reviewed and summarized below)      Past Medical History, Past Surgical History, Family history, Social History, and Medications were all reviewed with the patient today and updated as necessary.       Current Outpatient Medications:   •  allopurinol (ZYLOPRIM) 100 MG tablet, Take 100 mg by mouth Daily., Disp: , Rfl:   •  amitriptyline (ELAVIL) 25 MG tablet, Take 1 tablet by mouth Every Night., Disp: 30 tablet, Rfl: 6  •  aspirin 81 MG EC tablet, Take 81 mg by mouth daily., Disp: , Rfl:   •  carvedilol (COREG) 6.25 MG tablet, TAKE 1 TABLET BY MOUTH TWICE DAILY, Disp: 180 tablet, Rfl: 3  •  chlordiazePOXIDE (LIBRIUM) 5 MG capsule, Take 5 mg by mouth Daily., Disp: , Rfl:   •  fluticasone (FLONASE) 50 MCG/ACT nasal spray, 2 sprays by Each Nare route Daily As Needed., Disp: , Rfl:   •   furosemide (LASIX) 40 MG tablet, Take 0.5 tablets by mouth 2 (Two) Times a Day. May take an additional tablet PRN for weight gain of 3lbs in one day or 5lbs in 1 wk, Disp: , Rfl:   •  Liraglutide (VICTOZA) 18 MG/3ML solution pen-injector injection, Inject 1.2 mg under the skin Daily., Disp: , Rfl:   •  nitroglycerin (NITROSTAT) 0.4 MG SL tablet, Place 1 tablet under the tongue Every 5 (Five) Minutes As Needed for Chest Pain. Take no more than 3 doses in 15 minutes., Disp: 25 tablet, Rfl: 3  •  nystatin (MYCOSTATIN) 922992 UNIT/GM ointment, Apply 1 application topically 3 (Three) Times a Day As Needed., Disp: , Rfl:   •  ondansetron (ZOFRAN) 4 MG tablet, Take 4 mg by mouth Every 8 (Eight) Hours As Needed for Nausea or Vomiting., Disp: , Rfl:   •  potassium chloride (MICRO-K) 10 MEQ CR capsule, Take 10 mEq by mouth Daily., Disp: , Rfl:   •  rizatriptan (MAXALT) 10 MG tablet, Take 10 mg by mouth 1 (One) Time As Needed for Migraine. May repeat in 2 hours if needed, Disp: , Rfl:   •  rosuvastatin (CRESTOR) 20 MG tablet, Take 1 tablet by mouth Daily., Disp: 90 tablet, Rfl: 3  •  sacubitril-valsartan (ENTRESTO) 24-26 MG tablet, Take 1 tablet by mouth 2 (Two) Times a Day., Disp: 60 tablet, Rfl: 3  •  spironolactone (ALDACTONE) 25 MG tablet, Take 1 tablet by mouth Daily., Disp: , Rfl:       Allergies   Allergen Reactions   • Sulfa Antibiotics Anaphylaxis     Patient Active Problem List   Diagnosis   • Psoriasis   • Secondary Sjogren's syndrome (CMS/HCC)   • Hiatal hernia   • Obesity   • Hypertension   • Ischemic heart disease   • NAYLOR (dyspnea on exertion)   • Coronary artery disease involving native coronary artery of native heart with angina pectoris (CMS/HCC)   • Ischemic cardiomyopathy   • Biventricular ICD (implantable cardioverter-defibrillator) in place     Past Medical History:   Diagnosis Date   • Arthritis    • CHF (congestive heart failure) (CMS/HCC)    • Coronary artery disease     2 stents   • Depression    •  "Diabetes mellitus (CMS/HCC)     type 2 dm, 3 years, checks blood sugar every am   • Gout    • Hyperlipidemia    • Psoriasis    • Sjoegren syndrome (CMS/HCC)    • SOB (shortness of breath) on exertion    • Tobacco abuse 2011    cessation    • Wears dentures     full set   • Wears glasses      Past Surgical History:   Procedure Laterality Date   • CARDIAC CATHETERIZATION     • CARDIAC CATHETERIZATION N/A 2017    Procedure: Left Heart Cath;  Surgeon: Galina Leonard MD;  Location:  [facility];  Service:    • CARDIAC ELECTROPHYSIOLOGY PROCEDURE N/A 3/28/2018    Procedure: Device Implant BiV ICD;  Surgeon: Sarbjit Ellison DO;  Location:  [facility];  Service: Cardiovascular   • CERVICAL DISCECTOMY ANTERIOR     • CHOLECYSTECTOMY     • CORONARY ANGIOPLASTY     • CORONARY ANGIOPLASTY WITH STENT PLACEMENT     • CORONARY ANGIOPLASTY WITH STENT PLACEMENT     • CORONARY STENT PLACEMENT     • OTHER SURGICAL HISTORY      growth removed from small intestine as a child   • POLYPECTOMY      Colon polyps status   • RECTOVAGINAL FISTULA REPAIR     • TONSILLECTOMY       Family History   Problem Relation Age of Onset   • Heart disease Mother    • Heart attack Father    • No Known Problems Brother    • Cancer Maternal Grandmother    • Brain cancer Maternal Grandmother    • Stomach cancer Maternal Grandfather    • Heart failure Paternal Grandmother    • Heart attack Paternal Grandfather      Social History     Tobacco Use   • Smoking status: Former Smoker     Years: 20.00     Types: Cigarettes     Last attempt to quit: 2011     Years since quittin.3   • Smokeless tobacco: Never Used   Substance Use Topics   • Alcohol use: Yes     Alcohol/week: 1.2 - 2.4 oz     Types: 1 - 2 Glasses of wine, 1 - 2 Cans of beer per week     Comment: OCCAS         PHYSICAL EXAM:    BP 98/68 (BP Location: Left arm, Patient Position: Sitting)   Pulse 78   Ht 162.6 cm (64\")   Wt 103 kg (226 lb)   BMI 38.79 kg/m²  "       Wt Readings from Last 5 Encounters:   04/22/19 103 kg (226 lb)   04/22/19 102 kg (225 lb)   04/11/19 102 kg (225 lb)   03/04/19 103 kg (227 lb)   02/18/19 105 kg (232 lb)       BP Readings from Last 5 Encounters:   04/22/19 98/68   04/11/19 118/74   03/04/19 110/64   02/18/19 112/72   02/15/19 132/73       General appearance - Alert, well appearing, and in no distress   Mental status - Affect appropriate to mood.  Eyes - Sclerae anicteric,  ENMT - Hearing grossly normal bilaterally, Dental hygiene good.  Neck - Carotids upstroke normal bilaterally, no bruits, no JVD.  Resp - Clear to auscultation, no wheezes, rales or rhonchi, symmetric air entry.  Heart - Normal rate, regular rhythm, normal S1, S2, no murmurs, rubs, clicks or gallops.  GI - Soft, nontender, nondistended, no masses or organomegaly.  Neurological - Grossly intact - normal speech, no focal findings  Musculoskeletal - No joint tenderness, deformity or swelling, no muscular tenderness noted.  Extremities - Peripheral pulses normal, trace pedal edema, no clubbing or cyanosis.  Skin - Normal coloration and turgor.  Psych -  oriented to person, place, and time.    Medical problems and test results were reviewed with the patient today.       Echocardiogram Findings today      · Estimated EF = 50%.  · Mild tricuspid valve regurgitation is present.  · RVSP(TR) 24 mmHg          ASSESSMENT   1. ICM:  EF now improved to 50% on Coreg, Entresto, and Aldactone  2. Chronic SHF:  Continue Entresto, monitor daily weights, restrict sodium.  3. HTN: Controlled, recently marginal SBP  4. BIVICD: Normal function on Last interrogation CRT smart delay turned on   5. Mild Obesity.     PLAN  · The patient has had marginal blood pressure on Entresto we have asked her to stagger her dosing of Coreg and Entresto to see if she can tolerate better.  If she is unable to remains hypotensive we will hold her Entresto to once a day.  Overall the patient's symptoms have greatly  improved her ejection fraction has improved to 50%.  She has no progressive heart failure symptoms.  · Increase activity level, limit sodium daily activities and monitor blood pressure on a regular basis.  · Return for follow-up for device interrogation fall 2019.    4/22/2019  4:15 PM    Will Ti DESAI

## 2019-05-03 ENCOUNTER — TELEPHONE (OUTPATIENT)
Dept: CARDIOLOGY | Facility: HOSPITAL | Age: 59
End: 2019-05-03

## 2019-05-03 NOTE — TELEPHONE ENCOUNTER
Patient informs me that she had been taking 1/2 tablet of Entresto twice a day.  Continued to have dizziness and fatigue.  Low blood pressures as noted below.  Patient will stop morning dose of Entresto and continue Entresto at night 1/2 tablet.  She will follow-up with Dr. Borden in 1 week as scheduled.  Encouraged to keep home blood pressure and heart rate log for review at that time.  ----- Message from Ana Perea sent at 5/3/2019  4:12 PM EDT -----  Regarding: patient reporting low blood pressure  ..[facility] Telephone Note for:    Lucia Stevens  Home Phone      [phone]  Mobile          [phone]      Reason for Call:  Patient reporting low blood pressure    Symptoms:  Severe fatigue and dizziness  Onset::  Past couple of weeks    Anything Tried?: Recent   Entresto medication adjustment by Rosmery De Paz    Other Pertinent Information (Weight, Vitals, etc.):    BP in the morning high 90's/ high 50's.   As the day goes  Her BP keep dropping  To low 90's/40's   today's vitals: BP 93/43   HR 83    Please advise    Ana

## 2019-05-08 ENCOUNTER — OFFICE VISIT (OUTPATIENT)
Dept: CARDIOLOGY | Facility: CLINIC | Age: 59
End: 2019-05-08

## 2019-05-08 VITALS
SYSTOLIC BLOOD PRESSURE: 129 MMHG | HEART RATE: 87 BPM | HEIGHT: 64 IN | WEIGHT: 225 LBS | OXYGEN SATURATION: 98 % | DIASTOLIC BLOOD PRESSURE: 63 MMHG | BODY MASS INDEX: 38.41 KG/M2

## 2019-05-08 DIAGNOSIS — I25.5 ISCHEMIC CARDIOMYOPATHY: ICD-10-CM

## 2019-05-08 DIAGNOSIS — I10 ESSENTIAL HYPERTENSION: ICD-10-CM

## 2019-05-08 DIAGNOSIS — I25.119 CORONARY ARTERY DISEASE INVOLVING NATIVE CORONARY ARTERY OF NATIVE HEART WITH ANGINA PECTORIS (HCC): Primary | ICD-10-CM

## 2019-05-08 DIAGNOSIS — E78.5 DYSLIPIDEMIA: ICD-10-CM

## 2019-05-08 PROCEDURE — 99214 OFFICE O/P EST MOD 30 MIN: CPT | Performed by: INTERNAL MEDICINE

## 2019-05-08 NOTE — PROGRESS NOTES
Lucia CHA Hilda  1960  [phone]  [phone]    OFFICE FOLLOW UP    VISIT DATE: 5/8/2019     PCP: Johan Marks MD  [address] [facility] [address]  [facility] [address]    IDENTIFICATION: A 58 y.o. female   employee of [employer] and is a resident of [place], [place].     CHIEF COMPLAINT:    Chief Complaint   Patient presents with   • Coronary Artery Disease   • Fatigue      PROBLEM LIST:  1. CAD/ischemic cardiomyopathy  a. GXT myocardial perfusion study, 06/07/2005, revealing a moderate sized reversible defect in anteroseptal region with diminished myocardial thickening and hypokinesis. LVEF 58%.  b. Cardiac catheterization, June 2005, Dr. Talbot, due to moderate sized reversible anteroseptal defect; LVEF 38%: JADE stenting of PDA (2.5x13 mm Cypher).  c. JADE stenting of mid-RCA, 08/08/2011: 15 mm Promus JADE; LVEF 55% to 60%.  d. 11/30/17 echo: EF 30%, LA borderline dilated, moderate to severe MR, mild-to-moderate TR RVSP 41 mmHg  e. 11/30/2017 LHC: Chronic total occlusion of the RCA served by left-sided collaterals, nonobstructive disease of the left coronary system, cardiomyopathy with EF 25 to 30%.  f. LifeVest initiated 11/2017  g. 2/26/18 echo EF 25%, mild-mod MR  h. 3/28/18 BSc. BiVICD implantation Terra  i. 3/27/2018 echo EF 20%, moderate MR  j. 9/24/18 echo: EF 40%, multiple LV wall segments hypokinetic, grade 1 diastolic dysfunction, mild concentric LVH, no significant valvular abnormalities, no pulmonary hypertension  k. 4/20/2019 EF 50%, mild TR, RVSP 24  2. HTN  a. 10/10/2000 1824-hour BP monitor average 127/60  3. HLD  a. 11/30/17 lipids:   HDL 29 LDL 66  b. 6/20/18   HDL 23 LDL cannot be calculated  4. DM  a. 11/30/17 A1c 8.2  b. 3/27/18 A1c 7.3  5. Remote tobacco abuse:   a. Quit 2011  6. Hiatal hernia.  7. Sjogren’s syndrome.  8. Psoriasis.  9. Colon polyps status polypectomy.  10. Depression.  11. Surgical  history:  a. Appendectomy.  b. Cholecystectomy.  c. Rectovaginal fistula repair.  d. Cervical discectomy.     Allergies  Allergies   Allergen Reactions   • Sulfa Antibiotics Anaphylaxis       Current Medications    Current Outpatient Medications:   •  allopurinol (ZYLOPRIM) 100 MG tablet, Take 100 mg by mouth Daily., Disp: , Rfl:   •  amitriptyline (ELAVIL) 25 MG tablet, Take 1 tablet by mouth Every Night. (Patient taking differently: Take 25 mg by mouth Every Night. 0.5 tablet daily), Disp: 30 tablet, Rfl: 6  •  aspirin 81 MG EC tablet, Take 81 mg by mouth daily., Disp: , Rfl:   •  carvedilol (COREG) 6.25 MG tablet, TAKE 1 TABLET BY MOUTH TWICE DAILY, Disp: 180 tablet, Rfl: 3  •  chlordiazePOXIDE (LIBRIUM) 5 MG capsule, Take 5 mg by mouth Daily., Disp: , Rfl:   •  fluticasone (FLONASE) 50 MCG/ACT nasal spray, 2 sprays by Each Nare route Daily As Needed., Disp: , Rfl:   •  furosemide (LASIX) 40 MG tablet, Take 0.5 tablets by mouth 2 (Two) Times a Day. May take an additional tablet PRN for weight gain of 3lbs in one day or 5lbs in 1 wk, Disp: , Rfl:   •  Liraglutide (VICTOZA) 18 MG/3ML solution pen-injector injection, Inject 1.2 mg under the skin Daily., Disp: , Rfl:   •  nitroglycerin (NITROSTAT) 0.4 MG SL tablet, Place 1 tablet under the tongue Every 5 (Five) Minutes As Needed for Chest Pain. Take no more than 3 doses in 15 minutes., Disp: 25 tablet, Rfl: 3  •  nystatin (MYCOSTATIN) 209502 UNIT/GM ointment, Apply 1 application topically 3 (Three) Times a Day As Needed., Disp: , Rfl:   •  ondansetron (ZOFRAN) 4 MG tablet, Take 4 mg by mouth Every 8 (Eight) Hours As Needed for Nausea or Vomiting., Disp: , Rfl:   •  potassium chloride (MICRO-K) 10 MEQ CR capsule, Take 10 mEq by mouth Daily., Disp: , Rfl:   •  rizatriptan (MAXALT) 10 MG tablet, Take 10 mg by mouth 1 (One) Time As Needed for Migraine. May repeat in 2 hours if needed, Disp: , Rfl:   •  rosuvastatin (CRESTOR) 20 MG tablet, Take 1 tablet by mouth Daily.,  "Disp: 90 tablet, Rfl: 3  •  sacubitril-valsartan (ENTRESTO) 24-26 MG tablet, Take 0.5 tablets by mouth every night at bedtime., Disp: 15 tablet, Rfl: 3  •  spironolactone (ALDACTONE) 25 MG tablet, Take 1 tablet by mouth Daily., Disp: , Rfl:     History of Present Illness     Patient is here to follow up.  She had a by BiVICD implanted per Dr. Ellison last spring.  Has not been able to tolerate Entresto, has been experiencing hypotension and fatigue.  Is currently only taking 1/2 tablet of Entresto 24-26 at night. Reports she is staying hydrated. Just started this decreased dose of Entresto. Has been increasing her activity when her BP isn't too low. She has been having a lot of fatigue, and postural dizziness.     ROS:  All systems have been reviewed and are negative with the exception of those mentioned in the HPI.    OBJECTIVE:  Vitals:    05/08/19 1316   BP: 129/63   BP Location: Left arm   Patient Position: Sitting   Pulse: 87   SpO2: 98%   Weight: 102 kg (225 lb)   Height: 162.6 cm (64\")     Physical Exam   Constitutional: She appears well-developed and well-nourished.   Neck: Normal range of motion. Neck supple. No hepatojugular reflux and no JVD present. Carotid bruit is not present. No tracheal deviation present. No thyromegaly present.   Cardiovascular: Normal rate, regular rhythm, S1 normal, S2 normal, intact distal pulses and normal pulses. PMI is not displaced. Exam reveals no gallop, no distant heart sounds, no friction rub, no midsystolic click and no opening snap.   No murmur heard.  Pulses:       Radial pulses are 2+ on the right side, and 2+ on the left side.        Dorsalis pedis pulses are 2+ on the right side, and 2+ on the left side.        Posterior tibial pulses are 2+ on the right side, and 2+ on the left side.   Pulmonary/Chest: Effort normal and breath sounds normal. She has no wheezes. She has no rales.   Abdominal: Soft. Bowel sounds are normal. She exhibits no mass. There is no " tenderness. There is no guarding.       Diagnostic Data:  Procedures      ASSESSMENT:   Diagnosis Plan   1. Coronary artery disease involving native coronary artery of native heart with angina pectoris (CMS/HCC)     2. Ischemic cardiomyopathy     3. Dyslipidemia     4. Essential hypertension         PLAN:    CAD , Ischemic Cardiomyopathy with EF < 35% now s/p BiV ICD. On optimal medical Rx. May need to adjust lasix further if recurrent fluid overload and consider increasing to 40 mg BID. For now continue on lasix 20 mg BID and take additional as needed per symptoms and weight since gets leg cramps at higher dosage.     BiV ICD [place] with normal check previously. Pt counseled to only take morning dose on lasix and follow weights, and if she gains >2lbs in 24 hours she could then take the second dose.     Increased TG --> DM control discussed.    Pt was given release from work until 11/25 and I discussed with her that she needs to address longer term options.    HTN controlled at home    Recommended against NSAID use. Recommended to try Tumeric for inflammation.       Johan Marks MD, thank you for referring Ms. Stevens for evaluation.  I have forwarded my electronically generated recommendations to you for review.  Please do not hesitate to call with any questions.    Scribed for Virgilio Borden MD by Ale Sierra PA-C. 5/8/2019  1:53 PM   Virgilio Borden MD, [facility]  I, Virgilio Borden MD, personally performed the services described in this documentation as scribed by the above named individual in my presence, and it is both accurate and complete.  5/8/2019  2:56 PM

## 2019-05-13 ENCOUNTER — TELEPHONE (OUTPATIENT)
Dept: CARDIOLOGY | Facility: HOSPITAL | Age: 59
End: 2019-05-13

## 2019-05-13 NOTE — TELEPHONE ENCOUNTER
-spoke with patient and instructed her to take 60 mg of lasix today and tomorrow only, then resume 40 mg of lasix daily. Patient verbalized understanding.       ---- Message from Ana Perea sent at 5/13/2019  9:29 AM EDT -----  Regarding: rapid weight gain  ..[facility] Telephone Note for:    Lucia Stevens  Home Phone      [phone]  Mobile          [phone]      Reason for Call:  Patient has gained 8lbs in 5 days since taking Lasix 20 daily per Dr. Borden Instructions (please see notes below)  # Her BP has been normal -since the medication changes-l 116/68.#    Symptoms:  Swelling in ankles, legs and abdominal bloating, she is also having shortness of air with activity.      Other Pertinent Information (Weight, Vitals, etc.):      PLAN:(Dr. Borden's visit from 5/8/19)     CAD , Ischemic Cardiomyopathy with EF < 35% now s/p BiV ICD. On optimal medical Rx. May need to adjust lasix further if recurrent fluid overload and consider increasing to 40 mg BID. For now continue on lasix 20 mg BID and take additional as needed per symptoms and weight since gets leg cramps at higher dosage.     PHONE NOTE: (Moise Graves from 5/3/19)    Patient informs me that she had been taking 1/2 tablet of Entresto twice a day.  Continued to have dizziness and fatigue.  Low blood pressures ( 90's-50's).  Patient will stop morning dose of Entresto and continue Entresto at night 1/2 tablet.

## 2019-05-19 DIAGNOSIS — I25.119 CORONARY ARTERY DISEASE INVOLVING NATIVE CORONARY ARTERY OF NATIVE HEART WITH ANGINA PECTORIS (HCC): ICD-10-CM

## 2019-05-19 DIAGNOSIS — R06.09 DOE (DYSPNEA ON EXERTION): ICD-10-CM

## 2019-05-21 RX ORDER — NITROGLYCERIN 0.4 MG/1
TABLET SUBLINGUAL
Qty: 25 TABLET | Refills: 3 | Status: SHIPPED | OUTPATIENT
Start: 2019-05-21 | End: 2022-03-10 | Stop reason: DRUGHIGH

## 2019-06-06 ENCOUNTER — CLINICAL SUPPORT NO REQUIREMENTS (OUTPATIENT)
Dept: CARDIOLOGY | Facility: CLINIC | Age: 59
End: 2019-06-06

## 2019-06-06 DIAGNOSIS — I25.5 ISCHEMIC CARDIOMYOPATHY: ICD-10-CM

## 2019-06-06 PROCEDURE — 93296 REM INTERROG EVL PM/IDS: CPT | Performed by: INTERNAL MEDICINE

## 2019-06-06 PROCEDURE — 93295 DEV INTERROG REMOTE 1/2/MLT: CPT | Performed by: INTERNAL MEDICINE

## 2019-06-17 ENCOUNTER — OFFICE VISIT (OUTPATIENT)
Dept: CARDIOLOGY | Facility: HOSPITAL | Age: 59
End: 2019-06-17

## 2019-06-17 VITALS
TEMPERATURE: 97.6 F | WEIGHT: 230.25 LBS | BODY MASS INDEX: 39.31 KG/M2 | DIASTOLIC BLOOD PRESSURE: 64 MMHG | RESPIRATION RATE: 20 BRPM | HEART RATE: 77 BPM | HEIGHT: 64 IN | SYSTOLIC BLOOD PRESSURE: 133 MMHG | OXYGEN SATURATION: 96 %

## 2019-06-17 DIAGNOSIS — I95.2 HYPOTENSION DUE TO DRUGS: ICD-10-CM

## 2019-06-17 DIAGNOSIS — F32.A DEPRESSION, UNSPECIFIED DEPRESSION TYPE: ICD-10-CM

## 2019-06-17 DIAGNOSIS — I50.22 CHRONIC SYSTOLIC HEART FAILURE (HCC): ICD-10-CM

## 2019-06-17 DIAGNOSIS — I25.10 CORONARY ARTERY DISEASE INVOLVING NATIVE CORONARY ARTERY OF NATIVE HEART WITHOUT ANGINA PECTORIS: Primary | ICD-10-CM

## 2019-06-17 PROCEDURE — 99214 OFFICE O/P EST MOD 30 MIN: CPT | Performed by: NURSE PRACTITIONER

## 2019-06-17 NOTE — PROGRESS NOTES
Encounter Date:06/17/2019      Patient ID: Lucia Stevens is a 58 y.o. female.        Subjective:     Chief Complaint: Follow-up (f)     History of Present Illness patient presents the office today for ongoing evaluation of her chronic systolic heart failure.  She reports she is experiencing significant depression over the last few months.  She notes it is very hard to make herself get out of bed to do housework or even take a bath.  She notes that she quit taking her Librium because it made her feel poorly.  She reports ongoing fatigue, dyspnea on exertion and near syncope.  She reports blood pressures at home have been 90s to low 100s over 50s.  She reports when blood pressures are that low she does experience presyncope and dizziness.  She is taking 1/2 tablet of Entresto 24-26 mg nightly only and is inquiring about stopping Entresto.  She reports blood pressures are lower since restarting Entresto.  She does deny chest pain, pedal edema, syncope, orthopnea or PND.    Patient Active Problem List   Diagnosis   • Psoriasis   • Secondary Sjogren's syndrome (CMS/HCC)   • Hiatal hernia   • Obesity   • Hypertension   • Ischemic heart disease   • NAYLOR (dyspnea on exertion)   • Coronary artery disease involving native coronary artery of native heart with angina pectoris (CMS/HCC)   • Ischemic cardiomyopathy   • Biventricular ICD (implantable cardioverter-defibrillator) in place   • Dyslipidemia       Past Surgical History:   Procedure Laterality Date   • CARDIAC CATHETERIZATION     • CARDIAC CATHETERIZATION N/A 11/30/2017    Procedure: Left Heart Cath;  Surgeon: Galina Leonard MD;  Location:  [facility];  Service:    • CARDIAC ELECTROPHYSIOLOGY PROCEDURE N/A 3/28/2018    Procedure: Device Implant BiV ICD;  Surgeon: Sarbjit Ellison DO;  Location:  [facility];  Service: Cardiovascular   • CERVICAL DISCECTOMY ANTERIOR     • CHOLECYSTECTOMY     • CORONARY ANGIOPLASTY     • CORONARY  ANGIOPLASTY WITH STENT PLACEMENT     • CORONARY ANGIOPLASTY WITH STENT PLACEMENT     • CORONARY STENT PLACEMENT     • OTHER SURGICAL HISTORY      growth removed from small intestine as a child   • POLYPECTOMY      Colon polyps status   • RECTOVAGINAL FISTULA REPAIR     • TONSILLECTOMY         Allergies   Allergen Reactions   • Sulfa Antibiotics Anaphylaxis         Current Outpatient Medications:   •  allopurinol (ZYLOPRIM) 100 MG tablet, Take 100 mg by mouth Daily., Disp: , Rfl:   •  amitriptyline (ELAVIL) 25 MG tablet, Take 1 tablet by mouth Every Night. (Patient taking differently: Take 25 mg by mouth Every Night. 0.5 tablet daily), Disp: 30 tablet, Rfl: 6  •  aspirin 81 MG EC tablet, Take 81 mg by mouth daily., Disp: , Rfl:   •  carvedilol (COREG) 6.25 MG tablet, TAKE 1 TABLET BY MOUTH TWICE DAILY, Disp: 180 tablet, Rfl: 3  •  chlordiazePOXIDE (LIBRIUM) 5 MG capsule, Take 5 mg by mouth Daily., Disp: , Rfl: not taking  •  fluticasone (FLONASE) 50 MCG/ACT nasal spray, 2 sprays by Each Nare route Daily As Needed., Disp: , Rfl:   •  furosemide (LASIX) 40 MG tablet, Take 0.5 tablets by mouth 2 (Two) Times a Day. May take an additional tablet PRN for weight gain of 3lbs in one day or 5lbs in 1 wk, Disp: , Rfl:   •  Liraglutide (VICTOZA) 18 MG/3ML solution pen-injector injection, Inject 1.2 mg under the skin Daily., Disp: , Rfl:   •  nitroglycerin (NITROSTAT) 0.4 MG SL tablet, DISSOLVE ONE TABLET UNDER THE TONGUE EVERY 5 MINUTES AS NEEDED FOR CHEST PAIN.  DO NOT EXCEED A TOTAL OF 3 DOSES IN 15 MINUTES, Disp: 25 tablet, Rfl: 3  •  nystatin (MYCOSTATIN) 507412 UNIT/GM ointment, Apply 1 application topically 3 (Three) Times a Day As Needed., Disp: , Rfl:   •  ondansetron (ZOFRAN) 4 MG tablet, Take 4 mg by mouth Every 8 (Eight) Hours As Needed for Nausea or Vomiting., Disp: , Rfl:   •  potassium chloride (MICRO-K) 10 MEQ CR capsule, Take 10 mEq by mouth Daily., Disp: , Rfl:   •  rizatriptan (MAXALT) 10 MG tablet, Take 10 mg  by mouth 1 (One) Time As Needed for Migraine. May repeat in 2 hours if needed, Disp: , Rfl:   •  rosuvastatin (CRESTOR) 20 MG tablet, Take 1 tablet by mouth Daily., Disp: 90 tablet, Rfl: 3  •  sacubitril-valsartan (ENTRESTO) 24-26 MG tablet, Take 0.5 tablets by mouth every night at bedtime., Disp: 15 tablet, Rfl: 3  •  spironolactone (ALDACTONE) 25 MG tablet, Take 1 tablet by mouth Daily., Disp: , Rfl:     The following portions of the chart were reviewed today and updated as appropriate: Allergies, current medications, past family history, social history, past medical history.     Review of Systems   Constitution: Positive for malaise/fatigue and weight gain. Negative for chills, decreased appetite, diaphoresis, fever, weakness, night sweats and weight loss.   HENT: Negative for congestion, hearing loss, hoarse voice and nosebleeds.    Eyes: Negative for blurred vision, visual disturbance and visual halos.   Cardiovascular: Positive for near-syncope. Negative for chest pain, claudication, cyanosis, dyspnea on exertion, irregular heartbeat, leg swelling, orthopnea, palpitations, paroxysmal nocturnal dyspnea and syncope.   Respiratory: Negative for cough, hemoptysis, shortness of breath, sleep disturbances due to breathing, snoring, sputum production and wheezing.    Hematologic/Lymphatic: Negative for bleeding problem. Does not bruise/bleed easily.   Skin: Negative for dry skin, itching and rash.   Musculoskeletal: Negative for arthritis, falls, joint pain, joint swelling and myalgias.   Gastrointestinal: Negative for bloating, abdominal pain, constipation, diarrhea, flatus, heartburn, hematemesis, hematochezia, melena, nausea and vomiting.   Genitourinary: Negative for dysuria, frequency, hematuria, nocturia and urgency.   Neurological: Positive for dizziness and light-headedness. Negative for excessive daytime sleepiness, headaches and loss of balance.   Psychiatric/Behavioral: Positive for depression. The patient  "does not have insomnia and is not nervous/anxious.            Objective:     Vitals:    06/17/19 1002   BP: 133/64   BP Location: Right arm   Patient Position: Sitting   Cuff Size: Adult   Pulse: 77   Resp: 20   Temp: 97.6 °F (36.4 °C)   TempSrc: Temporal   SpO2: 96%   Weight: 104 kg (230 lb 4 oz)   Height: 162.6 cm (64\")         Physical Exam   Constitutional: She is oriented to person, place, and time. She appears well-developed and well-nourished. She is active and cooperative. No distress.   HENT:   Head: Normocephalic and atraumatic.   Mouth/Throat: Oropharynx is clear and moist.   Eyes: Conjunctivae and EOM are normal. Pupils are equal, round, and reactive to light.   Neck: Normal range of motion. Neck supple. No JVD present. No tracheal deviation present. No thyromegaly present.   Cardiovascular: Normal rate, regular rhythm, normal heart sounds and intact distal pulses.   Pulmonary/Chest: Effort normal and breath sounds normal.   Abdominal: Soft. Bowel sounds are normal. She exhibits no distension. There is no tenderness.   Musculoskeletal: Normal range of motion.   Neurological: She is alert and oriented to person, place, and time.   Skin: Skin is warm, dry and intact.   Psychiatric: She has a normal mood and affect. Her behavior is normal.   Nursing note and vitals reviewed.      Lab and Diagnostic Review:      Lab Results   Component Value Date    GLUCOSE 110 (H) 02/05/2019    CALCIUM 9.7 02/05/2019     02/05/2019    K 3.7 02/05/2019    CO2 26.0 02/05/2019     02/05/2019    BUN 19 02/05/2019    CREATININE 0.82 02/05/2019    EGFRIFNONA 71 02/05/2019    BCR 23.2 02/05/2019    ANIONGAP 10.0 02/05/2019         Assessment and Plan:         1. Chronic systolic heart failure (CMS/HCC)  euvolemic  Heart failure education today including signs and symptoms, the role of the heart failure center, daily weights, low sodium diet (less than 1500 mg per day), and daily physical activity. Reviewed HF Zones " with patient and family.  Patient to continue current medications as previously ordered.   Discontinue Entresto at this time due to hypotension; will consider reinitiation in the future  2. Coronary artery disease involving native coronary artery of native heart without angina pectoris  Without angina  Continue asa, coreg, crestor    3. Hypotension due to drugs  We will discontinue Entresto at this time due to hypotension  Patient to closely watch blood pressure at home and will consider reinitiation in the future.    4. Depression, unspecified depression type    She has stopped taking Librium on her own due to side effects.  Patient encouraged to follow-up with primary care regarding care of her ongoing depression.    It has been a pleasure to participate in the care of this patient.  Patient was instructed to call the [facility] with any questions, concerns, or worsening symptoms.        * Please note that portions of this note were completed with a voice recognition program. Efforts were made to edit the dictation but occasionally words are transcribed.

## 2019-08-15 ENCOUNTER — TELEPHONE (OUTPATIENT)
Dept: CARDIOLOGY | Facility: HOSPITAL | Age: 59
End: 2019-08-15

## 2019-08-15 NOTE — TELEPHONE ENCOUNTER
Patient called the office today noting a 15 lb weight gain over the last few weeks. She notes that she recently increased her lasix with no improvement in her symptoms. She notes that she recently increased aldactone to 50 mg daily. BPs over the last few days have been 90/50s. Patient is symptomatic with dizziness. Patient notes dyspnea, pedal edema . Take coreg once daily for next few days as fluid is diuresing off.

## 2019-09-05 ENCOUNTER — CLINICAL SUPPORT NO REQUIREMENTS (OUTPATIENT)
Dept: CARDIOLOGY | Facility: CLINIC | Age: 59
End: 2019-09-05

## 2019-09-05 DIAGNOSIS — I25.5 ISCHEMIC CARDIOMYOPATHY: ICD-10-CM

## 2019-09-05 PROCEDURE — 93296 REM INTERROG EVL PM/IDS: CPT | Performed by: INTERNAL MEDICINE

## 2019-09-05 PROCEDURE — 93295 DEV INTERROG REMOTE 1/2/MLT: CPT | Performed by: INTERNAL MEDICINE

## 2019-09-18 RX ORDER — ROSUVASTATIN CALCIUM 20 MG/1
TABLET, COATED ORAL
Qty: 90 TABLET | Refills: 3 | Status: SHIPPED | OUTPATIENT
Start: 2019-09-18 | End: 2020-10-12

## 2019-09-23 NOTE — PROGRESS NOTES
Encounter Date:06/17/2019      Patient ID: Lucia Stevens is a 59 y.o. female.        Subjective:     Chief Complaint: Follow-up   [facility]   History of Present Illness patient presents the office today for ongoing evaluation of her chronic systolic heart failure.  She reports she is not experiencing depression but anger about her situation (death of her , her health). She notes it is very hard to make herself get out of bed to do housework or even take a bath.   She reports ongoing fatigue, dyspnea on exertion that improves with rest.  She reports blood pressures at home have been 120-130s.    She does deny chest pain, pedal edema, syncope, orthopnea or PND. She notes weight gain but reports that she is no longer physically active.    Patient Active Problem List   Diagnosis   • Psoriasis   • Secondary Sjogren's syndrome (CMS/HCC)   • Hiatal hernia   • Obesity   • Hypertension   • Ischemic heart disease   • NAYLOR (dyspnea on exertion)   • Coronary artery disease involving native coronary artery of native heart with angina pectoris (CMS/HCC)   • Ischemic cardiomyopathy   • Biventricular ICD (implantable cardioverter-defibrillator) in place   • Dyslipidemia       Past Surgical History:   Procedure Laterality Date   • CARDIAC CATHETERIZATION     • CARDIAC CATHETERIZATION N/A 11/30/2017    Procedure: Left Heart Cath;  Surgeon: Galina Leonard MD;  Location:  [facility];  Service:    • CARDIAC ELECTROPHYSIOLOGY PROCEDURE N/A 3/28/2018    Procedure: Device Implant BiV ICD;  Surgeon: Sarbjit Ellison DO;  Location:  [facility];  Service: Cardiovascular   • CERVICAL DISCECTOMY ANTERIOR     • CHOLECYSTECTOMY     • CORONARY ANGIOPLASTY     • CORONARY ANGIOPLASTY WITH STENT PLACEMENT     • CORONARY ANGIOPLASTY WITH STENT PLACEMENT     • CORONARY STENT PLACEMENT     • OTHER SURGICAL HISTORY      growth removed from small intestine as a child   • POLYPECTOMY      Colon polyps status   • RECTOVAGINAL  FISTULA REPAIR     • TONSILLECTOMY         Allergies   Allergen Reactions   • Sulfa Antibiotics Anaphylaxis         Current Outpatient Medications:   •  allopurinol (ZYLOPRIM) 100 MG tablet, Take 100 mg by mouth Daily., Disp: , Rfl:   •  amitriptyline (ELAVIL) 25 MG tablet, Take 1 tablet by mouth Every Night. (Patient taking differently: Take 25 mg by mouth Every Night. 0.5 tablet daily), Disp: 30 tablet, Rfl: 6  •  aspirin 81 MG EC tablet, Take 81 mg by mouth daily., Disp: , Rfl:   •  carvedilol (COREG) 6.25 MG tablet, TAKE 1 TABLET BY MOUTH TWICE DAILY, Disp: 180 tablet, Rfl: 3  •  chlordiazePOXIDE (LIBRIUM) 5 MG capsule, Take 5 mg by mouth Daily., Disp: , Rfl: not taking  •  fluticasone (FLONASE) 50 MCG/ACT nasal spray, 2 sprays by Each Nare route Daily As Needed., Disp: , Rfl:   •  furosemide (LASIX) 40 MG tablet, Take 0.5 tablets by mouth 2 (Two) Times a Day. May take an additional tablet PRN for weight gain of 3lbs in one day or 5lbs in 1 wk, Disp: , Rfl:   •  Liraglutide (VICTOZA) 18 MG/3ML solution pen-injector injection, Inject 1.2 mg under the skin Daily., Disp: , Rfl:   •  nitroglycerin (NITROSTAT) 0.4 MG SL tablet, DISSOLVE ONE TABLET UNDER THE TONGUE EVERY 5 MINUTES AS NEEDED FOR CHEST PAIN.  DO NOT EXCEED A TOTAL OF 3 DOSES IN 15 MINUTES, Disp: 25 tablet, Rfl: 3  •  nystatin (MYCOSTATIN) 290103 UNIT/GM ointment, Apply 1 application topically 3 (Three) Times a Day As Needed., Disp: , Rfl:   •  ondansetron (ZOFRAN) 4 MG tablet, Take 4 mg by mouth Every 8 (Eight) Hours As Needed for Nausea or Vomiting., Disp: , Rfl:   •  potassium chloride (MICRO-K) 10 MEQ CR capsule, Take 10 mEq by mouth Daily., Disp: , Rfl:   •  rizatriptan (MAXALT) 10 MG tablet, Take 10 mg by mouth 1 (One) Time As Needed for Migraine. May repeat in 2 hours if needed, Disp: , Rfl:   •  rosuvastatin (CRESTOR) 20 MG tablet, Take 1 tablet by mouth Daily., Disp: 90 tablet, Rfl: 3  •  spironolactone (ALDACTONE) 25 MG tablet, Take 1 tablet by  "mouth Daily., Disp: , Rfl:     The following portions of the chart were reviewed today and updated as appropriate: Allergies, current medications, past family history, social history, past medical history.     Review of Systems   Constitution: Positive for malaise/fatigue and weight gain. Negative for chills, decreased appetite, diaphoresis, fever, weakness, night sweats and weight loss.   HENT: Negative for congestion, hearing loss, hoarse voice and nosebleeds.    Eyes: Negative for blurred vision, visual disturbance and visual halos.   Cardiovascular: Positive for dyspnea on exertion. Negative for chest pain, claudication, cyanosis, irregular heartbeat, leg swelling, near-syncope, orthopnea, palpitations, paroxysmal nocturnal dyspnea and syncope.   Respiratory: Negative for cough, hemoptysis, shortness of breath, sleep disturbances due to breathing, snoring, sputum production and wheezing.    Hematologic/Lymphatic: Negative for bleeding problem. Does not bruise/bleed easily.   Skin: Negative for dry skin, itching and rash.   Musculoskeletal: Negative for arthritis, joint pain, joint swelling and myalgias.   Gastrointestinal: Negative for bloating, abdominal pain, constipation, diarrhea, flatus, heartburn, hematemesis, hematochezia, melena, nausea and vomiting.   Genitourinary: Negative for dysuria, frequency, hematuria, nocturia and urgency.   Neurological: Negative for excessive daytime sleepiness, dizziness, headaches, light-headedness and loss of balance.   Psychiatric/Behavioral: Positive for depression. The patient does not have insomnia and is not nervous/anxious.            Objective:     Vitals:    09/24/19 0944   BP: 131/71   BP Location: Right arm   Patient Position: Sitting   Cuff Size: Large Adult   Pulse: 76   Resp: 18   Temp: 97.8 °F (36.6 °C)   TempSrc: Temporal   SpO2: 99%   Weight: 110 kg (243 lb 4 oz)   Height: 162.6 cm (64\")         Physical Exam   Constitutional: She is oriented to person, " place, and time. She appears well-developed and well-nourished. She is active and cooperative. No distress.   HENT:   Head: Normocephalic and atraumatic.   Mouth/Throat: Oropharynx is clear and moist.   Eyes: Conjunctivae and EOM are normal. Pupils are equal, round, and reactive to light.   Neck: Normal range of motion. Neck supple. No JVD present. No tracheal deviation present. No thyromegaly present.   Cardiovascular: Normal rate, regular rhythm, normal heart sounds and intact distal pulses.   Pulmonary/Chest: Effort normal and breath sounds normal.   Abdominal: Soft. Bowel sounds are normal. She exhibits no distension. There is no tenderness.   Musculoskeletal: Normal range of motion.   Neurological: She is alert and oriented to person, place, and time.   Skin: Skin is warm, dry and intact.   Psychiatric: She has a normal mood and affect. Her behavior is normal.   Nursing note and vitals reviewed.      Lab and Diagnostic Review:      Lab Results   Component Value Date    GLUCOSE 110 (H) 02/05/2019    CALCIUM 9.7 02/05/2019     02/05/2019    K 3.7 02/05/2019    CO2 26.0 02/05/2019     02/05/2019    BUN 19 02/05/2019    CREATININE 0.82 02/05/2019    EGFRIFNONA 71 02/05/2019    BCR 23.2 02/05/2019    ANIONGAP 10.0 02/05/2019         Assessment and Plan:         1. Chronic systolic heart failure (CMS/[facility])  euvolemic  Heart failure education today including signs and symptoms, the role of the heart failure center, daily weights, low sodium diet (less than 1500 mg per day), and daily physical activity. Reviewed HF Zones with patient and family.  Patient to continue current medications as previously ordered.     2. Coronary artery disease involving native coronary artery of native heart without angina pectoris  Without angina  Continue asa, coreg, crestor    3. Hypertension  Well controlled  HTN Education provided today including signs and symptoms, medication management, daily blood pressure monitoring.  Patient encouraged to call the [facility] with any abnormal readings.     4. Dyslipidemia  Statin therapy   It has been a pleasure to participate in the care of this patient.  Patient was instructed to call the [facility] with any questions, concerns, or worsening symptoms.        * Please note that portions of this note were completed with a voice recognition program. Efforts were made to edit the dictation but occasionally words are transcribed.

## 2019-09-24 ENCOUNTER — OFFICE VISIT (OUTPATIENT)
Dept: CARDIOLOGY | Facility: HOSPITAL | Age: 59
End: 2019-09-24

## 2019-09-24 VITALS
TEMPERATURE: 97.8 F | WEIGHT: 243.25 LBS | RESPIRATION RATE: 18 BRPM | BODY MASS INDEX: 41.53 KG/M2 | HEART RATE: 76 BPM | HEIGHT: 64 IN | SYSTOLIC BLOOD PRESSURE: 131 MMHG | OXYGEN SATURATION: 99 % | DIASTOLIC BLOOD PRESSURE: 71 MMHG

## 2019-09-24 DIAGNOSIS — I50.22 CHRONIC SYSTOLIC CONGESTIVE HEART FAILURE (HCC): Primary | ICD-10-CM

## 2019-09-24 DIAGNOSIS — I25.10 CORONARY ARTERY DISEASE INVOLVING NATIVE CORONARY ARTERY OF NATIVE HEART WITHOUT ANGINA PECTORIS: ICD-10-CM

## 2019-09-24 DIAGNOSIS — E78.5 DYSLIPIDEMIA: ICD-10-CM

## 2019-09-24 DIAGNOSIS — I10 ESSENTIAL HYPERTENSION: ICD-10-CM

## 2019-09-24 PROCEDURE — 99214 OFFICE O/P EST MOD 30 MIN: CPT | Performed by: NURSE PRACTITIONER

## 2019-09-24 RX ORDER — GLIMEPIRIDE 2 MG/1
4 TABLET ORAL DAILY
Refills: 1 | COMMUNITY
Start: 2019-07-23

## 2019-10-07 ENCOUNTER — TELEPHONE (OUTPATIENT)
Dept: CARDIOLOGY | Facility: CLINIC | Age: 59
End: 2019-10-07

## 2019-10-07 NOTE — TELEPHONE ENCOUNTER
Pt doesn't think she needs to see you in clinic on 10/29/2019 because she was seen by Rosmery JALLOH on 9/24/2049 who recommended a 6 month check up. Also she had her device checked on 9/5/2019 with less than 1% mode switch. No VHR. She just wanted to make sure she didn't need to see you this soon.

## 2019-10-25 NOTE — TELEPHONE ENCOUNTER
Pt calling back she is upset because she never received a call back from her 10/7/2019 telephone call to [facility]. I tried to call her no answer. I left her a voicemail to call the office.

## 2019-11-20 ENCOUNTER — HOSPITAL ENCOUNTER (OUTPATIENT)
Age: 59
End: 2019-11-20
Payer: COMMERCIAL

## 2019-11-20 DIAGNOSIS — M25.551: Primary | ICD-10-CM

## 2019-11-20 PROCEDURE — 73502 X-RAY EXAM HIP UNI 2-3 VIEWS: CPT

## 2019-11-20 PROCEDURE — 73552 X-RAY EXAM OF FEMUR 2/>: CPT

## 2019-11-20 PROCEDURE — 72190 X-RAY EXAM OF PELVIS: CPT

## 2020-01-08 ENCOUNTER — CLINICAL SUPPORT NO REQUIREMENTS (OUTPATIENT)
Dept: CARDIOLOGY | Facility: CLINIC | Age: 60
End: 2020-01-08

## 2020-01-08 DIAGNOSIS — I50.22 CHRONIC SYSTOLIC CONGESTIVE HEART FAILURE (HCC): ICD-10-CM

## 2020-01-08 DIAGNOSIS — I25.119 CORONARY ARTERY DISEASE INVOLVING NATIVE CORONARY ARTERY OF NATIVE HEART WITH ANGINA PECTORIS (HCC): Primary | ICD-10-CM

## 2020-01-08 DIAGNOSIS — I25.5 ISCHEMIC CARDIOMYOPATHY: ICD-10-CM

## 2020-01-08 PROCEDURE — 93295 DEV INTERROG REMOTE 1/2/MLT: CPT | Performed by: INTERNAL MEDICINE

## 2020-01-08 PROCEDURE — 93296 REM INTERROG EVL PM/IDS: CPT | Performed by: INTERNAL MEDICINE

## 2020-02-10 NOTE — PROGRESS NOTES
[facility]  Office Progress Note  Lucia Stevens  1960      Visit Date: 02/12/20    PCP: Johan Marks MD  [address]  [place] [address]    IDENTIFICATION: A 59 y.o. female disabled from [employer] and is a resident of [place], [place].     PROBLEM LIST:  1. CAD/ischemic cardiomyopathy  a. GXT myocardial perfusion study, 06/07/2005, revealing a moderate sized reversible defect in anteroseptal region with diminished myocardial thickening and hypokinesis. LVEF 58%.  b. Cardiac catheterization, June 2005, Dr. Talbot, due to moderate sized reversible anteroseptal defect; LVEF 38%: JADE stenting of PDA (2.5x13 mm Cypher).  c. JADE stenting of mid-RCA, 08/08/2011: 15 mm Promus JADE; LVEF 55% to 60%.  d. 11/30/17 echo: EF 30%, LA borderline dilated, moderate to severe MR, mild-to-moderate TR RVSP 41 mmHg  e. 11/30/2017 LHC: Chronic total occlusion of the RCA served by left-sided collaterals, nonobstructive disease of the left coronary system, cardiomyopathy with EF 25 to 30%.  f. LifeVest initiated 11/2017  g. 2/26/18 echo EF 25%, mild-mod MR  h. 3/28/18 BSc. BiVICD implantation Terra  i. 3/27/2018 echo EF 20%, moderate MR  j. 9/24/18 echo: EF 40%, multiple LV wall segments hypokinetic, grade 1 diastolic dysfunction, mild concentric LVH, no significant valvular abnormalities, no pulmonary hypertension  k. 4/20/2019 EF 50%, mild TR, RVSP 24  2. HTN  a. 10/10/2000 1824-hour BP monitor average 127/60  3. HLD  a. 11/30/17 lipids:   HDL 29 LDL 66  b. 6/20/18   HDL 23 LDL cannot be calculated  4. DM  a. 11/30/17 A1c 8.2  b. 3/27/18 A1c 7.3  5. Remote tobacco abuse:   a. Quit 2011  6. Hiatal hernia.  7. Sjogren’s syndrome.  8. Psoriasis.  9. Colon polyps status polypectomy.  10. Depression.  11. Surgical history:  a. Appendectomy.  b. Cholecystectomy.  c. Rectovaginal fistula repair.  d. Cervical discectomy.       Chief Complaint   Patient  presents with   • Coronary Artery Disease       Allergies  Allergies   Allergen Reactions   • Sulfa Antibiotics Anaphylaxis       Current Medications    Current Outpatient Medications:   •  allopurinol (ZYLOPRIM) 100 MG tablet, Take 200 mg by mouth Daily., Disp: , Rfl:   •  amitriptyline (ELAVIL) 25 MG tablet, Take 1 tablet by mouth Every Night. (Patient taking differently: Take 25 mg by mouth Every Night. 0.5 tablet daily), Disp: 30 tablet, Rfl: 6  •  aspirin 81 MG EC tablet, Take 81 mg by mouth daily., Disp: , Rfl:   •  carvedilol (COREG) 6.25 MG tablet, TAKE 1 TABLET BY MOUTH TWICE DAILY, Disp: 180 tablet, Rfl: 3  •  chlordiazePOXIDE (LIBRIUM) 5 MG capsule, Take 5 mg by mouth Daily., Disp: , Rfl:   •  fluticasone (FLONASE) 50 MCG/ACT nasal spray, 2 sprays by Each Nare route Daily As Needed., Disp: , Rfl:   •  furosemide (LASIX) 40 MG tablet, Take 0.5 tablets by mouth 2 (Two) Times a Day. May take an additional tablet PRN for weight gain of 3lbs in one day or 5lbs in 1 wk (Patient taking differently: Take 20 mg by mouth Daily. May take an additional tablet PRN for weight gain of 3lbs in one day or 5lbs in 1 wk), Disp: , Rfl:   •  glimepiride (AMARYL) 2 MG tablet, Take 2 mg by mouth Daily., Disp: , Rfl: 1  •  nitroglycerin (NITROSTAT) 0.4 MG SL tablet, DISSOLVE ONE TABLET UNDER THE TONGUE EVERY 5 MINUTES AS NEEDED FOR CHEST PAIN.  DO NOT EXCEED A TOTAL OF 3 DOSES IN 15 MINUTES, Disp: 25 tablet, Rfl: 3  •  nystatin (MYCOSTATIN) 503164 UNIT/GM ointment, Apply 1 application topically 3 (Three) Times a Day As Needed., Disp: , Rfl:   •  ondansetron (ZOFRAN) 4 MG tablet, Take 4 mg by mouth Every 8 (Eight) Hours As Needed for Nausea or Vomiting., Disp: , Rfl:   •  potassium chloride (MICRO-K) 10 MEQ CR capsule, Take 10 mEq by mouth Daily., Disp: , Rfl:   •  rizatriptan (MAXALT) 10 MG tablet, Take 10 mg by mouth 1 (One) Time As Needed for Migraine. May repeat in 2 hours if needed, Disp: , Rfl:   •  rosuvastatin (CRESTOR)  "20 MG tablet, TAKE 1 TABLET BY MOUTH ONCE DAILY, Disp: 90 tablet, Rfl: 3  •  spironolactone (ALDACTONE) 25 MG tablet, Take 1 tablet by mouth Daily., Disp: , Rfl:   •  TRULICITY 1.5 MG/0.5ML solution pen-injector, 1 (One) Time Per Week., Disp: , Rfl:       History of Present Illness   Lucia Stevens is a 59 y.o. year old female here for follow up.  No crescendo pattern of shortness of breath unfortunately she has gained weight in the last 1 year.  She is largely noncompliant with dietary intake she states.  She has no new chest pain issues but has had elevated blood sugars and is scheduled to see her primary care provider in short order      OBJECTIVE:  Vitals:    02/12/20 1056   BP: 110/72   BP Location: Left arm   Patient Position: Sitting   Pulse: 95   SpO2: 97%   Weight: 108 kg (237 lb)   Height: 162.6 cm (64\")     Physical Exam   Constitutional: She appears well-developed and well-nourished.   Neck: Normal range of motion. Neck supple. No hepatojugular reflux and no JVD present. Carotid bruit is not present. No tracheal deviation present. No thyromegaly present.   Cardiovascular: Normal rate, regular rhythm, S1 normal, S2 normal, intact distal pulses and normal pulses. PMI is not displaced. Exam reveals no gallop, no distant heart sounds, no friction rub, no midsystolic click and no opening snap.   Murmur heard.  Pulses:       Radial pulses are 2+ on the right side, and 2+ on the left side.        Dorsalis pedis pulses are 2+ on the right side, and 2+ on the left side.        Posterior tibial pulses are 2+ on the right side, and 2+ on the left side.   Pulmonary/Chest: Effort normal and breath sounds normal. She has no wheezes. She has no rales.   ICD clean dry and intact   Abdominal: Soft. Bowel sounds are normal. She exhibits no mass. There is no tenderness. There is no guarding.   Musculoskeletal: She exhibits edema.       Diagnostic Data:  Procedures      ASSESSMENT:   Diagnosis Plan   1. Coronary artery " disease involving native coronary artery of native heart without angina pectoris     2. Chronic systolic congestive heart failure (CMS/[facility])     3. Essential hypertension     4. Mixed hyperlipidemia     5. Type 2 diabetes mellitus with hyperglycemia, without long-term current use of insulin (CMS/[facility])         PLAN:  CAD post remote revascularization no overt anginal equivalent continued medical management    CHF chronic systolic will titrate carvedilol 12.5 twice daily for better heart rate suppression.  She was intolerant to ACE/ARB with hypotension    Hypertension controlled Spironolactone carvedilol again with titration of carvedilol    Dyslipidemia on statin therapy    Diabetes per PCP with ideal A1c less than 7 counseled regarding need for carbohydrate restriction        Johan Marks MD, thank you for referring Ms. Stevens for evaluation.  I have forwarded my electronically generated recommendations to you for review.  Please do not hesitate to call with any questions.      Virgilio Borden MD, FACC

## 2020-02-12 ENCOUNTER — OFFICE VISIT (OUTPATIENT)
Dept: CARDIOLOGY | Facility: CLINIC | Age: 60
End: 2020-02-12

## 2020-02-12 VITALS
HEIGHT: 64 IN | BODY MASS INDEX: 40.46 KG/M2 | OXYGEN SATURATION: 97 % | WEIGHT: 237 LBS | HEART RATE: 95 BPM | SYSTOLIC BLOOD PRESSURE: 110 MMHG | DIASTOLIC BLOOD PRESSURE: 72 MMHG

## 2020-02-12 DIAGNOSIS — I25.10 CORONARY ARTERY DISEASE INVOLVING NATIVE CORONARY ARTERY OF NATIVE HEART WITHOUT ANGINA PECTORIS: Primary | ICD-10-CM

## 2020-02-12 DIAGNOSIS — I50.22 CHRONIC SYSTOLIC CONGESTIVE HEART FAILURE (HCC): ICD-10-CM

## 2020-02-12 DIAGNOSIS — E11.65 TYPE 2 DIABETES MELLITUS WITH HYPERGLYCEMIA, WITHOUT LONG-TERM CURRENT USE OF INSULIN (HCC): ICD-10-CM

## 2020-02-12 DIAGNOSIS — E78.2 MIXED HYPERLIPIDEMIA: ICD-10-CM

## 2020-02-12 DIAGNOSIS — I10 ESSENTIAL HYPERTENSION: ICD-10-CM

## 2020-02-12 PROCEDURE — 99214 OFFICE O/P EST MOD 30 MIN: CPT | Performed by: INTERNAL MEDICINE

## 2020-02-12 RX ORDER — DULAGLUTIDE 1.5 MG/.5ML
INJECTION, SOLUTION SUBCUTANEOUS WEEKLY
COMMUNITY
Start: 2020-02-05 | End: 2020-03-10 | Stop reason: SINTOL

## 2020-02-12 RX ORDER — CARVEDILOL 6.25 MG/1
12.5 TABLET ORAL 2 TIMES DAILY
Qty: 180 TABLET | Refills: 3 | Status: SHIPPED | OUTPATIENT
Start: 2020-02-12 | End: 2020-06-12 | Stop reason: SDUPTHER

## 2020-03-03 ENCOUNTER — HOSPITAL ENCOUNTER (OUTPATIENT)
Age: 60
End: 2020-03-03
Payer: COMMERCIAL

## 2020-03-03 DIAGNOSIS — R19.7: Primary | ICD-10-CM

## 2020-03-03 PROCEDURE — 87507 IADNA-DNA/RNA PROBE TQ 12-25: CPT

## 2020-03-10 ENCOUNTER — TELEPHONE (OUTPATIENT)
Dept: CARDIOLOGY | Facility: HOSPITAL | Age: 60
End: 2020-03-10

## 2020-03-12 ENCOUNTER — TELEPHONE (OUTPATIENT)
Dept: CARDIOLOGY | Facility: CLINIC | Age: 60
End: 2020-03-12

## 2020-03-12 NOTE — TELEPHONE ENCOUNTER
Patient called with concerns of needing to switch of Coreg due to [facility] informing her that it maybe causing her sugars to go up. Pt would like us to call her back and inform her of what she should do with her medications. Please advise

## 2020-03-13 ENCOUNTER — TELEPHONE (OUTPATIENT)
Dept: CARDIOLOGY | Facility: HOSPITAL | Age: 60
End: 2020-03-13

## 2020-03-13 ENCOUNTER — TELEPHONE (OUTPATIENT)
Dept: CARDIOLOGY | Facility: CLINIC | Age: 60
End: 2020-03-13

## 2020-03-13 NOTE — TELEPHONE ENCOUNTER
Spoke with patient regarding her concerns over Coreg causing her blood sugar to go up.  advises that he disagrees and does not believe that is the case. Pt verbalized understanding and advised to follow up with PCP doctor for sugar monitoring.

## 2020-03-13 NOTE — TELEPHONE ENCOUNTER
Patient is calling to speak with provider about a medication that Dr. Borden has increased.  She believes it is causing her sugars to go out of whack  And she would like to speak with her about it because Dr. Borden doesn't believe it is the source of the sugar changes.  She wants to know if she needs to move up her appt in order to get the does changes or what does she need to do.

## 2020-03-13 NOTE — TELEPHONE ENCOUNTER
Spoke with patient who notes that her blood sugars have been elevated in the upper 300s-low 400s. Continue amaryl 4 mg daily.

## 2020-04-07 ENCOUNTER — OFFICE VISIT (OUTPATIENT)
Dept: CARDIOLOGY | Facility: HOSPITAL | Age: 60
End: 2020-04-07

## 2020-04-07 VITALS
DIASTOLIC BLOOD PRESSURE: 70 MMHG | HEART RATE: 78 BPM | WEIGHT: 232 LBS | SYSTOLIC BLOOD PRESSURE: 106 MMHG | BODY MASS INDEX: 39.82 KG/M2

## 2020-04-07 DIAGNOSIS — E11.65 TYPE 2 DIABETES MELLITUS WITH HYPERGLYCEMIA, WITHOUT LONG-TERM CURRENT USE OF INSULIN (HCC): ICD-10-CM

## 2020-04-07 DIAGNOSIS — E78.5 DYSLIPIDEMIA: Primary | ICD-10-CM

## 2020-04-07 DIAGNOSIS — I50.22 CHRONIC SYSTOLIC HEART FAILURE (HCC): Primary | ICD-10-CM

## 2020-04-07 DIAGNOSIS — E78.5 DYSLIPIDEMIA: ICD-10-CM

## 2020-04-07 DIAGNOSIS — I25.5 ISCHEMIC CARDIOMYOPATHY: ICD-10-CM

## 2020-04-07 PROCEDURE — 99213 OFFICE O/P EST LOW 20 MIN: CPT | Performed by: NURSE PRACTITIONER

## 2020-04-07 RX ORDER — OMEPRAZOLE 40 MG/1
40 CAPSULE, DELAYED RELEASE ORAL DAILY
COMMUNITY
Start: 2020-03-29 | End: 2022-10-05

## 2020-04-07 NOTE — PROGRESS NOTES
You have chosen to receive care through the use of telemedicine. Telemedicine enables health care providers at different locations to provide safe, effective, and convenient care through the use of technology. As with any health care service, there are risks associated with the use of telemedicine, including equipment failure, poor connections, and  issues.    • Do you understand the risks and benefits of telemedicine as I have explained them to you? Yes  • Have your questions regarding telemedicine been answered? Yes  • Do you consent to the use of telemedicine in your medical care today? Yes    [facility]  [facility]  Telemedicine note    04/07/2020         Lucia EUBANKS Hilda  [address] [place] [address]  [unfilled]    1960    Johan Marks MD [facility] Hilda is a 59 y.o. female.      Subjective:     Chief Complaint:  Congestive Heart Failure and Follow-up       This was an telephone enabled telemedicine encounter.    HPI 59 year old female with known SHF, now improved EF and CAD for telephone video visit today. She notes that she is feeling significantly better with initiation of jardiance. Blood sugars are now in 200 range down from 400. She notes that her energy level has improved significantly. She notes that she is more active than she has been in quite some time. She also reports that she does not tire as easily as she had in the past.   She notes she is now only experiencing dyspnea with moderate exertion, If she walks for a long distance. She denies chest pain. Notes that she did have one day when her bp was 95/53 and she experienced dizziness and lightheadedness. She notes that improved when her bp returned to her normal readings of low 100s systolic. She reports that her pcp ernst her cholesterol levels recently which were Total cholesterol 130, HDL 24,  and LDL not calculated.She denies any swelling in her legs. She also reports that  her PCP stopped her crestor 3 months ago due to stomach issues.       Patient Active Problem List   Diagnosis   • Psoriasis   • Secondary Sjogren's syndrome (CMS/HCC)   • Hiatal hernia   • Obesity   • Hypertension   • Ischemic heart disease   • NAYLOR (dyspnea on exertion)   • Coronary artery disease involving native coronary artery of native heart with angina pectoris (CMS/HCC)   • Ischemic cardiomyopathy   • Biventricular ICD (implantable cardioverter-defibrillator) in place   • Dyslipidemia       Past Medical History:   Diagnosis Date   • Arthritis    • CHF (congestive heart failure) (CMS/HCC)    • Coronary artery disease     2 stents   • Depression    • Diabetes mellitus (CMS/HCC)     type 2 dm, 3 years, checks blood sugar every am   • Gout    • Hyperlipidemia    • Psoriasis    • Sjoegren syndrome    • SOB (shortness of breath) on exertion    • Tobacco abuse 2011    cessation    • Wears dentures     full set   • Wears glasses        Past Surgical History:   Procedure Laterality Date   • CARDIAC CATHETERIZATION     • CARDIAC CATHETERIZATION N/A 11/30/2017    Procedure: Left Heart Cath;  Surgeon: Galina Leonard MD;  Location:  [facility];  Service:    • CARDIAC ELECTROPHYSIOLOGY PROCEDURE N/A 3/28/2018    Procedure: Device Implant BiV ICD;  Surgeon: Sarbjit Ellison DO;  Location:  [facility];  Service: Cardiovascular   • CERVICAL DISCECTOMY ANTERIOR     • CHOLECYSTECTOMY     • CORONARY ANGIOPLASTY     • CORONARY ANGIOPLASTY WITH STENT PLACEMENT     • CORONARY ANGIOPLASTY WITH STENT PLACEMENT     • CORONARY STENT PLACEMENT     • OTHER SURGICAL HISTORY      growth removed from small intestine as a child   • POLYPECTOMY      Colon polyps status   • RECTOVAGINAL FISTULA REPAIR     • TONSILLECTOMY         Family History   Problem Relation Age of Onset   • Heart disease Mother    • Heart attack Father    • No Known Problems Brother    • Cancer Maternal Grandmother    • Brain cancer Maternal  Grandmother    • Stomach cancer Maternal Grandfather    • Heart failure Paternal Grandmother    • Heart attack Paternal Grandfather        Social History     Socioeconomic History   • Marital status:      Spouse name: Not on file   • Number of children: 2   • Years of education: 13   • Highest education level: Some college, no degree   Social Needs   • Financial resource strain: Not very hard   • Food insecurity:     Worry: Never true     Inability: Never true   • Transportation needs:     Medical: No     Non-medical: No   Tobacco Use   • Smoking status: Former Smoker     Years: 20.00     Types: Cigarettes     Last attempt to quit:      Years since quittin.2   • Smokeless tobacco: Never Used   Substance and Sexual Activity   • Alcohol use: Yes     Alcohol/week: 2.0 - 4.0 standard drinks     Types: 1 - 2 Glasses of wine, 1 - 2 Cans of beer per week     Frequency: Monthly or less     Drinks per session: 1 or 2     Comment: OCCAS   • Drug use: No   • Sexual activity: Defer     Partners: Male   Lifestyle   • Physical activity:     Days per week: 0 days     Minutes per session: 0 min   • Stress: Only a little   Relationships   • Social connections:     Talks on phone: More than three times a week     Gets together: Twice a week     Attends Orthodox service: 1 to 4 times per year     Active member of club or organization: No     Attends meetings of clubs or organizations: Never     Relationship status:    Social History Narrative    Patient lives a at her home alone, she is a     Caffeine: 2 -3 servings per day       Allergies   Allergen Reactions   • Sulfa Antibiotics Anaphylaxis         Current Outpatient Medications:   •  allopurinol (ZYLOPRIM) 100 MG tablet, Take 200 mg by mouth Daily., Disp: , Rfl:   •  amitriptyline (ELAVIL) 25 MG tablet, Take 1 tablet by mouth Every Night. (Patient taking differently: Take 25 mg by mouth Every Night. 0.5 tablet daily), Disp: 30 tablet, Rfl: 6  •  aspirin  81 MG EC tablet, Take 81 mg by mouth daily., Disp: , Rfl:   •  carvedilol (COREG) 6.25 MG tablet, Take 2 tablets by mouth 2 (Two) Times a Day., Disp: 180 tablet, Rfl: 3  •  Empagliflozin 10 MG tablet, Take 10 mg by mouth Daily., Disp: 30 tablet, Rfl: 3  •  fluticasone (FLONASE) 50 MCG/ACT nasal spray, 2 sprays by Each Nare route Daily As Needed., Disp: , Rfl:   •  furosemide (LASIX) 40 MG tablet, Take 0.5 tablets by mouth 2 (Two) Times a Day. May take an additional tablet PRN for weight gain of 3lbs in one day or 5lbs in 1 wk (Patient taking differently: Take 20 mg by mouth Daily. May take an additional tablet PRN for weight gain of 3lbs in one day or 5lbs in 1 wk), Disp: , Rfl:   •  glimepiride (AMARYL) 2 MG tablet, Take 2 mg by mouth Daily., Disp: , Rfl: 1  •  Lactobacillus Rhamnosus, GG, (PROBIOTIC COLIC PO), Take 1 tablet by mouth Daily., Disp: , Rfl:   •  nitroglycerin (NITROSTAT) 0.4 MG SL tablet, DISSOLVE ONE TABLET UNDER THE TONGUE EVERY 5 MINUTES AS NEEDED FOR CHEST PAIN.  DO NOT EXCEED A TOTAL OF 3 DOSES IN 15 MINUTES, Disp: 25 tablet, Rfl: 3  •  nystatin (MYCOSTATIN) 360529 UNIT/GM ointment, Apply 1 application topically 3 (Three) Times a Day As Needed., Disp: , Rfl:   •  omeprazole (priLOSEC) 40 MG capsule, Take 40 mg by mouth Daily., Disp: , Rfl:   •  ondansetron (ZOFRAN) 4 MG tablet, Take 4 mg by mouth Every 8 (Eight) Hours As Needed for Nausea or Vomiting., Disp: , Rfl:   •  potassium chloride (MICRO-K) 10 MEQ CR capsule, Take 10 mEq by mouth Daily., Disp: , Rfl:   •  rizatriptan (MAXALT) 10 MG tablet, Take 10 mg by mouth 1 (One) Time As Needed for Migraine. May repeat in 2 hours if needed, Disp: , Rfl:   •  spironolactone (ALDACTONE) 25 MG tablet, Take 1 tablet by mouth Daily., Disp: , Rfl:   •  chlordiazePOXIDE (LIBRIUM) 5 MG capsule, Take 5 mg by mouth Daily., Disp: , Rfl:   •  rosuvastatin (CRESTOR) 20 MG tablet, TAKE 1 TABLET BY MOUTH ONCE DAILY, Disp: 90 tablet, Rfl: 3    The following portions  of the patient's history were reviewed today and updated as appropriate: allergies, current medications, past family history, past medical history, past social history, past surgical history and problem list     Review of Systems   Constitution: Positive for malaise/fatigue (improved). Negative for chills, decreased appetite, diaphoresis, fever, night sweats, weight gain and weight loss.   HENT: Negative for congestion, hearing loss, hoarse voice and nosebleeds.    Eyes: Negative for blurred vision, visual disturbance and visual halos.   Cardiovascular: Positive for dyspnea on exertion (with moderate exertion). Negative for chest pain, claudication, cyanosis, irregular heartbeat, leg swelling, near-syncope, orthopnea, palpitations, paroxysmal nocturnal dyspnea and syncope.   Respiratory: Negative for cough, hemoptysis, shortness of breath, sleep disturbances due to breathing, snoring, sputum production and wheezing.    Hematologic/Lymphatic: Negative for bleeding problem. Does not bruise/bleed easily.   Skin: Negative for dry skin, itching and rash.   Musculoskeletal: Negative for arthritis, falls, joint pain, joint swelling and myalgias.   Gastrointestinal: Negative for bloating, abdominal pain, constipation, diarrhea, flatus, heartburn, hematemesis, hematochezia, melena, nausea and vomiting.   Genitourinary: Negative for dysuria, frequency, hematuria, nocturia and urgency.   Neurological: Negative for excessive daytime sleepiness, dizziness, headaches, light-headedness, loss of balance and weakness.   Psychiatric/Behavioral: Positive for depression. The patient does not have insomnia and is not nervous/anxious.        Objective:     Vitals:    04/07/20 0947   BP: 106/70   BP Location: Left arm   Patient Position: Sitting   Cuff Size: Adult   Pulse: 78   Weight: 105 kg (232 lb)       Body mass index is 39.82 kg/m².    Physical Exam   Constitutional: No distress.   Alert and oriented   Pulmonary/Chest:   nonlabored  breathing    Musculoskeletal:   Patient states no edema in BLES   Psychiatric: She has a normal mood and affect. Thought content normal.       Lab and Diagnostic Review:  · Echo: Estimated EF = 50%.  · Mild tricuspid valve regurgitation is present.  · RVSP(TR) 24 mmHg           Assessment and Plan:   1. Chronic systolic heart failure (CMS/HCC)  Now with normalized ef  Continue coreg, lasix, aldactone  entresto had been stopped in past due to hypotension  Heart failure education today including signs and symptoms, the role of the heart failure center, daily weights, low sodium diet (less than 1500 mg per day), and daily physical activity. Reviewed HF Zones with patient and family.  Patient to continue current medications as previously ordered.   2. Ischemic cardiomyopathy  Without angina  Continue asa, coreg  Restart crestor     3. Dyslipidemia  Restart crestor 20 mg nightly   Will recheck FLP in 3 months     4. Type 2 diabetes mellitus with hyperglycemia, without long-term current use of insulin (CMS/HCC)  Increase jardiance to 25 mg daily   Watch bs closely         This visit has been rescheduled as a telephone visit to comply with patient safety concerns in accordance with CDC recommendations. Total time of discussion was 16  minutes.      It has been a pleasure to participate in the care of this patient.  Patient was instructed to call the [facility] with any questions, concerns, or worsening symptoms.    *Please note that portions of this note were completed with a voice recognition program. Efforts were made to edit the dictations, but occasionally words are mistranscribed.

## 2020-04-07 NOTE — PATIENT INSTRUCTIONS
Increase jardiance to 25 mg daily (rx at pharmacy)  Restart crestor 20 mg daily  Will recheck fasting lipid profile in 3 months

## 2020-05-19 ENCOUNTER — TELEPHONE (OUTPATIENT)
Dept: CARDIOLOGY | Facility: HOSPITAL | Age: 60
End: 2020-05-19

## 2020-05-19 NOTE — TELEPHONE ENCOUNTER
Patient states that when she had her heart surgery that she was told to not take any ibuprofen or naproxen. Patient states that this past month she has been having to take those medications for wrist and hand pain. She was wanting to know what she needs to take

## 2020-06-09 ENCOUNTER — TELEPHONE (OUTPATIENT)
Dept: CARDIOLOGY | Facility: HOSPITAL | Age: 60
End: 2020-06-09

## 2020-06-09 ENCOUNTER — HOSPITAL ENCOUNTER (EMERGENCY)
Age: 60
Discharge: HOME | End: 2020-06-09
Payer: COMMERCIAL

## 2020-06-09 VITALS
TEMPERATURE: 98.2 F | OXYGEN SATURATION: 98 % | HEART RATE: 83 BPM | DIASTOLIC BLOOD PRESSURE: 86 MMHG | RESPIRATION RATE: 16 BRPM | SYSTOLIC BLOOD PRESSURE: 160 MMHG

## 2020-06-09 VITALS
HEART RATE: 85 BPM | OXYGEN SATURATION: 99 % | RESPIRATION RATE: 19 BRPM | TEMPERATURE: 97.8 F | DIASTOLIC BLOOD PRESSURE: 89 MMHG | SYSTOLIC BLOOD PRESSURE: 133 MMHG

## 2020-06-09 VITALS — BODY MASS INDEX: 38.6 KG/M2

## 2020-06-09 DIAGNOSIS — Z88.2: ICD-10-CM

## 2020-06-09 DIAGNOSIS — I10: ICD-10-CM

## 2020-06-09 DIAGNOSIS — R03.1: Primary | ICD-10-CM

## 2020-06-09 DIAGNOSIS — E78.5: ICD-10-CM

## 2020-06-09 DIAGNOSIS — Z79.899: ICD-10-CM

## 2020-06-09 DIAGNOSIS — Z95.0: ICD-10-CM

## 2020-06-09 DIAGNOSIS — E11.65: ICD-10-CM

## 2020-06-09 DIAGNOSIS — I50.9: ICD-10-CM

## 2020-06-09 LAB
ALBUMIN LEVEL: 3.8 G/DL (ref 3.5–5)
ALBUMIN/GLOB SERPL: 1.1 {RATIO} (ref 1.1–1.8)
ALP ISO SERPL-ACNC: 150 U/L (ref 38–126)
ALT SERPLBLD-CCNC: 18 U/L (ref 12–78)
ANION GAP SERPL CALC-SCNC: 12.8 MEQ/L (ref 5–15)
AST SERPL QL: 34 U/L (ref 14–36)
BILIRUBIN,TOTAL: 0.7 MG/DL (ref 0.2–1.3)
BUN SERPL-MCNC: 15 MG/DL (ref 7–17)
CALCIUM SPEC-MCNC: 8.8 MG/DL (ref 8.4–10.2)
CHLORIDE SPEC-SCNC: 103 MMOL/L (ref 98–107)
CO2 SERPL-SCNC: 23 MMOL/L (ref 22–30)
CREAT BLD-SCNC: 0.8 MG/DL (ref 0.52–1.04)
CREATININE CLEARANCE ESTIMATED: 120 ML/MIN (ref 50–200)
ESTIMATED GLOMERULAR FILT RATE: 73 ML/MIN (ref 60–?)
GFR (AFRICAN AMERICAN): 89 ML/MIN (ref 60–?)
GLOBULIN SER CALC-MCNC: 3.4 G/DL (ref 1.3–3.2)
GLUCOSE: 240 MG/DL (ref 74–100)
HCT VFR BLD CALC: 36.5 % (ref 37–47)
HGB BLD-MCNC: 12.6 G/DL (ref 12.2–16.2)
MCHC RBC-ENTMCNC: 34.5 G/DL (ref 31.8–35.4)
MCV RBC: 88.6 FL (ref 81–99)
MEAN CORPUSCULAR HEMOGLOBIN: 30.6 PG (ref 27–31.2)
PLATELET # BLD: 197 K/MM3 (ref 142–424)
POTASSIUM: 3.8 MMOL/L (ref 3.5–5.1)
PROT SERPL-MCNC: 7.2 G/DL (ref 6.3–8.2)
RBC # BLD AUTO: 4.12 M/MM3 (ref 4.2–5.4)
SODIUM SPEC-SCNC: 135 MMOL/L (ref 136–145)
TROPONIN I: < 0.01 NG/ML (ref 0–0.03)
WBC # BLD AUTO: 7.3 K/MM3 (ref 4.8–10.8)

## 2020-06-09 PROCEDURE — 85025 COMPLETE CBC W/AUTO DIFF WBC: CPT

## 2020-06-09 PROCEDURE — 99283 EMERGENCY DEPT VISIT LOW MDM: CPT

## 2020-06-09 PROCEDURE — 71046 X-RAY EXAM CHEST 2 VIEWS: CPT

## 2020-06-09 PROCEDURE — 84484 ASSAY OF TROPONIN QUANT: CPT

## 2020-06-09 PROCEDURE — 93005 ELECTROCARDIOGRAM TRACING: CPT

## 2020-06-09 PROCEDURE — 80053 COMPREHEN METABOLIC PANEL: CPT

## 2020-06-09 PROCEDURE — 96365 THER/PROPH/DIAG IV INF INIT: CPT

## 2020-06-09 NOTE — TELEPHONE ENCOUNTER
Patient was wondering if taking bendryl twice yesterday could have lowered her blood pressure this morning at  83/46. Asked patient if she had taking her blood again she had  Not, but felt it was it was low  she took it while we were on the phone and it read 94/49.  She stated she felt lighted headed.  Advised if she had been feeling this worse she should be evaluated this evening. She verbalized understanding and stated she would get a ride to urgent care in her area.

## 2020-06-10 NOTE — TELEPHONE ENCOUNTER
Returned patient's call. Patient seen at [facility] yesterday. She notes that she is feeling better today. Notes no abnormal findings at ED.

## 2020-06-12 ENCOUNTER — TELEPHONE (OUTPATIENT)
Dept: CARDIOLOGY | Facility: HOSPITAL | Age: 60
End: 2020-06-12

## 2020-06-12 RX ORDER — CARVEDILOL 6.25 MG/1
6.25 TABLET ORAL 2 TIMES DAILY
Qty: 180 TABLET | Refills: 3
Start: 2020-06-12 | End: 2020-10-19

## 2020-06-12 NOTE — TELEPHONE ENCOUNTER
Pt states her bp is still running low she has checked it with two bp cuffs,  At las checked it was 99/59 hr 90.  She mentioned medication to increase bp.

## 2020-06-19 ENCOUNTER — TELEPHONE (OUTPATIENT)
Dept: CARDIOLOGY | Facility: HOSPITAL | Age: 60
End: 2020-06-19

## 2020-07-06 ENCOUNTER — TELEPHONE (OUTPATIENT)
Dept: NEUROLOGY | Facility: CLINIC | Age: 60
End: 2020-07-06

## 2020-07-06 RX ORDER — AMITRIPTYLINE HYDROCHLORIDE 25 MG/1
25 TABLET, FILM COATED ORAL NIGHTLY
Qty: 60 TABLET | Refills: 0 | Status: SHIPPED | OUTPATIENT
Start: 2020-07-06 | End: 2020-09-21

## 2020-07-06 NOTE — TELEPHONE ENCOUNTER
MANUELA FROM [facility] CALLED IN STATING SHE NEEDS CLARIFICATION ON WHICH SIG TO GO WITH ON THIS MEDICATION     amitriptyline (ELAVIL) 25 MG tablet    PLEASE ADVISE    [phone]

## 2020-08-10 RX ORDER — EMPAGLIFLOZIN 25 MG/1
TABLET, FILM COATED ORAL
Qty: 30 TABLET | Refills: 3 | Status: SHIPPED | OUTPATIENT
Start: 2020-08-10 | End: 2020-12-07

## 2020-08-10 NOTE — TELEPHONE ENCOUNTER
Last seen on 4/7/20 and will follow up on 10/7/20. Sent refills for Jardiance 25mg daily to [facility] in [place].     Brooklyn Chaudhari, MarkD

## 2020-08-27 ENCOUNTER — OFFICE VISIT (OUTPATIENT)
Dept: CARDIOLOGY | Facility: CLINIC | Age: 60
End: 2020-08-27

## 2020-08-27 VITALS
BODY MASS INDEX: 37.39 KG/M2 | DIASTOLIC BLOOD PRESSURE: 64 MMHG | WEIGHT: 219 LBS | HEART RATE: 88 BPM | SYSTOLIC BLOOD PRESSURE: 116 MMHG | HEIGHT: 64 IN | OXYGEN SATURATION: 97 %

## 2020-08-27 DIAGNOSIS — E78.2 MIXED HYPERLIPIDEMIA: ICD-10-CM

## 2020-08-27 DIAGNOSIS — I50.22 CHRONIC SYSTOLIC CONGESTIVE HEART FAILURE (HCC): ICD-10-CM

## 2020-08-27 DIAGNOSIS — I25.10 CORONARY ARTERY DISEASE INVOLVING NATIVE CORONARY ARTERY OF NATIVE HEART WITHOUT ANGINA PECTORIS: Primary | ICD-10-CM

## 2020-08-27 DIAGNOSIS — I10 ESSENTIAL HYPERTENSION: ICD-10-CM

## 2020-08-27 PROCEDURE — 99214 OFFICE O/P EST MOD 30 MIN: CPT | Performed by: INTERNAL MEDICINE

## 2020-08-27 PROCEDURE — 93284 PRGRMG EVAL IMPLANTABLE DFB: CPT | Performed by: INTERNAL MEDICINE

## 2020-08-27 NOTE — PROGRESS NOTES
[facility] at [facility]  Office Progress Note  Lucia Stevens  1960      Visit Date: 08/27/20    PCP: Johan Marks MD  [address]  [place] [address]    IDENTIFICATION: A 60 y.o. female disabled from [employer] and is a resident of [place], [place].     PROBLEM LIST:  1. CAD/ischemic cardiomyopathy  a. GXT myocardial perfusion study, 06/07/2005, revealing a moderate sized reversible defect in anteroseptal region with diminished myocardial thickening and hypokinesis. LVEF 58%.  b. Cardiac catheterization, June 2005, Dr. Talbot, due to moderate sized reversible anteroseptal defect; LVEF 38%: JADE stenting of PDA (2.5x13 mm Cypher).  c. JADE stenting of mid-RCA, 08/08/2011: 15 mm Promus JADE; LVEF 55% to 60%.  d. 11/30/17 echo: EF 30%, LA borderline dilated, moderate to severe MR, mild-to-moderate TR RVSP 41 mmHg  e. 11/30/2017 LHC: Chronic total occlusion of the RCA served by left-sided collaterals, nonobstructive disease of the left coronary system, cardiomyopathy with EF 25 to 30%.  f. LifeVest initiated 11/2017  g. 2/26/18 echo EF 25%, mild-mod MR  h. 3/28/18 BSc. BiVICD implantation Terra  i. 3/27/2018 echo EF 20%, moderate MR  j. 9/24/18 echo: EF 40%, multiple LV wall segments hypokinetic, grade 1 diastolic dysfunction, mild concentric LVH, no significant valvular abnormalities, no pulmonary hypertension  k. 4/20/2019 EF 50%, mild TR, RVSP 24  2. HTN  a. 10/10/2000 1824-hour BP monitor average 127/60  b. Intolerant to higher doses coreg  3. HLD  a. 11/30/17 lipids:   HDL 29 LDL 66  b. 6/20/18   HDL 23 LDL cannot be calculated  4. DM  a. 11/30/17 A1c 8.2  b. 3/27/18 A1c 7.3  5. Remote tobacco abuse:   a. Quit 2011  6. Hiatal hernia.  7. Sjogren’s syndrome.  8. Psoriasis.  9. Colon polyps status polypectomy.  10. Depression.  11. Surgical history:  a. Appendectomy.  b. Cholecystectomy.  c. Rectovaginal fistula repair.  d. Cervical  discectomy.       Chief Complaint   Patient presents with   • Coronary artery disease involving native coronary artery of        Allergies  Allergies   Allergen Reactions   • Sulfa Antibiotics Anaphylaxis       Current Medications    Current Outpatient Medications:   •  allopurinol (ZYLOPRIM) 100 MG tablet, Take 200 mg by mouth Daily., Disp: , Rfl:   •  amitriptyline (ELAVIL) 25 MG tablet, Take 1 tablet by mouth Every Night. 0.5 tablet daily, Disp: 60 tablet, Rfl: 0  •  aspirin 81 MG EC tablet, Take 81 mg by mouth daily., Disp: , Rfl:   •  carvedilol (COREG) 6.25 MG tablet, Take 1 tablet by mouth 2 (Two) Times a Day., Disp: 180 tablet, Rfl: 3  •  chlordiazePOXIDE (LIBRIUM) 5 MG capsule, Take 5 mg by mouth Daily., Disp: , Rfl:   •  fluticasone (FLONASE) 50 MCG/ACT nasal spray, 2 sprays by Each Nare route Daily As Needed., Disp: , Rfl:   •  furosemide (LASIX) 40 MG tablet, Take 0.5 tablets by mouth 2 (Two) Times a Day. May take an additional tablet PRN for weight gain of 3lbs in one day or 5lbs in 1 wk (Patient taking differently: Take 20 mg by mouth Daily. May take an additional tablet PRN for weight gain of 3lbs in one day or 5lbs in 1 wk), Disp: , Rfl:   •  glimepiride (AMARYL) 2 MG tablet, Take 2 mg by mouth Daily., Disp: , Rfl: 1  •  JARDIANCE 25 MG tablet, Take 1 tablet by mouth once daily, Disp: 30 tablet, Rfl: 3  •  Lactobacillus Rhamnosus, GG, (PROBIOTIC COLIC PO), Take 1 tablet by mouth Daily., Disp: , Rfl:   •  nitroglycerin (NITROSTAT) 0.4 MG SL tablet, DISSOLVE ONE TABLET UNDER THE TONGUE EVERY 5 MINUTES AS NEEDED FOR CHEST PAIN.  DO NOT EXCEED A TOTAL OF 3 DOSES IN 15 MINUTES, Disp: 25 tablet, Rfl: 3  •  nystatin (MYCOSTATIN) 516261 UNIT/GM ointment, Apply 1 application topically 3 (Three) Times a Day As Needed., Disp: , Rfl:   •  omeprazole (priLOSEC) 40 MG capsule, Take 40 mg by mouth Daily., Disp: , Rfl:   •  ondansetron (ZOFRAN) 4 MG tablet, Take 4 mg by mouth Every 8 (Eight) Hours As Needed for  "Nausea or Vomiting., Disp: , Rfl:   •  potassium chloride (MICRO-K) 10 MEQ CR capsule, Take 10 mEq by mouth Daily., Disp: , Rfl:   •  rizatriptan (MAXALT) 10 MG tablet, Take 10 mg by mouth 1 (One) Time As Needed for Migraine. May repeat in 2 hours if needed, Disp: , Rfl:   •  rosuvastatin (CRESTOR) 20 MG tablet, TAKE 1 TABLET BY MOUTH ONCE DAILY, Disp: 90 tablet, Rfl: 3  •  spironolactone (ALDACTONE) 25 MG tablet, Take 1 tablet by mouth Daily., Disp: , Rfl:       History of Present Illness   Lucia Stevens is a 60 y.o. year old female here for follow up.  No crescendo pattern of shortness of breath .  Did have issues with higher dose carvedilol noted to be somewhat volume contracted on concomitant Jardiance.  They have titrated her dose and she states she feels better now than she has in some time.  Unfortunately somewhat noncompliant with her diabetic diet and has been eating significant amounts of watermelon this summer    OBJECTIVE:  Vitals:    08/27/20 1128   BP: 116/64   BP Location: Right arm   Patient Position: Sitting   Pulse: 88   SpO2: 97%   Weight: 99.3 kg (219 lb)   Height: 162.6 cm (64\")     Physical Exam   Constitutional: She appears well-developed and well-nourished.   Neck: Normal range of motion. Neck supple. No hepatojugular reflux and no JVD present. Carotid bruit is not present. No tracheal deviation present. No thyromegaly present.   Cardiovascular: Normal rate, regular rhythm, S1 normal, S2 normal, intact distal pulses and normal pulses. PMI is not displaced. Exam reveals no gallop, no distant heart sounds, no friction rub, no midsystolic click and no opening snap.   Murmur heard.  Pulses:       Radial pulses are 2+ on the right side, and 2+ on the left side.        Dorsalis pedis pulses are 2+ on the right side, and 2+ on the left side.        Posterior tibial pulses are 2+ on the right side, and 2+ on the left side.   Pulmonary/Chest: Effort normal and breath sounds normal. She has no " wheezes. She has no rales.   ICD clean dry and intact   Abdominal: Soft. Bowel sounds are normal. She exhibits no mass. There is no tenderness. There is no guarding.   Musculoskeletal: She exhibits edema.       Diagnostic Data:  Procedures  Device check  Acceptable threshold and impedances  100% LV pacing   oversensing far field noted as mode switch  Beginning of life generator        ASSESSMENT:   Diagnosis Plan   1. Coronary artery disease involving native coronary artery of native heart without angina pectoris     2. Chronic systolic congestive heart failure (CMS/HCC)     3. Essential hypertension     4. Mixed hyperlipidemia         PLAN:  CAD post remote revascularization no overt anginal equivalent continued medical management    CHF chronic systolic will titrate carvedilol 12.5 twice daily for better heart rate suppression.  She was intolerant to ACE/ARB with hypotension    Hypertension controlled Spironolactone carvedilol again with titration of carvedilol    Dyslipidemia on statin therapy     Diabetes per PCP with ideal A1c less than 7 counseled regarding need for carbohydrate restriction        Johan Marks MD, thank you for referring Ms. Stevens for evaluation.  I have forwarded my electronically generated recommendations to you for review.  Please do not hesitate to call with any questions.      Virgilio Borden MD, [facility]

## 2020-09-03 ENCOUNTER — HOSPITAL ENCOUNTER (OUTPATIENT)
Age: 60
End: 2020-09-03
Payer: COMMERCIAL

## 2020-09-03 DIAGNOSIS — Z03.818: Primary | ICD-10-CM

## 2020-09-03 LAB
HCT VFR BLD CALC: 39.9 % (ref 37–47)
HGB BLD-MCNC: 13.9 G/DL (ref 12.2–16.2)
MCHC RBC-ENTMCNC: 34.8 G/DL (ref 31.8–35.4)
MCV RBC: 88.2 FL (ref 81–99)
MEAN CORPUSCULAR HEMOGLOBIN: 30.6 PG (ref 27–31.2)
PLATELET # BLD: 160 K/MM3 (ref 142–424)
RBC # BLD AUTO: 4.52 M/MM3 (ref 4.2–5.4)
WBC # BLD AUTO: 10.4 K/MM3 (ref 4.8–10.8)

## 2020-09-03 PROCEDURE — 85025 COMPLETE CBC W/AUTO DIFF WBC: CPT

## 2020-09-03 PROCEDURE — 36415 COLL VENOUS BLD VENIPUNCTURE: CPT

## 2020-09-03 PROCEDURE — U0004 COV-19 TEST NON-CDC HGH THRU: HCPCS

## 2020-09-21 RX ORDER — AMITRIPTYLINE HYDROCHLORIDE 25 MG/1
TABLET, FILM COATED ORAL
Qty: 60 TABLET | Refills: 0 | Status: SHIPPED | OUTPATIENT
Start: 2020-09-21 | End: 2021-01-04

## 2020-10-12 RX ORDER — ROSUVASTATIN CALCIUM 20 MG/1
TABLET, COATED ORAL
Qty: 30 TABLET | Refills: 5 | Status: SHIPPED | OUTPATIENT
Start: 2020-10-12 | End: 2021-03-11 | Stop reason: SDUPTHER

## 2020-10-14 ENCOUNTER — OFFICE VISIT (OUTPATIENT)
Dept: CARDIOLOGY | Facility: HOSPITAL | Age: 60
End: 2020-10-14

## 2020-10-14 VITALS
OXYGEN SATURATION: 98 % | SYSTOLIC BLOOD PRESSURE: 129 MMHG | HEART RATE: 90 BPM | DIASTOLIC BLOOD PRESSURE: 60 MMHG | BODY MASS INDEX: 37.39 KG/M2 | TEMPERATURE: 97.6 F | WEIGHT: 219 LBS | RESPIRATION RATE: 18 BRPM | HEIGHT: 64 IN

## 2020-10-14 DIAGNOSIS — I10 ESSENTIAL HYPERTENSION: ICD-10-CM

## 2020-10-14 DIAGNOSIS — E11.65 TYPE 2 DIABETES MELLITUS WITH HYPERGLYCEMIA, WITHOUT LONG-TERM CURRENT USE OF INSULIN (HCC): ICD-10-CM

## 2020-10-14 DIAGNOSIS — I50.22 CHRONIC SYSTOLIC CONGESTIVE HEART FAILURE (HCC): Primary | ICD-10-CM

## 2020-10-14 DIAGNOSIS — I25.10 CORONARY ARTERY DISEASE INVOLVING NATIVE CORONARY ARTERY OF NATIVE HEART WITHOUT ANGINA PECTORIS: ICD-10-CM

## 2020-10-14 PROCEDURE — 99214 OFFICE O/P EST MOD 30 MIN: CPT | Performed by: NURSE PRACTITIONER

## 2020-10-14 NOTE — PROGRESS NOTES
[facility]  [facility]      10/14/2020         Lucia Stevens  [address] [place] [address]  [unfilled]    1960    Johan Marks MD    Lucia Stevens is a 60 y.o. female.      Subjective:     Chief Complaint:  Follow-up and Congestive Heart Failure         HPI 60-year-old female presents the office today for ongoing evaluation of her ischemic cardiomyopathy.  She reports she is been doing well from a cardiac standpoint.  Notes mild pedal edema as the day goes on.  She does report she has more cognizant of elevating her legs when seated.  She has lost 24 pounds since last office visit.  Her biggest concern today is her A1c.  She reports her A1c 1 week ago was 10.3.  Patient reports that her blood sugar was 485 prior to this appointment.  She does deny chest pain, dyspnea, palpitations, presyncope, syncope.  Does note fatigue.      Patient Active Problem List   Diagnosis   • Psoriasis   • Secondary Sjogren's syndrome (CMS/HCC)   • Hiatal hernia   • Obesity   • Hypertension   • Ischemic heart disease   • NAYLOR (dyspnea on exertion)   • Coronary artery disease involving native coronary artery of native heart with angina pectoris (CMS/HCC)   • Ischemic cardiomyopathy   • Biventricular ICD (implantable cardioverter-defibrillator) in place   • Dyslipidemia       Past Medical History:   Diagnosis Date   • Arthritis    • CHF (congestive heart failure) (CMS/HCC)    • Coronary artery disease     2 stents   • Depression    • Diabetes mellitus (CMS/HCC)     type 2 dm, 3 years, checks blood sugar every am   • Gout    • Hyperlipidemia    • Psoriasis    • Sjoegren syndrome    • SOB (shortness of breath) on exertion    • Tobacco abuse 2011    cessation    • Wears dentures     full set   • Wears glasses        Past Surgical History:   Procedure Laterality Date   • CARDIAC CATHETERIZATION     • CARDIAC CATHETERIZATION N/A 11/30/2017    Procedure: Left Heart Cath;  Surgeon: Galina Leonard MD;   Location:  [facility];  Service:    • CARDIAC ELECTROPHYSIOLOGY PROCEDURE N/A 3/28/2018    Procedure: Device Implant BiV ICD;  Surgeon: Sarbjit Ellison DO;  Location:  [facility];  Service: Cardiovascular   • CERVICAL DISCECTOMY ANTERIOR     • CHOLECYSTECTOMY     • CORONARY ANGIOPLASTY     • CORONARY ANGIOPLASTY WITH STENT PLACEMENT     • CORONARY ANGIOPLASTY WITH STENT PLACEMENT     • CORONARY STENT PLACEMENT     • OTHER SURGICAL HISTORY      growth removed from small intestine as a child   • POLYPECTOMY      Colon polyps status   • RECTOVAGINAL FISTULA REPAIR     • TONSILLECTOMY         Family History   Problem Relation Age of Onset   • Heart disease Mother    • Heart attack Father    • No Known Problems Brother    • Cancer Maternal Grandmother    • Brain cancer Maternal Grandmother    • Stomach cancer Maternal Grandfather    • Heart failure Paternal Grandmother    • Heart attack Paternal Grandfather        Social History     Socioeconomic History   • Marital status:      Spouse name: Not on file   • Number of children: 2   • Years of education: 13   • Highest education level: Some college, no degree   Social Needs   • Financial resource strain: Not very hard   • Food insecurity     Worry: Never true     Inability: Never true   • Transportation needs     Medical: No     Non-medical: No   Tobacco Use   • Smoking status: Former Smoker     Years: 20.00     Types: Cigarettes     Quit date:      Years since quittin.7   • Smokeless tobacco: Never Used   Substance and Sexual Activity   • Alcohol use: Yes     Alcohol/week: 2.0 - 4.0 standard drinks     Types: 1 - 2 Glasses of wine, 1 - 2 Cans of beer per week     Frequency: Monthly or less     Drinks per session: 1 or 2     Comment: OCCAS   • Drug use: No   • Sexual activity: Defer     Partners: Male   Lifestyle   • Physical activity     Days per week: 0 days     Minutes per session: 0 min   • Stress: Only a little    Relationships   • Social connections     Talks on phone: More than three times a week     Gets together: Twice a week     Attends [employer] service: 1 to 4 times per year     Active member of club or organization: No     Attends meetings of clubs or organizations: Never     Relationship status:    Social History Narrative    Patient lives a at her home alone, she is a     Caffeine: 2 -3 servings per day       Allergies   Allergen Reactions   • Sulfa Antibiotics Anaphylaxis         Current Outpatient Medications:   •  allopurinol (ZYLOPRIM) 100 MG tablet, Take 200 mg by mouth Daily., Disp: , Rfl:   •  amitriptyline (ELAVIL) 25 MG tablet, TAKE 1 TABLET BY MOUTH AT BEDTIME, Disp: 60 tablet, Rfl: 0  •  aspirin 81 MG EC tablet, Take 81 mg by mouth daily., Disp: , Rfl:   •  carvedilol (COREG) 6.25 MG tablet, Take 1 tablet by mouth 2 (Two) Times a Day., Disp: 180 tablet, Rfl: 3  •  chlordiazePOXIDE (LIBRIUM) 5 MG capsule, Take 5 mg by mouth Daily., Disp: , Rfl:   •  fluticasone (FLONASE) 50 MCG/ACT nasal spray, 2 sprays by Each Nare route Daily As Needed., Disp: , Rfl:   •  furosemide (LASIX) 40 MG tablet, Take 0.5 tablets by mouth 2 (Two) Times a Day. May take an additional tablet PRN for weight gain of 3lbs in one day or 5lbs in 1 wk (Patient taking differently: Take 20 mg by mouth Daily. May take an additional tablet PRN for weight gain of 3lbs in one day or 5lbs in 1 wk), Disp: , Rfl:   •  glimepiride (AMARYL) 2 MG tablet, Take 2 mg by mouth Daily., Disp: , Rfl: 1  •  JARDIANCE 25 MG tablet, Take 1 tablet by mouth once daily, Disp: 30 tablet, Rfl: 3  •  metFORMIN (GLUCOPHAGE) 500 MG tablet, Take 500 mg by mouth 2 (Two) Times a Day., Disp: , Rfl:   •  nystatin (MYCOSTATIN) 104335 UNIT/GM ointment, Apply 1 application topically 3 (Three) Times a Day As Needed., Disp: , Rfl:   •  omeprazole (priLOSEC) 40 MG capsule, Take 40 mg by mouth Daily., Disp: , Rfl:   •  ondansetron (ZOFRAN) 4 MG tablet, Take 4 mg by  mouth Every 8 (Eight) Hours As Needed for Nausea or Vomiting., Disp: , Rfl:   •  potassium chloride (MICRO-K) 10 MEQ CR capsule, Take 10 mEq by mouth Daily., Disp: , Rfl:   •  rizatriptan (MAXALT) 10 MG tablet, Take 10 mg by mouth 1 (One) Time As Needed for Migraine. May repeat in 2 hours if needed, Disp: , Rfl:   •  rosuvastatin (CRESTOR) 20 MG tablet, Take 1 tablet by mouth once daily, Disp: 30 tablet, Rfl: 5  •  spironolactone (ALDACTONE) 25 MG tablet, Take 1 tablet by mouth Daily., Disp: , Rfl:   •  Lactobacillus Rhamnosus, GG, (PROBIOTIC COLIC PO), Take 1 tablet by mouth Daily., Disp: , Rfl:   •  nitroglycerin (NITROSTAT) 0.4 MG SL tablet, DISSOLVE ONE TABLET UNDER THE TONGUE EVERY 5 MINUTES AS NEEDED FOR CHEST PAIN.  DO NOT EXCEED A TOTAL OF 3 DOSES IN 15 MINUTES, Disp: 25 tablet, Rfl: 3    The following portions of the patient's history were reviewed today and updated as appropriate: allergies, current medications, past family history, past medical history, past social history, past surgical history and problem list     Review of Systems   Constitution: Positive for malaise/fatigue. Negative for chills, decreased appetite, diaphoresis, fever, night sweats, weight gain and weight loss.   HENT: Negative for congestion, hearing loss, hoarse voice and nosebleeds.    Eyes: Negative for blurred vision, visual disturbance and visual halos.   Cardiovascular: Positive for dyspnea on exertion (with moderate exertion ) and leg swelling (mild ). Negative for chest pain, claudication, cyanosis, irregular heartbeat, near-syncope, orthopnea, palpitations, paroxysmal nocturnal dyspnea and syncope.   Respiratory: Negative for cough, hemoptysis, shortness of breath, sleep disturbances due to breathing, snoring, sputum production and wheezing.    Hematologic/Lymphatic: Negative for bleeding problem. Does not bruise/bleed easily.   Skin: Negative for dry skin, itching and rash.   Musculoskeletal: Negative for arthritis, falls,  "joint pain, joint swelling and myalgias.   Gastrointestinal: Negative for bloating, abdominal pain, constipation, diarrhea, flatus, heartburn, hematemesis, hematochezia, melena, nausea and vomiting.   Genitourinary: Negative for dysuria, frequency, hematuria, nocturia and urgency.   Neurological: Negative for excessive daytime sleepiness, dizziness, headaches, light-headedness, loss of balance and weakness.   Psychiatric/Behavioral: Negative for depression. The patient does not have insomnia and is not nervous/anxious.        Objective:     Vitals:    10/14/20 1354   BP: 129/60   BP Location: Left arm   Patient Position: Sitting   Cuff Size: Large Adult   Pulse: 90   Resp: 18   Temp: 97.6 °F (36.4 °C)   TempSrc: Temporal   SpO2: 98%   Weight: 99.3 kg (219 lb)   Height: 162.6 cm (64\")       Body mass index is 37.59 kg/m².    Vitals signs and nursing note reviewed.   Constitutional:       General: Not in acute distress.     Appearance: Well-developed.   Eyes:      Conjunctiva/sclera: Conjunctivae normal.      Pupils: Pupils are equal, round, and reactive to light.   HENT:      Head: Normocephalic and atraumatic.   Neck:      Musculoskeletal: Normal range of motion and neck supple.      Thyroid: No thyromegaly.      Vascular: No JVD.      Trachea: No tracheal deviation.   Pulmonary:      Effort: Pulmonary effort is normal.      Breath sounds: Normal breath sounds.   Cardiovascular:      PMI at left midclavicular line. Normal rate. Regular rhythm. Normal S1. Normal S2.      Murmurs: There is no murmur.      No gallop. No click. No rub.   Pulses:     Intact distal pulses.   Edema:     Peripheral edema absent.   Abdominal:      General: Bowel sounds are normal. There is no distension.      Palpations: Abdomen is soft.      Tenderness: There is no abdominal tenderness.   Musculoskeletal: Normal range of motion.   Skin:     General: Skin is warm and dry.   Neurological:      Mental Status: Alert and oriented to person, " place, and time.   Psychiatric:         Behavior: Behavior normal. Behavior is cooperative.         Lab and Diagnostic Review:  Echo: April 2019   · Estimated EF = 50%.  · Mild tricuspid valve regurgitation is present.  · RVSP(TR) 24 mmHg      Assessment and Plan:   1. Chronic systolic congestive heart failure (CMS/HCC)  Normalized ef  Continue coreg, lasix, aldactone   No ace, arb or arni due to history of hypotension  Heart failure education today including signs and symptoms, the role of the heart failure center, daily weights, low sodium diet (less than 1500 mg per day), and daily physical activity. Reviewed HF Zones with patient and family.  Patient to continue current medications as previously ordered.   2. Coronary artery disease involving native coronary artery of native heart without angina pectoris  Currently without angina  Continue asa, coreg, crestor     3. Essential hypertension  Well controlled on coreg, lasix, aldactone   Heart failure education today including signs and symptoms, the role of the heart failure center, daily weights, low sodium diet (less than 1500 mg per day), and daily physical activity. Reviewed HF Zones with patient and family.  Patient to continue current medications as previously ordered.   4. Type 2 diabetes mellitus with hyperglycemia, without long-term current use of insulin (CMS/[facility])  Strongly encouraged patient to follow up with her pcp in the near future            It has been a pleasure to participate in the care of this patient.  Patient was instructed to call the [facility] with any questions, concerns, or worsening symptoms.    *Please note that portions of this note were completed with a voice recognition program. Efforts were made to edit the dictations, but occasionally words are mistranscribed.

## 2020-10-19 RX ORDER — CARVEDILOL 6.25 MG/1
TABLET ORAL
Qty: 180 TABLET | Refills: 0 | Status: SHIPPED | OUTPATIENT
Start: 2020-10-19 | End: 2021-01-18

## 2020-10-24 NOTE — PROGRESS NOTES
Encounter Date:09/24/2018      Patient ID: Lucia Stevens is a 58 y.o. female.        Subjective:     Chief Complaint: Follow-up   [facility]  History of Present Illness patient presents to the office for ongoing evaluation of her chronic systolic heart failure. She notes ongoing fatigue, generalized weakness, dyspnea at rest and with exertion and pedal edema. She had an echo prior to this appointment but results are not available yet. She does report intermittent dizziness/lightheadedness. She reports bps at home 110-120s/60-70s.    Patient Active Problem List   Diagnosis   • Psoriasis   • Secondary Sjogren's syndrome (CMS/HCC)   • Hiatal hernia   • Obesity   • Hypertension   • Ischemic heart disease   • NAYLOR (dyspnea on exertion)   • Coronary artery disease involving native coronary artery of native heart with angina pectoris (CMS/HCC)   • Ischemic cardiomyopathy   • Biventricular ICD (implantable cardioverter-defibrillator) in place       Past Surgical History:   Procedure Laterality Date   • CARDIAC CATHETERIZATION     • CARDIAC CATHETERIZATION N/A 11/30/2017    Procedure: Left Heart Cath;  Surgeon: Galina Leonard MD;  Location:  [facility];  Service:    • CARDIAC ELECTROPHYSIOLOGY PROCEDURE N/A 3/28/2018    Procedure: Device Implant BiV ICD;  Surgeon: Sarbjit Ellison DO;  Location:  [facility];  Service: Cardiovascular   • CERVICAL DISCECTOMY ANTERIOR     • CHOLECYSTECTOMY     • CORONARY ANGIOPLASTY     • CORONARY ANGIOPLASTY WITH STENT PLACEMENT     • CORONARY ANGIOPLASTY WITH STENT PLACEMENT     • CORONARY STENT PLACEMENT     • OTHER SURGICAL HISTORY      growth removed from small intestine as a child   • POLYPECTOMY      Colon polyps status   • RECTOVAGINAL FISTULA REPAIR     • TONSILLECTOMY         Allergies   Allergen Reactions   • Sulfa Antibiotics Anaphylaxis         Current Outpatient Prescriptions:   •  allopurinol (ZYLOPRIM) 100 MG tablet, Take 100 mg by mouth Daily., Disp: ,  Rfl:   •  aspirin 81 MG EC tablet, Take 81 mg by mouth daily., Disp: , Rfl:   •  carvedilol (COREG) 6.25 MG tablet, Take 1 tablet by mouth 2 (Two) Times a Day., Disp: 60 tablet, Rfl: 11  •  chlordiazePOXIDE (LIBRIUM) 5 MG capsule, Take 5 mg by mouth 2 (Two) Times a Day., Disp: , Rfl:   •  diclofenac (VOLTAREN) 1 % gel gel, Apply 4 g topically to the appropriate area as directed 4 (Four) Times a Day As Needed., Disp: , Rfl:   •  fluticasone (FLONASE) 50 MCG/ACT nasal spray, 2 sprays by Each Nare route Daily As Needed., Disp: , Rfl:   •  furosemide (LASIX) 40 MG tablet, Take 0.5 tablets by mouth 2 (Two) Times a Day. May take an additional tablet PRN for weight gain of 3lbs in one day or 5lbs in 1 wk, Disp: , Rfl:   •  Liraglutide (VICTOZA) 18 MG/3ML solution pen-injector injection, Inject 1.2 mg under the skin Daily., Disp: , Rfl:   •  losartan (COZAAR) 25 MG tablet, 1/2 tablet per day, Disp: 60 tablet, Rfl: 3  •  nitroglycerin (NITROSTAT) 0.4 MG SL tablet, Place 1 tablet under the tongue Every 5 (Five) Minutes As Needed for Chest Pain. Take no more than 3 doses in 15 minutes., Disp: 25 tablet, Rfl: 3  •  nystatin (MYCOSTATIN) 404929 UNIT/GM ointment, Apply 1 application topically 3 (Three) Times a Day As Needed., Disp: , Rfl:   •  ondansetron (ZOFRAN) 4 MG tablet, Take 4 mg by mouth Every 8 (Eight) Hours As Needed for Nausea or Vomiting., Disp: , Rfl:   •  rizatriptan (MAXALT) 10 MG tablet, Take 10 mg by mouth 1 (One) Time As Needed for Migraine. May repeat in 2 hours if needed, Disp: , Rfl:   •  rosuvastatin (CRESTOR) 20 MG tablet, Take 1 tablet by mouth Daily., Disp: 90 tablet, Rfl: 3  •  spironolactone (ALDACTONE) 25 MG tablet, Take 1 tablet by mouth Daily., Disp: , Rfl:     The following portions of the chart were reviewed and updated as appropriate: Allergies, current medications, past family history, social history, past medical history.     Review of Systems   Constitution: Positive for weakness and  "malaise/fatigue. Negative for chills, decreased appetite, diaphoresis, fever, night sweats, weight gain and weight loss.   HENT: Negative for congestion, hearing loss, hoarse voice and nosebleeds.    Eyes: Negative for blurred vision, visual disturbance and visual halos.   Cardiovascular: Positive for dyspnea on exertion, leg swelling and near-syncope. Negative for chest pain, claudication, cyanosis, irregular heartbeat, orthopnea, palpitations, paroxysmal nocturnal dyspnea and syncope.   Respiratory: Positive for shortness of breath. Negative for cough, hemoptysis, sleep disturbances due to breathing, snoring, sputum production and wheezing.    Hematologic/Lymphatic: Negative for bleeding problem. Does not bruise/bleed easily.   Skin: Negative for dry skin, itching and rash.   Musculoskeletal: Positive for gout and joint pain. Negative for arthritis, falls, joint swelling and myalgias.   Gastrointestinal: Positive for abdominal pain and diarrhea. Negative for bloating, constipation, flatus, heartburn, hematemesis, hematochezia, melena, nausea and vomiting.   Genitourinary: Positive for frequency. Negative for dysuria, hematuria, nocturia and urgency.   Neurological: Positive for excessive daytime sleepiness, dizziness, light-headedness and loss of balance. Negative for headaches.   Psychiatric/Behavioral: Negative for depression. The patient has insomnia. The patient is not nervous/anxious.            Objective:     Vitals:    09/24/18 0952 09/24/18 0954 09/24/18 0955   BP: 116/61 112/61 111/65   BP Location: Right arm Left arm Left arm   Patient Position: Sitting Sitting Standing   Pulse: 77  82   Resp: 18     Temp: 97.8 °F (36.6 °C)     TempSrc: Temporal Artery      SpO2: 100%     Weight: 101 kg (222 lb 6.4 oz)     Height: 163 cm (64.17\")           Physical Exam   Constitutional: She is oriented to person, place, and time. She appears well-developed and well-nourished. She is active and cooperative. No distress. "   HENT:   Head: Normocephalic and atraumatic.   Mouth/Throat: Oropharynx is clear and moist.   Eyes: Pupils are equal, round, and reactive to light. Conjunctivae and EOM are normal.   Neck: Normal range of motion. Neck supple. No JVD present. No tracheal deviation present. No thyromegaly present.   Cardiovascular: Normal rate, regular rhythm, normal heart sounds and intact distal pulses.    Pulmonary/Chest: Effort normal and breath sounds normal.   Abdominal: Soft. Bowel sounds are normal. She exhibits no distension. There is no tenderness.   Musculoskeletal: Normal range of motion. She exhibits edema (trace pedal edema noted BLEs).   Neurological: She is alert and oriented to person, place, and time.   Skin: Skin is warm, dry and intact.   Psychiatric: She has a normal mood and affect. Her behavior is normal.   Nursing note and vitals reviewed.      Lab and Diagnostic Review:      Lab Results   Component Value Date    GLUCOSE 116 (H) 03/27/2018    CALCIUM 9.1 03/27/2018     03/27/2018    K 4.2 03/27/2018    CO2 28.0 03/27/2018     03/27/2018    BUN 25 (H) 03/27/2018    CREATININE 0.80 03/27/2018    EGFRIFNONA 74 03/27/2018    BCR 31.3 (H) 03/27/2018    ANIONGAP 5.0 03/27/2018         Assessment and Plan:         1. Chronic systolic congestive heart failure (CMS/HCC)  euvolemic  ECHO pending from today  Heart failure education today including signs and symptoms, the role of the heart failure center, daily weights, low sodium diet (less than 1500 mg per day), and daily physical activity. Reviewed HF Zones with patient and family.  Patient to continue current medications as previously ordered.     2. Essential hypertension  Well controlled  HTN Education provided today including signs and symptoms, medication management, daily blood pressure monitoring. Patient encouraged to call the [facility] and Val[facility] center with any abnormal readings.     3. Coronary artery disease involving native coronary artery of native  heart without angina pectoris  Without angina   Continue asa, coreg, crestor    4. Mixed hyperlipidemia  Statin therapy    F/u 8 weeks or sooner if necessary    It has been a pleasure to participate in the care of this patient.  Patient was instructed to call the [facility] with any questions, concerns, or worsening symptoms.        * Please note that portions of this note were completed with a voice recognition program. Efforts were made to edit the dictation but occasionally words are transcribed.    Plastic Surgery

## 2020-12-07 RX ORDER — EMPAGLIFLOZIN 25 MG/1
TABLET, FILM COATED ORAL
Qty: 30 TABLET | Refills: 5 | Status: SHIPPED | OUTPATIENT
Start: 2020-12-07 | End: 2022-01-05 | Stop reason: SDUPTHER

## 2020-12-21 ENCOUNTER — TELEPHONE (OUTPATIENT)
Dept: CARDIOLOGY | Facility: HOSPITAL | Age: 60
End: 2020-12-21

## 2020-12-21 DIAGNOSIS — I25.5 ISCHEMIC CARDIOMYOPATHY: Primary | ICD-10-CM

## 2020-12-21 DIAGNOSIS — I50.22 CHRONIC SYSTOLIC CONGESTIVE HEART FAILURE (HCC): ICD-10-CM

## 2020-12-21 NOTE — TELEPHONE ENCOUNTER
Patient called stating she saw her PCP today who advised her to stop taking spirolactone and the patient stated he was going to schedule for her to get an echo done. Patient stated she would not get it done in [place] and stated she wants your opinion on whether she should get it done or not. Patient stated her BP has been dropping when she does any activity.Patient stated her BP when shes moving around ranges from a systolic of low to mid 90s and a diastolic of low to mid 40s. Patient stated if you believe she needs the echo she would need you to write the order for it.

## 2021-01-03 DIAGNOSIS — R11.15 INTRACTABLE CYCLICAL VOMITING WITH NAUSEA: Primary | ICD-10-CM

## 2021-01-04 RX ORDER — AMITRIPTYLINE HYDROCHLORIDE 25 MG/1
TABLET, FILM COATED ORAL
Qty: 90 TABLET | Refills: 1 | Status: SHIPPED | OUTPATIENT
Start: 2021-01-04 | End: 2021-05-27

## 2021-01-19 RX ORDER — CARVEDILOL 6.25 MG/1
TABLET ORAL
Qty: 180 TABLET | Refills: 3 | Status: SHIPPED | OUTPATIENT
Start: 2021-01-19 | End: 2021-03-11 | Stop reason: SDUPTHER

## 2021-01-25 ENCOUNTER — TELEPHONE (OUTPATIENT)
Dept: CARDIOLOGY | Facility: HOSPITAL | Age: 61
End: 2021-01-25

## 2021-01-25 NOTE — TELEPHONE ENCOUNTER
Patient called and stated that she is cancelling her echo that was scheduled for tomorrow due to financial reasons. Stated she is getting Medicare in March and will reschedule after she get she gets the Medicare. She just wanted to let know why she isn't getting it done now.

## 2021-01-26 ENCOUNTER — APPOINTMENT (OUTPATIENT)
Dept: CARDIOLOGY | Facility: HOSPITAL | Age: 61
End: 2021-01-26

## 2021-02-26 ENCOUNTER — TELEPHONE (OUTPATIENT)
Dept: CARDIOLOGY | Facility: HOSPITAL | Age: 61
End: 2021-02-26

## 2021-02-26 NOTE — TELEPHONE ENCOUNTER
Patient needs to know if you will be able to help her get another discount card for her jardiance because she is switching insurance companies and will have problems affording it.

## 2021-03-11 RX ORDER — ROSUVASTATIN CALCIUM 20 MG/1
20 TABLET, COATED ORAL DAILY
Qty: 90 TABLET | Refills: 3 | Status: SHIPPED | OUTPATIENT
Start: 2021-03-11 | End: 2021-10-18 | Stop reason: SDUPTHER

## 2021-03-11 RX ORDER — CARVEDILOL 6.25 MG/1
12.5 TABLET ORAL 2 TIMES DAILY
Qty: 180 TABLET | Refills: 3 | Status: SHIPPED | OUTPATIENT
Start: 2021-03-11 | End: 2021-05-27

## 2021-03-15 PROCEDURE — 93295 DEV INTERROG REMOTE 1/2/MLT: CPT | Performed by: INTERNAL MEDICINE

## 2021-03-15 PROCEDURE — 93296 REM INTERROG EVL PM/IDS: CPT | Performed by: INTERNAL MEDICINE

## 2021-05-19 DIAGNOSIS — I25.9 ISCHEMIC HEART DISEASE: ICD-10-CM

## 2021-05-19 DIAGNOSIS — E78.5 DYSLIPIDEMIA: Primary | ICD-10-CM

## 2021-05-19 DIAGNOSIS — I25.5 ISCHEMIC CARDIOMYOPATHY: ICD-10-CM

## 2021-05-21 ENCOUNTER — HOSPITAL ENCOUNTER (OUTPATIENT)
Age: 61
End: 2021-05-21
Payer: MEDICARE

## 2021-05-21 DIAGNOSIS — M25.551: ICD-10-CM

## 2021-05-21 DIAGNOSIS — I25.5: ICD-10-CM

## 2021-05-21 DIAGNOSIS — I10: ICD-10-CM

## 2021-05-21 DIAGNOSIS — E78.5: ICD-10-CM

## 2021-05-21 DIAGNOSIS — E11.59: Primary | ICD-10-CM

## 2021-05-21 DIAGNOSIS — I25.9: ICD-10-CM

## 2021-05-21 LAB
ALBUMIN LEVEL: 4.5 G/DL (ref 3.5–5)
ALBUMIN/GLOB SERPL: 1.7 {RATIO} (ref 1.1–1.8)
ALP ISO SERPL-ACNC: 102 U/L (ref 38–126)
ALT SERPLBLD-CCNC: 16 U/L (ref 12–78)
ANION GAP SERPL CALC-SCNC: 15.1 MEQ/L (ref 5–15)
AST SERPL QL: 25 U/L (ref 14–36)
BILIRUBIN,TOTAL: 0.7 MG/DL (ref 0.2–1.3)
BUN SERPL-MCNC: 27 MG/DL (ref 7–17)
CALCIUM SPEC-MCNC: 9.6 MG/DL (ref 8.4–10.2)
CHLORIDE SPEC-SCNC: 106 MMOL/L (ref 98–107)
CHOLEST SPEC-SCNC: 177 MG/DL (ref 140–200)
CO2 SERPL-SCNC: 24 MMOL/L (ref 22–30)
CREAT BLD-SCNC: 0.9 MG/DL (ref 0.52–1.04)
ESTIMATED GLOMERULAR FILT RATE: 64 ML/MIN (ref 60–?)
GFR (AFRICAN AMERICAN): 77 ML/MIN (ref 60–?)
GLOBULIN SER CALC-MCNC: 2.7 G/DL (ref 1.3–3.2)
GLUCOSE: 193 MG/DL (ref 74–100)
HBA1C MFR BLD: 7.4 % (ref 4–6)
HDLC SERPL-MCNC: 28 MG/DL (ref 40–60)
POTASSIUM: 4.1 MMOL/L (ref 3.5–5.1)
PROT SERPL-MCNC: 7.2 G/DL (ref 6.3–8.2)
SODIUM SPEC-SCNC: 141 MMOL/L (ref 136–145)
TRIGLYCERIDES: 776 MG/DL (ref 30–150)
URATE SERPL-SCNC: 4.6 MG/DL (ref 2.5–6.2)

## 2021-05-21 PROCEDURE — 83036 HEMOGLOBIN GLYCOSYLATED A1C: CPT

## 2021-05-21 PROCEDURE — 84550 ASSAY OF BLOOD/URIC ACID: CPT

## 2021-05-21 PROCEDURE — 80061 LIPID PANEL: CPT

## 2021-05-21 PROCEDURE — 36415 COLL VENOUS BLD VENIPUNCTURE: CPT

## 2021-05-21 PROCEDURE — 80053 COMPREHEN METABOLIC PANEL: CPT

## 2021-05-27 ENCOUNTER — OFFICE VISIT (OUTPATIENT)
Dept: CARDIOLOGY | Facility: CLINIC | Age: 61
End: 2021-05-27

## 2021-05-27 VITALS
DIASTOLIC BLOOD PRESSURE: 72 MMHG | OXYGEN SATURATION: 97 % | SYSTOLIC BLOOD PRESSURE: 110 MMHG | HEIGHT: 64 IN | HEART RATE: 85 BPM | BODY MASS INDEX: 36.54 KG/M2 | WEIGHT: 214 LBS

## 2021-05-27 DIAGNOSIS — E78.2 MIXED HYPERLIPIDEMIA: ICD-10-CM

## 2021-05-27 DIAGNOSIS — I10 ESSENTIAL HYPERTENSION: ICD-10-CM

## 2021-05-27 DIAGNOSIS — Z95.810 BIVENTRICULAR ICD (IMPLANTABLE CARDIOVERTER-DEFIBRILLATOR) IN PLACE: ICD-10-CM

## 2021-05-27 DIAGNOSIS — I50.22 CHRONIC SYSTOLIC CONGESTIVE HEART FAILURE (HCC): ICD-10-CM

## 2021-05-27 DIAGNOSIS — I25.10 CORONARY ARTERY DISEASE INVOLVING NATIVE CORONARY ARTERY OF NATIVE HEART WITHOUT ANGINA PECTORIS: Primary | ICD-10-CM

## 2021-05-27 PROCEDURE — 93284 PRGRMG EVAL IMPLANTABLE DFB: CPT | Performed by: INTERNAL MEDICINE

## 2021-05-27 PROCEDURE — 99214 OFFICE O/P EST MOD 30 MIN: CPT | Performed by: INTERNAL MEDICINE

## 2021-05-27 RX ORDER — FENOFIBRATE 48 MG/1
48 TABLET, COATED ORAL DAILY
Qty: 30 TABLET | Refills: 11 | Status: SHIPPED | OUTPATIENT
Start: 2021-05-27 | End: 2021-10-07 | Stop reason: SDUPTHER

## 2021-05-27 RX ORDER — CARVEDILOL 12.5 MG/1
6.25 TABLET ORAL 2 TIMES DAILY WITH MEALS
COMMUNITY
End: 2021-06-21 | Stop reason: SINTOL

## 2021-05-27 NOTE — PROGRESS NOTES
[facility]  Office Progress Note  Lucia Stevens  1960      Visit Date: 05/27/21    PCP: Johan Marks MD  [address]  [place] [address]    IDENTIFICATION: A 60 y.o. female disabled from [place] and is a resident of [place], [place].     PROBLEM LIST:  1. CAD/ischemic cardiomyopathy  a. GXT myocardial perfusion study, 06/07/2005, revealing a moderate sized reversible defect in anteroseptal region with diminished myocardial thickening and hypokinesis. LVEF 58%.  b. Cardiac catheterization, June 2005, Dr. Talbot, due to moderate sized reversible anteroseptal defect; LVEF 38%: JADE stenting of PDA (2.5x13 mm Cypher).  c. JADE stenting of mid-RCA, 08/08/2011: 15 mm Promus JADE; LVEF 55% to 60%.  d. 11/30/17 echo: EF 30%, LA borderline dilated, moderate to severe MR, mild-to-moderate TR RVSP 41 mmHg  e. 11/30/2017 LHC: Chronic total occlusion of the RCA served by left-sided collaterals, nonobstructive disease of the left coronary system, cardiomyopathy with EF 25 to 30%.  f. LifeVest initiated 11/2017  g. 2/26/18 echo EF 25%, mild-mod MR  h. 3/28/18 BSc. BiVICD implantation Terra  i. 3/27/2018 echo EF 20%, moderate MR  j. 9/24/18 echo: EF 40%, multiple LV wall segments hypokinetic, grade 1 diastolic dysfunction, mild concentric LVH, no significant valvular abnormalities, no pulmonary hypertension  k. 4/20/2019 EF 50%, mild TR, RVSP 24  2. HTN  a. 10/10/2000 1824-hour BP monitor average 127/60  b. Intolerant to higher doses coreg  3. HLD  a. 11/30/17 lipids:   HDL 29 LDL 66  b. 6/20/18   HDL 23 LDL cannot be calculated  4. DM  a. 11/30/17 A1c 8.2  b. 3/27/18 A1c 7.3  5. Remote tobacco abuse:   a. Quit 2011  6. Hiatal hernia.  7. Sjogren’s syndrome.  8. Psoriasis.  9. Colon polyps status polypectomy.  10. Depression.  11. Surgical history:  a. Appendectomy.  b. Cholecystectomy.  c. Rectovaginal fistula repair.  d. Cervical  discectomy.       Chief Complaint   Patient presents with   • Coronary artery disease involving native coronary artery of        Allergies  Allergies   Allergen Reactions   • Sulfa Antibiotics Anaphylaxis       Current Medications    Current Outpatient Medications:   •  allopurinol (ZYLOPRIM) 100 MG tablet, Take 200 mg by mouth Daily., Disp: , Rfl:   •  aspirin 81 MG EC tablet, Take 81 mg by mouth daily., Disp: , Rfl:   •  carvedilol (COREG) 12.5 MG tablet, Take 12.5 mg by mouth 2 (Two) Times a Day With Meals., Disp: , Rfl:   •  chlordiazePOXIDE (LIBRIUM) 5 MG capsule, Take 5 mg by mouth Daily., Disp: , Rfl:   •  fluticasone (FLONASE) 50 MCG/ACT nasal spray, 2 sprays by Each Nare route Daily As Needed., Disp: , Rfl:   •  furosemide (LASIX) 40 MG tablet, Take 0.5 tablets by mouth 2 (Two) Times a Day. May take an additional tablet PRN for weight gain of 3lbs in one day or 5lbs in 1 wk (Patient taking differently: Take 20 mg by mouth Daily. May take an additional tablet PRN for weight gain of 3lbs in one day or 5lbs in 1 wk), Disp: , Rfl:   •  glimepiride (AMARYL) 2 MG tablet, Take 2 mg by mouth Daily., Disp: , Rfl: 1  •  Jardiance 25 MG tablet, Take 1 tablet by mouth once daily, Disp: 30 tablet, Rfl: 5  •  metFORMIN (GLUCOPHAGE) 500 MG tablet, Take 500 mg by mouth 2 (Two) Times a Day., Disp: , Rfl:   •  nitroglycerin (NITROSTAT) 0.4 MG SL tablet, DISSOLVE ONE TABLET UNDER THE TONGUE EVERY 5 MINUTES AS NEEDED FOR CHEST PAIN.  DO NOT EXCEED A TOTAL OF 3 DOSES IN 15 MINUTES, Disp: 25 tablet, Rfl: 3  •  nystatin (MYCOSTATIN) 305952 UNIT/GM ointment, Apply 1 application topically 3 (Three) Times a Day As Needed., Disp: , Rfl:   •  omeprazole (priLOSEC) 40 MG capsule, Take 40 mg by mouth Daily., Disp: , Rfl:   •  ondansetron (ZOFRAN) 4 MG tablet, Take 4 mg by mouth Every 8 (Eight) Hours As Needed for Nausea or Vomiting., Disp: , Rfl:   •  potassium chloride (MICRO-K) 10 MEQ CR capsule, Take 10 mEq by mouth Daily., Disp: ,  "Rfl:   •  rizatriptan (MAXALT) 10 MG tablet, Take 10 mg by mouth 1 (One) Time As Needed for Migraine. May repeat in 2 hours if needed, Disp: , Rfl:   •  rosuvastatin (CRESTOR) 20 MG tablet, Take 1 tablet by mouth Daily., Disp: 90 tablet, Rfl: 3  •  spironolactone (ALDACTONE) 25 MG tablet, Take 1 tablet by mouth Daily., Disp: , Rfl:       History of Present Illness   Lucia Stevens is a 60 y.o. year old female here for follow up.  Issues with triglycerides per last lab work.  States her A1c had improved to 7.4 from greater than 12 range.    OBJECTIVE:  Vitals:    05/27/21 1426   BP: 110/72   BP Location: Left arm   Patient Position: Sitting   Pulse: 85   SpO2: 97%   Weight: 97.1 kg (214 lb)   Height: 162.6 cm (64\")     Physical Exam  Constitutional:       Appearance: She is well-developed.   Neck:      Thyroid: No thyromegaly.      Vascular: No carotid bruit, hepatojugular reflux or JVD.      Trachea: No tracheal deviation.   Cardiovascular:      Rate and Rhythm: Normal rate and regular rhythm.      Chest Wall: PMI is not displaced.      Pulses: Normal pulses and intact distal pulses. No midsystolic click and no opening snap.           Radial pulses are 2+ on the right side and 2+ on the left side.        Dorsalis pedis pulses are 2+ on the right side and 2+ on the left side.        Posterior tibial pulses are 2+ on the right side and 2+ on the left side.      Heart sounds: S1 normal and S2 normal. Heart sounds not distant. Murmur heard.   No friction rub. No gallop.    Pulmonary:      Effort: Pulmonary effort is normal.      Breath sounds: Normal breath sounds. No wheezing or rales.   Abdominal:      General: Bowel sounds are normal.      Palpations: Abdomen is soft. There is no mass.      Tenderness: There is no abdominal tenderness. There is no guarding.   Musculoskeletal:      Cervical back: Normal range of motion and neck supple.         Diagnostic Data:  Procedures  Device check  Acceptable threshold and " impedances  100% LV pacing  <5sec mode switch  Beginning of life generator        ASSESSMENT:   Diagnosis Plan   1. Coronary artery disease involving native coronary artery of native heart without angina pectoris     2. Biventricular ICD (implantable cardioverter-defibrillator) in place     3. Essential hypertension     4. Mixed hyperlipidemia     5. Chronic systolic congestive heart failure (CMS/HCC)         PLAN:  CAD post remote revascularization no overt anginal equivalent continued medical management    CHF chronic systolic will titrate carvedilol 12.5 twice daily for better heart rate suppression.  She was intolerant to ACE/ARB with hypotension    Hypertension controlled Spironolactone carvedilol again with titration of carvedilol    Dyslipidemia on statin therapy we will add fenofibrate    Diabetes per PCP with ideal A1c less than 7 counseled regarding need for carbohydrate restriction        Johan Marks MD, thank you for referring Ms. Stevens for evaluation.  I have forwarded my electronically generated recommendations to you for review.  Please do not hesitate to call with any questions.      Virgilio Borden MD, FACC

## 2021-06-03 ENCOUNTER — TELEPHONE (OUTPATIENT)
Dept: CARDIOLOGY | Facility: CLINIC | Age: 61
End: 2021-06-03

## 2021-06-14 PROCEDURE — 93295 DEV INTERROG REMOTE 1/2/MLT: CPT | Performed by: INTERNAL MEDICINE

## 2021-06-14 PROCEDURE — 93296 REM INTERROG EVL PM/IDS: CPT | Performed by: INTERNAL MEDICINE

## 2021-06-16 ENCOUNTER — TELEPHONE (OUTPATIENT)
Dept: CARDIOLOGY | Facility: CLINIC | Age: 61
End: 2021-06-16

## 2021-06-16 NOTE — TELEPHONE ENCOUNTER
Pt called with side effect complaints from Carvedilol and per  she is decrease to half a pill for total of 3.125 mg BID. PT had already decreased dose to 6.25 mg BID. Pt will call in a couple of days with update

## 2021-06-18 ENCOUNTER — TELEPHONE (OUTPATIENT)
Dept: CARDIOLOGY | Facility: CLINIC | Age: 61
End: 2021-06-18

## 2021-06-18 NOTE — TELEPHONE ENCOUNTER
Pt called and LVM with an update after cutting dose in half per previous telephone note stating it had helped a little but not enough. Pt states she will continue doing this over the weekend and call back next week.

## 2021-06-21 ENCOUNTER — OFFICE VISIT (OUTPATIENT)
Dept: CARDIOLOGY | Facility: HOSPITAL | Age: 61
End: 2021-06-21

## 2021-06-21 VITALS
SYSTOLIC BLOOD PRESSURE: 121 MMHG | DIASTOLIC BLOOD PRESSURE: 58 MMHG | TEMPERATURE: 96.8 F | HEIGHT: 64 IN | BODY MASS INDEX: 36.37 KG/M2 | WEIGHT: 213 LBS | OXYGEN SATURATION: 98 % | HEART RATE: 82 BPM | RESPIRATION RATE: 20 BRPM

## 2021-06-21 DIAGNOSIS — I25.5 ISCHEMIC CARDIOMYOPATHY: Primary | ICD-10-CM

## 2021-06-21 DIAGNOSIS — I10 ESSENTIAL HYPERTENSION: ICD-10-CM

## 2021-06-21 DIAGNOSIS — I25.10 CORONARY ARTERY DISEASE INVOLVING NATIVE CORONARY ARTERY OF NATIVE HEART WITHOUT ANGINA PECTORIS: ICD-10-CM

## 2021-06-21 PROCEDURE — 99214 OFFICE O/P EST MOD 30 MIN: CPT | Performed by: NURSE PRACTITIONER

## 2021-06-21 RX ORDER — BISOPROLOL FUMARATE 5 MG/1
TABLET, FILM COATED ORAL
Qty: 30 TABLET | Refills: 1 | Status: SHIPPED | OUTPATIENT
Start: 2021-06-21 | End: 2021-10-07 | Stop reason: SDUPTHER

## 2021-06-21 NOTE — PATIENT INSTRUCTIONS
Stop coreg and begin bisoprolol 2.5 mg daily (1/2 tablet ) daily   Continue monitoring blood pressure  You will be called with the echo

## 2021-06-23 NOTE — PROGRESS NOTES
"Chief Complaint  Follow-up and Hypertension    Subjective    History of Present Illness {CC  Problem List  Visit  Diagnosis   Encounters  Notes  Medications  Labs  Result Review Imaging  Media :23}       History of Present Illness   60-year-old female presents the office today for ongoing evaluation of her ischemic cardiomyopathy and hypotension.  Patient saw Dr. Ellis recently and her carvedilol was cut down to 3.125 mg twice daily.  Patient reports compliance with that medication.  She reports however that she is still in experiencing hypotension with systolic readings in the 90s.  She reports that when she is hypotensive, she experiences significant fatigue as well as intermittent dizziness and lightheadedness.  Currently denies chest pain.  Notes dyspnea on exertion that improves with rest.  Patient reports that she has not had the energy for the past couple of months to do anything.  Objective     Vital Signs:   Vitals:    06/21/21 1541   BP: 121/58   BP Location: Left arm   Patient Position: Sitting   Cuff Size: Large Adult   Pulse: 82   Resp: 20   Temp: 96.8 °F (36 °C)   TempSrc: Temporal   SpO2: 98%   Weight: 96.6 kg (213 lb)   Height: 162.6 cm (64\")     Body mass index is 36.56 kg/m².  Physical Exam  Vitals and nursing note reviewed.   Constitutional:       Appearance: Normal appearance.   HENT:      Head: Normocephalic.   Eyes:      Pupils: Pupils are equal, round, and reactive to light.   Cardiovascular:      Rate and Rhythm: Normal rate and regular rhythm.      Pulses: Normal pulses.      Heart sounds: Normal heart sounds. No murmur heard.     Pulmonary:      Effort: Pulmonary effort is normal.      Breath sounds: Normal breath sounds.   Abdominal:      General: Bowel sounds are normal.      Palpations: Abdomen is soft.   Musculoskeletal:         General: Normal range of motion.      Cervical back: Normal range of motion.      Right lower leg: No edema.      Left lower leg: No edema.   Skin:   "   General: Skin is warm and dry.      Capillary Refill: Capillary refill takes less than 2 seconds.   Neurological:      Mental Status: She is alert and oriented to person, place, and time.   Psychiatric:         Mood and Affect: Mood normal.         Thought Content: Thought content normal.              Result Review  Data Reviewed:{ Labs  Result Review  Imaging  Med Tab  Media :23}   SCANNED - CARDIOLOGY (06/04/2021)               Assessment and Plan {CC Problem List  Visit Diagnosis  ROS  Review (Popup)  Health Maintenance  Quality  BestPractice  Medications  SmartSets  SnapShot Encounters  Media :23}   1. Ischemic cardiomyopathy    - Adult Transthoracic Echo Complete W/ Cont if Necessary Per Protocol; Future    2. Essential hypertension  Discontinue  carvedilol due to significant fatigue and hypotension  - Adult Transthoracic Echo Complete W/ Cont if Necessary Per Protocol; Future  Begin bisoprolol 2.5 mg daily  Monitor blood pressure closely  3. Coronary artery disease involving native coronary artery of native heart without angina pectoris  Currently without angina          Follow Up {Instructions Charge Capture  Follow-up Communications :23}   No follow-ups on file.    Patient was given instructions and counseling regarding her condition or for health maintenance advice. Please see specific information pulled into the AVS if appropriate.  Patient was instructed to call the [facility] with any questions, concerns, or worsening symptoms.    *Please note that portions of this note were completed with a voice recognition program. Efforts were made to edit the dictations, but occasionally words are mistranscribed.

## 2021-06-25 ENCOUNTER — TELEPHONE (OUTPATIENT)
Dept: CARDIOLOGY | Facility: HOSPITAL | Age: 61
End: 2021-06-25

## 2021-06-25 DIAGNOSIS — R06.09 DOE (DYSPNEA ON EXERTION): ICD-10-CM

## 2021-06-25 DIAGNOSIS — I25.119 CORONARY ARTERY DISEASE INVOLVING NATIVE CORONARY ARTERY OF NATIVE HEART WITH ANGINA PECTORIS (HCC): ICD-10-CM

## 2021-06-25 RX ORDER — NITROGLYCERIN 0.4 MG/1
TABLET SUBLINGUAL
Qty: 100 TABLET | Refills: 11 | Status: SHIPPED | OUTPATIENT
Start: 2021-06-25

## 2021-06-25 NOTE — TELEPHONE ENCOUNTER
Pt called stating that the medication has not brought her blood pressure up and over all not feeling. Having some chest discomfort. Would like to know what she should do because of the weekend coming up.    bp currently 103/55. Hold BB over the weekend. May use prn ntg. Hold lasix over the weekend as well.

## 2021-06-30 ENCOUNTER — HOSPITAL ENCOUNTER (OUTPATIENT)
Dept: CARDIOLOGY | Facility: HOSPITAL | Age: 61
Discharge: HOME | End: 2021-06-30
Admitting: NURSE PRACTITIONER

## 2021-06-30 VITALS — BODY MASS INDEX: 36.37 KG/M2 | WEIGHT: 213 LBS | HEIGHT: 64 IN

## 2021-06-30 DIAGNOSIS — I25.5 ISCHEMIC CARDIOMYOPATHY: ICD-10-CM

## 2021-06-30 DIAGNOSIS — I50.22 CHRONIC SYSTOLIC CONGESTIVE HEART FAILURE (HCC): ICD-10-CM

## 2021-06-30 LAB
BH CV ECHO MEAS - AO MAX PG (FULL): 6.4 MMHG
BH CV ECHO MEAS - AO MAX PG: 9 MMHG
BH CV ECHO MEAS - AO MEAN PG (FULL): 3 MMHG
BH CV ECHO MEAS - AO MEAN PG: 4 MMHG
BH CV ECHO MEAS - AO ROOT AREA (BSA CORRECTED): 1.7
BH CV ECHO MEAS - AO ROOT AREA: 9.6 CM^2
BH CV ECHO MEAS - AO ROOT DIAM: 3.5 CM
BH CV ECHO MEAS - AO V2 MAX: 146 CM/SEC
BH CV ECHO MEAS - AO V2 MEAN: 91.3 CM/SEC
BH CV ECHO MEAS - AO V2 VTI: 26.4 CM
BH CV ECHO MEAS - AVA(I,A): 2.3 CM^2
BH CV ECHO MEAS - AVA(I,D): 2.3 CM^2
BH CV ECHO MEAS - AVA(V,A): 1.9 CM^2
BH CV ECHO MEAS - AVA(V,D): 1.9 CM^2
BH CV ECHO MEAS - BSA(HAYCOCK): 2.2 M^2
BH CV ECHO MEAS - BSA: 2 M^2
BH CV ECHO MEAS - BZI_BMI: 37.2 KILOGRAMS/M^2
BH CV ECHO MEAS - BZI_METRIC_HEIGHT: 162.6 CM
BH CV ECHO MEAS - BZI_METRIC_WEIGHT: 98.4 KG
BH CV ECHO MEAS - EDV(CUBED): 149.7 ML
BH CV ECHO MEAS - EDV(MOD-SP2): 117 ML
BH CV ECHO MEAS - EDV(MOD-SP4): 126 ML
BH CV ECHO MEAS - EDV(TEICH): 135.9 ML
BH CV ECHO MEAS - EF(CUBED): 58.2 %
BH CV ECHO MEAS - EF(MOD-BP): 45 %
BH CV ECHO MEAS - EF(MOD-SP2): 53.8 %
BH CV ECHO MEAS - EF(MOD-SP4): 43.7 %
BH CV ECHO MEAS - EF(TEICH): 49.4 %
BH CV ECHO MEAS - ESV(CUBED): 62.6 ML
BH CV ECHO MEAS - ESV(MOD-SP2): 54 ML
BH CV ECHO MEAS - ESV(MOD-SP4): 71 ML
BH CV ECHO MEAS - ESV(TEICH): 68.8 ML
BH CV ECHO MEAS - FS: 25.2 %
BH CV ECHO MEAS - IVS/LVPW: 0.9
BH CV ECHO MEAS - IVSD: 0.91 CM
BH CV ECHO MEAS - LA DIMENSION: 3.7 CM
BH CV ECHO MEAS - LA/AO: 1.1
BH CV ECHO MEAS - LAD MAJOR: 5.2 CM
BH CV ECHO MEAS - LAT PEAK E' VEL: 4.2 CM/SEC
BH CV ECHO MEAS - LATERAL E/E' RATIO: 11
BH CV ECHO MEAS - LV DIASTOLIC VOL/BSA (35-75): 62.2 ML/M^2
BH CV ECHO MEAS - LV MASS(C)D: 190.3 GRAMS
BH CV ECHO MEAS - LV MASS(C)DI: 94 GRAMS/M^2
BH CV ECHO MEAS - LV MAX PG: 2.6 MMHG
BH CV ECHO MEAS - LV MEAN PG: 1 MMHG
BH CV ECHO MEAS - LV SYSTOLIC VOL/BSA (12-30): 35.1 ML/M^2
BH CV ECHO MEAS - LV V1 MAX: 81.2 CM/SEC
BH CV ECHO MEAS - LV V1 MEAN: 49.9 CM/SEC
BH CV ECHO MEAS - LV V1 VTI: 17.5 CM
BH CV ECHO MEAS - LVIDD: 5.3 CM
BH CV ECHO MEAS - LVIDS: 4 CM
BH CV ECHO MEAS - LVLD AP2: 7.9 CM
BH CV ECHO MEAS - LVLD AP4: 7.2 CM
BH CV ECHO MEAS - LVLS AP2: 6.4 CM
BH CV ECHO MEAS - LVLS AP4: 6.8 CM
BH CV ECHO MEAS - LVOT AREA (M): 3.5 CM^2
BH CV ECHO MEAS - LVOT AREA: 3.5 CM^2
BH CV ECHO MEAS - LVOT DIAM: 2.1 CM
BH CV ECHO MEAS - LVPWD: 1 CM
BH CV ECHO MEAS - MED PEAK E' VEL: 5.3 CM/SEC
BH CV ECHO MEAS - MEDIAL E/E' RATIO: 8.8
BH CV ECHO MEAS - MV A MAX VEL: 78 CM/SEC
BH CV ECHO MEAS - MV DEC TIME: 0.16 SEC
BH CV ECHO MEAS - MV E MAX VEL: 46.4 CM/SEC
BH CV ECHO MEAS - MV E/A: 0.59
BH CV ECHO MEAS - PA ACC SLOPE: 761 CM/SEC^2
BH CV ECHO MEAS - PA ACC TIME: 0.1 SEC
BH CV ECHO MEAS - PA MAX PG: 2.8 MMHG
BH CV ECHO MEAS - PA PR(ACCEL): 34.5 MMHG
BH CV ECHO MEAS - PA V2 MAX: 84.2 CM/SEC
BH CV ECHO MEAS - RAP SYSTOLE: 3 MMHG
BH CV ECHO MEAS - RVSP: 23 MMHG
BH CV ECHO MEAS - SI(AO): 125.4 ML/M^2
BH CV ECHO MEAS - SI(CUBED): 43 ML/M^2
BH CV ECHO MEAS - SI(LVOT): 29.9 ML/M^2
BH CV ECHO MEAS - SI(MOD-SP2): 31.1 ML/M^2
BH CV ECHO MEAS - SI(MOD-SP4): 27.2 ML/M^2
BH CV ECHO MEAS - SI(TEICH): 33.2 ML/M^2
BH CV ECHO MEAS - SV(AO): 254 ML
BH CV ECHO MEAS - SV(CUBED): 87.2 ML
BH CV ECHO MEAS - SV(LVOT): 60.6 ML
BH CV ECHO MEAS - SV(MOD-SP2): 63 ML
BH CV ECHO MEAS - SV(MOD-SP4): 55 ML
BH CV ECHO MEAS - SV(TEICH): 67.2 ML
BH CV ECHO MEAS - TR MAX PG: 20 MMHG
BH CV ECHO MEAS - TR MAX VEL: 222.3 CM/SEC
BH CV ECHO MEASUREMENTS AVERAGE E/E' RATIO: 9.77
LEFT ATRIUM VOLUME INDEX: 18.3 ML/M^2
LEFT ATRIUM VOLUME: 37 ML
LV EF 2D ECHO EST: 43 %

## 2021-06-30 PROCEDURE — 93306 TTE W/DOPPLER COMPLETE: CPT

## 2021-06-30 PROCEDURE — 93306 TTE W/DOPPLER COMPLETE: CPT | Performed by: INTERNAL MEDICINE

## 2021-07-07 ENCOUNTER — TELEPHONE (OUTPATIENT)
Dept: CARDIOLOGY | Facility: HOSPITAL | Age: 61
End: 2021-07-07

## 2021-07-07 RX ORDER — MIDODRINE HYDROCHLORIDE 5 MG/1
5 TABLET ORAL 2 TIMES DAILY
Qty: 60 TABLET | Refills: 3 | Status: SHIPPED | OUTPATIENT
Start: 2021-07-07 | End: 2021-07-15 | Stop reason: DRUGHIGH

## 2021-07-07 NOTE — TELEPHONE ENCOUNTER
Reviewed echo with patient. Last EF 50% now 41-45. Patient had been holding her guideline directed medical therapy due to hypotension. Patient reports off bisoprolol blood pressures are still 90s over 50s. Reports significant fatigue.Will begin midodrine 5 mg bid. Monitor bp closely

## 2021-07-14 ENCOUNTER — TELEPHONE (OUTPATIENT)
Dept: CARDIOLOGY | Facility: HOSPITAL | Age: 61
End: 2021-07-14

## 2021-07-15 ENCOUNTER — TELEPHONE (OUTPATIENT)
Dept: CARDIOLOGY | Facility: HOSPITAL | Age: 61
End: 2021-07-15

## 2021-07-15 RX ORDER — MIDODRINE HYDROCHLORIDE 5 MG/1
10 TABLET ORAL 2 TIMES DAILY
Qty: 60 TABLET | Refills: 3 | Status: SHIPPED | OUTPATIENT
Start: 2021-07-15 | End: 2021-08-23 | Stop reason: SDUPTHER

## 2021-07-15 NOTE — TELEPHONE ENCOUNTER
Patient called and states that her blood pressure the last couple of days has averaged around 110/65 first thing in the morning and lowers with any activity. She states that her pulse rate has been elevated when her bp is low. Yesterday it was 105. She was driving and did not have her bp log with her but said she could give it to provider when she calls or call back with the numbers once she is home.

## 2021-07-15 NOTE — TELEPHONE ENCOUNTER
7/7 0900 100/57 95        1300  101/59 94        2400 101/55 86  7/8 0800 95/57 95       1500 110/61 98        2400  100/58 101  7/9 0900 109/57 95        1600 110/50 89         2300 101/58 105  7/10 0900 98/56 90          1500 104/49 89          2300 103/56 85  7/11 0900 110/61 81          1600 105/57 86          2300 107/52 89  7/12 0900 118/56 83           1500 117/62 79           2200 122/63 82  7/13 0900 122/66 90          1600 102/59 77          2300 116/58 89  7/14 0900 99/59 89          1700 105/63 109          2400 110/60 99  7/15 0800 110/65 89

## 2021-07-19 NOTE — TELEPHONE ENCOUNTER
bp slightly Improved on midodrine 5 mg bid. Will increase to 10 mg bid.continue monitoring bp. Patient to receive a follow up call from [facility] next week to reassess bps.

## 2021-07-23 ENCOUNTER — TELEPHONE (OUTPATIENT)
Dept: CARDIOLOGY | Facility: HOSPITAL | Age: 61
End: 2021-07-23

## 2021-07-23 NOTE — TELEPHONE ENCOUNTER
Spoke with patient who notes that bp has improved except for this am and it was 90/68. Patient notes ongoing dyspnea.  Patient to resume bisoprolol 2. 5mg daily due to hrs of 100s. Monitor hr and bp closely.

## 2021-08-05 ENCOUNTER — TELEPHONE (OUTPATIENT)
Dept: CARDIOLOGY | Facility: HOSPITAL | Age: 61
End: 2021-08-05

## 2021-08-05 DIAGNOSIS — R53.83 OTHER FATIGUE: Primary | ICD-10-CM

## 2021-08-06 ENCOUNTER — HOSPITAL ENCOUNTER (OUTPATIENT)
Age: 61
End: 2021-08-06
Payer: MEDICARE

## 2021-08-06 DIAGNOSIS — R53.83: ICD-10-CM

## 2021-08-06 DIAGNOSIS — I25.5: Primary | ICD-10-CM

## 2021-08-06 DIAGNOSIS — E78.5: ICD-10-CM

## 2021-08-06 LAB
ALBUMIN LEVEL: 4.4 G/DL (ref 3.5–5)
ALBUMIN/GLOB SERPL: 1.6 {RATIO} (ref 1.1–1.8)
ALP ISO SERPL-ACNC: 94 U/L (ref 38–126)
ALT SERPLBLD-CCNC: 14 U/L (ref 12–78)
ANION GAP SERPL CALC-SCNC: 17.5 MEQ/L (ref 5–15)
AST SERPL QL: 27 U/L (ref 14–36)
BILIRUBIN,TOTAL: 0.7 MG/DL (ref 0.2–1.3)
BUN SERPL-MCNC: 19 MG/DL (ref 7–17)
CALCIUM SPEC-MCNC: 9.7 MG/DL (ref 8.4–10.2)
CHLORIDE SPEC-SCNC: 106 MMOL/L (ref 98–107)
CHOLEST SPEC-SCNC: 143 MG/DL (ref 140–200)
CO2 SERPL-SCNC: 25 MMOL/L (ref 22–30)
CREAT BLD-SCNC: 0.8 MG/DL (ref 0.52–1.04)
ESTIMATED GLOMERULAR FILT RATE: 73 ML/MIN (ref 60–?)
GFR (AFRICAN AMERICAN): 88 ML/MIN (ref 60–?)
GLOBULIN SER CALC-MCNC: 2.7 G/DL (ref 1.3–3.2)
GLUCOSE: 150 MG/DL (ref 74–100)
HCT VFR BLD CALC: 40.8 % (ref 37–47)
HDLC SERPL-MCNC: 24 MG/DL (ref 40–60)
HGB BLD-MCNC: 13.4 G/DL (ref 12.2–16.2)
MCHC RBC-ENTMCNC: 32.9 G/DL (ref 31.8–35.4)
MCV RBC: 89.3 FL (ref 81–99)
MEAN CORPUSCULAR HEMOGLOBIN: 29.4 PG (ref 27–31.2)
PLATELET # BLD: 237 K/MM3 (ref 142–424)
POTASSIUM: 4.5 MMOL/L (ref 3.5–5.1)
PROT SERPL-MCNC: 7.1 G/DL (ref 6.3–8.2)
RBC # BLD AUTO: 4.57 M/MM3 (ref 4.2–5.4)
SODIUM SPEC-SCNC: 144 MMOL/L (ref 136–145)
TRIGLYCERIDES: 483 MG/DL (ref 30–150)
TSH SERPL-ACNC: 3.65 UIU/ML (ref 0.47–4.68)
WBC # BLD AUTO: 8.4 K/MM3 (ref 4.8–10.8)

## 2021-08-06 PROCEDURE — 84443 ASSAY THYROID STIM HORMONE: CPT

## 2021-08-06 PROCEDURE — 85025 COMPLETE CBC W/AUTO DIFF WBC: CPT

## 2021-08-06 PROCEDURE — 80053 COMPREHEN METABOLIC PANEL: CPT

## 2021-08-06 PROCEDURE — 80061 LIPID PANEL: CPT

## 2021-08-06 NOTE — TELEPHONE ENCOUNTER
Patient notes that bp is 115/60. Notes significant fatigue. Will order tsh, CBC  Continue current plan of care. Labs to be drawn at [facility]

## 2021-08-06 NOTE — TELEPHONE ENCOUNTER
Patient attempted to return your call and would like to speak to you at your earliest convenience.

## 2021-08-16 ENCOUNTER — TELEPHONE (OUTPATIENT)
Dept: CARDIOLOGY | Facility: HOSPITAL | Age: 61
End: 2021-08-16

## 2021-08-16 NOTE — TELEPHONE ENCOUNTER
Patient left message on her voicemail stating that she had her labs drawn at [facility] and was wanting to get the results.

## 2021-08-23 ENCOUNTER — TELEPHONE (OUTPATIENT)
Dept: CARDIOLOGY | Facility: HOSPITAL | Age: 61
End: 2021-08-23

## 2021-08-23 RX ORDER — MIDODRINE HYDROCHLORIDE 5 MG/1
10 TABLET ORAL 2 TIMES DAILY
Qty: 120 TABLET | Refills: 3 | Status: SHIPPED | OUTPATIENT
Start: 2021-08-23 | End: 2021-10-07 | Stop reason: SDUPTHER

## 2021-08-23 NOTE — TELEPHONE ENCOUNTER
Pt needs the midodrine sent enough for 30 days. She only got enough for 15 days. Needs to sent as 120 tablets instead of 60 tablets. She also has a question about the bisoprolol. Wants to know if she needs to continue the current therapy. Blood pressure systolic number has been stable but concerns about the diastolic number not coming up. Prescription needs to be sent to [facility] in [place].

## 2021-08-24 NOTE — TELEPHONE ENCOUNTER
Returned patient's call. Patient notes that her blood pressure is doing well. Patient notes that she is feeling better. Continue current plan of care.

## 2021-09-09 ENCOUNTER — DOCUMENTATION (OUTPATIENT)
Dept: CARDIOLOGY | Facility: HOSPITAL | Age: 61
End: 2021-09-09

## 2021-09-09 NOTE — PROGRESS NOTES
[facility] 8/6/2021: WBC 8.4, RBC 4.57, hemoglobin 13.4, hematocrit 40.8, platelets 237, sodium 144, potassium 4.5, chloride 106, carbon dioxide 25, BUN 19, creatinine 0.8, estimated GFR 73, glucose 150, calcium 9.7, total bilirubin 0.7, AST 27, ALT 14, total protein 7.1, albumin 4.4, triglycerides 483, total cholesterol 143, LDL 48, HDL 24, alkaline phosphatase 94, TSH 3.65

## 2021-09-13 PROCEDURE — 93295 DEV INTERROG REMOTE 1/2/MLT: CPT | Performed by: INTERNAL MEDICINE

## 2021-09-13 PROCEDURE — 93296 REM INTERROG EVL PM/IDS: CPT | Performed by: INTERNAL MEDICINE

## 2021-09-28 ENCOUNTER — HOSPITAL ENCOUNTER (OUTPATIENT)
Age: 61
End: 2021-09-28
Payer: MEDICARE

## 2021-09-28 DIAGNOSIS — R05: ICD-10-CM

## 2021-09-28 DIAGNOSIS — R06.09: ICD-10-CM

## 2021-09-28 DIAGNOSIS — U07.1: ICD-10-CM

## 2021-09-28 LAB
HCT VFR BLD CALC: 42.2 % (ref 37–47)
HGB BLD-MCNC: 13.7 G/DL (ref 12.2–16.2)
MCHC RBC-ENTMCNC: 32.4 G/DL (ref 31.8–35.4)
MCV RBC: 94 FL (ref 81–99)
MEAN CORPUSCULAR HEMOGLOBIN: 30.4 PG (ref 27–31.2)
PLATELET # BLD: 261 K/MM3 (ref 142–424)
RBC # BLD AUTO: 4.49 M/MM3 (ref 4.2–5.4)
WBC # BLD AUTO: 10.4 K/MM3 (ref 4.8–10.8)

## 2021-09-28 PROCEDURE — U0005 INFEC AGEN DETEC AMPLI PROBE: HCPCS

## 2021-09-28 PROCEDURE — 87632 RESP VIRUS 6-11 TARGETS: CPT

## 2021-09-28 PROCEDURE — 87581 M.PNEUMON DNA AMP PROBE: CPT

## 2021-09-28 PROCEDURE — 36415 COLL VENOUS BLD VENIPUNCTURE: CPT

## 2021-09-28 PROCEDURE — 71045 X-RAY EXAM CHEST 1 VIEW: CPT

## 2021-09-28 PROCEDURE — C9803 HOPD COVID-19 SPEC COLLECT: HCPCS

## 2021-09-28 PROCEDURE — 87798 DETECT AGENT NOS DNA AMP: CPT

## 2021-09-28 PROCEDURE — U0003 INFECTIOUS AGENT DETECTION BY NUCLEIC ACID (DNA OR RNA); SEVERE ACUTE RESPIRATORY SYNDROME CORONAVIRUS 2 (SARS-COV-2) (CORONAVIRUS DISEASE [COVID-19]), AMPLIFIED PROBE TECHNIQUE, MAKING USE OF HIGH THROUGHPUT TECHNOLOGIES AS DESCRIBED BY CMS-2020-01-R: HCPCS

## 2021-09-28 PROCEDURE — 85025 COMPLETE CBC W/AUTO DIFF WBC: CPT

## 2021-10-07 ENCOUNTER — TELEMEDICINE (OUTPATIENT)
Dept: CARDIOLOGY | Facility: HOSPITAL | Age: 61
End: 2021-10-07

## 2021-10-07 ENCOUNTER — HOSPITAL ENCOUNTER (OUTPATIENT)
Age: 61
End: 2021-10-07
Payer: MEDICARE

## 2021-10-07 VITALS
HEIGHT: 64 IN | WEIGHT: 206 LBS | BODY MASS INDEX: 35.17 KG/M2 | SYSTOLIC BLOOD PRESSURE: 141 MMHG | HEART RATE: 71 BPM | DIASTOLIC BLOOD PRESSURE: 73 MMHG

## 2021-10-07 DIAGNOSIS — R06.09 DOE (DYSPNEA ON EXERTION): ICD-10-CM

## 2021-10-07 DIAGNOSIS — Z20.822: Primary | ICD-10-CM

## 2021-10-07 DIAGNOSIS — I10 ESSENTIAL HYPERTENSION: ICD-10-CM

## 2021-10-07 DIAGNOSIS — I50.22 CHRONIC SYSTOLIC CONGESTIVE HEART FAILURE (HCC): Primary | ICD-10-CM

## 2021-10-07 DIAGNOSIS — U07.1 COVID-19: ICD-10-CM

## 2021-10-07 LAB
CELLS COUNTED: 100
HCT VFR BLD CALC: 47.2 % (ref 37–47)
HGB BLD-MCNC: 15.4 G/DL (ref 12.2–16.2)
MANUAL DIFFERENTIAL: (no result)
MCHC RBC-ENTMCNC: 32.7 G/DL (ref 31.8–35.4)
MCV RBC: 90.5 FL (ref 81–99)
MEAN CORPUSCULAR HEMOGLOBIN: 29.6 PG (ref 27–31.2)
PLATELET # BLD: 360 K/MM3 (ref 142–424)
RBC # BLD AUTO: 5.21 M/MM3 (ref 4.2–5.4)
WBC # BLD AUTO: 21.2 K/MM3 (ref 4.8–10.8)

## 2021-10-07 PROCEDURE — 71045 X-RAY EXAM CHEST 1 VIEW: CPT

## 2021-10-07 PROCEDURE — 99442 PR PHYS/QHP TELEPHONE EVALUATION 11-20 MIN: CPT | Performed by: NURSE PRACTITIONER

## 2021-10-07 PROCEDURE — 85007 BL SMEAR W/DIFF WBC COUNT: CPT

## 2021-10-07 PROCEDURE — 36415 COLL VENOUS BLD VENIPUNCTURE: CPT

## 2021-10-07 PROCEDURE — 85025 COMPLETE CBC W/AUTO DIFF WBC: CPT

## 2021-10-07 RX ORDER — FAMOTIDINE 20 MG/1
20 TABLET, FILM COATED ORAL
COMMUNITY
Start: 2021-09-29 | End: 2022-03-10 | Stop reason: ALTCHOICE

## 2021-10-07 RX ORDER — DEXAMETHASONE 6 MG/1
6 TABLET ORAL DAILY
COMMUNITY
Start: 2021-09-29 | End: 2021-11-04

## 2021-10-07 RX ORDER — DULAGLUTIDE 0.75 MG/.5ML
1.5 INJECTION, SOLUTION SUBCUTANEOUS
COMMUNITY
End: 2022-10-05

## 2021-10-07 RX ORDER — MELATONIN
1000 DAILY
COMMUNITY
End: 2022-03-10

## 2021-10-07 RX ORDER — GENTAMICIN SULFATE 3 MG/ML
SOLUTION/ DROPS OPHTHALMIC
COMMUNITY
Start: 2021-10-02 | End: 2022-03-10

## 2021-10-07 RX ORDER — FENOFIBRATE 48 MG/1
48 TABLET, COATED ORAL DAILY
Qty: 90 TABLET | Refills: 3 | Status: SHIPPED | OUTPATIENT
Start: 2021-10-07 | End: 2022-10-05

## 2021-10-07 RX ORDER — IPRATROPIUM/ALBUTEROL SULFATE 20-100 MCG
MIST INHALER (GRAM) INHALATION
COMMUNITY
Start: 2021-09-29 | End: 2022-03-10

## 2021-10-07 RX ORDER — MIDODRINE HYDROCHLORIDE 5 MG/1
5 TABLET ORAL 2 TIMES DAILY
Qty: 180 TABLET | Refills: 3 | Status: SHIPPED | OUTPATIENT
Start: 2021-10-07 | End: 2021-10-21 | Stop reason: SDUPTHER

## 2021-10-07 RX ORDER — ZINC SULFATE 50(220)MG
1 CAPSULE ORAL DAILY
COMMUNITY
Start: 2021-09-29 | End: 2022-03-10

## 2021-10-07 RX ORDER — ASCORBIC ACID 500 MG
500 TABLET ORAL DAILY
COMMUNITY
End: 2022-03-10

## 2021-10-07 RX ORDER — BISOPROLOL FUMARATE 5 MG/1
TABLET, FILM COATED ORAL
Qty: 90 TABLET | Refills: 3 | Status: SHIPPED | OUTPATIENT
Start: 2021-10-07 | End: 2022-05-16 | Stop reason: SDUPTHER

## 2021-10-07 NOTE — PROGRESS NOTES
"Chief Complaint  Congestive Heart Failure and Follow-up    Subjective    History of Present Illness {CC  Problem List  Visit  Diagnosis   Encounters  Notes  Medications  Labs  Result Review Imaging  Media :23}       History of Present Illness   61-year-old female with telephone visit today for ongoing evaluation of her chronic systolic heart failure.  Patient diagnosed with Covid one week ago.  Since her diagnosis of Covid, patient has reported worsening dyspnea, significant fatigue and generalized weakness.  Also notes that her voice has changed.  Patient reports her blood sugars are very elevated due to her current steroid use.  Patient did receive monoclonal antibodies last week after her positive diagnosis.  Patient is fully vaccinated.  She currently denies chest pain, orthopnea, syncope, PND or pedal edema.  She does report that her blood pressure has been elevated more than normal since her diagnosis of Covid.  Objective     Vital Signs:   Vitals:    10/07/21 0917   BP: 141/73   BP Location: Left arm   Patient Position: Sitting   Cuff Size: Adult   Pulse: 71   Weight: 93.4 kg (206 lb)   Height: 162.6 cm (64\")     Body mass index is 35.36 kg/m².  Physical Exam  Vitals and nursing note reviewed.   Constitutional:       Appearance: Normal appearance.   Pulmonary:      Effort: Pulmonary effort is normal.   Neurological:      Mental Status: She is alert and oriented to person, place, and time.   Psychiatric:         Mood and Affect: Mood normal.         Behavior: Behavior normal.         Thought Content: Thought content normal.              Result Review  Data Reviewed:{ Labs  Result Review  Imaging  Med Tab  Media :23}     [facility] 8/6/2021: WBC 8.4, RBC 4.57, hemoglobin 13.4, hematocrit 40.8, platelets 237, sodium 144, potassium 4.5, chloride 106, carbon dioxide 25, BUN 19, creatinine 0.8, estimated GFR 73, glucose 150, calcium 9.7, total bilirubin 0.7, AST 27, ALT 14, total " protein 7.1, albumin 4.4, triglycerides 483, total cholesterol 143, LDL 48, HDL 24, alkaline phosphatase 94, TSH 3.65    ·   echo: Estimated left ventricular EF = 43% Left ventricular ejection fraction appears to be 41 - 45%. Left ventricular systolic function is mildly decreased.  · Left ventricular diastolic function is consistent with (grade I) impaired relaxation.  · Estimated right ventricular systolic pressure from tricuspid regurgitation is normal (<35 mmHg). Calculated right ventricular systolic pressure from tricuspid regurgitation is 23 mmHg.          Assessment and Plan {CC Problem List  Visit Diagnosis  ROS  Review (Popup)  Health Maintenance  Quality  BestPractice  Medications  SmartSets  SnapShot Encounters  Media :23}   1. Chronic systolic congestive heart failure (HCC)  euvolemic  Dyspnea related to covid 19 virus     2. NAYLOR (dyspnea on exertion)  Encouraged patient to continue steroids and use albuterol prn     3. COVID-19  Patient is fully vaccinated and did receive monoclonal antibody infusion last week     4. Essential hypertension  Previously hypotensive with blood pressures normalizing since covid diagnosis. Decrease midodrine to 5 mg bid.   Monitor bp closely.    Telephone time 12 minutes        Follow Up {Instructions Charge Capture  Follow-up Communications :23}   Return in about 2 weeks (around 10/21/2021) for Telemedicine visit, HF.    Patient was given instructions and counseling regarding her condition or for health maintenance advice. Please see specific information pulled into the AVS if appropriate.  Patient was instructed to call the [facility] with any questions, concerns, or worsening symptoms.    *Please note that portions of this note were completed with a voice recognition program. Efforts were made to edit the dictations, but occasionally words are mistranscribed.

## 2021-10-13 ENCOUNTER — HOSPITAL ENCOUNTER (OUTPATIENT)
Age: 61
End: 2021-10-13
Payer: MEDICARE

## 2021-10-13 DIAGNOSIS — Z20.822: Primary | ICD-10-CM

## 2021-10-13 LAB
ALBUMIN LEVEL: 4.2 G/DL (ref 3.5–5)
ALBUMIN/GLOB SERPL: 1.6 {RATIO} (ref 1.1–1.8)
ALP ISO SERPL-ACNC: 101 U/L (ref 38–126)
ALT SERPLBLD-CCNC: 27 U/L (ref 12–78)
ANION GAP SERPL CALC-SCNC: 14.5 MEQ/L (ref 5–15)
AST SERPL QL: 28 U/L (ref 14–36)
BILIRUBIN,TOTAL: 0.7 MG/DL (ref 0.2–1.3)
BUN SERPL-MCNC: 21 MG/DL (ref 7–17)
CALCIUM SPEC-MCNC: 9.8 MG/DL (ref 8.4–10.2)
CHLORIDE SPEC-SCNC: 102 MMOL/L (ref 98–107)
CO2 SERPL-SCNC: 25 MMOL/L (ref 22–30)
CREAT BLD-SCNC: 0.8 MG/DL (ref 0.52–1.04)
ESTIMATED GLOMERULAR FILT RATE: 73 ML/MIN (ref 60–?)
GFR (AFRICAN AMERICAN): 88 ML/MIN (ref 60–?)
GLOBULIN SER CALC-MCNC: 2.7 G/DL (ref 1.3–3.2)
GLUCOSE: 322 MG/DL (ref 74–100)
HCT VFR BLD CALC: 46.7 % (ref 37–47)
HGB BLD-MCNC: 15 G/DL (ref 12.2–16.2)
MCHC RBC-ENTMCNC: 32.1 G/DL (ref 31.8–35.4)
MCV RBC: 94.8 FL (ref 81–99)
MEAN CORPUSCULAR HEMOGLOBIN: 30.4 PG (ref 27–31.2)
PLATELET # BLD: 259 K/MM3 (ref 142–424)
POTASSIUM: 4.5 MMOL/L (ref 3.5–5.1)
PROT SERPL-MCNC: 6.9 G/DL (ref 6.3–8.2)
RBC # BLD AUTO: 4.93 M/MM3 (ref 4.2–5.4)
SODIUM SPEC-SCNC: 137 MMOL/L (ref 136–145)
WBC # BLD AUTO: 14.2 K/MM3 (ref 4.8–10.8)

## 2021-10-13 PROCEDURE — 85025 COMPLETE CBC W/AUTO DIFF WBC: CPT

## 2021-10-13 PROCEDURE — 36415 COLL VENOUS BLD VENIPUNCTURE: CPT

## 2021-10-13 PROCEDURE — 71045 X-RAY EXAM CHEST 1 VIEW: CPT

## 2021-10-13 PROCEDURE — 80053 COMPREHEN METABOLIC PANEL: CPT

## 2021-10-18 RX ORDER — ROSUVASTATIN CALCIUM 20 MG/1
20 TABLET, COATED ORAL DAILY
Qty: 90 TABLET | Refills: 3 | Status: SHIPPED | OUTPATIENT
Start: 2021-10-18 | End: 2022-01-07 | Stop reason: SDUPTHER

## 2021-10-21 ENCOUNTER — TELEMEDICINE (OUTPATIENT)
Dept: CARDIOLOGY | Facility: HOSPITAL | Age: 61
End: 2021-10-21

## 2021-10-21 VITALS
BODY MASS INDEX: 35 KG/M2 | HEIGHT: 64 IN | SYSTOLIC BLOOD PRESSURE: 119 MMHG | WEIGHT: 205 LBS | HEART RATE: 72 BPM | DIASTOLIC BLOOD PRESSURE: 60 MMHG

## 2021-10-21 DIAGNOSIS — I10 ESSENTIAL HYPERTENSION: ICD-10-CM

## 2021-10-21 DIAGNOSIS — R06.09 DOE (DYSPNEA ON EXERTION): ICD-10-CM

## 2021-10-21 DIAGNOSIS — I50.22 CHRONIC SYSTOLIC CONGESTIVE HEART FAILURE (HCC): Primary | ICD-10-CM

## 2021-10-21 DIAGNOSIS — U07.1 COVID-19: ICD-10-CM

## 2021-10-21 PROCEDURE — 99442 PR PHYS/QHP TELEPHONE EVALUATION 11-20 MIN: CPT | Performed by: NURSE PRACTITIONER

## 2021-10-22 ENCOUNTER — HOSPITAL ENCOUNTER (EMERGENCY)
Age: 61
Discharge: HOME | End: 2021-10-22
Payer: MEDICARE

## 2021-10-22 VITALS — BODY MASS INDEX: 35.6 KG/M2

## 2021-10-22 VITALS
HEART RATE: 82 BPM | RESPIRATION RATE: 16 BRPM | TEMPERATURE: 98.42 F | DIASTOLIC BLOOD PRESSURE: 67 MMHG | SYSTOLIC BLOOD PRESSURE: 134 MMHG

## 2021-10-22 VITALS
SYSTOLIC BLOOD PRESSURE: 134 MMHG | HEART RATE: 82 BPM | RESPIRATION RATE: 20 BRPM | DIASTOLIC BLOOD PRESSURE: 67 MMHG | OXYGEN SATURATION: 95 %

## 2021-10-22 DIAGNOSIS — B30.8: Primary | ICD-10-CM

## 2021-10-22 DIAGNOSIS — U09.9: ICD-10-CM

## 2021-10-22 DIAGNOSIS — E11.9: ICD-10-CM

## 2021-10-22 DIAGNOSIS — Z88.2: ICD-10-CM

## 2021-10-22 PROCEDURE — G0463 HOSPITAL OUTPT CLINIC VISIT: HCPCS

## 2021-10-22 PROCEDURE — 99202 OFFICE O/P NEW SF 15 MIN: CPT

## 2021-11-01 RX ORDER — MIDODRINE HYDROCHLORIDE 5 MG/1
TABLET ORAL
Qty: 180 TABLET | Refills: 3 | Status: SHIPPED | OUTPATIENT
Start: 2021-11-01 | End: 2022-01-05 | Stop reason: SDUPTHER

## 2021-11-04 ENCOUNTER — OFFICE VISIT (OUTPATIENT)
Dept: CARDIOLOGY | Facility: HOSPITAL | Age: 61
End: 2021-11-04

## 2021-11-04 VITALS
DIASTOLIC BLOOD PRESSURE: 76 MMHG | HEIGHT: 64 IN | SYSTOLIC BLOOD PRESSURE: 110 MMHG | WEIGHT: 205 LBS | BODY MASS INDEX: 35 KG/M2

## 2021-11-04 DIAGNOSIS — U07.1 COVID-19: ICD-10-CM

## 2021-11-04 DIAGNOSIS — I10 ESSENTIAL HYPERTENSION: ICD-10-CM

## 2021-11-04 DIAGNOSIS — I50.22 CHRONIC SYSTOLIC CONGESTIVE HEART FAILURE (HCC): Primary | ICD-10-CM

## 2021-11-04 PROCEDURE — 99442 PR PHYS/QHP TELEPHONE EVALUATION 11-20 MIN: CPT | Performed by: NURSE PRACTITIONER

## 2021-11-04 NOTE — PROGRESS NOTES
"Chief Complaint  Follow-up (SHF )    Subjective    History of Present Illness {CC  Problem List  Visit  Diagnosis   Encounters  Notes  Medications  Labs  Result Review Imaging  Media :23}     You have chosen to receive care through the use of telemedicine. Telemedicine enables health care providers at different locations to provide safe, effective, and convenient care through the use of technology. As with any health care service, there are risks associated with the use of telemedicine, including equipment failure, poor connections, and  issues.    • Do you understand the risks and benefits of telemedicine as I have explained them to you? Yes  • Have your questions regarding telemedicine been answered? Yes  • Do you consent to the use of telemedicine in your medical care today? Yes    History of Present Illness   61-year-old female with telephone visit today for ongoing evaluation of her chronic systolic heart failure and hypertension.  Patient is still recovering from Covid although she reports she is feeling significantly better.  Notes energy level slowly improving.  Notes dyspnea has improved as well.  Patient notes blood pressures for the most part are running in the low 100s to 1 teens.  Notes dizziness and lightheadedness occurred less frequently now.  She notes compliance with her medications.  Objective     Vital Signs:   Vitals:    11/04/21 1116   BP: 110/76   BP Location: Left arm   Patient Position: Sitting   Weight: 93 kg (205 lb)   Height: 162.6 cm (64\")     Body mass index is 35.19 kg/m².  Physical Exam  Vitals and nursing note reviewed.   Constitutional:       Appearance: Normal appearance.   Pulmonary:      Effort: Pulmonary effort is normal.   Neurological:      Mental Status: She is alert and oriented to person, place, and time.   Psychiatric:         Mood and Affect: Mood normal.         Behavior: Behavior normal.         Thought Content: Thought content normal.      "         Result Review  Data Reviewed:{ Labs  Result Review  I SmartEquip  Med Tab  Media :23}   June 2021: echo   ef 41-45 %, grade 1 diastolic dysfunction, mild MR, TR            Assessment and Plan {CC Problem List  Visit Diagnosis  ROS  Review (Popup)  Health Maintenance  Quality  BestPractice  Medications  SmartSets  SnapShot Encounters  Media :23}   1. Chronic systolic congestive heart failure (HCC)  Euvolemic  Continue bisoprolol and midodrine  Unable to tolerate entresto due to hypotension  2. Essential hypertension  Continue to monitor    3. COVID-19  Improving       Telephone time 15 minutes     Follow Up {Instructions Charge Capture  Follow-up Communications :23}   Return in about 7 weeks (around 12/20/2021) for Office visit, HF.    Patient was given instructions and counseling regarding her condition or for health maintenance advice. Please see specific information pulled into the AVS if appropriate.  Patient was instructed to call the [facility] with any questions, concerns, or worsening symptoms.

## 2021-12-07 ENCOUNTER — HOSPITAL ENCOUNTER (OUTPATIENT)
Age: 61
End: 2021-12-07
Payer: MEDICARE

## 2021-12-07 DIAGNOSIS — J32.0: Primary | ICD-10-CM

## 2021-12-07 PROCEDURE — 70486 CT MAXILLOFACIAL W/O DYE: CPT

## 2022-01-03 ENCOUNTER — OFFICE VISIT (OUTPATIENT)
Dept: CARDIOLOGY | Facility: HOSPITAL | Age: 62
End: 2022-01-03

## 2022-01-03 VITALS
SYSTOLIC BLOOD PRESSURE: 142 MMHG | TEMPERATURE: 97.5 F | HEIGHT: 64 IN | DIASTOLIC BLOOD PRESSURE: 65 MMHG | RESPIRATION RATE: 18 BRPM | OXYGEN SATURATION: 96 % | HEART RATE: 76 BPM | WEIGHT: 206.56 LBS | BODY MASS INDEX: 35.26 KG/M2

## 2022-01-03 DIAGNOSIS — I95.2 HYPOTENSION DUE TO DRUGS: ICD-10-CM

## 2022-01-03 DIAGNOSIS — E78.2 MIXED HYPERLIPIDEMIA: ICD-10-CM

## 2022-01-03 DIAGNOSIS — I50.22 CHRONIC SYSTOLIC CONGESTIVE HEART FAILURE: Primary | ICD-10-CM

## 2022-01-03 DIAGNOSIS — I25.10 CORONARY ARTERY DISEASE INVOLVING NATIVE CORONARY ARTERY OF NATIVE HEART WITHOUT ANGINA PECTORIS: ICD-10-CM

## 2022-01-03 PROCEDURE — 99214 OFFICE O/P EST MOD 30 MIN: CPT | Performed by: NURSE PRACTITIONER

## 2022-01-03 RX ORDER — AZITHROMYCIN 500 MG/1
500 TABLET, FILM COATED ORAL DAILY
COMMUNITY
Start: 2021-12-31 | End: 2022-03-10

## 2022-01-03 NOTE — PROGRESS NOTES
"Chief Complaint  Follow-up (chronic systolic heart failure, hypotension)    Subjective    History of Present Illness {CC  Problem List  Visit  Diagnosis   Encounters  Notes  Medications  Labs  Result Review Imaging  Media :23}       History of Present Illness   61-year-old female presents the office today for ongoing evaluation of her chronic systolic heart failure and hypertension.  Patient reports up until few weeks ago-blood pressures were doing well running 1 teens to 120s.  She reports a history of abdominal migraines that has worsened over the last few weeks.  She notes days of vomiting and diarrhea.  When checking her blood pressure after vomiting and diarrhea, blood pressure is rather low 90s systolic.  Patient reports her primary care recently started her on Librium for her abdominal migraines however she has not started that medication yet.  She notes that she has not required the use of extra Lasix.  Currently denies pedal edema, chest pain, presyncope, syncope, orthopnea, PND.  Does report dyspnea on exertion.  Objective     Vital Signs:   Vitals:    01/03/22 1313   BP: 142/65   BP Location: Left arm   Patient Position: Sitting   Cuff Size: Adult   Pulse: 76   Resp: 18   Temp: 97.5 °F (36.4 °C)   TempSrc: Temporal   SpO2: 96%   Weight: 93.7 kg (206 lb 9 oz)   Height: 162.6 cm (64\")     Body mass index is 35.46 kg/m².  Physical Exam  Vitals and nursing note reviewed.   Constitutional:       Appearance: Normal appearance.   HENT:      Head: Normocephalic.   Eyes:      Pupils: Pupils are equal, round, and reactive to light.   Cardiovascular:      Rate and Rhythm: Normal rate and regular rhythm.      Pulses: Normal pulses.      Heart sounds: Normal heart sounds. No murmur heard.      Pulmonary:      Effort: Pulmonary effort is normal.      Breath sounds: Normal breath sounds.   Abdominal:      General: Bowel sounds are normal.      Palpations: Abdomen is soft.   Musculoskeletal:         General: " Normal range of motion.      Cervical back: Normal range of motion.      Right lower leg: No edema.      Left lower leg: No edema.   Skin:     General: Skin is warm and dry.      Capillary Refill: Capillary refill takes less than 2 seconds.   Neurological:      Mental Status: She is alert and oriented to person, place, and time.   Psychiatric:         Mood and Affect: Mood normal.         Thought Content: Thought content normal.              Result Review  Data Reviewed:{ Labs  Result Review  Imaging  Med Tab  Media :23}   Echo June 2021   · Estimated left ventricular EF = 43% Left ventricular ejection fraction appears to be 41 - 45%. Left ventricular systolic function is mildly decreased.  · Left ventricular diastolic function is consistent with (grade I) impaired relaxation.  · Estimated right ventricular systolic pressure from tricuspid regurgitation is normal (<35 mmHg). Calculated right ventricular systolic pressure from tricuspid regurgitation is 23 mmHg.                Assessment and Plan {CC Problem List  Visit Diagnosis  ROS  Review (Popup)  Health Maintenance  Quality  BestPractice  Medications  SmartSets  SnapShot Encounters  Media :23}   1. Chronic systolic congestive heart failure (HCC)  Euvolemic  Continue zebeta  Intolerant to coreg, entresto in the past due to hypotension  Heart failure education today including signs and symptoms, the role of the heart failure center, daily weights, low sodium diet (less than 1500 mg per day), and daily physical activity. Reviewed HF Zones with patient and family.  Patient to continue current medications as previously ordered.     2. Hypotension due to drugs  Continue midodrine 10 mg bid for hypotension  Continue good hydration     3. Coronary artery disease involving native coronary artery of native heart without angina pectoris  Currently without angina  Continue asa, zebeta, tricor, crestor     4. Mixed hyperlipidemia  Stable on crestor, tricor          Follow Up {Instructions Charge Capture  Follow-up Communications :23}   Return in about 6 months (around 7/3/2022) for Office visit, HF.    Patient was given instructions and counseling regarding her condition or for health maintenance advice. Please see specific information pulled into the AVS if appropriate.  Patient was instructed to call the [facility] with any questions, concerns, or worsening symptoms.

## 2022-01-05 RX ORDER — MIDODRINE HYDROCHLORIDE 5 MG/1
TABLET ORAL
Qty: 360 TABLET | Refills: 0 | Status: SHIPPED | OUTPATIENT
Start: 2022-01-05 | End: 2022-05-02 | Stop reason: SDUPTHER

## 2022-01-05 NOTE — TELEPHONE ENCOUNTER
Refill request from [facility] for midodrine and jardiance. She last saw YEIMI Vela on 11/4/2021 and is has an appointment scheduled for 7/5/2022.  Sent 3 month supply to [facility] mail order.

## 2022-01-07 RX ORDER — ROSUVASTATIN CALCIUM 20 MG/1
20 TABLET, COATED ORAL DAILY
Qty: 90 TABLET | Refills: 3 | Status: SHIPPED | OUTPATIENT
Start: 2022-01-07 | End: 2023-03-20

## 2022-03-09 NOTE — PROGRESS NOTES
[facility] Cardiology  Office Progress Note  Lucia Stevens  1960  [address] [place] [address]       Visit Date: 03/10/22    PCP: Johan Marks MD  [address]  [place] [address]    IDENTIFICATION: A 61 y.o. female disabled from [employer] and is a resident of [place], [place].     PROBLEM LIST:   1. CAD/ischemic cardiomyopathy  a. GXT myocardial perfusion study, 06/07/2005, revealing a moderate sized reversible defect in anteroseptal region with diminished myocardial thickening and hypokinesis. LVEF 58%.  b. Cardiac catheterization, June 2005, Dr. Talbot, due to moderate sized reversible anteroseptal defect; LVEF 38%: JADE stenting of PDA (2.5x13 mm Cypher).  c. JADE stenting of mid-RCA, 08/08/2011: 15 mm Promus JADE; LVEF 55% to 60%.  d. 11/30/17 echo: EF 30%, LA borderline dilated, moderate to severe MR, mild-to-moderate TR RVSP 41 mmHg  e. 11/30/2017 LHC: Chronic total occlusion of the RCA served by left-sided collaterals, nonobstructive disease of the left coronary system, cardiomyopathy with EF 25 to 30%.  f. LifeVest initiated 11/2017  g. 2/26/18 echo EF 25%, mild-mod MR  h. 3/28/18 BSc. BiVICD implantation Terra  i. 3/27/2018 echo EF 20%, moderate MR  j. 9/24/18 echo: EF 40%, multiple LV wall segments hypokinetic, grade 1 diastolic dysfunction, mild concentric LVH, no significant valvular abnormalities, no pulmonary hypertension  k. 6/21 echo EF 43% rvsp 23  2. HTN  a. 10/10/2000 1824-hour BP monitor average 127/60  b. Intolerant to higher doses coreg  3. HLD  a. 11/30/17 lipids:   HDL 29 LDL 66  b. 6/20/18   HDL 23 LDL cannot be calculated  c. 8/21 143/483/24/48  4. DM  a. 11/30/17 A1c 8.2  b. 3/27/18 A1c 7.3  5. Remote tobacco abuse:   a. Quit 2011 6. 9/2021 Covid 19 + -received antibiotic infusion  7. Hiatal hernia.  8. Sjogren’s syndrome.  9. Psoriasis.  10. Colon polyps status polypectomy.  11. Depression.PTSD-  estranged son 2021 12. Surgical history:  a. Appendectomy.  b. Cholecystectomy.  c. Rectovaginal fistula repair.  d. Cervical discectomy.       CC:   Chief Complaint   Patient presents with   • Coronary artery disease involving native coronary artery of       Allergies  Allergies   Allergen Reactions   • Sulfa Antibiotics Anaphylaxis       Current Medications    Current Outpatient Medications:   •  allopurinol (ZYLOPRIM) 100 MG tablet, Take 200 mg by mouth Daily., Disp: , Rfl:   •  aspirin 81 MG EC tablet, Take 81 mg by mouth daily., Disp: , Rfl:   •  bisoprolol (ZEBeta) 5 MG tablet, 1/2 tablet po qd, Disp: 90 tablet, Rfl: 3  •  Dulaglutide (Trulicity) 0.75 MG/0.5ML solution pen-injector, Inject 1.5 mg under the skin into the appropriate area as directed Every 7 (Seven) Days., Disp: , Rfl:   •  DULoxetine (CYMBALTA) 30 MG capsule, Daily., Disp: , Rfl:   •  empagliflozin (Jardiance) 25 MG tablet tablet, Take 1 tablet by mouth Daily., Disp: 90 tablet, Rfl: 0  •  fenofibrate (TRICOR) 48 MG tablet, Take 1 tablet by mouth Daily., Disp: 90 tablet, Rfl: 3  •  furosemide (LASIX) 40 MG tablet, Take 0.5 tablets by mouth 2 (Two) Times a Day. May take an additional tablet PRN for weight gain of 3lbs in one day or 5lbs in 1 wk (Patient taking differently: Take 20 mg by mouth Daily. May take an additional tablet PRN for weight gain of 3lbs in one day or 5lbs in 1 wk), Disp: , Rfl:   •  glimepiride (AMARYL) 2 MG tablet, Take 4 mg by mouth Daily., Disp: , Rfl: 1  •  metFORMIN (GLUCOPHAGE) 850 MG tablet, Take 850 mg by mouth 2 (Two) Times a Day., Disp: , Rfl:   •  midodrine (PROAMATINE) 5 MG tablet, Take 2 tablets orally bid, Disp: 360 tablet, Rfl: 0  •  nitroglycerin (NITROSTAT) 0.4 MG SL tablet, 1 under the tongue as needed for angina, may repeat q5mins for up three doses, Disp: 100 tablet, Rfl: 11  •  omeprazole (priLOSEC) 40 MG capsule, Take 40 mg by mouth Daily., Disp: , Rfl:   •  ondansetron (ZOFRAN) 4 MG tablet, Take 4 mg by  "mouth Every 8 (Eight) Hours As Needed for Nausea or Vomiting., Disp: , Rfl:   •  potassium chloride (MICRO-K) 10 MEQ CR capsule, Take 10 mEq by mouth Daily., Disp: , Rfl:   •  rizatriptan (MAXALT) 10 MG tablet, Take 10 mg by mouth 1 (One) Time As Needed for Migraine. May repeat in 2 hours if needed, Disp: , Rfl:   •  rosuvastatin (CRESTOR) 20 MG tablet, Take 1 tablet by mouth Daily., Disp: 90 tablet, Rfl: 3  •  spironolactone (ALDACTONE) 25 MG tablet, Take 1 tablet by mouth Daily., Disp: , Rfl:   •  chlordiazePOXIDE (LIBRIUM) 5 MG capsule, Take 5 mg by mouth Daily., Disp: , Rfl:   •  famotidine (PEPCID) 20 MG tablet, Take 20 mg by mouth every night at bedtime., Disp: , Rfl:   •  nitroglycerin (NITROSTAT) 0.4 MG SL tablet, DISSOLVE ONE TABLET UNDER THE TONGUE EVERY 5 MINUTES AS NEEDED FOR CHEST PAIN.  DO NOT EXCEED A TOTAL OF 3 DOSES IN 15 MINUTES, Disp: 25 tablet, Rfl: 3      History of Present Illness   Lucia Stevens is a 61 y.o. year old female here for follow up.    Pt denies any chest pain, dyspnea, dyspnea on exertion, orthopnea, PND, palpitations, lower extremity edema, or claudication.    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  She is describes no overt cardiac issues.  She states she had significant fatigue and labile blood sugars following her COVID last fall      OBJECTIVE:  Vitals:    03/10/22 1151   BP: 130/74   BP Location: Right arm   Patient Position: Sitting   Pulse: 70   SpO2: 96%   Weight: 96.6 kg (213 lb)   Height: 162.6 cm (64\")     Body mass index is 36.56 kg/m².    Constitutional:       Appearance: Healthy appearance. Not in distress.   Neck:      Vascular: No JVR. JVD normal.   Pulmonary:      Effort: Pulmonary effort is normal.      Breath sounds: Normal breath sounds. No wheezing. No rhonchi. No rales.   Chest:      Chest wall: Not tender to palpatation.   Cardiovascular:      PMI at left midclavicular line. Normal " rate. Regular rhythm. Normal S1. Normal S2.      Murmurs: There is no murmur.      No gallop. No click. No rub.   Pulses:     Intact distal pulses.   Edema:     Peripheral edema absent.   Abdominal:      General: Bowel sounds are normal.      Palpations: Abdomen is soft.      Tenderness: There is no abdominal tenderness.   Musculoskeletal: Normal range of motion.         General: No tenderness. Skin:     General: Skin is warm and dry.   Neurological:      General: No focal deficit present.      Mental Status: Alert and oriented to person, place and time.         Diagnostic Data:  Procedures  Device check  Acceptable thresholds/ impedances  No events  19% a pacing, 6% RV pacing, 100% LV pacing as programmed  Generator 8 years    ASSESSMENT:   Diagnosis Plan   1. Coronary artery disease involving native coronary artery of native heart without angina pectoris     2. Chronic systolic congestive heart failure (HCC)     3. Primary hypertension     4. Mixed hyperlipidemia     5. Type 2 diabetes mellitus with hyperglycemia, without long-term current use of insulin (HCC)         PLAN:  Ischemic cardiomyopathy status post revascularization BiV pacer appears euvolemic New York Heart Association class III CHF at current continued aggressive medical regimen as tolerant with hypotension    Hypotension tolerant of low-dose beta-blockade with concomitant midodrine    Mixed dyslipidemia controlled on statin therapy    Diabetes A1c most recent 7.3          Virgilio Borden MD, [facility]

## 2022-03-10 ENCOUNTER — OFFICE VISIT (OUTPATIENT)
Dept: CARDIOLOGY | Facility: CLINIC | Age: 62
End: 2022-03-10

## 2022-03-10 VITALS
WEIGHT: 213 LBS | OXYGEN SATURATION: 96 % | DIASTOLIC BLOOD PRESSURE: 74 MMHG | HEIGHT: 64 IN | BODY MASS INDEX: 36.37 KG/M2 | HEART RATE: 70 BPM | SYSTOLIC BLOOD PRESSURE: 130 MMHG

## 2022-03-10 DIAGNOSIS — E11.65 TYPE 2 DIABETES MELLITUS WITH HYPERGLYCEMIA, WITHOUT LONG-TERM CURRENT USE OF INSULIN: ICD-10-CM

## 2022-03-10 DIAGNOSIS — I50.22 CHRONIC SYSTOLIC CONGESTIVE HEART FAILURE: ICD-10-CM

## 2022-03-10 DIAGNOSIS — I25.10 CORONARY ARTERY DISEASE INVOLVING NATIVE CORONARY ARTERY OF NATIVE HEART WITHOUT ANGINA PECTORIS: Primary | ICD-10-CM

## 2022-03-10 DIAGNOSIS — E78.2 MIXED HYPERLIPIDEMIA: ICD-10-CM

## 2022-03-10 DIAGNOSIS — I10 PRIMARY HYPERTENSION: ICD-10-CM

## 2022-03-10 PROCEDURE — 93284 PRGRMG EVAL IMPLANTABLE DFB: CPT | Performed by: INTERNAL MEDICINE

## 2022-03-10 PROCEDURE — 99214 OFFICE O/P EST MOD 30 MIN: CPT | Performed by: INTERNAL MEDICINE

## 2022-03-10 RX ORDER — DULOXETIN HYDROCHLORIDE 30 MG/1
30 CAPSULE, DELAYED RELEASE ORAL DAILY
COMMUNITY
Start: 2022-02-18

## 2022-03-25 ENCOUNTER — HOSPITAL ENCOUNTER (OUTPATIENT)
Age: 62
End: 2022-03-25
Payer: MEDICARE

## 2022-03-25 DIAGNOSIS — M79.605: Primary | ICD-10-CM

## 2022-03-25 PROCEDURE — 93971 EXTREMITY STUDY: CPT

## 2022-03-31 RX ORDER — EMPAGLIFLOZIN 25 MG/1
TABLET, FILM COATED ORAL
Qty: 90 TABLET | Refills: 1 | Status: SHIPPED | OUTPATIENT
Start: 2022-03-31 | End: 2022-12-14

## 2022-05-02 RX ORDER — MIDODRINE HYDROCHLORIDE 5 MG/1
TABLET ORAL
Qty: 360 TABLET | Refills: 0 | Status: SHIPPED | OUTPATIENT
Start: 2022-05-02 | End: 2022-05-02 | Stop reason: SDUPTHER

## 2022-05-02 RX ORDER — MIDODRINE HYDROCHLORIDE 5 MG/1
TABLET ORAL
Qty: 360 TABLET | Refills: 0 | Status: CANCELLED | OUTPATIENT
Start: 2022-05-02

## 2022-05-02 RX ORDER — MIDODRINE HYDROCHLORIDE 5 MG/1
TABLET ORAL
Qty: 360 TABLET | Refills: 0 | Status: SHIPPED | OUTPATIENT
Start: 2022-05-02 | End: 2022-08-09

## 2022-05-02 NOTE — TELEPHONE ENCOUNTER
Refill request from Ms Stevens for midodrine. She last saw YEIMI Vela on 11/4/2021 and has an appointment scheduled for 7/5/2022.  Sent 3 month supply to [facility] mail order.

## 2022-05-16 RX ORDER — BISOPROLOL FUMARATE 5 MG/1
TABLET, FILM COATED ORAL
Qty: 90 TABLET | Refills: 3 | Status: SHIPPED | OUTPATIENT
Start: 2022-05-16 | End: 2023-01-09

## 2022-05-27 ENCOUNTER — HOSPITAL ENCOUNTER (OUTPATIENT)
Age: 62
End: 2022-05-27
Payer: MEDICARE

## 2022-05-27 DIAGNOSIS — I10: ICD-10-CM

## 2022-05-27 DIAGNOSIS — E79.0: ICD-10-CM

## 2022-05-27 DIAGNOSIS — E55.9: ICD-10-CM

## 2022-05-27 DIAGNOSIS — E11.59: Primary | ICD-10-CM

## 2022-05-27 DIAGNOSIS — I42.9: ICD-10-CM

## 2022-05-27 LAB
25-OH VITAMIN D, TOTAL: 34.3 NG/ML (ref 30–100)
ALBUMIN LEVEL: 4.4 G/DL (ref 3.5–5)
ALBUMIN/GLOB SERPL: 1.6 {RATIO} (ref 1.1–1.8)
ALP ISO SERPL-ACNC: 103 U/L (ref 38–126)
ALT SERPLBLD-CCNC: 22 U/L (ref 12–78)
ANION GAP SERPL CALC-SCNC: 15.3 MEQ/L (ref 5–15)
AST SERPL QL: 33 U/L (ref 14–36)
BILIRUBIN,TOTAL: 0.5 MG/DL (ref 0.2–1.3)
BUN SERPL-MCNC: 18 MG/DL (ref 7–17)
CALCIUM SPEC-MCNC: 10 MG/DL (ref 8.4–10.2)
CHLORIDE SPEC-SCNC: 102 MMOL/L (ref 98–107)
CHOLEST SPEC-SCNC: 170 MG/DL (ref 140–200)
CO2 SERPL-SCNC: 28 MMOL/L (ref 22–30)
CREAT BLD-SCNC: 0.8 MG/DL (ref 0.52–1.04)
ESTIMATED GLOMERULAR FILT RATE: 73 ML/MIN (ref 60–?)
GFR (AFRICAN AMERICAN): 88 ML/MIN (ref 60–?)
GLOBULIN SER CALC-MCNC: 2.7 G/DL (ref 1.3–3.2)
GLUCOSE: 164 MG/DL (ref 74–100)
HBA1C MFR BLD: 7.1 % (ref 4–6)
HCT VFR BLD CALC: 43.5 % (ref 37–47)
HDLC SERPL-MCNC: 27 MG/DL (ref 40–60)
HGB BLD-MCNC: 14.6 G/DL (ref 12.2–16.2)
MCHC RBC-ENTMCNC: 33.6 G/DL (ref 31.8–35.4)
MCV RBC: 90.3 FL (ref 81–99)
MEAN CORPUSCULAR HEMOGLOBIN: 30.3 PG (ref 27–31.2)
PLATELET # BLD: 256 K/MM3 (ref 142–424)
POTASSIUM: 4.3 MMOL/L (ref 3.5–5.1)
PROT SERPL-MCNC: 7.1 G/DL (ref 6.3–8.2)
RBC # BLD AUTO: 4.82 M/MM3 (ref 4.2–5.4)
SODIUM SPEC-SCNC: 141 MMOL/L (ref 136–145)
TRIGLYCERIDES: 640 MG/DL (ref 30–150)
URATE SERPL-SCNC: 3.7 MG/DL (ref 2.5–6.2)
WBC # BLD AUTO: 9.3 K/MM3 (ref 4.8–10.8)

## 2022-05-27 PROCEDURE — 36415 COLL VENOUS BLD VENIPUNCTURE: CPT

## 2022-05-27 PROCEDURE — 83036 HEMOGLOBIN GLYCOSYLATED A1C: CPT

## 2022-05-27 PROCEDURE — 82570 ASSAY OF URINE CREATININE: CPT

## 2022-05-27 PROCEDURE — 80061 LIPID PANEL: CPT

## 2022-05-27 PROCEDURE — 80053 COMPREHEN METABOLIC PANEL: CPT

## 2022-05-27 PROCEDURE — 82306 VITAMIN D 25 HYDROXY: CPT

## 2022-05-27 PROCEDURE — 84550 ASSAY OF BLOOD/URIC ACID: CPT

## 2022-05-27 PROCEDURE — 85025 COMPLETE CBC W/AUTO DIFF WBC: CPT

## 2022-05-27 PROCEDURE — 82043 UR ALBUMIN QUANTITATIVE: CPT

## 2022-06-09 ENCOUNTER — TELEPHONE (OUTPATIENT)
Dept: CARDIOLOGY | Facility: CLINIC | Age: 62
End: 2022-06-09

## 2022-06-09 NOTE — TELEPHONE ENCOUNTER
Pt would like [facility] input on recent labs (scanned in Epic). She also requested samples of Jardiance 25 mg sent to [place] (sent with RN to GI office).    5/27/2022  A1c 7.1  , BUN 18, CR 0.8  LIPID: , TRIG 640, HDL 22, LDL not calculated - on Fenofibrate, rosuvastatin 20 mg      Any further recommendations on TRIG? Insurance may cover Vascepa    Per [facility] - Vascepa 1 GM, 2 caps BID - LM for pt -

## 2022-06-15 RX ORDER — ICOSAPENT ETHYL 1000 MG/1
2 CAPSULE ORAL 2 TIMES DAILY WITH MEALS
Qty: 120 CAPSULE | Refills: 11 | Status: SHIPPED | OUTPATIENT
Start: 2022-06-15

## 2022-06-23 ENCOUNTER — HOSPITAL ENCOUNTER (OUTPATIENT)
Age: 62
End: 2022-06-23
Payer: MEDICARE

## 2022-06-23 DIAGNOSIS — K58.0: Primary | ICD-10-CM

## 2022-06-23 PROCEDURE — 74178 CT ABD&PLV WO CNTR FLWD CNTR: CPT

## 2022-07-09 ENCOUNTER — HOSPITAL ENCOUNTER (OUTPATIENT)
Age: 62
End: 2022-07-09
Payer: MEDICARE

## 2022-07-09 DIAGNOSIS — R10.84: Primary | ICD-10-CM

## 2022-07-09 DIAGNOSIS — B95.8: ICD-10-CM

## 2022-07-09 PROCEDURE — 87086 URINE CULTURE/COLONY COUNT: CPT

## 2022-07-09 PROCEDURE — 87186 SC STD MICRODIL/AGAR DIL: CPT

## 2022-08-03 ENCOUNTER — HOSPITAL ENCOUNTER (OUTPATIENT)
Age: 62
End: 2022-08-03
Payer: MEDICARE

## 2022-08-03 DIAGNOSIS — N93.8: Primary | ICD-10-CM

## 2022-08-03 PROCEDURE — 76830 TRANSVAGINAL US NON-OB: CPT

## 2022-08-05 ENCOUNTER — OFFICE VISIT (OUTPATIENT)
Dept: CARDIOLOGY | Facility: HOSPITAL | Age: 62
End: 2022-08-05

## 2022-08-05 VITALS
HEART RATE: 78 BPM | RESPIRATION RATE: 20 BRPM | HEIGHT: 64 IN | SYSTOLIC BLOOD PRESSURE: 120 MMHG | TEMPERATURE: 97.2 F | OXYGEN SATURATION: 99 % | WEIGHT: 201.56 LBS | BODY MASS INDEX: 34.41 KG/M2 | DIASTOLIC BLOOD PRESSURE: 58 MMHG

## 2022-08-05 DIAGNOSIS — E78.2 MIXED HYPERLIPIDEMIA: ICD-10-CM

## 2022-08-05 DIAGNOSIS — I10 PRIMARY HYPERTENSION: ICD-10-CM

## 2022-08-05 DIAGNOSIS — I25.10 CORONARY ARTERY DISEASE INVOLVING NATIVE CORONARY ARTERY OF NATIVE HEART WITHOUT ANGINA PECTORIS: ICD-10-CM

## 2022-08-05 DIAGNOSIS — I50.22 CHRONIC SYSTOLIC CONGESTIVE HEART FAILURE: Primary | ICD-10-CM

## 2022-08-05 PROCEDURE — 99214 OFFICE O/P EST MOD 30 MIN: CPT | Performed by: NURSE PRACTITIONER

## 2022-08-05 RX ORDER — CALCIUM CITRATE/VITAMIN D3 200MG-6.25
TABLET ORAL
COMMUNITY
Start: 2022-07-27

## 2022-08-07 NOTE — PROGRESS NOTES
"Chief Complaint  Congestive Heart Failure and Follow-up    Subjective    History of Present Illness {CC  Problem List  Visit  Diagnosis   Encounters  Notes  Medications  Labs  Result Review Imaging  Media :23}       History of Present Illness   62-year-old female presents today for evaluation of her chronic systolic heart failure.  She reports that she has been having GYN issues recently and has been very fatigued and in bed for the past few weeks.  She notes that now that she is out of bed she is very dyspneic with minimal activity.  She does report however that she did very little movement for the past 3 weeks.  She currently denies chest pain, dizziness, presyncope, palpitations, syncope, orthopnea, PND or pedal edema.  Notes compliance with her medications.  Blood pressure 1 teens to 120s systolic over 60s to 70s.  Vital Signs:   Vitals:    08/05/22 1325   BP: 120/58   BP Location: Left arm   Patient Position: Sitting   Cuff Size: Large Adult   Pulse: 78   Resp: 20   Temp: 97.2 °F (36.2 °C)   TempSrc: Temporal   SpO2: 99%   Weight: 91.4 kg (201 lb 9 oz)   Height: 162.6 cm (64\")     Body mass index is 34.6 kg/m².  Physical Exam  Vitals and nursing note reviewed.   Constitutional:       Appearance: Normal appearance.   HENT:      Head: Normocephalic.   Eyes:      Pupils: Pupils are equal, round, and reactive to light.   Cardiovascular:      Rate and Rhythm: Normal rate and regular rhythm.      Pulses: Normal pulses.      Heart sounds: Normal heart sounds. No murmur heard.  Pulmonary:      Effort: Pulmonary effort is normal.      Breath sounds: Normal breath sounds.   Abdominal:      General: Bowel sounds are normal.      Palpations: Abdomen is soft.   Musculoskeletal:         General: Normal range of motion.      Cervical back: Normal range of motion.      Right lower leg: No edema.      Left lower leg: No edema.   Skin:     General: Skin is warm and dry.      Capillary Refill: Capillary refill takes " less than 2 seconds.   Neurological:      Mental Status: She is alert and oriented to person, place, and time.   Psychiatric:         Mood and Affect: Mood normal.         Thought Content: Thought content normal.              Result Review  Data Reviewed:{ Labs  Result Review  Imaging  Med Tab  Media :23}     Echo June 2021:  · Estimated left ventricular EF = 43% Left ventricular ejection fraction appears to be 41 - 45%. Left ventricular systolic function is mildly decreased.  · Left ventricular diastolic function is consistent with (grade I) impaired relaxation.  · Estimated right ventricular systolic pressure from tricuspid regurgitation is normal (<35 mmHg). Calculated right ventricular systolic pressure from tricuspid regurgitation is 23 mmHg.               Assessment and Plan {CC Problem List  Visit Diagnosis  ROS  Review (Popup)  Health Maintenance  Quality  BestPractice  Medications  SmartSets  SnapShot Encounters  Media :23}   1. Chronic systolic congestive heart failure (HCC)  Euvolemic  Continue bisoprolol Lasix, Jardiance, Aldactone  No ACE ARB or Arni due to hypotension   2. Coronary artery disease involving native coronary artery of native heart without angina pectoris  Currently without angina  Continue aspirin, Crestor    3. Primary hypertension  Well-controlled, with periods of hypotension continue daily midodrine  4. Mixed hyperlipidemia  Continue Crestor  vascepa recently added      Follow Up {Instructions Charge Capture  Follow-up Communications :23}   Return in about 6 months (around 2/5/2023) for Office visit, HF.    Patient was given instructions and counseling regarding her condition or for health maintenance advice. Please see specific information pulled into the AVS if appropriate.  Patient was instructed to call the [facility] with any questions, concerns, or worsening symptoms.

## 2022-08-09 RX ORDER — MIDODRINE HYDROCHLORIDE 5 MG/1
TABLET ORAL
Qty: 360 TABLET | Refills: 0 | Status: SHIPPED | OUTPATIENT
Start: 2022-08-09 | End: 2023-02-06

## 2022-08-25 ENCOUNTER — TELEPHONE (OUTPATIENT)
Dept: CARDIOLOGY | Facility: HOSPITAL | Age: 62
End: 2022-08-25

## 2022-08-25 NOTE — TELEPHONE ENCOUNTER
----- Message from Brooklyn Garcia CMA sent at 8/25/2022 10:41 AM EDT -----  Patient notified.    ----- Message -----  From: Rosmery De Paz APRN  Sent: 8/25/2022   9:44 AM EDT  To: Brooklyn Garcia CMA    Yes, she needs to resume jardiance. Please call her and ask her to resume jardiance.   ----- Message -----  From: Brooklyn Garcia CMA  Sent: 8/24/2022   4:12 PM EDT  To: YEIMI Slaughter    Patient called and states that her PCP took her off the Jardiance that Moise prescribed due to frequent urination but she states that it has not helped. She states that she knows that this is a medication with heart benefits and is wondering if she should go back on it.

## 2022-09-12 ENCOUNTER — OFFICE VISIT (OUTPATIENT)
Dept: OBSTETRICS AND GYNECOLOGY | Facility: CLINIC | Age: 62
End: 2022-09-12

## 2022-09-12 VITALS
HEIGHT: 64 IN | BODY MASS INDEX: 34.31 KG/M2 | DIASTOLIC BLOOD PRESSURE: 78 MMHG | WEIGHT: 201 LBS | SYSTOLIC BLOOD PRESSURE: 118 MMHG

## 2022-09-12 DIAGNOSIS — N95.0 POSTMENOPAUSAL BLEEDING: Primary | ICD-10-CM

## 2022-09-12 DIAGNOSIS — N88.2 CERVICAL STENOSIS (UTERINE CERVIX): ICD-10-CM

## 2022-09-12 PROCEDURE — 99204 OFFICE O/P NEW MOD 45 MIN: CPT | Performed by: OBSTETRICS & GYNECOLOGY

## 2022-09-12 NOTE — PROGRESS NOTES
Gynecologic Annual Exam Note        GYN - NEW patient     CC - PMB    Subjective     HPI  Lucia Stevens is a 62 y.o. female, No obstetric history on file., who presents for evaluation of PMB as a(n) new patient.  She is postmenopausal.  Patient reports vaginal bleeding.  She reports the bleeding as spotting. It has previously occurred daily.  Patient has been using none.  Patient also complains of bloating and swelling pelic pain an alot of itching.  ..  Patient reports problems with: denies. Pt. reports mild urinary incontinence. There were no changes to her medical or surgical history since her last visit.. Partner Status: Marital Status: .  She is sexually active. She has not had new partners.. STD testing recommendations have been explained to the patient and she does not desire STD testing.    Pap smear with PCP reviewed and normal  Labs reviewed and discussed findings.  US reviewed and discussed findings. Due to age with bleeding and thickened endometrial lining, I recommend endometrial biopsy.     Additional OB/GYN History     On HRT? No    Last Pap : 07/15/2022. Results: negative. HPV: negative    Last Completed Pap Smear     This patient has no relevant Health Maintenance data.        History of abnormal Pap smear: no  Family history of uterine, colon, breast, or ovarian cancer: yes - PGM breast  Performs monthly Self-Breast Exam: yes  Last mammogram: been a couple of years Done at [facility].    Last Completed Mammogram     This patient has no relevant Health Maintenance data.        Last colonoscopy: has had a colonoscopy 8 year(s) ago.    Last Completed Colonoscopy          COLORECTAL CANCER SCREENING (COLONOSCOPY - Every 10 Years) Next due on 10/27/2025    10/27/2015  SCANNED - COLONOSCOPY    08/01/2012  SCANNED - COLONOSCOPY    04/28/2006  SCANNED - COLONOSCOPY    05/19/1997  SCANNED - COLONOSCOPY              Last DEXA: None  Exercises Regularly: yes  Feelings of Anxiety or  Depression: no      Tobacco Usage?: No       Current Outpatient Medications:   •  allopurinol (ZYLOPRIM) 100 MG tablet, Take 200 mg by mouth Daily., Disp: , Rfl:   •  aspirin 81 MG EC tablet, Take 81 mg by mouth daily., Disp: , Rfl:   •  bisoprolol (ZEBeta) 5 MG tablet, 1/2 tablet po qd, Disp: 90 tablet, Rfl: 3  •  Dulaglutide (Trulicity) 0.75 MG/0.5ML solution pen-injector, Inject 1.5 mg under the skin into the appropriate area as directed Every 7 (Seven) Days., Disp: , Rfl:   •  DULoxetine (CYMBALTA) 30 MG capsule, Daily., Disp: , Rfl:   •  furosemide (LASIX) 40 MG tablet, Take 0.5 tablets by mouth 2 (Two) Times a Day. May take an additional tablet PRN for weight gain of 3lbs in one day or 5lbs in 1 wk (Patient taking differently: Take 20 mg by mouth Daily. May take an additional tablet PRN for weight gain of 3lbs in one day or 5lbs in 1 wk), Disp: , Rfl:   •  glimepiride (AMARYL) 2 MG tablet, Take 4 mg by mouth Daily., Disp: , Rfl: 1  •  icosapent ethyl (Vascepa) 1 g capsule capsule, Take 2 g by mouth 2 (Two) Times a Day With Meals., Disp: 120 capsule, Rfl: 11  •  Jardiance 25 MG tablet tablet, TAKE 1 TABLET EVERY DAY, Disp: 90 tablet, Rfl: 1  •  metFORMIN (GLUCOPHAGE) 850 MG tablet, Take 850 mg by mouth Daily With Breakfast., Disp: , Rfl:   •  midodrine (PROAMATINE) 5 MG tablet, TAKE 2 TABLETS TWICE DAILY, Disp: 360 tablet, Rfl: 0  •  nitroglycerin (NITROSTAT) 0.4 MG SL tablet, 1 under the tongue as needed for angina, may repeat q5mins for up three doses, Disp: 100 tablet, Rfl: 11  •  ondansetron (ZOFRAN) 4 MG tablet, Take 4 mg by mouth Every 8 (Eight) Hours As Needed for Nausea or Vomiting., Disp: , Rfl:   •  potassium chloride (MICRO-K) 10 MEQ CR capsule, Take 10 mEq by mouth Daily., Disp: , Rfl:   •  rizatriptan (MAXALT) 10 MG tablet, Take 10 mg by mouth 1 (One) Time As Needed for Migraine. May repeat in 2 hours if needed, Disp: , Rfl:   •  rosuvastatin (CRESTOR) 20 MG tablet, Take 1 tablet by mouth Daily.,  Disp: 90 tablet, Rfl: 3  •  spironolactone (ALDACTONE) 25 MG tablet, Take 1 tablet by mouth Daily., Disp: , Rfl:   •  True Metrix Blood Glucose Test test strip, , Disp: , Rfl:   •  fenofibrate (TRICOR) 48 MG tablet, Take 1 tablet by mouth Daily., Disp: 90 tablet, Rfl: 3  •  omeprazole (priLOSEC) 40 MG capsule, Take 40 mg by mouth Daily., Disp: , Rfl:     Patient denies the need for medication refills today.    OB History    No obstetric history on file.         Past Medical History:   Diagnosis Date   • Arthritis    • CHF (congestive heart failure) (HCC)    • Coronary artery disease     2 stents   • Depression    • Diabetes mellitus (HCC)     type 2 dm, 3 years, checks blood sugar every am   • Gout    • Hyperlipidemia    • Psoriasis    • Sjoegren syndrome    • SOB (shortness of breath) on exertion    • Tobacco abuse 2011    cessation    • Wears dentures     full set   • Wears glasses         Past Surgical History:   Procedure Laterality Date   • CARDIAC CATHETERIZATION     • CARDIAC CATHETERIZATION N/A 11/30/2017    Procedure: Left Heart Cath;  Surgeon: Galina Leonard MD;  Location:  [facility] CATH [facility]ASIVE [facility];  Service:    • CARDIAC ELECTROPHYSIOLOGY PROCEDURE N/A 3/28/2018    Procedure: Device Implant BiV ICD;  Surgeon: Sarbjit Ellison DO;  Location: [facility] EP INVASIVE LOCATION;  Service: Cardiovascular   • CERVICAL DISCECTOMY ANTERIOR     • CHOLECYSTECTOMY     • CORONARY ANGIOPLASTY     • CORONARY ANGIOPLASTY WITH STENT PLACEMENT     • CORONARY ANGIOPLASTY WITH STENT PLACEMENT     • CORONARY STENT PLACEMENT     • OTHER SURGICAL HISTORY      growth removed from small intestine as a child   • POLYPECTOMY      Colon polyps status   • RECTOVAGINAL FISTULA REPAIR     • TONSILLECTOMY         Health Maintenance   Topic Date Due   • Annual Gynecologic Pelvic and Breast Exam  Never done   • MAMMOGRAM  Never done   • URINE MICROALBUMIN  Never done   • COVID-19 Vaccine (1) Never done   • Pneumococcal Vaccine 0-64 (1 - PCV)  "Never done   • TDAP/TD VACCINES (1 - Tdap) Never done   • ZOSTER VACCINE (1 of 2) Never done   • HEPATITIS C SCREENING  Never done   • ANNUAL WELLNESS VISIT  Never done   • DIABETIC FOOT EXAM  Never done   • PAP SMEAR  Never done   • DIABETIC EYE EXAM  Never done   • HEMOGLOBIN A1C  09/27/2018   • LIPID PANEL  11/30/2018   • INFLUENZA VACCINE  10/01/2022   • COLORECTAL CANCER SCREENING  10/27/2025       The additional following portions of the patient's history were reviewed and updated as appropriate: allergies, current medications, past family history, past medical history, past social history, past surgical history and problem list.    Review of Systems   Constitutional: Negative.    Respiratory: Negative.    Cardiovascular: Negative.    Gastrointestinal: Negative.    Genitourinary: Positive for menstrual problem, vaginal bleeding and vaginal pain. Negative for breast discharge, breast lump, breast pain, pelvic pain and urinary incontinence.   Neurological: Negative.    Psychiatric/Behavioral: Negative.          I have reviewed and agree with the HPI, ROS, and historical information as entered above. Davide Durbin MD       Objective   /78   Ht 162.6 cm (64\")   Wt 91.2 kg (201 lb)   BMI 34.50 kg/m²     Physical Exam  Vitals reviewed. Exam conducted with a chaperone present.   Constitutional:       Appearance: Normal appearance.   HENT:      Head: Normocephalic and atraumatic.   Abdominal:      General: Abdomen is flat. Bowel sounds are normal.      Palpations: Abdomen is soft.   Genitourinary:     General: Normal vulva.      Vagina: Normal.      Cervix: Normal.      Uterus: Normal.       Adnexa: Right adnexa normal and left adnexa normal.      Comments: Cervical stenosis present and could not complete endometrial biopsy.   Skin:     General: Skin is warm and dry.   Neurological:      Mental Status: She is alert and oriented to person, place, and time.   Psychiatric:         Mood and Affect: Mood " normal.         Behavior: Behavior normal.            Assessment and Plan    Problem List Items Addressed This Visit    None     Visit Diagnoses     Postmenopausal bleeding    -  Primary    Relevant Orders    TISSUE EXAM, [facility] ([facility])    Cervical stenosis (uterine cervix)              1.  PMB- due to recent bleeding and findings on US, we need to proceed with endometrial sampling to rule out hyperplasia or cancer. EMB could not be performed today due to cervical stenosis.   2. Will proceed with dx hysteroscopy, D&C in the Main OR due to multiple comorbidities.     Davide Durbin MD  09/12/2022

## 2022-09-21 ENCOUNTER — PREP FOR SURGERY (OUTPATIENT)
Dept: OTHER | Facility: HOSPITAL | Age: 62
End: 2022-09-21

## 2022-09-21 DIAGNOSIS — N95.0 PMB (POSTMENOPAUSAL BLEEDING): Primary | ICD-10-CM

## 2022-09-21 RX ORDER — SODIUM CHLORIDE 0.9 % (FLUSH) 0.9 %
3 SYRINGE (ML) INJECTION EVERY 12 HOURS SCHEDULED
Status: CANCELLED | OUTPATIENT
Start: 2022-09-21

## 2022-09-21 RX ORDER — GABAPENTIN 300 MG/1
600 CAPSULE ORAL ONCE
Status: CANCELLED | OUTPATIENT
Start: 2022-09-21 | End: 2022-09-21

## 2022-09-21 RX ORDER — CEFAZOLIN SODIUM 2 G/100ML
2 INJECTION, SOLUTION INTRAVENOUS ONCE
Status: CANCELLED | OUTPATIENT
Start: 2022-09-21 | End: 2022-09-21

## 2022-09-21 RX ORDER — SODIUM CHLORIDE 0.9 % (FLUSH) 0.9 %
10 SYRINGE (ML) INJECTION AS NEEDED
Status: CANCELLED | OUTPATIENT
Start: 2022-09-21

## 2022-09-21 RX ORDER — ACETAMINOPHEN 500 MG
1000 TABLET ORAL ONCE
Status: CANCELLED | OUTPATIENT
Start: 2022-09-21 | End: 2022-09-21

## 2022-09-21 RX ORDER — SCOLOPAMINE TRANSDERMAL SYSTEM 1 MG/1
1 PATCH, EXTENDED RELEASE TRANSDERMAL CONTINUOUS
Status: CANCELLED | OUTPATIENT
Start: 2022-09-21 | End: 2022-09-24

## 2022-09-22 ENCOUNTER — TELEPHONE (OUTPATIENT)
Dept: CARDIOLOGY | Facility: CLINIC | Age: 62
End: 2022-09-22

## 2022-09-22 NOTE — TELEPHONE ENCOUNTER
Patient is to have a DILATATION AND CURETTAGE HYSTEROSCOPY with Dr. Durbin on 10/06/2022 and is needing pre-operative cardiac risk assessment and medication recommendations. Please advise.

## 2022-10-05 ENCOUNTER — OFFICE VISIT (OUTPATIENT)
Dept: OBSTETRICS AND GYNECOLOGY | Facility: CLINIC | Age: 62
End: 2022-10-05

## 2022-10-05 ENCOUNTER — ANESTHESIA EVENT (OUTPATIENT)
Dept: PERIOP | Facility: HOSPITAL | Age: 62
End: 2022-10-05

## 2022-10-05 ENCOUNTER — PREP FOR SURGERY (OUTPATIENT)
Dept: OTHER | Facility: HOSPITAL | Age: 62
End: 2022-10-05

## 2022-10-05 ENCOUNTER — PRE-ADMISSION TESTING (OUTPATIENT)
Dept: PREADMISSION TESTING | Facility: HOSPITAL | Age: 62
End: 2022-10-05

## 2022-10-05 VITALS
DIASTOLIC BLOOD PRESSURE: 74 MMHG | WEIGHT: 206.2 LBS | SYSTOLIC BLOOD PRESSURE: 112 MMHG | HEIGHT: 64 IN | BODY MASS INDEX: 35.2 KG/M2

## 2022-10-05 VITALS — WEIGHT: 206.24 LBS | BODY MASS INDEX: 35.21 KG/M2 | HEIGHT: 64 IN

## 2022-10-05 DIAGNOSIS — N95.0 PMB (POSTMENOPAUSAL BLEEDING): ICD-10-CM

## 2022-10-05 DIAGNOSIS — R93.89 THICKENED ENDOMETRIUM: Primary | ICD-10-CM

## 2022-10-05 LAB
ABO GROUP BLD: NORMAL
ALBUMIN SERPL-MCNC: 4.9 G/DL (ref 3.5–5.2)
ALBUMIN/GLOB SERPL: 2.1 G/DL
ALP SERPL-CCNC: 85 U/L (ref 39–117)
ALT SERPL W P-5'-P-CCNC: 12 U/L (ref 1–33)
ANION GAP SERPL CALCULATED.3IONS-SCNC: 12 MMOL/L (ref 5–15)
AST SERPL-CCNC: 20 U/L (ref 1–32)
BASOPHILS # BLD AUTO: 0.04 10*3/MM3 (ref 0–0.2)
BASOPHILS NFR BLD AUTO: 0.5 % (ref 0–1.5)
BILIRUB SERPL-MCNC: 0.6 MG/DL (ref 0–1.2)
BLD GP AB SCN SERPL QL: NEGATIVE
BUN SERPL-MCNC: 18 MG/DL (ref 8–23)
BUN/CREAT SERPL: 26.1 (ref 7–25)
CALCIUM SPEC-SCNC: 9.9 MG/DL (ref 8.6–10.5)
CHLORIDE SERPL-SCNC: 101 MMOL/L (ref 98–107)
CO2 SERPL-SCNC: 25 MMOL/L (ref 22–29)
CREAT SERPL-MCNC: 0.69 MG/DL (ref 0.57–1)
DEPRECATED RDW RBC AUTO: 47.5 FL (ref 37–54)
EGFRCR SERPLBLD CKD-EPI 2021: 97.7 ML/MIN/1.73
EOSINOPHIL # BLD AUTO: 0.22 10*3/MM3 (ref 0–0.4)
EOSINOPHIL NFR BLD AUTO: 2.6 % (ref 0.3–6.2)
ERYTHROCYTE [DISTWIDTH] IN BLOOD BY AUTOMATED COUNT: 14.1 % (ref 12.3–15.4)
GLOBULIN UR ELPH-MCNC: 2.3 GM/DL
GLUCOSE SERPL-MCNC: 118 MG/DL (ref 65–99)
HCT VFR BLD AUTO: 41.3 % (ref 34–46.6)
HGB BLD-MCNC: 13.8 G/DL (ref 12–15.9)
IMM GRANULOCYTES # BLD AUTO: 0.09 10*3/MM3 (ref 0–0.05)
IMM GRANULOCYTES NFR BLD AUTO: 1.1 % (ref 0–0.5)
LYMPHOCYTES # BLD AUTO: 2.06 10*3/MM3 (ref 0.7–3.1)
LYMPHOCYTES NFR BLD AUTO: 24.2 % (ref 19.6–45.3)
MCH RBC QN AUTO: 30.9 PG (ref 26.6–33)
MCHC RBC AUTO-ENTMCNC: 33.4 G/DL (ref 31.5–35.7)
MCV RBC AUTO: 92.4 FL (ref 79–97)
MONOCYTES # BLD AUTO: 1.2 10*3/MM3 (ref 0.1–0.9)
MONOCYTES NFR BLD AUTO: 14.1 % (ref 5–12)
NEUTROPHILS NFR BLD AUTO: 4.92 10*3/MM3 (ref 1.7–7)
NEUTROPHILS NFR BLD AUTO: 57.5 % (ref 42.7–76)
NRBC BLD AUTO-RTO: 0 /100 WBC (ref 0–0.2)
PLATELET # BLD AUTO: 191 10*3/MM3 (ref 140–450)
PMV BLD AUTO: 9.5 FL (ref 6–12)
POTASSIUM SERPL-SCNC: 3.9 MMOL/L (ref 3.5–5.2)
PROT SERPL-MCNC: 7.2 G/DL (ref 6–8.5)
QT INTERVAL: 478 MS
QTC INTERVAL: 537 MS
RBC # BLD AUTO: 4.47 10*6/MM3 (ref 3.77–5.28)
RH BLD: POSITIVE
SODIUM SERPL-SCNC: 138 MMOL/L (ref 136–145)
T&S EXPIRATION DATE: NORMAL
WBC NRBC COR # BLD: 8.53 10*3/MM3 (ref 3.4–10.8)

## 2022-10-05 PROCEDURE — 93010 ELECTROCARDIOGRAM REPORT: CPT | Performed by: STUDENT IN AN ORGANIZED HEALTH CARE EDUCATION/TRAINING PROGRAM

## 2022-10-05 PROCEDURE — 86901 BLOOD TYPING SEROLOGIC RH(D): CPT

## 2022-10-05 PROCEDURE — 36415 COLL VENOUS BLD VENIPUNCTURE: CPT

## 2022-10-05 PROCEDURE — 86900 BLOOD TYPING SEROLOGIC ABO: CPT

## 2022-10-05 PROCEDURE — 93005 ELECTROCARDIOGRAM TRACING: CPT

## 2022-10-05 PROCEDURE — 99213 OFFICE O/P EST LOW 20 MIN: CPT | Performed by: OBSTETRICS & GYNECOLOGY

## 2022-10-05 PROCEDURE — 86850 RBC ANTIBODY SCREEN: CPT

## 2022-10-05 PROCEDURE — 85025 COMPLETE CBC W/AUTO DIFF WBC: CPT

## 2022-10-05 PROCEDURE — 80053 COMPREHEN METABOLIC PANEL: CPT

## 2022-10-05 RX ORDER — DULAGLUTIDE 1.5 MG/.5ML
0.5 INJECTION, SOLUTION SUBCUTANEOUS WEEKLY
COMMUNITY

## 2022-10-05 RX ORDER — FAMOTIDINE 10 MG/ML
20 INJECTION, SOLUTION INTRAVENOUS ONCE
Status: CANCELLED | OUTPATIENT
Start: 2022-10-05 | End: 2022-10-05

## 2022-10-05 RX ORDER — TRAMADOL HYDROCHLORIDE 50 MG/1
50 TABLET ORAL EVERY 6 HOURS PRN
COMMUNITY

## 2022-10-05 NOTE — PROGRESS NOTES
Gynecologic Preoperative Exam Note        Subjective   Lucia Stevens is a 62 y.o. year old No obstetric history on file. who is scheduled for hysteroscopy/D&C/endometrial polypectomy with Myosure at [facility] on 10/06/22 at 12:45 PM.  Pre Admission testing has been scheduled for 10/05/22 @ 3:15 PM at [facility]. Her pre operative diagnosis is Postmenopausal Vaginal Bleeding. She does not need to see her PCP for preop clearance for this surgery. Patient states she did get clearance from her Cardiologist. No LMP recorded. Patient is postmenopausal. Her BMI is Body mass index is 35.38 kg/m²..          She understands the risks of bleeding, infection, possible damage to other organ systems, including but not limited to the gastrointestinal tract and genitourinary tract.  She also understands the specific risks listed in the preop information (video, pamphlets, etc.).    She has reviewed and signed the preop consent form.    She has been instructed to have a light dinner the night before surgery, then nothing to eat or drink after midnight.  The day of surgery do not chew gum or smoke.  Remove all jewelry, nail polish, contact lenses prior to coming to the hospital.  Do not bring valuables or large sums of money with you. Patient was instructed on what time to arrive and where to check in, maps were given.  She was instructed that she will meet an Anesthesiologist and that an IV will be started to provide fluids and sedation.  The total time of procedure was discussed.  She was instructed that she will need a .      Allergies   Allergen Reactions   • Sulfa Antibiotics Anaphylaxis     She has confirmed that she is not allergic to Latex.     She is on the following medications. These were reviewed with the patient today and instructed on which medications are ok to take with a sip of water prior to the surgery.      Current Outpatient Medications:   •  allopurinol (ZYLOPRIM) 100 MG tablet,  Take 200 mg by mouth Daily., Disp: , Rfl:   •  aspirin 81 MG EC tablet, Take 81 mg by mouth daily., Disp: , Rfl:   •  bisoprolol (ZEBeta) 5 MG tablet, 1/2 tablet po qd, Disp: 90 tablet, Rfl: 3  •  Dulaglutide (Trulicity) 1.5 MG/0.5ML solution pen-injector, 0.5 mL., Disp: , Rfl:   •  DULoxetine (CYMBALTA) 30 MG capsule, Daily., Disp: , Rfl:   •  furosemide (LASIX) 40 MG tablet, Take 0.5 tablets by mouth 2 (Two) Times a Day. May take an additional tablet PRN for weight gain of 3lbs in one day or 5lbs in 1 wk (Patient taking differently: Take 20 mg by mouth Daily. May take an additional tablet PRN for weight gain of 3lbs in one day or 5lbs in 1 wk), Disp: , Rfl:   •  glimepiride (AMARYL) 2 MG tablet, Take 4 mg by mouth Daily., Disp: , Rfl: 1  •  icosapent ethyl (Vascepa) 1 g capsule capsule, Take 2 g by mouth 2 (Two) Times a Day With Meals., Disp: 120 capsule, Rfl: 11  •  Jardiance 25 MG tablet tablet, TAKE 1 TABLET EVERY DAY, Disp: 90 tablet, Rfl: 1  •  metFORMIN (GLUCOPHAGE) 850 MG tablet, Take 850 mg by mouth Daily With Breakfast., Disp: , Rfl:   •  midodrine (PROAMATINE) 5 MG tablet, TAKE 2 TABLETS TWICE DAILY, Disp: 360 tablet, Rfl: 0  •  nitroglycerin (NITROSTAT) 0.4 MG SL tablet, 1 under the tongue as needed for angina, may repeat q5mins for up three doses, Disp: 100 tablet, Rfl: 11  •  ondansetron (ZOFRAN) 4 MG tablet, Take 4 mg by mouth Every 8 (Eight) Hours As Needed for Nausea or Vomiting., Disp: , Rfl:   •  potassium chloride (MICRO-K) 10 MEQ CR capsule, Take 10 mEq by mouth Daily., Disp: , Rfl:   •  rizatriptan (MAXALT) 10 MG tablet, Take 10 mg by mouth 1 (One) Time As Needed for Migraine. May repeat in 2 hours if needed, Disp: , Rfl:   •  rosuvastatin (CRESTOR) 20 MG tablet, Take 1 tablet by mouth Daily., Disp: 90 tablet, Rfl: 3  •  spironolactone (ALDACTONE) 25 MG tablet, Take 1 tablet by mouth Daily., Disp: , Rfl:   •  traMADol (ULTRAM) 50 MG tablet, Take 50 mg by mouth Every 6 (Six) Hours As Needed for  Moderate Pain., Disp: , Rfl:   •  True Metrix Blood Glucose Test test strip, , Disp: , Rfl:      Past Medical History:   Diagnosis Date   • Tobacco abuse 2011    cessation    • Arthritis    • CHF (congestive heart failure) (HCC)    • Coronary artery disease     2 stents   • Depression    • Diabetes mellitus (HCC)     type 2 dm, 3 years, checks blood sugar every am   • Gout    • Hyperlipidemia    • Psoriasis    • Sjoegren syndrome    • SOB (shortness of breath) on exertion    • Wears dentures     full set   • Wears glasses      Past Surgical History:   Procedure Laterality Date   • CARDIAC CATHETERIZATION N/A 2017    Procedure: Left Heart Cath;  Surgeon: Galina Leonard MD;  Location:  [facility];  Service:    • CARDIAC ELECTROPHYSIOLOGY PROCEDURE N/A 3/28/2018    Procedure: Device Implant BiV ICD;  Surgeon: Sarbjit Ellison DO;  Location:  [facility] EP INVASIVE LOCATION;  Service: Cardiovascular   • CARDIAC CATHETERIZATION     • CERVICAL DISCECTOMY ANTERIOR     • CHOLECYSTECTOMY     • CORONARY ANGIOPLASTY     • CORONARY ANGIOPLASTY WITH STENT PLACEMENT     • CORONARY ANGIOPLASTY WITH STENT PLACEMENT     • CORONARY STENT PLACEMENT     • OTHER SURGICAL HISTORY      growth removed from small intestine as a child   • POLYPECTOMY      Colon polyps status   • RECTOVAGINAL FISTULA REPAIR     • TONSILLECTOMY       OB History   No obstetric history on file.     Social History     Tobacco Use   Smoking Status Former Smoker   • Years: 20.00   • Types: Cigarettes   • Quit date:    • Years since quittin.7   Smokeless Tobacco Never Used     Social History     Substance and Sexual Activity   Alcohol Use Yes   • Alcohol/week: 2.0 - 4.0 standard drinks   • Types: 1 - 2 Glasses of wine, 1 - 2 Cans of beer per week    Comment: OCCAS     Social History     Substance and Sexual Activity   Drug Use No         Review of Systems   Constitutional: Negative.    Respiratory: Negative.    Cardiovascular: Negative.     Gastrointestinal: Negative.    Genitourinary: Positive for pelvic pain and vaginal bleeding.   Skin: Negative.    Neurological: Negative.    Psychiatric/Behavioral: Negative.            Objective    Vitals:    10/05/22 1308   BP: 112/74         Physical Exam  Vitals reviewed.   Constitutional:       Appearance: Normal appearance.   HENT:      Head: Normocephalic and atraumatic.   Cardiovascular:      Rate and Rhythm: Normal rate and regular rhythm.   Pulmonary:      Effort: Pulmonary effort is normal.      Breath sounds: Normal breath sounds.   Abdominal:      General: Abdomen is flat. Bowel sounds are normal.      Palpations: Abdomen is soft.   Skin:     General: Skin is warm and dry.   Neurological:      Mental Status: She is alert and oriented to person, place, and time.   Psychiatric:         Mood and Affect: Mood normal.         Behavior: Behavior normal.         Assessment   Problem List Items Addressed This Visit        Genitourinary and Reproductive     PMB (postmenopausal bleeding)    Thickened endometrium - Primary                   Plan   1. Will proceed with dx hysteroscopy, dilation and curettage, endometrial polypectomy with Myosure.   2. Risks of surgery were reviewed with the patient including risks of bleeding, infection, damage to other organ systems including, but not limited to GI and  tracts (bowel, bladder, blood vessels, nerves) risks of Anesthesia, as well as the risk the surgery will not produce the desired results, possible need for additional surgery, death, risk of uterine perforation.  3. PAT Scheduled    4. Tera has been obtained and reviewed   5. Pain Medication Consent Form has been signed.  A review regarding proper medication administration, impact on driving and working while medicated, the safety of use in pregnancy, the potential for overdose and the proper disposal and storage of controlled medications has been done with the patient.          Davide Durbin  MD  10/5/2022

## 2022-10-05 NOTE — PAT
Instructed patient to take two Tylenol extra strength (total of 1000 mg) the night before surgery.    Patient instructed to drink 20 ounces of Gatorade and it needs to be completed 1 hour (for Main OR patients) or 2 hours (scheduled  section & [facility] patients) before given arrival time for procedure (NO RED Gatorade)    Patient verbalized understanding.    Blood bank bracelet applied to patient during Pre Admission Testing visit.  Patient instructed not to remove from arm until after procedure and they are discharged from the hospital.  Explained to patient that they may shower and get the bracelet wet, but not to immerse under water for longer periods (bathing, swimming, hand dishwashing, etc).  Patient verbalized understanding.    Consent signed.    Device check in chart from 22.    Cardiac risk per Dr. Borden in chart 22.    EKG in PAT.

## 2022-10-06 ENCOUNTER — HOSPITAL ENCOUNTER (OUTPATIENT)
Facility: HOSPITAL | Age: 62
Setting detail: HOSPITAL OUTPATIENT SURGERY
Discharge: HOME | End: 2022-10-06
Attending: OBSTETRICS & GYNECOLOGY | Admitting: OBSTETRICS & GYNECOLOGY

## 2022-10-06 ENCOUNTER — ANESTHESIA (OUTPATIENT)
Dept: PERIOP | Facility: HOSPITAL | Age: 62
End: 2022-10-06

## 2022-10-06 VITALS
SYSTOLIC BLOOD PRESSURE: 137 MMHG | HEIGHT: 64 IN | OXYGEN SATURATION: 98 % | WEIGHT: 206 LBS | RESPIRATION RATE: 16 BRPM | HEART RATE: 70 BPM | DIASTOLIC BLOOD PRESSURE: 74 MMHG | BODY MASS INDEX: 35.17 KG/M2 | TEMPERATURE: 98.5 F

## 2022-10-06 DIAGNOSIS — N95.0 PMB (POSTMENOPAUSAL BLEEDING): ICD-10-CM

## 2022-10-06 DIAGNOSIS — N95.0 POST-MENOPAUSAL BLEEDING: ICD-10-CM

## 2022-10-06 LAB
ABO GROUP BLD: NORMAL
GLUCOSE BLDC GLUCOMTR-MCNC: 108 MG/DL (ref 70–130)
RH BLD: POSITIVE

## 2022-10-06 PROCEDURE — 86901 BLOOD TYPING SEROLOGIC RH(D): CPT

## 2022-10-06 PROCEDURE — 86900 BLOOD TYPING SEROLOGIC ABO: CPT

## 2022-10-06 PROCEDURE — 25010000002 ONDANSETRON PER 1 MG: Performed by: ANESTHESIOLOGY

## 2022-10-06 PROCEDURE — 88305 TISSUE EXAM BY PATHOLOGIST: CPT | Performed by: OBSTETRICS & GYNECOLOGY

## 2022-10-06 PROCEDURE — 25010000002 PROPOFOL 10 MG/ML EMULSION: Performed by: ANESTHESIOLOGY

## 2022-10-06 PROCEDURE — S0260 H&P FOR SURGERY: HCPCS | Performed by: OBSTETRICS & GYNECOLOGY

## 2022-10-06 PROCEDURE — 25010000002 FENTANYL CITRATE (PF) 50 MCG/ML SOLUTION: Performed by: ANESTHESIOLOGY

## 2022-10-06 PROCEDURE — 25010000002 DEXAMETHASONE PER 1 MG: Performed by: ANESTHESIOLOGY

## 2022-10-06 PROCEDURE — 58558 HYSTEROSCOPY BIOPSY: CPT | Performed by: OBSTETRICS & GYNECOLOGY

## 2022-10-06 PROCEDURE — 82962 GLUCOSE BLOOD TEST: CPT

## 2022-10-06 RX ORDER — FENTANYL CITRATE 50 UG/ML
INJECTION, SOLUTION INTRAMUSCULAR; INTRAVENOUS AS NEEDED
Status: DISCONTINUED | OUTPATIENT
Start: 2022-10-06 | End: 2022-10-06 | Stop reason: SURG

## 2022-10-06 RX ORDER — MIDAZOLAM HYDROCHLORIDE 1 MG/ML
1 INJECTION INTRAMUSCULAR; INTRAVENOUS
Status: DISCONTINUED | OUTPATIENT
Start: 2022-10-06 | End: 2022-10-06 | Stop reason: HOSPADM

## 2022-10-06 RX ORDER — SODIUM CHLORIDE 0.9 % (FLUSH) 0.9 %
10 SYRINGE (ML) INJECTION AS NEEDED
Status: DISCONTINUED | OUTPATIENT
Start: 2022-10-06 | End: 2022-10-06 | Stop reason: HOSPADM

## 2022-10-06 RX ORDER — PROMETHAZINE HYDROCHLORIDE 12.5 MG/1
12.5 TABLET ORAL EVERY 6 HOURS PRN
Qty: 12 TABLET | Refills: 0 | Status: SHIPPED | OUTPATIENT
Start: 2022-10-06

## 2022-10-06 RX ORDER — ONDANSETRON 2 MG/ML
INJECTION INTRAMUSCULAR; INTRAVENOUS AS NEEDED
Status: DISCONTINUED | OUTPATIENT
Start: 2022-10-06 | End: 2022-10-06 | Stop reason: SURG

## 2022-10-06 RX ORDER — HYDROMORPHONE HYDROCHLORIDE 1 MG/ML
0.5 INJECTION, SOLUTION INTRAMUSCULAR; INTRAVENOUS; SUBCUTANEOUS
Status: DISCONTINUED | OUTPATIENT
Start: 2022-10-06 | End: 2022-10-06 | Stop reason: HOSPADM

## 2022-10-06 RX ORDER — SODIUM CHLORIDE 9 MG/ML
INJECTION, SOLUTION INTRAVENOUS CONTINUOUS PRN
Status: COMPLETED | OUTPATIENT
Start: 2022-10-06 | End: 2022-10-06

## 2022-10-06 RX ORDER — FAMOTIDINE 20 MG/1
20 TABLET, FILM COATED ORAL ONCE
Status: DISCONTINUED | OUTPATIENT
Start: 2022-10-06 | End: 2022-10-06 | Stop reason: HOSPADM

## 2022-10-06 RX ORDER — CEFAZOLIN SODIUM IN 0.9 % NACL 2 G/100 ML
2 PLASTIC BAG, INJECTION (ML) INTRAVENOUS ONCE
Status: COMPLETED | OUTPATIENT
Start: 2022-10-06 | End: 2022-10-06

## 2022-10-06 RX ORDER — FENTANYL CITRATE 50 UG/ML
50 INJECTION, SOLUTION INTRAMUSCULAR; INTRAVENOUS
Status: DISCONTINUED | OUTPATIENT
Start: 2022-10-06 | End: 2022-10-06 | Stop reason: HOSPADM

## 2022-10-06 RX ORDER — SCOLOPAMINE TRANSDERMAL SYSTEM 1 MG/1
1 PATCH, EXTENDED RELEASE TRANSDERMAL CONTINUOUS
Status: DISCONTINUED | OUTPATIENT
Start: 2022-10-06 | End: 2022-10-06 | Stop reason: HOSPADM

## 2022-10-06 RX ORDER — SODIUM CHLORIDE, SODIUM LACTATE, POTASSIUM CHLORIDE, CALCIUM CHLORIDE 600; 310; 30; 20 MG/100ML; MG/100ML; MG/100ML; MG/100ML
9 INJECTION, SOLUTION INTRAVENOUS CONTINUOUS
Status: DISCONTINUED | OUTPATIENT
Start: 2022-10-06 | End: 2022-10-06 | Stop reason: HOSPADM

## 2022-10-06 RX ORDER — HYDROCODONE BITARTRATE AND ACETAMINOPHEN 5; 325 MG/1; MG/1
1 TABLET ORAL EVERY 6 HOURS PRN
Qty: 8 TABLET | Refills: 0 | Status: SHIPPED | OUTPATIENT
Start: 2022-10-06

## 2022-10-06 RX ORDER — GABAPENTIN 300 MG/1
600 CAPSULE ORAL ONCE
Status: DISCONTINUED | OUTPATIENT
Start: 2022-10-06 | End: 2022-10-06 | Stop reason: HOSPADM

## 2022-10-06 RX ORDER — ACETAMINOPHEN 500 MG
1000 TABLET ORAL ONCE
Status: DISCONTINUED | OUTPATIENT
Start: 2022-10-06 | End: 2022-10-06 | Stop reason: HOSPADM

## 2022-10-06 RX ORDER — SODIUM CHLORIDE 0.9 % (FLUSH) 0.9 %
10 SYRINGE (ML) INJECTION EVERY 12 HOURS SCHEDULED
Status: DISCONTINUED | OUTPATIENT
Start: 2022-10-06 | End: 2022-10-06 | Stop reason: HOSPADM

## 2022-10-06 RX ORDER — DEXAMETHASONE SODIUM PHOSPHATE 4 MG/ML
INJECTION, SOLUTION INTRA-ARTICULAR; INTRALESIONAL; INTRAMUSCULAR; INTRAVENOUS; SOFT TISSUE AS NEEDED
Status: DISCONTINUED | OUTPATIENT
Start: 2022-10-06 | End: 2022-10-06 | Stop reason: SURG

## 2022-10-06 RX ORDER — LIDOCAINE HYDROCHLORIDE 10 MG/ML
INJECTION, SOLUTION EPIDURAL; INFILTRATION; INTRACAUDAL; PERINEURAL AS NEEDED
Status: DISCONTINUED | OUTPATIENT
Start: 2022-10-06 | End: 2022-10-06 | Stop reason: SURG

## 2022-10-06 RX ORDER — PROPOFOL 10 MG/ML
VIAL (ML) INTRAVENOUS AS NEEDED
Status: DISCONTINUED | OUTPATIENT
Start: 2022-10-06 | End: 2022-10-06 | Stop reason: SURG

## 2022-10-06 RX ORDER — LIDOCAINE HYDROCHLORIDE 10 MG/ML
0.5 INJECTION, SOLUTION EPIDURAL; INFILTRATION; INTRACAUDAL; PERINEURAL ONCE AS NEEDED
Status: DISCONTINUED | OUTPATIENT
Start: 2022-10-06 | End: 2022-10-06 | Stop reason: HOSPADM

## 2022-10-06 RX ADMIN — GABAPENTIN 600 MG: 300 CAPSULE ORAL at 12:00

## 2022-10-06 RX ADMIN — PROPOFOL 50 MG: 10 INJECTION, EMULSION INTRAVENOUS at 12:38

## 2022-10-06 RX ADMIN — PROPOFOL 100 MG: 10 INJECTION, EMULSION INTRAVENOUS at 12:36

## 2022-10-06 RX ADMIN — PROPOFOL 50 MG: 10 INJECTION, EMULSION INTRAVENOUS at 12:39

## 2022-10-06 RX ADMIN — FENTANYL CITRATE 25 MCG: 50 INJECTION, SOLUTION INTRAMUSCULAR; INTRAVENOUS at 12:36

## 2022-10-06 RX ADMIN — SODIUM CHLORIDE, POTASSIUM CHLORIDE, SODIUM LACTATE AND CALCIUM CHLORIDE: 600; 310; 30; 20 INJECTION, SOLUTION INTRAVENOUS at 12:43

## 2022-10-06 RX ADMIN — DEXAMETHASONE SODIUM PHOSPHATE 4 MG: 4 INJECTION, SOLUTION INTRA-ARTICULAR; INTRALESIONAL; INTRAMUSCULAR; INTRAVENOUS; SOFT TISSUE at 12:43

## 2022-10-06 RX ADMIN — ACETAMINOPHEN 1000 MG: 500 TABLET, FILM COATED ORAL at 12:00

## 2022-10-06 RX ADMIN — FENTANYL CITRATE 25 MCG: 50 INJECTION, SOLUTION INTRAMUSCULAR; INTRAVENOUS at 12:43

## 2022-10-06 RX ADMIN — LIDOCAINE HYDROCHLORIDE 50 MG: 10 INJECTION, SOLUTION EPIDURAL; INFILTRATION; INTRACAUDAL; PERINEURAL at 12:36

## 2022-10-06 RX ADMIN — ONDANSETRON 4 MG: 2 INJECTION INTRAMUSCULAR; INTRAVENOUS at 12:50

## 2022-10-06 RX ADMIN — CEFAZOLIN 2 G: 10 INJECTION, POWDER, FOR SOLUTION INTRAVENOUS at 12:38

## 2022-10-06 NOTE — ANESTHESIA PROCEDURE NOTES
Airway  Urgency: elective    Date/Time: 10/6/2022 12:38 PM  Airway not difficult    General Information and Staff    Patient location during procedure: OR  SRNA: Nicole Serna SRNA  Indications and Patient Condition  Indications for airway management: airway protection    Preoxygenated: yes  Mask difficulty assessment: 0 - not attempted    Final Airway Details  Final airway type: supraglottic airway      Successful airway: I-gel  Size 4    Number of attempts at approach: 1  Assessment: lips, teeth, and gum same as pre-op    Additional Comments  LMA placed without difficulty, ventilation with assist, equal breath sounds and symmetric chest rise and fall

## 2022-10-06 NOTE — ANESTHESIA PREPROCEDURE EVALUATION
Anesthesia Evaluation     Patient summary reviewed and Nursing notes reviewed   no history of anesthetic complications:  NPO Solid Status: > 8 hours  NPO Liquid Status: > 2 hours           Airway   Mallampati: II  TM distance: >3 FB  Neck ROM: full  Possible difficult intubation  Dental    (+) lower dentures and upper dentures    Pulmonary - normal exam   (+) a smoker Former, shortness of breath (unchanged from prior),   Cardiovascular - normal exam    ECG reviewed    (+) pacemaker ICD, pacemaker, hypertension, CAD, cardiac stents (~2014) CHF , NAYLOR, hyperlipidemia,     ROS comment: A sensed v paced  6/30/21 - lvef 43, gr1dd, rvsp nl, nl valves    9/8/22- battery life 7y on CRT      Cardiac risk per Dr. Borden in chart 9/22/22 - non prohibitive risk    Neuro/Psych  (+) psychiatric history,    (-) seizures, CVA  GI/Hepatic/Renal/Endo    (+) obesity,  hiatal hernia,  diabetes mellitus,     Musculoskeletal     Abdominal    Substance History   (+) alcohol use,   (-) drug use     OB/GYN          Other   arthritis, autoimmune disease rheumatoid arthritis and Sjogren syndrome,      ROS/Med Hx Other: hgb 13.8 k 3.9                Anesthesia Plan    ASA 3     general     (Risks and benefits of general anesthesia discussed with patient (including MI, CVA, death, recall, aspiration, oropharyngeal/dental damage), questions answered, agreeable to proceed.  )  intravenous induction     Anesthetic plan, risks, benefits, and alternatives have been provided, discussed and informed consent has been obtained with: patient.        CODE STATUS:

## 2022-10-06 NOTE — ANESTHESIA POSTPROCEDURE EVALUATION
Patient: Lucia Stevens    Procedure Summary     Date: 10/06/22 [facility] / [facility]    Anesthesia Start: 1225 Anesthesia Stop: 1302    Procedure: DILATATION AND CURETTAGE HYSTEROSCOPY (N/A Vagina) Diagnosis:     Surgeons: Davide Durbin MD Provider: Brooklyn Tellez DO    Anesthesia Type: general ASA Status: 3          Anesthesia Type: general    Vitals  Vitals Value Taken Time   /80 10/06/22 1303   Temp 98 °F (36.7 °C) 10/06/22 1303   Pulse 77 10/06/22 1303   Resp 15 10/06/22 1303   SpO2 99 % 10/06/22 1303           Post Anesthesia Care and Evaluation    Patient location during evaluation: PACU  Patient participation: complete - patient participated  Level of consciousness: awake and alert  Pain management: adequate    Airway patency: patent  Anesthetic complications: No anesthetic complications  PONV Status: none  Cardiovascular status: hemodynamically stable and acceptable  Respiratory status: nonlabored ventilation, acceptable and nasal cannula  Hydration status: acceptable

## 2022-10-06 NOTE — ANESTHESIA PROCEDURE NOTES
Peripheral IV    Patient location during procedure: pre-op  Line placed for Difficult Access.  Performed By   CRNA/CAA: Stacia Goodwin CRNA  Preanesthetic Checklist  Completed: patient identified, IV checked, site marked, risks and benefits discussed, surgical consent, monitors and equipment checked, pre-op evaluation and timeout performed  Peripheral IV Prep   Patient position: supine   Prep: ChloraPrep  Peripheral IV Procedure   Laterality:left  Location:  Hand  Catheter size: 20 G         Post Assessment   Dressing Type: tape and transparent.    IV Dressing/Site: clean, dry and intact

## 2022-10-06 NOTE — DISCHARGE SUMMARY
Discharge Summary    Date of Admission: 10/6/2022  Date of Discharge:  10/6/2022      Patient: Lucia Stevens      MR#:[id number]    Primary Surgeon/OB: Davide Durbin MD    Discharge Surgeon/OB: Davide Durbin MD    Presenting Problem/History of Present Illness  No admission diagnoses are documented for this encounter.     Patient Active Problem List    Diagnosis    • PMB (postmenopausal bleeding) [N95.0]    • Thickened endometrium [R93.89]    • Dyslipidemia [E78.5]    • Biventricular ICD (implantable cardioverter-defibrillator) in place [Z95.810]    • Ischemic cardiomyopathy [I25.5]    • NAYLOR (dyspnea on exertion) [R06.09]    • Coronary artery disease involving native coronary artery of native heart with angina pectoris (HCC) [I25.119]    • Psoriasis [L40.9]    • Secondary Sjogren's syndrome (HCC) [M35.00]    • Hiatal hernia [K44.9]    • Obesity [E66.9]    • Hypertension [I10]    • Ischemic heart disease [I25.9]        Preop Diagnosis:      Discharge Diagnosis:     Procedures:        Discharge Date: 10/6/2022; Discharge Time: 13:04 EDT        Hospital Course  Patient is a 62 y.o. female  status post hysteroscopy, D&C with uneventful postoperative recovery.  Patient was recovered in pacu and was discharged home after voiding, ambulating, and tolerating po.      Condition on Discharge:  Stable    Vital Signs  Temp:  [97.6 °F (36.4 °C)-98 °F (36.7 °C)] 98 °F (36.7 °C)  Heart Rate:  [71-77] 77  Resp:  [15-18] 15  BP: (112-138)/(74-80) 135/80    Lab Results   Component Value Date    WBC 8.53 10/05/2022    HGB 13.8 10/05/2022    HCT 41.3 10/05/2022    MCV 92.4 10/05/2022     10/05/2022       Discharge Disposition  Home or Self Care    Discharge Medications     Discharge Medications      New Medications      Instructions Start Date   HYDROcodone-acetaminophen 5-325 MG per tablet  Commonly known as: Norco   1 tablet, Oral, Every 6 Hours PRN      promethazine 12.5 MG tablet  Commonly known as:  PHENERGAN   12.5 mg, Oral, Every 6 Hours PRN         Changes to Medications      Instructions Start Date   furosemide 40 MG tablet  Commonly known as: LASIX  What changed: when to take this   20 mg, Oral, 2 Times Daily, May take an additional tablet PRN for weight gain of 3lbs in one day or 5lbs in 1 wk         Continue These Medications      Instructions Start Date   allopurinol 100 MG tablet  Commonly known as: ZYLOPRIM   200 mg, Oral, Daily      aspirin 81 MG EC tablet   81 mg, Oral, Daily      bisoprolol 5 MG tablet  Commonly known as: ZEBeta   1/2 tablet po qd      DULoxetine 30 MG capsule  Commonly known as: CYMBALTA   30 mg, Oral, Daily      glimepiride 2 MG tablet  Commonly known as: AMARYL   4 mg, Oral, Daily      icosapent ethyl 1 g capsule capsule  Commonly known as: Vascepa   2 g, Oral, 2 Times Daily With Meals      Jardiance 25 MG tablet tablet  Generic drug: empagliflozin   TAKE 1 TABLET EVERY DAY      metFORMIN 850 MG tablet  Commonly known as: GLUCOPHAGE   850 mg, Oral, Daily With Breakfast      midodrine 5 MG tablet  Commonly known as: PROAMATINE   TAKE 2 TABLETS TWICE DAILY      nitroglycerin 0.4 MG SL tablet  Commonly known as: NITROSTAT   1 under the tongue as needed for angina, may repeat q5mins for up three doses      ondansetron 4 MG tablet  Commonly known as: ZOFRAN   4 mg, Oral, Every 8 Hours PRN      potassium chloride 10 MEQ CR capsule  Commonly known as: MICRO-K   10 mEq, Oral, Daily      rizatriptan 10 MG tablet  Commonly known as: MAXALT   10 mg, Oral, Once As Needed, May repeat in 2 hours if needed       rosuvastatin 20 MG tablet  Commonly known as: CRESTOR   20 mg, Oral, Daily      spironolactone 25 MG tablet  Commonly known as: ALDACTONE   25 mg, Oral, Daily      traMADol 50 MG tablet  Commonly known as: ULTRAM   50 mg, Oral, Every 6 Hours PRN      True Metrix Blood Glucose Test test strip  Generic drug: glucose blood   No dose, route, or frequency recorded.      Trulicity 1.5  MG/0.5ML solution pen-injector  Generic drug: Dulaglutide   0.5 mL, Subcutaneous, Weekly, Every Monday             Discharge Medications  [unfilled]    Follow-up Appointments  Future Appointments   Date Time Provider Department Center   10/18/2022  1:30 PM Davide Durbin MD [facility]   1/12/2023 10:30 AM Virgilio Borden MD [facility]   3/8/2023 11:15 AM Rosmery De Paz APRN [facility]         Davide Durbin MD  10/06/22  13:04 EDT  [facility]

## 2022-10-06 NOTE — H&P
[facility] GYN History and Physical     Patient ID: Lucia Stevens is a 62 y.o. female.    Chief Complaint:  Postmenopausal bleeding    Menstrual History:  OB History    No obstetric history on file.        62 year old female with postmenopausal bleeding and cramping. She was seen by her PCP and underwent imaging and found to have thickened endometrial lining.  An attempt at EMB was performed in the office and unsuccessful due to cervical stenosis. Discussed need for further workup and will proceed with hysteroscopy, Diation and currettage.      The following portions of the patient's history were reviewed and updated as appropriate: allergies, current medications, past family history, past medical history, past social history, past surgical history and problem list.  Patient Active Problem List    Diagnosis    • PMB (postmenopausal bleeding) [N95.0]    • Thickened endometrium [R93.89]    • Dyslipidemia [E78.5]    • Biventricular ICD (implantable cardioverter-defibrillator) in place [Z95.810]    • Ischemic cardiomyopathy [I25.5]    • NAYLOR (dyspnea on exertion) [R06.09]    • Coronary artery disease involving native coronary artery of native heart with angina pectoris (HCC) [I25.119]    • Psoriasis [L40.9]    • Secondary Sjogren's syndrome (HCC) [M35.00]    • Hiatal hernia [K44.9]    • Obesity [E66.9]    • Hypertension [I10]    • Ischemic heart disease [I25.9]    Review of Systems   Constitutional: Negative.    Respiratory: Negative.    Cardiovascular: Negative.    Gastrointestinal: Negative.    Genitourinary: Positive for vaginal bleeding.   Musculoskeletal: Negative.    Skin: Negative.    Neurological: Negative.    Psychiatric/Behavioral: Negative.       Objective   There were no vitals taken for this visit.  Physical Exam  Vitals reviewed.   Constitutional:       Appearance: Normal appearance.   HENT:      Head: Normocephalic and atraumatic.   Cardiovascular:      Rate and Rhythm: Normal rate and regular rhythm.    Pulmonary:      Effort: Pulmonary effort is normal.      Breath sounds: Normal breath sounds.   Abdominal:      General: Abdomen is flat. Bowel sounds are normal.      Palpations: Abdomen is soft.   Musculoskeletal:      Cervical back: Neck supple.   Skin:     General: Skin is warm and dry.   Neurological:      Mental Status: She is alert and oriented to person, place, and time.   Psychiatric:         Mood and Affect: Mood normal.         Behavior: Behavior normal.               Assessment     1. PMB (postmenopausal bleeding)     postmenopausal bleeding with thickened endometrial lining.        Plan   Will proceed with diagnostic hysterscopy with dilation and curettage.         Davide Durbin MD

## 2022-10-06 NOTE — OP NOTE
DILATATION AND CURETTAGE HYSTEROSCOPY  Procedure Note    Lucia Stevens  10/6/2022    Pre-op Diagnosis:   * No pre-op diagnosis entered *  Postmenopausal bleeding  Cervical stenosis    Post-op Diagnosis:     * No Diagnosis Codes entered *  Postmenopausal bleeding  Cervical stenosis    Procedure(s):  DILATATION AND CURETTAGE HYSTEROSCOPY    Surgeon(s):  Davide Durbin MD    Anesthesia: General    Staff:   Circulator: Chiquita Meier RN  Scrub Person: Sal Lobato; Parag Joseph; Dallas Joseph  Nursing Assistant: Lizett Richard    Estimated Blood Loss: none    Specimens:                Order Name Source Comment Collection Info Order Time   POTASSIUM  For all patients with renal disease, within 3 days if taking digoxin, potassium-depleting anti-hypertensives, or diuretics.  10/5/2022  5:06 PM   ABO/RH SPECIMEN VERIFICATION PREOP   Collected By: Janet Zimmerman RN 10/6/2022 11:50 AM     Release to patient   Routine Release              Drains: * No LDAs found *    Indications: postmenopausal bleeding     Findings: Thin/atrophic endometrial lining    Operative procedure: The patient was taken to the operating room where general anesthesia was found to be adequate.  The patient was placed in the dorsal supine position with legs in lithotomy stirrups.  The patient was prepped and draped in normal sterile fashion.  A red rubber Hi was used to drain urinary bladder.  A weighted speculum was then placed in the patient's vagina visualizing the cervix which was grasped with a single-tooth tenaculum.  The cervix was then progressively dilated until a  hysteroscope could be introduced into the endometrial cavity.  Normal saline was used for distention media and the endometrial cavity was surveyed and found to be thin and atrophic.  No masses polyps or fibroids seen.  The hysteroscope was then removed and a sharp curettage was performed retrieving minimal tissue.  All instruments were then removed from the  patient's vagina and good hemostasis noted.  All needle and lap counts were correct.  The patient tolerated the procedure well and taken to the recovery room in stable condition.    Complications: none      Davide Durbin MD     Date: 10/6/2022  Time: 12:57 EDT

## 2022-10-10 LAB
CYTO UR: NORMAL
LAB AP CASE REPORT: NORMAL
LAB AP CLINICAL INFORMATION: NORMAL
PATH REPORT.FINAL DX SPEC: NORMAL
PATH REPORT.GROSS SPEC: NORMAL

## 2022-10-18 ENCOUNTER — OFFICE VISIT (OUTPATIENT)
Dept: OBSTETRICS AND GYNECOLOGY | Facility: CLINIC | Age: 62
End: 2022-10-18

## 2022-10-18 VITALS
SYSTOLIC BLOOD PRESSURE: 102 MMHG | WEIGHT: 208 LBS | BODY MASS INDEX: 35.51 KG/M2 | HEIGHT: 64 IN | DIASTOLIC BLOOD PRESSURE: 80 MMHG

## 2022-10-18 DIAGNOSIS — N76.0 BV (BACTERIAL VAGINOSIS): ICD-10-CM

## 2022-10-18 DIAGNOSIS — Z48.89 POSTOPERATIVE VISIT: Primary | ICD-10-CM

## 2022-10-18 DIAGNOSIS — B96.89 BV (BACTERIAL VAGINOSIS): ICD-10-CM

## 2022-10-18 LAB — WET PREP GENITAL: NORMAL

## 2022-10-18 PROCEDURE — 99213 OFFICE O/P EST LOW 20 MIN: CPT | Performed by: OBSTETRICS & GYNECOLOGY

## 2022-10-18 PROCEDURE — 87210 SMEAR WET MOUNT SALINE/INK: CPT | Performed by: OBSTETRICS & GYNECOLOGY

## 2022-10-18 RX ORDER — METRONIDAZOLE 500 MG/1
500 TABLET ORAL 2 TIMES DAILY
Qty: 14 TABLET | Refills: 0 | Status: SHIPPED | OUTPATIENT
Start: 2022-10-18 | End: 2022-10-25
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
## 2022-12-12 DIAGNOSIS — E78.5 DYSLIPIDEMIA: Primary | ICD-10-CM

## 2022-12-12 DIAGNOSIS — E78.1 HYPERTRIGLYCERIDEMIA: ICD-10-CM

## 2022-12-12 PROCEDURE — 93296 REM INTERROG EVL PM/IDS: CPT | Performed by: INTERNAL MEDICINE

## 2022-12-12 PROCEDURE — 93295 DEV INTERROG REMOTE 1/2/MLT: CPT | Performed by: INTERNAL MEDICINE

## 2022-12-14 RX ORDER — EMPAGLIFLOZIN 25 MG/1
TABLET, FILM COATED ORAL
Qty: 90 TABLET | Refills: 1 | Status: SHIPPED | OUTPATIENT
Start: 2022-12-14

## 2023-01-05 ENCOUNTER — HOSPITAL ENCOUNTER (OUTPATIENT)
Age: 63
End: 2023-01-05
Payer: MEDICARE

## 2023-01-05 DIAGNOSIS — E78.5: Primary | ICD-10-CM

## 2023-01-05 DIAGNOSIS — E78.1: ICD-10-CM

## 2023-01-05 DIAGNOSIS — E79.0: ICD-10-CM

## 2023-01-05 LAB
CHOLEST SPEC-SCNC: 142 MG/DL (ref 140–200)
HDLC SERPL-MCNC: 26 MG/DL (ref 40–60)
TRIGLYCERIDES: 488 MG/DL (ref 30–150)
URATE SERPL-SCNC: 4.7 MG/DL (ref 2.5–6.2)

## 2023-01-05 PROCEDURE — 84550 ASSAY OF BLOOD/URIC ACID: CPT

## 2023-01-05 PROCEDURE — 80061 LIPID PANEL: CPT

## 2023-01-05 PROCEDURE — 36415 COLL VENOUS BLD VENIPUNCTURE: CPT

## 2023-01-09 RX ORDER — BISOPROLOL FUMARATE 5 MG/1
TABLET, FILM COATED ORAL
Qty: 45 TABLET | Refills: 3 | Status: SHIPPED | OUTPATIENT
Start: 2023-01-09

## 2023-01-12 ENCOUNTER — OFFICE VISIT (OUTPATIENT)
Dept: CARDIOLOGY | Facility: CLINIC | Age: 63
End: 2023-01-12
Payer: MEDICARE

## 2023-01-12 VITALS
WEIGHT: 210 LBS | OXYGEN SATURATION: 97 % | DIASTOLIC BLOOD PRESSURE: 70 MMHG | SYSTOLIC BLOOD PRESSURE: 110 MMHG | HEART RATE: 83 BPM | BODY MASS INDEX: 35.85 KG/M2 | HEIGHT: 64 IN

## 2023-01-12 DIAGNOSIS — E11.65 TYPE 2 DIABETES MELLITUS WITH HYPERGLYCEMIA, WITHOUT LONG-TERM CURRENT USE OF INSULIN: ICD-10-CM

## 2023-01-12 DIAGNOSIS — I50.22 CHRONIC SYSTOLIC CONGESTIVE HEART FAILURE: Primary | ICD-10-CM

## 2023-01-12 DIAGNOSIS — I10 PRIMARY HYPERTENSION: ICD-10-CM

## 2023-01-12 DIAGNOSIS — E78.2 MIXED HYPERLIPIDEMIA: ICD-10-CM

## 2023-01-12 DIAGNOSIS — I25.10 CORONARY ARTERY DISEASE INVOLVING NATIVE CORONARY ARTERY OF NATIVE HEART WITHOUT ANGINA PECTORIS: ICD-10-CM

## 2023-01-12 PROCEDURE — 99214 OFFICE O/P EST MOD 30 MIN: CPT | Performed by: INTERNAL MEDICINE

## 2023-01-12 PROCEDURE — 93284 PRGRMG EVAL IMPLANTABLE DFB: CPT | Performed by: INTERNAL MEDICINE

## 2023-01-12 RX ORDER — TEMAZEPAM 15 MG/1
15 CAPSULE ORAL NIGHTLY PRN
COMMUNITY
Start: 2022-12-27

## 2023-01-12 RX ORDER — PREGABALIN 75 MG/1
CAPSULE ORAL 2 TIMES DAILY
COMMUNITY
Start: 2023-01-11

## 2023-01-12 NOTE — PROGRESS NOTES
[facility] Cardiology  Office Progress Note  Lucia Stevens  1960  [address] [facility] [address]       Visit Date: 01/12/23    PCP: Johan Marks MD  [address]  [facility] [place] [address]    IDENTIFICATION: A 62 y.o. female disabled from [employer] and is a resident of [place], [place].     PROBLEM LIST:   1. CAD/ischemic cardiomyopathy  a. GXT myocardial perfusion study, 06/07/2005, revealing a moderate sized reversible defect in anteroseptal region with diminished myocardial thickening and hypokinesis. LVEF 58%.  b. Cardiac catheterization, June 2005, Dr. Talbot, due to moderate sized reversible anteroseptal defect; LVEF 38%: JADE stenting of PDA (2.5x13 mm Cypher).  c. JADE stenting of mid-RCA, 08/08/2011: 15 mm Promus JADE; LVEF 55% to 60%.  d. 11/30/17 echo: EF 30%, LA borderline dilated, moderate to severe MR, mild-to-moderate TR RVSP 41 mmHg  e. 11/30/2017 LHC: Chronic total occlusion of the RCA served by left-sided collaterals, nonobstructive disease of the left coronary system, cardiomyopathy with EF 25 to 30%.  f. LifeVest initiated 11/2017  g. 2/26/18 echo EF 25%, mild-mod MR  h. 3/28/18 BSc. BiVICD implantation Terra  i. 3/27/2018 echo EF 20%, moderate MR  j. 9/24/18 echo: EF 40%, multiple LV wall segments hypokinetic, grade 1 diastolic dysfunction, mild concentric LVH, no significant valvular abnormalities, no pulmonary hypertension  k. 6/21 echo EF 43% rvsp 23  2. HTN  a. 10/10/2000 1824-hour BP monitor average 127/60  b. Intolerant to higher doses coreg  3. HLD  a. 11/30/17 lipids:   HDL 29 LDL 66  b. 6/20/18   HDL 23 LDL cannot be calculated  c. 8/21 143/483/24/48  4. DM  a. 11/30/17 A1c 8.2  b. 3/27/18 A1c 7.3  5. Remote tobacco abuse:   a. Quit 2011 6. 9/2021 Covid 19 + -received antibiotic infusion  7. Hiatal hernia.  8. Sjogren’s syndrome.  9. Psoriasis.  10. Colon polyps status polypectomy.  11. Depression.PTSD-  estranged son 2021 12. Surgical history:  a. Appendectomy.  b. Cholecystectomy.  c. Rectovaginal fistula repair.  d. Cervical discectomy.   e. 10/22 D& C      CC:   Chief Complaint   Patient presents with   • Coronary Artery Disease              Allergies  Allergies   Allergen Reactions   • Sulfa Antibiotics Anaphylaxis     Lip swelling       Current Medications    Current Outpatient Medications:   •  allopurinol (ZYLOPRIM) 100 MG tablet, Take 200 mg by mouth Daily., Disp: , Rfl:   •  aspirin 81 MG EC tablet, Take 81 mg by mouth daily., Disp: , Rfl:   •  bisoprolol (ZEBeta) 5 MG tablet, TAKE 1/2 TABLET EVERY DAY, Disp: 45 tablet, Rfl: 3  •  Dulaglutide (Trulicity) 1.5 MG/0.5ML solution pen-injector, Inject 0.5 mL under the skin into the appropriate area as directed 1 (One) Time Per Week. Every Monday, Disp: , Rfl:   •  DULoxetine (CYMBALTA) 30 MG capsule, Take 30 mg by mouth Daily., Disp: , Rfl:   •  furosemide (LASIX) 40 MG tablet, Take 0.5 tablets by mouth 2 (Two) Times a Day. May take an additional tablet PRN for weight gain of 3lbs in one day or 5lbs in 1 wk (Patient taking differently: Take 20 mg by mouth Daily. May take an additional tablet PRN for weight gain of 3lbs in one day or 5lbs in 1 wk), Disp: , Rfl:   •  glimepiride (AMARYL) 2 MG tablet, Take 4 mg by mouth Daily., Disp: , Rfl: 1  •  HYDROcodone-acetaminophen (Norco) 5-325 MG per tablet, Take 1 tablet by mouth Every 6 (Six) Hours As Needed for Moderate Pain., Disp: 8 tablet, Rfl: 0  •  icosapent ethyl (Vascepa) 1 g capsule capsule, Take 2 g by mouth 2 (Two) Times a Day With Meals., Disp: 120 capsule, Rfl: 11  •  Jardiance 25 MG tablet tablet, TAKE 1 TABLET EVERY DAY, Disp: 90 tablet, Rfl: 1  •  metFORMIN (GLUCOPHAGE) 850 MG tablet, Take 850 mg by mouth Daily With Breakfast., Disp: , Rfl:   •  midodrine (PROAMATINE) 5 MG tablet, TAKE 2 TABLETS TWICE DAILY, Disp: 360 tablet, Rfl: 0  •  nitroglycerin (NITROSTAT) 0.4 MG SL tablet, 1 under the tongue as  "needed for angina, may repeat q5mins for up three doses, Disp: 100 tablet, Rfl: 11  •  ondansetron (ZOFRAN) 4 MG tablet, Take 4 mg by mouth Every 8 (Eight) Hours As Needed for Nausea or Vomiting., Disp: , Rfl:   •  potassium chloride (MICRO-K) 10 MEQ CR capsule, Take 10 mEq by mouth Daily., Disp: , Rfl:   •  Probiotic Product (PROBIOTIC PO), Take  by mouth. xavier, Disp: , Rfl:   •  promethazine (PHENERGAN) 12.5 MG tablet, Take 1 tablet by mouth Every 6 (Six) Hours As Needed for Nausea or Vomiting., Disp: 12 tablet, Rfl: 0  •  rizatriptan (MAXALT) 10 MG tablet, Take 10 mg by mouth 1 (One) Time As Needed for Migraine. May repeat in 2 hours if needed, Disp: , Rfl:   •  rosuvastatin (CRESTOR) 20 MG tablet, Take 1 tablet by mouth Daily., Disp: 90 tablet, Rfl: 3  •  spironolactone (ALDACTONE) 25 MG tablet, Take 1 tablet by mouth Daily., Disp: , Rfl:   •  temazepam (RESTORIL) 15 MG capsule, Take 15 mg by mouth At Night As Needed., Disp: , Rfl:   •  traMADol (ULTRAM) 50 MG tablet, Take 50 mg by mouth Every 6 (Six) Hours As Needed for Moderate Pain., Disp: , Rfl:   •  True Metrix Blood Glucose Test test strip, , Disp: , Rfl:   •  pregabalin (LYRICA) 75 MG capsule, 2 (Two) Times a Day., Disp: , Rfl:       History of Present Illness   Lucia Stevens is a 62 y.o. year old female here for follow up.  Some increased dyspnea.  She is taking 20 mg of Lasix daily.  She did have some dietary indiscretion through the holidays        OBJECTIVE:  Vitals:    01/12/23 1027   BP: 110/70   BP Location: Left arm   Patient Position: Sitting   Pulse: 83   SpO2: 97%   Weight: 95.3 kg (210 lb)   Height: 162.6 cm (64\")     Body mass index is 36.05 kg/m².    Constitutional:       Appearance: Healthy appearance. Not in distress.   Neck:      Vascular: No JVR. JVD normal.   Pulmonary:      Effort: Pulmonary effort is normal.      Breath sounds: Normal breath sounds. No wheezing. No rhonchi. No rales.   Chest:      Chest wall: Not tender to " palpatation.   Cardiovascular:      PMI at left midclavicular line. Normal rate. Regular rhythm. Normal S1. Normal S2.      Murmurs: There is no murmur.      No gallop. No click. No rub.   Pulses:     Intact distal pulses.   Edema:     Peripheral edema absent.   Abdominal:      General: Bowel sounds are normal.      Palpations: Abdomen is soft.      Tenderness: There is no abdominal tenderness.   Musculoskeletal: Normal range of motion.         General: No tenderness. Skin:     General: Skin is warm and dry.   Neurological:      General: No focal deficit present.      Mental Status: Alert and oriented to person, place and time.         Diagnostic Data:  Procedures  Device check  Acceptable thresholds/ impedances  No events  19% a pacing, 6% RV pacing, 100% LV pacing as programmed  Generator 7 years    ASSESSMENT:   Diagnosis Plan   1. Chronic systolic congestive heart failure (HCC)        2. Coronary artery disease involving native coronary artery of native heart without angina pectoris        3. Primary hypertension        4. Mixed hyperlipidemia        5. Type 2 diabetes mellitus with hyperglycemia, without long-term current use of insulin (HCC)            PLAN:  Ischemic cardiomyopathy status post revascularization BiV pacer appears euvolemic New York Heart Association class III CHF at current continued aggressive medical regimen as tolerant with hypotension.  We will document echocardiogram at this time she is to utilize Lasix 40 mg any day that her weight is up 2 pounds in 24 hours    Hypotension tolerant of low-dose beta-blockade with concomitant midodrine    Mixed dyslipidemia controlled on statin therapy    Diabetes A1c most recent 8.5          Virgilio Borden MD, [facility]

## 2023-02-06 RX ORDER — MIDODRINE HYDROCHLORIDE 5 MG/1
TABLET ORAL
Qty: 360 TABLET | Refills: 0 | Status: SHIPPED | OUTPATIENT
Start: 2023-02-06

## 2023-02-06 NOTE — TELEPHONE ENCOUNTER
No protocol available. Previous visit on 8/5/22 and follow up currently scheduled for 3/8/23. Please review and refill as appropriate

## 2023-03-08 ENCOUNTER — OFFICE VISIT (OUTPATIENT)
Dept: CARDIOLOGY | Facility: HOSPITAL | Age: 63
End: 2023-03-08
Payer: MEDICARE

## 2023-03-08 VITALS
OXYGEN SATURATION: 99 % | SYSTOLIC BLOOD PRESSURE: 130 MMHG | TEMPERATURE: 97.6 F | HEART RATE: 79 BPM | DIASTOLIC BLOOD PRESSURE: 60 MMHG | HEIGHT: 64 IN | RESPIRATION RATE: 18 BRPM | WEIGHT: 217.38 LBS | BODY MASS INDEX: 37.11 KG/M2

## 2023-03-08 PROCEDURE — 1159F MED LIST DOCD IN RCRD: CPT | Performed by: NURSE PRACTITIONER

## 2023-03-08 PROCEDURE — 3078F DIAST BP <80 MM HG: CPT | Performed by: NURSE PRACTITIONER

## 2023-03-08 PROCEDURE — 99214 OFFICE O/P EST MOD 30 MIN: CPT | Performed by: NURSE PRACTITIONER

## 2023-03-08 PROCEDURE — 3075F SYST BP GE 130 - 139MM HG: CPT | Performed by: NURSE PRACTITIONER

## 2023-03-08 PROCEDURE — 1160F RVW MEDS BY RX/DR IN RCRD: CPT | Performed by: NURSE PRACTITIONER

## 2023-03-08 NOTE — PROGRESS NOTES
"Chief Complaint  Congestive Heart Failure and Follow-up    Subjective    History of Present Illness {CC  Problem List  Visit  Diagnosis   Encounters  Notes  Medications  Labs  Result Review Imaging  Media :23}       History of Present Illness   62-year-old female presents today for evaluation of her chronic systolic heart failure.  She reports that she has been experiencing worsening NAYLOR. Notes that Dr Borden ordered an echo in January but it has not been scheduled yet.   She currently denies chest pain, dizziness, presyncope, palpitations, syncope, orthopnea, PND or pedal edema.  Notes compliance with her medications.  Blood pressure 1 teens to 120s systolic over 60s to 70s.  Vital Signs:   Vitals:    03/08/23 1123   BP: 130/60   BP Location: Left arm   Patient Position: Sitting   Cuff Size: Large Adult   Pulse: 79   Resp: 18   Temp: 97.6 °F (36.4 °C)   TempSrc: Temporal   SpO2: 99%   Weight: 98.6 kg (217 lb 6 oz)   Height: 162.6 cm (64\")     Body mass index is 37.31 kg/m².  Physical Exam  Vitals and nursing note reviewed.   Constitutional:       Appearance: Normal appearance.   HENT:      Head: Normocephalic.   Eyes:      Pupils: Pupils are equal, round, and reactive to light.   Cardiovascular:      Rate and Rhythm: Normal rate and regular rhythm.      Pulses: Normal pulses.      Heart sounds: Normal heart sounds. No murmur heard.  Pulmonary:      Effort: Pulmonary effort is normal.      Breath sounds: Normal breath sounds.   Abdominal:      General: Bowel sounds are normal.      Palpations: Abdomen is soft.   Musculoskeletal:         General: Normal range of motion.      Cervical back: Normal range of motion.      Right lower leg: Edema (1+) present.      Left lower leg: Edema (1+) present.   Skin:     General: Skin is warm and dry.      Capillary Refill: Capillary refill takes less than 2 seconds.   Neurological:      Mental Status: She is alert and oriented to person, place, and time.   Psychiatric:   "       Mood and Affect: Mood normal.         Thought Content: Thought content normal.              Result Review  Data Reviewed:{ Labs  Result Review  Imaging  Med Tab  Media :23}     Echo June 2021:  · Estimated left ventricular EF = 43% Left ventricular ejection fraction appears to be 41 - 45%. Left ventricular systolic function is mildly decreased.  · Left ventricular diastolic function is consistent with (grade I) impaired relaxation.  · Estimated right ventricular systolic pressure from tricuspid regurgitation is normal (<35 mmHg). Calculated right ventricular systolic pressure from tricuspid regurgitation is 23 mmHg.               Assessment and Plan {CC Problem List  Visit Diagnosis  ROS  Review (Popup)  Health Maintenance  Quality  BestPractice  Medications  SmartSets  SnapShot Encounters  Media :23}   1. Chronic systolic congestive heart failure (HCC)  Take one extra 20 mg of lasix today   Will schedule echo  jardiance samples 25 mg bottles x 4 given  [id number] expiration 10/24  Continue bisoprolol Lasix, Jardiance, Aldactone  No ACE ARB or Arni due to hypotension   2. Coronary artery disease involving native coronary artery of native heart without angina pectoris  Currently without angina  Continue aspirin, Crestor    3. Primary hypertension  Well-controlled, with periods of hypotension continue daily midodrine  4. Mixed hyperlipidemia  Continue Crestor  vascepa recently added      Follow Up {Instructions Charge Capture  Follow-up Communications :23}   No follow-ups on file.    Patient was given instructions and counseling regarding her condition or for health maintenance advice. Please see specific information pulled into the AVS if appropriate.  Patient was instructed to call the [facility] with any questions, concerns, or worsening symptoms.

## 2023-03-16 ENCOUNTER — HOSPITAL ENCOUNTER (OUTPATIENT)
Dept: CARDIOLOGY | Facility: HOSPITAL | Age: 63
Discharge: HOME | End: 2023-03-16
Admitting: INTERNAL MEDICINE
Payer: MEDICARE

## 2023-03-16 ENCOUNTER — TELEPHONE (OUTPATIENT)
Dept: CARDIOLOGY | Facility: CLINIC | Age: 63
End: 2023-03-16

## 2023-03-16 VITALS — WEIGHT: 217.37 LBS | BODY MASS INDEX: 37.11 KG/M2 | HEIGHT: 64 IN

## 2023-03-16 DIAGNOSIS — I50.22 CHRONIC SYSTOLIC CONGESTIVE HEART FAILURE: ICD-10-CM

## 2023-03-16 LAB
ASCENDING AORTA: 3.4 CM
BH CV ECHO MEAS - AO MAX PG: 6.2 MMHG
BH CV ECHO MEAS - AO MEAN PG: 3.4 MMHG
BH CV ECHO MEAS - AO ROOT DIAM: 3.1 CM
BH CV ECHO MEAS - AO V2 MAX: 124.9 CM/SEC
BH CV ECHO MEAS - AO V2 VTI: 24.6 CM
BH CV ECHO MEAS - AVA(I,D): 2.33 CM2
BH CV ECHO MEAS - EDV(CUBED): 167.3 ML
BH CV ECHO MEAS - EDV(MOD-SP2): 120 ML
BH CV ECHO MEAS - EDV(MOD-SP4): 153 ML
BH CV ECHO MEAS - EF(MOD-BP): 47 %
BH CV ECHO MEAS - EF(MOD-SP2): 53.3 %
BH CV ECHO MEAS - EF(MOD-SP4): 43.8 %
BH CV ECHO MEAS - ESV(CUBED): 66.2 ML
BH CV ECHO MEAS - ESV(MOD-SP2): 56 ML
BH CV ECHO MEAS - ESV(MOD-SP4): 86 ML
BH CV ECHO MEAS - FS: 26.6 %
BH CV ECHO MEAS - IVS/LVPW: 0.95 CM
BH CV ECHO MEAS - IVSD: 0.72 CM
BH CV ECHO MEAS - LA DIMENSION: 3.9 CM
BH CV ECHO MEAS - LV DIASTOLIC VOL/BSA (35-75): 75.6 CM2
BH CV ECHO MEAS - LV MASS(C)D: 146.9 GRAMS
BH CV ECHO MEAS - LV MAX PG: 3 MMHG
BH CV ECHO MEAS - LV MEAN PG: 1.46 MMHG
BH CV ECHO MEAS - LV SYSTOLIC VOL/BSA (12-30): 42.5 CM2
BH CV ECHO MEAS - LV V1 MAX: 86.4 CM/SEC
BH CV ECHO MEAS - LV V1 VTI: 17.8 CM
BH CV ECHO MEAS - LVIDD: 5.5 CM
BH CV ECHO MEAS - LVIDS: 4 CM
BH CV ECHO MEAS - LVOT AREA: 3.2 CM2
BH CV ECHO MEAS - LVOT DIAM: 2.03 CM
BH CV ECHO MEAS - LVPWD: 0.77 CM
BH CV ECHO MEAS - MV A MAX VEL: 79 CM/SEC
BH CV ECHO MEAS - MV DEC TIME: 0.19 MSEC
BH CV ECHO MEAS - MV E MAX VEL: 61.7 CM/SEC
BH CV ECHO MEAS - MV E/A: 0.78
BH CV ECHO MEAS - PA ACC SLOPE: 555.7 CM/SEC2
BH CV ECHO MEAS - PA ACC TIME: 0.1 SEC
BH CV ECHO MEAS - PA PR(ACCEL): 34.6 MMHG
BH CV ECHO MEAS - RAP SYSTOLE: 3 MMHG
BH CV ECHO MEAS - RVSP: 23 MMHG
BH CV ECHO MEAS - SI(MOD-SP2): 31.6 ML/M2
BH CV ECHO MEAS - SI(MOD-SP4): 33.1 ML/M2
BH CV ECHO MEAS - SV(LVOT): 57.4 ML
BH CV ECHO MEAS - SV(MOD-SP2): 64 ML
BH CV ECHO MEAS - SV(MOD-SP4): 67 ML
BH CV ECHO MEAS - TAPSE (>1.6): 1.6 CM
BH CV ECHO MEAS - TR MAX PG: 19.9 MMHG
BH CV ECHO MEAS - TR MAX VEL: 208.7 CM/SEC
BH CV VAS BP LEFT ARM: NORMAL MMHG
BH CV XLRA - RV BASE: 3.7 CM
BH CV XLRA - RV LENGTH: 7.4 CM
BH CV XLRA - RV MID: 2.7 CM
BH CV XLRA - TDI S': 7.58 CM/SEC
LEFT ATRIUM VOLUME INDEX: 24.1 ML/M2
MAXIMAL PREDICTED HEART RATE: 157 BPM
STRESS TARGET HR: 133 BPM

## 2023-03-16 PROCEDURE — 93306 TTE W/DOPPLER COMPLETE: CPT | Performed by: INTERNAL MEDICINE

## 2023-03-16 PROCEDURE — 93306 TTE W/DOPPLER COMPLETE: CPT

## 2023-03-20 RX ORDER — ROSUVASTATIN CALCIUM 20 MG/1
TABLET, COATED ORAL
Qty: 90 TABLET | Refills: 3 | Status: SHIPPED | OUTPATIENT
Start: 2023-03-20

## 2023-06-14 ENCOUNTER — HOSPITAL ENCOUNTER (OUTPATIENT)
Age: 63
End: 2023-06-14
Payer: MEDICARE

## 2023-06-14 DIAGNOSIS — M79.671: Primary | ICD-10-CM

## 2023-06-14 DIAGNOSIS — M79.672: ICD-10-CM

## 2023-06-14 PROCEDURE — 73630 X-RAY EXAM OF FOOT: CPT

## 2023-07-17 ENCOUNTER — HOSPITAL ENCOUNTER (OUTPATIENT)
Age: 63
End: 2023-07-17
Payer: MEDICARE

## 2023-07-17 DIAGNOSIS — Z79.899: ICD-10-CM

## 2023-07-17 DIAGNOSIS — Z13.220: ICD-10-CM

## 2023-07-17 DIAGNOSIS — Z79.84: ICD-10-CM

## 2023-07-17 DIAGNOSIS — E11.59: Primary | ICD-10-CM

## 2023-07-17 DIAGNOSIS — I10: ICD-10-CM

## 2023-07-17 LAB
ALBUMIN LEVEL: 4.3 G/DL (ref 3.5–5)
ALBUMIN/GLOB SERPL: 1.5 {RATIO} (ref 1.1–1.8)
ALP ISO SERPL-ACNC: 84 U/L (ref 38–126)
ALT SERPLBLD-CCNC: 27 U/L (ref 12–78)
ANION GAP SERPL CALC-SCNC: 11.6 MEQ/L (ref 5–15)
AST SERPL QL: 39 U/L (ref 14–36)
BILIRUBIN,TOTAL: 0.7 MG/DL (ref 0.2–1.3)
BUN SERPL-MCNC: 22 MG/DL (ref 7–17)
CALCIUM SPEC-MCNC: 9.6 MG/DL (ref 8.4–10.2)
CHLORIDE SPEC-SCNC: 105 MMOL/L (ref 98–107)
CHOLEST SPEC-SCNC: 131 MG/DL (ref 140–200)
CO2 SERPL-SCNC: 29 MMOL/L (ref 22–30)
CREAT BLD-SCNC: 0.8 MG/DL (ref 0.52–1.04)
ESTIMATED GLOMERULAR FILT RATE: 72 ML/MIN (ref 60–?)
GFR (AFRICAN AMERICAN): 87 ML/MIN (ref 60–?)
GLOBULIN SER CALC-MCNC: 2.9 G/DL (ref 1.3–3.2)
GLUCOSE: 155 MG/DL (ref 74–100)
HBA1C MFR BLD: 7.5 % (ref 4–6)
HCT VFR BLD CALC: 43.3 % (ref 37–47)
HDLC SERPL-MCNC: 25 MG/DL (ref 40–60)
HGB BLD-MCNC: 13.8 G/DL (ref 12.2–16.2)
MCHC RBC-ENTMCNC: 31.8 G/DL (ref 31.8–35.4)
MCV RBC: 91.6 FL (ref 81–99)
MEAN CORPUSCULAR HEMOGLOBIN: 29.1 PG (ref 27–31.2)
PLATELET # BLD: 174 K/MM3 (ref 142–424)
POTASSIUM: 4.6 MMOL/L (ref 3.5–5.1)
PROT SERPL-MCNC: 7.2 G/DL (ref 6.3–8.2)
RBC # BLD AUTO: 4.72 M/MM3 (ref 4.2–5.4)
SODIUM SPEC-SCNC: 141 MMOL/L (ref 136–145)
TRIGLYCERIDES: 429 MG/DL (ref 30–150)
WBC # BLD AUTO: 6.8 K/MM3 (ref 4.8–10.8)

## 2023-07-17 PROCEDURE — 80061 LIPID PANEL: CPT

## 2023-07-17 PROCEDURE — 82043 UR ALBUMIN QUANTITATIVE: CPT

## 2023-07-17 PROCEDURE — 36415 COLL VENOUS BLD VENIPUNCTURE: CPT

## 2023-07-17 PROCEDURE — 83036 HEMOGLOBIN GLYCOSYLATED A1C: CPT

## 2023-07-17 PROCEDURE — 85025 COMPLETE CBC W/AUTO DIFF WBC: CPT

## 2023-07-17 PROCEDURE — 80053 COMPREHEN METABOLIC PANEL: CPT

## 2023-07-21 ENCOUNTER — HOSPITAL ENCOUNTER (OUTPATIENT)
Age: 63
End: 2023-07-21
Payer: MEDICARE

## 2023-07-21 DIAGNOSIS — Z12.31: Primary | ICD-10-CM

## 2023-07-21 PROCEDURE — 77063 BREAST TOMOSYNTHESIS BI: CPT

## 2023-07-21 PROCEDURE — 77067 SCR MAMMO BI INCL CAD: CPT

## 2023-08-07 RX ORDER — ICOSAPENT ETHYL 1000 MG/1
CAPSULE ORAL
Qty: 360 CAPSULE | Refills: 2 | Status: SHIPPED | OUTPATIENT
Start: 2023-08-07

## 2023-08-14 ENCOUNTER — OFFICE VISIT (OUTPATIENT)
Dept: OBSTETRICS AND GYNECOLOGY | Facility: CLINIC | Age: 63
End: 2023-08-14
Payer: MEDICARE

## 2023-08-14 VITALS
SYSTOLIC BLOOD PRESSURE: 126 MMHG | BODY MASS INDEX: 34.66 KG/M2 | HEIGHT: 64 IN | DIASTOLIC BLOOD PRESSURE: 84 MMHG | WEIGHT: 203 LBS | RESPIRATION RATE: 18 BRPM

## 2023-08-14 DIAGNOSIS — N89.8 VAGINAL ITCHING: Primary | ICD-10-CM

## 2023-08-14 DIAGNOSIS — Z01.419 ENCOUNTER FOR ANNUAL ROUTINE GYNECOLOGICAL EXAMINATION: ICD-10-CM

## 2023-08-14 DIAGNOSIS — Z78.0 MENOPAUSE: ICD-10-CM

## 2023-08-14 DIAGNOSIS — Z12.31 SCREENING MAMMOGRAM FOR BREAST CANCER: ICD-10-CM

## 2023-08-14 RX ORDER — NYSTATIN AND TRIAMCINOLONE ACETONIDE 100000; 1 [USP'U]/G; MG/G
OINTMENT TOPICAL
COMMUNITY
Start: 2023-08-10

## 2023-08-14 RX ORDER — DIPHENHYDRAMINE HCL 25 MG
TABLET ORAL
COMMUNITY
Start: 2023-06-28

## 2023-08-14 NOTE — PROGRESS NOTES
Gynecologic Annual Exam Note        GYN Annual Exam     CC - Here for annual exam.        HPI  Lucia Stevens is a 63 y.o. female, No obstetric history on file., who presents for annual well woman exam as a established patient.  She is postmenopausal. Denies vaginal bleeding.  Patient reports problems with: hot flashes and vaginal itching and states she has two bumps near her clitoris . There were no changes to her medical or surgical history since her last visit.. Partner Status: Marital Status: .  She is is sexually active. She has not had new partners.. STD testing recommendations have been explained to the patient and she does desire STD testing. Patient was told by [facility] that she has herpes but she does not believe that she does. She would like more insight to clarify.    Additional OB/GYN History   On HRT? No    Last Pap : 07/15/2022. Results: negative. HPV: negative.   Last Completed Pap Smear       This patient has no relevant Health Maintenance data.          History of abnormal Pap smear: no  Family history of uterine, colon, breast, or ovarian cancer: yes - PGM had breast cancer and Father had colon cancer  Performs monthly Self-Breast Exam: yes  Last mammogram: 07/15/2023. Done at [facility].    Last Completed Mammogram       This patient has no relevant Health Maintenance data.          Last colonoscopy: has had a colonoscopy 8 year(s) ago.    Last Completed Colonoscopy            COLORECTAL CANCER SCREENING (COLONOSCOPY - Every 10 Years) Next due on 10/27/2025      10/27/2015  SCANNED - COLONOSCOPY    08/01/2012  SCANNED - COLONOSCOPY    04/28/2006  SCANNED - COLONOSCOPY    05/19/1997  SCANNED - COLONOSCOPY                      Last bone density scan (DEXA): None  Exercises Regularly: no  Feelings of Anxiety or Depression: yes - Cymbalta      Tobacco Usage?: No       Current Outpatient Medications:     allopurinol (ZYLOPRIM) 100 MG tablet, Take 2 tablets by mouth Daily., Disp: , Rfl:     aspirin 81  MG EC tablet, Take 1 tablet by mouth Daily., Disp: , Rfl:     bisoprolol (ZEBeta) 5 MG tablet, TAKE 1/2 TABLET EVERY DAY, Disp: 45 tablet, Rfl: 3    Blood Glucose Monitoring Suppl (True Metrix Air Glucose Meter) w/Device kit, , Disp: , Rfl:     Dulaglutide (Trulicity) 1.5 MG/0.5ML solution pen-injector, Inject 1.5 mg under the skin into the appropriate area as directed 1 (One) Time Per Week. Every Monday, Disp: , Rfl:     DULoxetine (CYMBALTA) 30 MG capsule, Take 1 capsule by mouth Daily., Disp: , Rfl:     furosemide (LASIX) 40 MG tablet, Take 0.5 tablets by mouth 2 (Two) Times a Day. May take an additional tablet PRN for weight gain of 3lbs in one day or 5lbs in 1 wk (Patient taking differently: Take 0.5 tablets by mouth Daily. May take an additional tablet PRN for weight gain of 3lbs in one day or 5lbs in 1 wk), Disp: , Rfl:     glimepiride (AMARYL) 2 MG tablet, Take 2 tablets by mouth Daily., Disp: , Rfl: 1    Jardiance 25 MG tablet tablet, TAKE 1 TABLET EVERY DAY, Disp: 90 tablet, Rfl: 1    metFORMIN (GLUCOPHAGE) 850 MG tablet, Take 1 tablet by mouth Daily With Breakfast., Disp: , Rfl:     midodrine (PROAMATINE) 5 MG tablet, TAKE 2 TABLETS TWICE DAILY, Disp: 360 tablet, Rfl: 0    nitroglycerin (NITROSTAT) 0.4 MG SL tablet, 1 under the tongue as needed for angina, may repeat q5mins for up three doses, Disp: 100 tablet, Rfl: 11    nystatin-triamcinolone (MYCOLOG) 914572-8.1 UNIT/GM-% ointment, APPLY OINTMENT TOPICALLY THREE TIMES DAILY, Disp: , Rfl:     ondansetron (ZOFRAN) 4 MG tablet, Take 1 tablet by mouth Every 8 (Eight) Hours As Needed for Nausea or Vomiting., Disp: , Rfl:     potassium chloride (MICRO-K) 10 MEQ CR capsule, Take 1 capsule by mouth Daily., Disp: , Rfl:     pregabalin (LYRICA) 75 MG capsule, 2 (Two) Times a Day., Disp: , Rfl:     rizatriptan (MAXALT) 10 MG tablet, Take 1 tablet by mouth 1 (One) Time As Needed for Migraine. May repeat in 2 hours if needed, Disp: , Rfl:     rosuvastatin (CRESTOR)  20 MG tablet, TAKE 1 TABLET EVERY DAY, Disp: 90 tablet, Rfl: 3    spironolactone (ALDACTONE) 25 MG tablet, Take 1 tablet by mouth Daily., Disp: , Rfl:     temazepam (RESTORIL) 15 MG capsule, Take 1 capsule by mouth At Night As Needed., Disp: , Rfl:     True Metrix Blood Glucose Test test strip, , Disp: , Rfl:     Vascepa 1 g capsule capsule, TAKE 2 CAPSULES BY MOUTH TWICE DAILY WITH  MEALS, Disp: 360 capsule, Rfl: 2    Patient denies the need for medication refills today.    OB History    No obstetric history on file.         Past Medical History:   Diagnosis Date    Abdominal migraine     Arthritis     CHF (congestive heart failure)     Coronary artery disease     2 stents    Depression     Diabetes mellitus     type 2 dm, 3 years, checks blood sugar every am    Gout     Hyperlipidemia     Sjoegren syndrome     SOB (shortness of breath) on exertion     Tobacco abuse 2011    cessation     Wears dentures     full set    Wears glasses         Past Surgical History:   Procedure Laterality Date    APPENDECTOMY      CARDIAC CATHETERIZATION      CARDIAC CATHETERIZATION N/A 11/30/2017    Procedure: Left Heart Cath;  Surgeon: Galina Leonard MD;  Location:  [facility];  Service:     CARDIAC ELECTROPHYSIOLOGY PROCEDURE N/A 03/28/2018    Procedure: Device Implant BiV ICD;  Surgeon: Sarbjit Ellison DO;  Location:  [facility];  Service: Cardiovascular    CERVICAL DISCECTOMY ANTERIOR      CHOLECYSTECTOMY      COLONOSCOPY  09/2022    CORONARY ANGIOPLASTY      CORONARY ANGIOPLASTY WITH STENT PLACEMENT      CORONARY ANGIOPLASTY WITH STENT PLACEMENT      CORONARY STENT PLACEMENT      D & C HYSTEROSCOPY N/A 10/06/2022    Procedure: DILATATION AND CURETTAGE HYSTEROSCOPY;  Surgeon: Davide Durbin MD;  Location: [facility] OR;  Service: Obstetrics/Gynecology;  Laterality: N/A;    ENDOSCOPY  09/2022    OTHER SURGICAL HISTORY      growth removed from small intestine as a child    POLYPECTOMY      Colon  "polyps status    RECTOVAGINAL FISTULA REPAIR      TONSILLECTOMY      TUBAL ABDOMINAL LIGATION  1998?    Only 1    WISDOM TOOTH EXTRACTION         Health Maintenance   Topic Date Due    Annual Gynecologic Pelvic and Breast Exam  Never done    MAMMOGRAM  Never done    URINE MICROALBUMIN  Never done    COVID-19 Vaccine (1) Never done    TDAP/TD VACCINES (1 - Tdap) Never done    ZOSTER VACCINE (1 of 2) Never done    HEPATITIS C SCREENING  Never done    ANNUAL WELLNESS VISIT  Never done    DIABETIC FOOT EXAM  Never done    PAP SMEAR  Never done    DIABETIC EYE EXAM  Never done    Pneumococcal Vaccine 0-64 (2 - PPSV23 or PCV20) 03/21/2018    HEMOGLOBIN A1C  09/27/2018    LIPID PANEL  11/30/2018    INFLUENZA VACCINE  10/01/2023    COLORECTAL CANCER SCREENING  10/27/2025       The additional following portions of the patient's history were reviewed and updated as appropriate: allergies, current medications, past family history, past medical history, past social history, past surgical history, and problem list.    Review of Systems   Constitutional: Negative.    Respiratory: Negative.     Cardiovascular: Negative.    Gastrointestinal: Negative.    Genitourinary:  Positive for vaginal discharge and vaginal pain. Negative for breast discharge, breast lump, decreased libido, menstrual problem and pelvic pain.   Musculoskeletal: Negative.    Neurological: Negative.    Psychiatric/Behavioral: Negative.       I have reviewed and agree with the HPI, ROS, and historical information as entered above. Davide Durbin MD      Objective   /84   Resp 18   Ht 162.6 cm (64.02\")   Wt 92.1 kg (203 lb)   BMI 34.83 kg/mý     Physical Exam  Vitals reviewed. Exam conducted with a chaperone present.   Constitutional:       Appearance: Normal appearance.   HENT:      Head: Normocephalic and atraumatic.   Cardiovascular:      Rate and Rhythm: Normal rate and regular rhythm.   Pulmonary:      Effort: Pulmonary effort is normal.      " Breath sounds: Normal breath sounds.   Chest:   Breasts:     Right: Normal. No swelling, bleeding, inverted nipple, mass, nipple discharge, skin change or tenderness.      Left: Normal. No swelling, bleeding, inverted nipple, mass, nipple discharge, skin change or tenderness.   Abdominal:      General: Abdomen is flat. Bowel sounds are normal.      Palpations: Abdomen is soft.   Genitourinary:     General: Normal vulva.      Vagina: Normal.      Cervix: Normal.      Uterus: Normal.       Adnexa: Right adnexa normal and left adnexa normal.      Rectum: Normal.   Musculoskeletal:      Cervical back: Neck supple.   Lymphadenopathy:      Upper Body:      Right upper body: No supraclavicular, axillary or pectoral adenopathy.      Left upper body: No supraclavicular, axillary or pectoral adenopathy.   Skin:     General: Skin is warm and dry.   Neurological:      Mental Status: She is alert and oriented to person, place, and time.   Psychiatric:         Mood and Affect: Mood normal.         Behavior: Behavior normal.          Assessment and Plan    Problem List Items Addressed This Visit    None  Visit Diagnoses       Vaginal itching    -  Primary    Relevant Orders    LIQUID-BASED PAP SMEAR WITH HPV GENOTYPING REGARDLESS OF INTERPRETATION ([facility]AD)    Menopause        Relevant Orders    DEXA Bone Density Axial    Screening mammogram for breast cancer        Relevant Orders    Mammo Screening Digital Tomosynthesis Bilateral With CAD    Encounter for annual routine gynecological examination                GYN annual well woman exam.   Reviewed monthly self breast exams.  Instructed to call with lumps, pain, or breast discharge.  Yearly mammograms ordered.  Ordered mammogram today.  Recommended use of Vitamin D and getting adequate calcium in her diet. (1500mg)  Osteoporosis screening ordered today.  Reviewed exercise as a preventative health measures.   Recommended Flu Vaccine in Fall of each year.  RTC in 1 year or PRN  with problems.  Return in about 1 year (around 8/14/2024) for Annual physical. DEXA ordered.     Davide Durbin MD  08/14/2023

## 2023-08-21 LAB — REF LAB TEST METHOD: NORMAL

## 2023-08-21 RX ORDER — MIDODRINE HYDROCHLORIDE 5 MG/1
TABLET ORAL
Qty: 360 TABLET | Refills: 1 | Status: SHIPPED | OUTPATIENT
Start: 2023-08-21

## 2023-08-22 ENCOUNTER — TELEPHONE (OUTPATIENT)
Dept: OBSTETRICS AND GYNECOLOGY | Facility: CLINIC | Age: 63
End: 2023-08-22
Payer: MEDICARE

## 2023-08-22 NOTE — TELEPHONE ENCOUNTER
Pt would like for us to request a copy of her last mammo in [place] for [facility]. She also wants to speak with a nurse about her test results.

## 2023-08-23 RX ORDER — NITROGLYCERIN 0.4 MG/1
TABLET SUBLINGUAL
Qty: 25 TABLET | Refills: 3 | Status: SHIPPED | OUTPATIENT
Start: 2023-08-23

## 2023-08-29 ENCOUNTER — TELEPHONE (OUTPATIENT)
Dept: OBSTETRICS AND GYNECOLOGY | Facility: CLINIC | Age: 63
End: 2023-08-29
Payer: MEDICARE

## 2023-08-29 NOTE — TELEPHONE ENCOUNTER
Review pap results with patient regarding HSV testing which was negative at this time. Patient has tested positive in the past at her local hospital, therefore she was advised to call back if she has a break out. We can discuss with CBF for medication.

## 2023-09-15 ENCOUNTER — HOSPITAL ENCOUNTER (OUTPATIENT)
Age: 63
End: 2023-09-15
Payer: MEDICARE

## 2023-09-15 DIAGNOSIS — I25.10: ICD-10-CM

## 2023-09-15 DIAGNOSIS — R09.89: ICD-10-CM

## 2023-09-15 DIAGNOSIS — E11.8: ICD-10-CM

## 2023-09-15 PROCEDURE — 93923 UPR/LXTR ART STDY 3+ LVLS: CPT

## 2023-10-24 ENCOUNTER — TELEPHONE (OUTPATIENT)
Dept: CARDIOLOGY | Facility: CLINIC | Age: 63
End: 2023-10-24
Payer: MEDICARE

## 2023-10-24 NOTE — TELEPHONE ENCOUNTER
Clearance for nerve conduction test     3/28/18 [facility]. BiVICD implantation Terra   -  says patient will need magnet therefore monitored during   - We can scheduled rep for day of test    Patient is not dependent     Seeing patient for CHF, CAD, HTN, HLD    Last echo - 2023 ef 46-50%     If patient is monitored on tele is this okay to proceed?     Per OLIVIA okay to proceed   Patient wants to see her PCP first because she really wants this done [facility] of [facility]   Patient said she will call me back next week to let me know if we need to call the BS rep for the test or not.

## 2023-11-01 ENCOUNTER — TRANSCRIBE ORDERS (OUTPATIENT)
Dept: ADMINISTRATIVE | Facility: HOSPITAL | Age: 63
End: 2023-11-01
Payer: MEDICARE

## 2023-11-01 DIAGNOSIS — M79.661 PAIN IN RIGHT LOWER LEG: Primary | ICD-10-CM

## 2023-11-02 ENCOUNTER — TELEPHONE (OUTPATIENT)
Dept: CARDIOLOGY | Facility: CLINIC | Age: 63
End: 2023-11-02
Payer: MEDICARE

## 2023-11-02 NOTE — TELEPHONE ENCOUNTER
Name: Lucia Stevens    Relationship: Self    Best Callback Number: [phone]    Incoming call to the Hub, requesting to  Reschedule their Device Check appointment on 11.09.23.     Per Hub workflow, further review is needed before the task can be completed.    Result of Call: Please reach out to the patient to reschedule    PT WOULD LIKE TO R/S FOR AFTER BEGINNING OF YEAR DUE TO INSURANCE

## 2023-11-09 ENCOUNTER — HOSPITAL ENCOUNTER (OUTPATIENT)
Dept: NEUROLOGY | Facility: HOSPITAL | Age: 63
Discharge: HOME | End: 2023-11-09
Payer: MEDICARE

## 2023-11-09 DIAGNOSIS — M79.661 PAIN IN RIGHT LOWER LEG: ICD-10-CM

## 2023-11-09 PROCEDURE — 95886 MUSC TEST DONE W/N TEST COMP: CPT

## 2023-11-09 PROCEDURE — 95911 NRV CNDJ TEST 9-10 STUDIES: CPT

## 2024-01-04 RX ORDER — ROSUVASTATIN CALCIUM 20 MG/1
20 TABLET, COATED ORAL DAILY
Qty: 90 TABLET | Refills: 1 | Status: SHIPPED | OUTPATIENT
Start: 2024-01-04

## 2024-01-04 RX ORDER — BISOPROLOL FUMARATE 5 MG/1
TABLET, FILM COATED ORAL
Qty: 45 TABLET | Refills: 1 | Status: SHIPPED | OUTPATIENT
Start: 2024-01-04

## 2024-01-04 NOTE — TELEPHONE ENCOUNTER
Lab Results   Component Value Date    GLUCOSE 118 (H) 10/05/2022    BUN 18 10/05/2022    CREATININE 0.69 10/05/2022    EGFR 97.7 10/05/2022    BCR 26.1 (H) 10/05/2022    K 3.9 10/05/2022    CO2 25.0 10/05/2022    CALCIUM 9.9 10/05/2022    ALBUMIN 4.90 10/05/2022    BILITOT 0.6 10/05/2022    AST 20 10/05/2022    ALT 12 10/05/2022      Chol panel in media.

## 2024-01-17 ENCOUNTER — OFFICE VISIT (OUTPATIENT)
Dept: FAMILY MEDICINE CLINIC | Facility: CLINIC | Age: 64
End: 2024-01-17
Payer: MEDICARE

## 2024-01-17 ENCOUNTER — OFFICE VISIT (OUTPATIENT)
Dept: NEUROLOGY | Facility: CLINIC | Age: 64
End: 2024-01-17
Payer: MEDICARE

## 2024-01-17 VITALS
SYSTOLIC BLOOD PRESSURE: 140 MMHG | DIASTOLIC BLOOD PRESSURE: 76 MMHG | HEART RATE: 89 BPM | OXYGEN SATURATION: 98 % | BODY MASS INDEX: 36.37 KG/M2 | HEIGHT: 64 IN | RESPIRATION RATE: 16 BRPM | WEIGHT: 213 LBS | TEMPERATURE: 97.8 F

## 2024-01-17 VITALS
OXYGEN SATURATION: 96 % | DIASTOLIC BLOOD PRESSURE: 80 MMHG | SYSTOLIC BLOOD PRESSURE: 126 MMHG | BODY MASS INDEX: 35.85 KG/M2 | WEIGHT: 210 LBS | HEIGHT: 64 IN | HEART RATE: 91 BPM

## 2024-01-17 DIAGNOSIS — I25.119 CORONARY ARTERY DISEASE INVOLVING NATIVE CORONARY ARTERY OF NATIVE HEART WITH ANGINA PECTORIS: ICD-10-CM

## 2024-01-17 DIAGNOSIS — F33.1 MODERATE EPISODE OF RECURRENT MAJOR DEPRESSIVE DISORDER: ICD-10-CM

## 2024-01-17 DIAGNOSIS — E78.5 DYSLIPIDEMIA: ICD-10-CM

## 2024-01-17 DIAGNOSIS — E11.42 DIABETIC POLYNEUROPATHY ASSOCIATED WITH TYPE 2 DIABETES MELLITUS: Primary | ICD-10-CM

## 2024-01-17 DIAGNOSIS — E11.42 DIABETIC POLYNEUROPATHY ASSOCIATED WITH TYPE 2 DIABETES MELLITUS: ICD-10-CM

## 2024-01-17 DIAGNOSIS — R76.8 POSITIVE ANA (ANTINUCLEAR ANTIBODY): ICD-10-CM

## 2024-01-17 DIAGNOSIS — E11.40 TYPE 2 DIABETES MELLITUS WITH DIABETIC NEUROPATHY, WITHOUT LONG-TERM CURRENT USE OF INSULIN: Primary | ICD-10-CM

## 2024-01-17 PROCEDURE — 99214 OFFICE O/P EST MOD 30 MIN: CPT | Performed by: PSYCHIATRY & NEUROLOGY

## 2024-01-17 PROCEDURE — 3079F DIAST BP 80-89 MM HG: CPT | Performed by: PSYCHIATRY & NEUROLOGY

## 2024-01-17 PROCEDURE — 1160F RVW MEDS BY RX/DR IN RCRD: CPT | Performed by: PSYCHIATRY & NEUROLOGY

## 2024-01-17 PROCEDURE — 3074F SYST BP LT 130 MM HG: CPT | Performed by: PSYCHIATRY & NEUROLOGY

## 2024-01-17 PROCEDURE — 1159F MED LIST DOCD IN RCRD: CPT | Performed by: PSYCHIATRY & NEUROLOGY

## 2024-01-17 RX ORDER — DULOXETIN HYDROCHLORIDE 60 MG/1
60 CAPSULE, DELAYED RELEASE ORAL DAILY
Qty: 90 CAPSULE | Refills: 1 | Status: SHIPPED | OUTPATIENT
Start: 2024-01-17

## 2024-01-17 RX ORDER — ROSUVASTATIN CALCIUM 20 MG/1
20 TABLET, COATED ORAL DAILY
Qty: 90 TABLET | Refills: 1 | Status: SHIPPED | OUTPATIENT
Start: 2024-01-17

## 2024-01-17 RX ORDER — PREGABALIN 100 MG/1
100 CAPSULE ORAL 3 TIMES DAILY
Qty: 90 CAPSULE | Refills: 2 | Status: SHIPPED | OUTPATIENT
Start: 2024-01-17

## 2024-01-17 RX ORDER — GLIMEPIRIDE 2 MG/1
4 TABLET ORAL DAILY
Qty: 180 TABLET | Refills: 1 | Status: SHIPPED | OUTPATIENT
Start: 2024-01-17

## 2024-01-17 NOTE — PROGRESS NOTES
Subjective:    CC: Lucia Stevens is seen today in consultation at the request of YEIMI Feliz for Polyneuropathy       HPI:  Patient is a 63-year-old female with past medical history of type 2 diabetes, hypertension, hyperlipidemia, ischemic heart disease referred to the clinic to establish care for diabetic polyneuropathy.  She reports that she started experiencing symptoms of pain, tingling, numbness and burning in both her feet extending up to the ankles and lower aspect of legs bilaterally about 1 and half years ago.  She reports that symptoms continue to become somewhat worse slowly over the period of time.  Because of this she underwent EMG/nerve conduction study.  I reviewed the results personally which showed moderate axonal polyneuropathy.  She was then started on Lyrica 100 mg 3 times a day about 6 months ago and reports 70 to 80% reduction in her symptoms of neuropathy.  She denies any symptoms in her hands.  Denies any problems with walking, gait or balance currently.  She is trying to keep her blood sugars under good control and last A1c was 7.1.  She denies any side effects with Lyrica use.    The following portions of the patient's history were reviewed today and updated as of 01/17/2024  : allergies, social history, and problem list.  This document will be scanned to patient's chart.      Current Outpatient Medications:     allopurinol (ZYLOPRIM) 100 MG tablet, Take 2 tablets by mouth Daily., Disp: , Rfl:     aspirin 81 MG EC tablet, Take 1 tablet by mouth Daily., Disp: , Rfl:     bisoprolol (ZEBeta) 5 MG tablet, TAKE 1/2 TABLET EVERY DAY, Disp: 45 tablet, Rfl: 1    Blood Glucose Monitoring Suppl (True Metrix Air Glucose Meter) w/Device kit, , Disp: , Rfl:     Dulaglutide 3 MG/0.5ML solution pen-injector, Inject 0.5 mL under the skin into the appropriate area as directed 1 (One) Time Per Week. Every Monday, Disp: , Rfl:     DULoxetine (CYMBALTA) 60 MG capsule, Take 1 capsule by mouth  Daily., Disp: , Rfl:     empagliflozin (Jardiance) 25 MG tablet tablet, Take 1 tablet by mouth Daily., Disp: 90 tablet, Rfl: 1    furosemide (LASIX) 40 MG tablet, Take 0.5 tablets by mouth 2 (Two) Times a Day. May take an additional tablet PRN for weight gain of 3lbs in one day or 5lbs in 1 wk (Patient taking differently: Take 0.5 tablets by mouth Daily. May take an additional tablet PRN for weight gain of 3lbs in one day or 5lbs in 1 wk), Disp: , Rfl:     glimepiride (AMARYL) 2 MG tablet, Take 2 tablets by mouth Daily., Disp: , Rfl: 1    metFORMIN (GLUCOPHAGE) 850 MG tablet, Take 1 tablet by mouth Daily With Breakfast., Disp: , Rfl:     midodrine (PROAMATINE) 5 MG tablet, TAKE 2 TABLETS TWICE DAILY, Disp: 360 tablet, Rfl: 1    nitroglycerin (NITROSTAT) 0.4 MG SL tablet, 1 under the tongue as needed for angina, may repeat q5mins for up three doses, Disp: 25 tablet, Rfl: 3    nystatin-triamcinolone (MYCOLOG) 106845-8.1 UNIT/GM-% ointment, APPLY OINTMENT TOPICALLY THREE TIMES DAILY, Disp: , Rfl:     ondansetron (ZOFRAN) 4 MG tablet, Take 1 tablet by mouth Every 8 (Eight) Hours As Needed for Nausea or Vomiting., Disp: , Rfl:     potassium chloride (MICRO-K) 10 MEQ CR capsule, Take 1 capsule by mouth Daily., Disp: , Rfl:     pregabalin (LYRICA) 100 MG capsule, Take 1 capsule by mouth 3 (Three) Times a Day., Disp: , Rfl:     rizatriptan (MAXALT) 10 MG tablet, Take 1 tablet by mouth 1 (One) Time As Needed for Migraine. May repeat in 2 hours if needed, Disp: , Rfl:     rosuvastatin (CRESTOR) 20 MG tablet, Take 1 tablet by mouth Daily., Disp: 90 tablet, Rfl: 1    spironolactone (ALDACTONE) 25 MG tablet, Take 1 tablet by mouth Daily., Disp: , Rfl:     temazepam (RESTORIL) 15 MG capsule, Take 1 capsule by mouth At Night As Needed., Disp: , Rfl:     True Metrix Blood Glucose Test test strip, , Disp: , Rfl:     Vascepa 1 g capsule capsule, TAKE 2 CAPSULES BY MOUTH TWICE DAILY WITH  MEALS, Disp: 360 capsule, Rfl: 2   Past  Medical History:   Diagnosis Date    Abdominal migraine     Arthritis     CHF (congestive heart failure)     Coronary artery disease     2 stents    Depression     Diabetes mellitus     type 2 dm, 3 years, checks blood sugar every am    Gout     Hyperlipidemia     Sjoegren syndrome     SOB (shortness of breath) on exertion     Tobacco abuse 2011    cessation     Wears dentures     full set    Wears glasses       Past Surgical History:   Procedure Laterality Date    APPENDECTOMY      CARDIAC CATHETERIZATION      CARDIAC CATHETERIZATION N/A 11/30/2017    Procedure: Left Heart Cath;  Surgeon: Galina Leonard MD;  Location:  [facility];  Service:     CARDIAC ELECTROPHYSIOLOGY PROCEDURE N/A 03/28/2018    Procedure: Device Implant BiV ICD;  Surgeon: Sarbjit Ellison DO;  Location:  [facility];  Service: Cardiovascular    CERVICAL DISCECTOMY ANTERIOR      CHOLECYSTECTOMY      COLONOSCOPY  09/2022    CORONARY ANGIOPLASTY      CORONARY ANGIOPLASTY WITH STENT PLACEMENT      CORONARY ANGIOPLASTY WITH STENT PLACEMENT      CORONARY STENT PLACEMENT      D & C HYSTEROSCOPY N/A 10/06/2022    Procedure: DILATATION AND CURETTAGE HYSTEROSCOPY;  Surgeon: Davide Durbin MD;  Location:  [facility];  Service: Obstetrics/Gynecology;  Laterality: N/A;    ENDOSCOPY  09/2022    OTHER SURGICAL HISTORY      growth removed from small intestine as a child    POLYPECTOMY      Colon polyps status    RECTOVAGINAL FISTULA REPAIR      TONSILLECTOMY      TUBAL ABDOMINAL LIGATION  1998?    Only 1    WISDOM TOOTH EXTRACTION        Family History   Problem Relation Age of Onset    Heart disease Mother     Osteoporosis Mother     Hypertension Mother     Heart attack Father     Colon cancer Father     Prostate cancer Father     Coronary artery disease Father     Diabetes Father     No Known Problems Brother     Cancer Maternal Grandmother     Brain cancer Maternal Grandmother     Stomach cancer Maternal Grandfather      "Heart failure Paternal Grandmother     Breast cancer Paternal Grandmother     Heart attack Paternal Grandfather       Review of Systems    All other systems reviewed and are negative     Objective:    /80   Pulse 91   Ht 162.6 cm (64.02\") Comment: self reported  Wt 95.3 kg (210 lb) Comment: self reported  SpO2 96%   BMI 36.03 kg/m²     Neurology Exam:    General apperance: NAD.     Mental status: Alert, awake and oriented to time place and person.    Language and Speech: No aphasia or dysarthria.    Naming , Repitition and Comprehension:  Can name objects, repeat a sentence and follow commands. Speech is clear and fluent with good repetition, comprehension, and naming.    Cranial Nerves:   CN II: Visual fields are full. Intact. Fundi - Normal, No papillederma, Pupils - SIVA  CN III, IV and VI: Extraocular movements are intact. Normal saccades.   CN V: Facial sensation is intact.   CN VII: Muscles of facial expression reveal no asymmetry. Intact.   CN VIII: Hearing is intact. Whispered voice intact.   CN IX and X: Palate elevates symmetrically. Intact  CN XI: Shoulder shrug is intact.   CN XII: Tongue is midline without evidence of atrophy or fasciculation.     Motor:  Right UE muscle strength 5/5. Normal tone.     Left UE muscle strength 5/5. Normal tone.      Right LE muscle strength5/5. Normal tone.     Left LE muscle strength 5/5. Normal tone.      Sensory: Reduced light touch, vibration and pinprick sensation bilaterally.    DTRs: 1+ bilaterally in upper extremities, 1+ bilateral knee and absent bilateral ankles.    Babinski: Negative bilaterally.    Co-ordination: Normal finger-to-nose, heel to shin B/L.    Rhomberg: Negative.    Gait: Normal.    Cardiovascular: Regular rate and rhythm without murmur, gallop or rub.    Assessment and Plan:  1. Diabetic polyneuropathy associated with type 2 diabetes mellitus  -Diabetic polyneuropathy in a patient with long-term history of diabetes.  EMG/nerve " conduction confirmed moderate axonal diabetic polyneuropathy as well.  She has been on Lyrica 100 mg 3 times daily for last 6 months and has had good response with reduction in symptoms of neuropathy.  She is also on Cymbalta which seems to be helping.  Continue with this combination.  I advised her to keep blood sugars under good control as that is the best way to prevent further worsening in diabetic neuropathy symptoms.  Otherwise, I will see her back in 6 months for follow-up.       Return in about 6 months (around 7/17/2024).     Shahid Linares MD      Note to patient: The 21st Century Cures Act makes medical notes like these available to patients in the interest of transparency. However, be advised this is a medical document. It is intended as peer to peer communication. It is written in medical language and may contain abbreviations or verbiage that are unfamiliar. It may appear blunt or direct. Medical documents are intended to carry relevant information, facts as evident, and the clinical opinion of the physician.

## 2024-01-17 NOTE — PROGRESS NOTES
Subjective   Lucia Stevens is a 63 y.o. female.     History of Present Illness     Diabetes Mellitus Type II, Follow-up:   Lucia Stevens is a 63 y.o. female who is here for follow-up of Type 2 diabetes mellitus.  Current symptoms/problems include none and have been stable. Patient is adherent with medications.  Known diabetic complications: cardiovascular disease  Cardiovascular risk factors: diabetes mellitus, dyslipidemia, hypertension, and obesity (BMI >= 30 kg/m2)  Current diabetic medications include  trulicity, metformin, amaryl and jardiance .   Checks glucose at home, around 150  She is on ACE inhibitor or angiotensin II receptor blocker. Patient is on a statin.       Lucia Stevens  is here for follow-up of hypertension of several years duration. She is not exercising and is not adherent to a low-salt diet. Patient does not check her blood pressure.    She is compliant with meds.        Lucia Stevens returns today for follow up of Hyperlipidemia  Lucia indicates her exercise level as not at all.  Diet: unchanged  Patient is compliant with medications   Any side effects to medications:   chest pain No myalgia No memory change No  Pt is not due for labs      The following portions of the patient's history were reviewed and updated as appropriate: allergies, current medications, past family history, past medical history, past social history, past surgical history, and problem list.    Review of Systems   Constitutional: Negative.    Respiratory: Negative.     Cardiovascular: Negative.    Musculoskeletal:  Positive for arthralgias.   Psychiatric/Behavioral: Negative.         Objective   Physical Exam  Vitals and nursing note reviewed.   Constitutional:       General: She is not in acute distress.     Appearance: Normal appearance. She is well-developed.   Cardiovascular:      Rate and Rhythm: Normal rate and regular rhythm.      Heart sounds: Normal heart sounds.   Pulmonary:      Effort: Pulmonary effort is  normal.      Breath sounds: Normal breath sounds.   Neurological:      Mental Status: She is alert and oriented to person, place, and time.   Psychiatric:         Mood and Affect: Mood normal.         Behavior: Behavior normal.         Thought Content: Thought content normal.         Judgment: Judgment normal.         Assessment & Plan   Diagnoses and all orders for this visit:    1. Type 2 diabetes mellitus with diabetic neuropathy, without long-term current use of insulin (Primary)  -     empagliflozin (Jardiance) 25 MG tablet tablet; Take 1 tablet by mouth Daily.  Dispense: 90 tablet; Refill: 1  -     glimepiride (AMARYL) 2 MG tablet; Take 2 tablets by mouth Daily.  Dispense: 180 tablet; Refill: 1  -     metFORMIN (GLUCOPHAGE) 850 MG tablet; Take 1 tablet by mouth Daily With Breakfast.  Dispense: 180 tablet; Refill: 1  -     Tirzepatide (Mounjaro) 2.5 MG/0.5ML solution pen-injector pen; Inject 0.5 mL under the skin into the appropriate area as directed 1 (One) Time Per Week.  Dispense: 2 mL; Refill: 1    2. Dyslipidemia  -     rosuvastatin (CRESTOR) 20 MG tablet; Take 1 tablet by mouth Daily.  Dispense: 90 tablet; Refill: 1    3. Coronary artery disease involving native coronary artery of native heart with angina pectoris    4. Moderate episode of recurrent major depressive disorder  -     DULoxetine (CYMBALTA) 60 MG capsule; Take 1 capsule by mouth Daily.  Dispense: 90 capsule; Refill: 1    5. Diabetic polyneuropathy associated with type 2 diabetes mellitus  -     pregabalin (LYRICA) 100 MG capsule; Take 1 capsule by mouth 3 (Three) Times a Day.  Dispense: 90 capsule; Refill: 2    6. Positive PREET (antinuclear antibody)  -     Ambulatory Referral to Rheumatology    DM controlo not to goal with HgA1C at 8.5 in December form prior PCP.  Discussed med changes to improve this.  She has been on trulicity.  I am going to try mounjaro instead and will recheck in 3 months.  BP borderline, continue meds and will recheck  in future.  Hoping weight loss helps this!  No change in lyBENNIE jacobs reviewed  Continu crestor with Hx CAD and will check lipids in future  PREET was positve from outside provider.  She asks about rheum referral and I will place this  Discussed PMR for ongoing pain but she dclines at this time.

## 2024-01-18 ENCOUNTER — TELEPHONE (OUTPATIENT)
Dept: CARDIOLOGY | Facility: CLINIC | Age: 64
End: 2024-01-18
Payer: MEDICARE

## 2024-01-18 NOTE — TELEPHONE ENCOUNTER
----- Message from Virgilio Borden MD sent at 1/16/2024  4:15 PM EST -----  LE JESSICA from [facility]  ----- Message -----  From: Federico, Tucker Incoming  Sent: 1/16/2024   2:00 PM EST  To: Virgilio Borden MD

## 2024-01-22 ENCOUNTER — TELEPHONE (OUTPATIENT)
Dept: FAMILY MEDICINE CLINIC | Facility: CLINIC | Age: 64
End: 2024-01-22
Payer: MEDICARE

## 2024-01-22 NOTE — TELEPHONE ENCOUNTER
Left detailed message on patient identifiable vm as requested. Message will be forwarded to Dr Beckwith to review when he returns next week. Pt advised to schedule appt with another provider if she needs something sooner.

## 2024-01-22 NOTE — TELEPHONE ENCOUNTER
Pt called stating the Tylenol arthritis is not working to help with her pain. Is there something else she can take until she can get into the rheumatology?  She has tried muscle relaxer in the past but they did not help either.    [phone] to leave message if no answer

## 2024-01-24 ENCOUNTER — TELEPHONE (OUTPATIENT)
Dept: FAMILY MEDICINE CLINIC | Facility: CLINIC | Age: 64
End: 2024-01-24
Payer: MEDICARE

## 2024-01-24 ENCOUNTER — PATIENT ROUNDING (BHMG ONLY) (OUTPATIENT)
Dept: FAMILY MEDICINE CLINIC | Facility: CLINIC | Age: 64
End: 2024-01-24
Payer: MEDICARE

## 2024-01-24 NOTE — PROGRESS NOTES
A My-Chart message has been sent to the patient for PATIENT ROUNDING with [facility].

## 2024-01-24 NOTE — TELEPHONE ENCOUNTER
PA submitted to plan via [facility].  KEY:  [id number]

## 2024-01-30 NOTE — TELEPHONE ENCOUNTER
We had discussed PMR referral for pain but she declined. Would she like to go see specialist at this time

## 2024-02-02 ENCOUNTER — OFFICE VISIT (OUTPATIENT)
Dept: FAMILY MEDICINE CLINIC | Facility: CLINIC | Age: 64
End: 2024-02-02
Payer: MEDICARE

## 2024-02-02 VITALS
DIASTOLIC BLOOD PRESSURE: 78 MMHG | WEIGHT: 206 LBS | SYSTOLIC BLOOD PRESSURE: 132 MMHG | HEART RATE: 92 BPM | OXYGEN SATURATION: 96 % | BODY MASS INDEX: 35.17 KG/M2 | RESPIRATION RATE: 18 BRPM | HEIGHT: 64 IN | TEMPERATURE: 97.8 F

## 2024-02-02 DIAGNOSIS — Z00.00 MEDICARE ANNUAL WELLNESS VISIT, SUBSEQUENT: Primary | ICD-10-CM

## 2024-02-02 DIAGNOSIS — E11.40 TYPE 2 DIABETES MELLITUS WITH DIABETIC NEUROPATHY, WITHOUT LONG-TERM CURRENT USE OF INSULIN: ICD-10-CM

## 2024-02-02 DIAGNOSIS — L85.8: ICD-10-CM

## 2024-02-02 DIAGNOSIS — R11.2 NAUSEA AND VOMITING, UNSPECIFIED VOMITING TYPE: ICD-10-CM

## 2024-02-02 DIAGNOSIS — C44.92 SQUAMOUS CELL SKIN CANCER: ICD-10-CM

## 2024-02-02 PROBLEM — R06.09 DOE (DYSPNEA ON EXERTION): Status: RESOLVED | Noted: 2017-11-29 | Resolved: 2024-02-02

## 2024-02-02 LAB
ALBUMIN/CREATININE RATIO, URINE: ABNORMAL
EXPIRATION DATE: ABNORMAL
Lab: ABNORMAL
POC CREATININE URINE: 10
POC MICROALBUMIN URINE: 30

## 2024-02-02 NOTE — PROGRESS NOTES
The ABCs of the Annual Wellness Visit  Subsequent Medicare Wellness Visit    Subjective    Lucia Stevens is a 63 y.o. female who presents for a Subsequent Medicare Wellness Visit.    The following portions of the patient's history were reviewed and   updated as appropriate: allergies, current medications, past family history, past medical history, past social history, past surgical history, and problem list.    Compared to one year ago, the patient feels her physical   health is worse.    Compared to one year ago, the patient feels her mental   health is worse.    Recent Hospitalizations:  She was not admitted to the hospital during the last year.       Current Medical Providers:  Patient Care Team:  Benjamin Beckwith MD as PCP - General (Family Medicine)  Virgilio Borden MD as Consulting Physician (Cardiology)  Davide Durbin MD as Consulting Physician (Obstetrics and Gynecology)    Outpatient Medications Prior to Visit   Medication Sig Dispense Refill    allopurinol (ZYLOPRIM) 100 MG tablet Take 2 tablets by mouth Daily.      aspirin 81 MG EC tablet Take 1 tablet by mouth Daily.      bisoprolol (ZEBeta) 5 MG tablet TAKE 1/2 TABLET EVERY DAY 45 tablet 1    Blood Glucose Monitoring Suppl (True Metrix Air Glucose Meter) w/Device kit       DULoxetine (CYMBALTA) 60 MG capsule Take 1 capsule by mouth Daily. 90 capsule 1    empagliflozin (Jardiance) 25 MG tablet tablet Take 1 tablet by mouth Daily. 90 tablet 1    furosemide (LASIX) 40 MG tablet Take 0.5 tablets by mouth 2 (Two) Times a Day. May take an additional tablet PRN for weight gain of 3lbs in one day or 5lbs in 1 wk (Patient taking differently: Take 0.5 tablets by mouth Daily. May take an additional tablet PRN for weight gain of 3lbs in one day or 5lbs in 1 wk)      glimepiride (AMARYL) 2 MG tablet Take 2 tablets by mouth Daily. 180 tablet 1    metFORMIN (GLUCOPHAGE) 850 MG tablet Take 1 tablet by mouth Daily With Breakfast. 180 tablet 1    midodrine  (PROAMATINE) 5 MG tablet TAKE 2 TABLETS TWICE DAILY 360 tablet 1    nitroglycerin (NITROSTAT) 0.4 MG SL tablet 1 under the tongue as needed for angina, may repeat q5mins for up three doses 25 tablet 3    ondansetron (ZOFRAN) 4 MG tablet Take 1 tablet by mouth Every 8 (Eight) Hours As Needed for Nausea or Vomiting.      potassium chloride (MICRO-K) 10 MEQ CR capsule Take 1 capsule by mouth Daily.      pregabalin (LYRICA) 100 MG capsule Take 1 capsule by mouth 3 (Three) Times a Day. 90 capsule 2    rizatriptan (MAXALT) 10 MG tablet Take 1 tablet by mouth 1 (One) Time As Needed for Migraine. May repeat in 2 hours if needed      rosuvastatin (CRESTOR) 20 MG tablet Take 1 tablet by mouth Daily. 90 tablet 1    spironolactone (ALDACTONE) 25 MG tablet Take 1 tablet by mouth Daily.      temazepam (RESTORIL) 15 MG capsule Take 1 capsule by mouth At Night As Needed.      Tirzepatide (Mounjaro) 2.5 MG/0.5ML solution pen-injector pen Inject 0.5 mL under the skin into the appropriate area as directed 1 (One) Time Per Week. 2 mL 1    True Metrix Blood Glucose Test test strip       Vascepa 1 g capsule capsule TAKE 2 CAPSULES BY MOUTH TWICE DAILY WITH  MEALS 360 capsule 2     No facility-administered medications prior to visit.       No opioid medication identified on active medication list. I have reviewed chart for other potential  high risk medication/s and harmful drug interactions in the elderly.        A      Patient Active Problem List   Diagnosis    Psoriasis    Secondary Sjogren's syndrome    Hiatal hernia    Hypertension    Ischemic heart disease    Coronary artery disease involving native coronary artery of native heart with angina pectoris    Ischemic cardiomyopathy    Biventricular ICD (implantable cardioverter-defibrillator) in place    Dyslipidemia    PMB (postmenopausal bleeding)    Thickened endometrium    Type 2 diabetes mellitus with diabetic neuropathy, without long-term current use of insulin     Advance Care  "Planning   Advance Care Planning     Advance Directive is on file.  ACP discussion was held with the patient during this visit. Patient has an advance directive in EMR which is still valid.      Objective    Vitals:    24 1449   BP: 132/78   Pulse: 92   Resp: 18   Temp: 97.8 °F (36.6 °C)   SpO2: 96%   Weight: 93.4 kg (206 lb)   Height: 162.6 cm (64.02\")     Estimated body mass index is 35.34 kg/m² as calculated from the following:    Height as of this encounter: 162.6 cm (64.02\").    Weight as of this encounter: 93.4 kg (206 lb).           Does the patient have evidence of cognitive impairment? No          HEALTH RISK ASSESSMENT    Smoking Status:  Social History     Tobacco Use   Smoking Status Former    Packs/day: 0.50    Years: 20.00    Additional pack years: 0.00    Total pack years: 10.00    Types: Cigarettes    Quit date: 2011    Years since quittin.0    Passive exposure: Past   Smokeless Tobacco Never     Alcohol Consumption:  Social History     Substance and Sexual Activity   Alcohol Use Yes    Alcohol/week: 1.0 standard drink of alcohol    Types: 1 Shots of liquor per week    Comment: Social drinker     Fall Risk Screen:    MICHELLE Fall Risk Assessment was completed, and patient is at LOW risk for falls.Assessment completed on:2024    Depression Screenin/2/2024     3:15 PM   PHQ-2/PHQ-9 Depression Screening   Little Interest or Pleasure in Doing Things 0-->not at all   Feeling Down, Depressed or Hopeless 0-->not at all   PHQ-9: Brief Depression Severity Measure Score 0       Health Habits and Functional and Cognitive Screenin/2/2024     3:00 PM   Functional & Cognitive Status   Do you have difficulty preparing food and eating? No   Do you have difficulty bathing yourself, getting dressed or grooming yourself? No   Do you have difficulty using the toilet? No   Do you have difficulty moving around from place to place? No   Do you have trouble with steps or getting out of a " bed or a chair? No   Current Diet Unhealthy Diet   Dental Exam Up to date   Eye Exam Up to date   Exercise (times per week) 0 times per week   Current Exercises Include No Regular Exercise   Do you need help using the phone?  No   Are you deaf or do you have serious difficulty hearing?  No   Do you need help to go to places out of walking distance? No   Do you need help shopping? No   Do you need help preparing meals?  No   Do you need help with housework?  No   Do you need help with laundry? No   Do you need help taking your medications? No   Do you need help managing money? No   Do you ever drive or ride in a car without wearing a seat belt? No   Have you felt unusual stress, anger or loneliness in the last month? No   Who do you live with? Alone   If you need help, do you have trouble finding someone available to you? No   Have you been bothered in the last four weeks by sexual problems? No   Do you have difficulty concentrating, remembering or making decisions? Yes       Age-appropriate Screening Schedule:  Refer to the list below for future screening recommendations based on patient's age, sex and/or medical conditions. Orders for these recommended tests are listed in the plan section. The patient has been provided with a written plan.    Health Maintenance   Topic Date Due    MAMMOGRAM  Never done    TDAP/TD VACCINES (1 - Tdap) Never done    ZOSTER VACCINE (1 of 2) Never done    HEPATITIS C SCREENING  Never done    ANNUAL WELLNESS VISIT  Never done    DIABETIC FOOT EXAM  Never done    DIABETIC EYE EXAM  Never done    Pneumococcal Vaccine 0-64 (2 of 2 - PPSV23 or PCV20) 03/21/2018    HEMOGLOBIN A1C  09/27/2018    LIPID PANEL  11/30/2018    INFLUENZA VACCINE  08/01/2023    COVID-19 Vaccine (3 - 2023-24 season) 09/01/2023    BMI FOLLOWUP  01/17/2025    URINE MICROALBUMIN  02/02/2025    COLORECTAL CANCER SCREENING  10/27/2025    PAP SMEAR  08/14/2026                  CMS Preventative Services Quick Reference  Risk  "Factors Identified During Encounter  Inactivity/Sedentary: Patient was advised to exercise at least 150 minutes a week per CDC recommendations.  Polypharmacy: Medication List reviewed  The above risks/problems have been discussed with the patient.  Pertinent information has been shared with the patient in the After Visit Summary.  An After Visit Summary and PPPS were made available to the patient.    Follow Up:   Next Medicare Wellness visit to be scheduled in 1 year.       Additional E&M Note during same encounter follows:  Patient has multiple medical problems which are significant and separately identifiable that require additional work above and beyond the Medicare Wellness Visit.      Chief Complaint  Diabetes and skin irritation    Subjective        Diabetes  Hypoglycemia symptoms include nervousness/anxiousness.     Lucia Stevens is also being seen today for SM, skin reaction, GI issues      She had squamous cell CA removed from her left hand about 6 years ago  This spot is not hurting  Has episodic burning in this area  Seems to be worse on the palm compared to the dorsal surface where lesion was removed    Also a rough spot on ear left    She also had some N/V/D and atypical GI issues for multiple years  Has seen ENT, GI, neurology in the past  Had another episode on 1/28/24 with severe N/V/D  Did not have dizziness this episode  Just had a bad 24 hours with another episode of this      We have not done labs for her DM rec[facility]ty  [facility]    Review of Systems   Constitutional: Negative.    Gastrointestinal:  Positive for nausea and vomiting.   Musculoskeletal:  Positive for arthralgias.   Psychiatric/Behavioral:  The patient is nervous/anxious.        Objective   Vital Signs:  /78   Pulse 92   Temp 97.8 °F (36.6 °C)   Resp 18   Ht 162.6 cm (64.02\")   Wt 93.4 kg (206 lb)   SpO2 96%   BMI 35.34 kg/m²     Physical Exam  Vitals and nursing note reviewed.   Constitutional:       General: She is not " in acute distress.     Appearance: Normal appearance. She is well-developed.   HENT:      Ears:     Eyes:      Extraocular Movements: Extraocular movements intact.      Conjunctiva/sclera: Conjunctivae normal.   Cardiovascular:      Rate and Rhythm: Normal rate and regular rhythm.      Heart sounds: Normal heart sounds.   Pulmonary:      Effort: Pulmonary effort is normal.      Breath sounds: Normal breath sounds.   Neurological:      Mental Status: She is alert and oriented to person, place, and time.   Psychiatric:         Mood and Affect: Mood normal.         Behavior: Behavior normal.         Thought Content: Thought content normal.         Judgment: Judgment normal.                 Assessment and Plan   Diagnoses and all orders for this visit:    1. Medicare annual wellness visit, subsequent (Primary)    2. Squamous cell skin cancer  -     Ambulatory Referral to Dermatology    3. Keratoacanthoma of helix    4. Type 2 diabetes mellitus with diabetic neuropathy, without long-term current use of insulin  -     CBC & Differential  -     Comprehensive Metabolic Panel  -     Hemoglobin A1c  -     POCT microalbumin    5. Nausea and vomiting, unspecified vomiting type      Medicare wellness completed and counseling as above.    Will work on derm referral for Hx of squamous cell CA>  I am unsure why she would have pain at sire of old treatment  Cryotherapy times 4 applied to rough skin lesion on top of left ear after verbal consent.  Pt tolerated well.  F/u as needed or with derm  Reviewed DM meds and will check labs today to determine if further med changes needed.  F/u pending labs  Unsure of cause of longstanding N/V that sh has seen multiple provders for.  F/u wth GI as scheduled.

## 2024-02-03 LAB
ALBUMIN SERPL-MCNC: 4 G/DL (ref 3.9–4.9)
ALBUMIN/GLOB SERPL: 1.6 {RATIO} (ref 1.2–2.2)
ALP SERPL-CCNC: 90 IU/L (ref 44–121)
ALT SERPL-CCNC: 28 IU/L (ref 0–32)
AST SERPL-CCNC: 27 IU/L (ref 0–40)
BASOPHILS # BLD AUTO: 0.1 X10E3/UL (ref 0–0.2)
BASOPHILS NFR BLD AUTO: 1 %
BILIRUB SERPL-MCNC: 0.6 MG/DL (ref 0–1.2)
BUN SERPL-MCNC: 15 MG/DL (ref 8–27)
BUN/CREAT SERPL: 24 (ref 12–28)
CALCIUM SERPL-MCNC: 9.3 MG/DL (ref 8.7–10.3)
CHLORIDE SERPL-SCNC: 97 MMOL/L (ref 96–106)
CO2 SERPL-SCNC: 24 MMOL/L (ref 20–29)
CREAT SERPL-MCNC: 0.62 MG/DL (ref 0.57–1)
EGFRCR SERPLBLD CKD-EPI 2021: 99 ML/MIN/1.73
EOSINOPHIL # BLD AUTO: 0.3 X10E3/UL (ref 0–0.4)
EOSINOPHIL NFR BLD AUTO: 3 %
ERYTHROCYTE [DISTWIDTH] IN BLOOD BY AUTOMATED COUNT: 13.8 % (ref 11.7–15.4)
GLOBULIN SER CALC-MCNC: 2.5 G/DL (ref 1.5–4.5)
GLUCOSE SERPL-MCNC: 152 MG/DL (ref 70–99)
HBA1C MFR BLD: 9.5 % (ref 4.8–5.6)
HCT VFR BLD AUTO: 41.5 % (ref 34–46.6)
HGB BLD-MCNC: 14.2 G/DL (ref 11.1–15.9)
IMM GRANULOCYTES # BLD AUTO: 0.3 X10E3/UL (ref 0–0.1)
IMM GRANULOCYTES NFR BLD AUTO: 3 %
LYMPHOCYTES # BLD AUTO: 2.4 X10E3/UL (ref 0.7–3.1)
LYMPHOCYTES NFR BLD AUTO: 21 %
MCH RBC QN AUTO: 29.8 PG (ref 26.6–33)
MCHC RBC AUTO-ENTMCNC: 34.2 G/DL (ref 31.5–35.7)
MCV RBC AUTO: 87 FL (ref 79–97)
MONOCYTES # BLD AUTO: 1.3 X10E3/UL (ref 0.1–0.9)
MONOCYTES NFR BLD AUTO: 11 %
NEUTROPHILS # BLD AUTO: 7 X10E3/UL (ref 1.4–7)
NEUTROPHILS NFR BLD AUTO: 61 %
PLATELET # BLD AUTO: 170 X10E3/UL (ref 150–450)
POTASSIUM SERPL-SCNC: 3.6 MMOL/L (ref 3.5–5.2)
PROT SERPL-MCNC: 6.5 G/DL (ref 6–8.5)
RBC # BLD AUTO: 4.77 X10E6/UL (ref 3.77–5.28)
SODIUM SERPL-SCNC: 139 MMOL/L (ref 134–144)
WBC # BLD AUTO: 11.4 X10E3/UL (ref 3.4–10.8)

## 2024-02-22 ENCOUNTER — OFFICE VISIT (OUTPATIENT)
Dept: CARDIOLOGY | Facility: CLINIC | Age: 64
End: 2024-02-22
Payer: MEDICARE

## 2024-02-22 VITALS
OXYGEN SATURATION: 97 % | HEART RATE: 88 BPM | HEIGHT: 64 IN | BODY MASS INDEX: 34.83 KG/M2 | WEIGHT: 204 LBS | DIASTOLIC BLOOD PRESSURE: 74 MMHG | SYSTOLIC BLOOD PRESSURE: 130 MMHG

## 2024-02-22 DIAGNOSIS — E11.65 TYPE 2 DIABETES MELLITUS WITH HYPERGLYCEMIA, WITHOUT LONG-TERM CURRENT USE OF INSULIN: ICD-10-CM

## 2024-02-22 DIAGNOSIS — I10 PRIMARY HYPERTENSION: ICD-10-CM

## 2024-02-22 DIAGNOSIS — I50.22 CHRONIC SYSTOLIC CONGESTIVE HEART FAILURE: Primary | ICD-10-CM

## 2024-02-22 RX ORDER — METHOTREXATE 2.5 MG/1
2.5 TABLET ORAL WEEKLY
COMMUNITY
Start: 2024-02-16

## 2024-02-22 RX ORDER — PREDNISONE 5 MG/1
5 TABLET ORAL DAILY
COMMUNITY
Start: 2024-02-16

## 2024-02-22 RX ORDER — FOLIC ACID 1 MG/1
TABLET ORAL DAILY
COMMUNITY
Start: 2024-02-16

## 2024-02-22 NOTE — PROGRESS NOTES
[facility] Cardiology  Office Progress Note  Lucia Stevens  1960  [address] [facility] [place] [address]       Visit Date: 02/22/24    PCP: Benjamin Beckwith  [address]  [place] [address]    IDENTIFICATION: A 63 y.o. female disabled from [employer] and is a resident of [place], [place].     PROBLEM LIST:   CAD/ischemic cardiomyopathy  GXT myocardial perfusion study, 06/07/2005, revealing a moderate sized reversible defect in anteroseptal region with diminished myocardial thickening and hypokinesis. LVEF 58%.  Cardiac catheterization, June 2005, Dr. Talbot, due to moderate sized reversible anteroseptal defect; LVEF 38%: JADE stenting of PDA (2.5x13 mm Cypher).  JADE stenting of mid-RCA, 08/08/2011: 15 mm Promus JADE; LVEF 55% to 60%.  11/30/17 echo: EF 30%, LA borderline dilated, moderate to severe MR, mild-to-moderate TR RVSP 41 mmHg  11/30/2017 LHC: Chronic total occlusion of the RCA served by left-sided collaterals, nonobstructive disease of the left coronary system, cardiomyopathy with EF 25 to 30%.  LifeVest initiated 11/2017 2/26/18 echo EF 25%, mild-mod MR  3/28/18 BS. BiVICD implantation Terra  3/27/2018 echo EF 20%, moderate MR  9/24/18 echo: EF 40%, multiple LV wall segments hypokinetic, grade 1 diastolic dysfunction, mild concentric LVH, no significant valvular abnormalities, no pulmonary hypertension  6/21 echo EF 43% rvsp 23  3/23 echi EF >46%  HTN  10/10/2000 1824-hour BP monitor average 127/60  Intolerant to higher doses coreg  HLD  11/30/17 lipids:   HDL 29 LDL 66  6/20/18   HDL 23 LDL cannot be calculated  8/21 143/483/24/48  DM  11/30/17 A1c 8.2  3/27/18 A1c 7.3  Remote tobacco abuse:   Quit 2011 9/2021 Covid 19 + -received antibiotic infusion  Hiatal hernia.  Sjogren’s syndrome. Inflammatory arthritis- Dr Diamond 2023  Psoriasis.  Colon polyps status polypectomy.  Recurrent R ear drainage- ENT X 3  Depression.PTSD- estranged son  2021  Surgical history:  Appendectomy.  Cholecystectomy.  Rectovaginal fistula repair.  Cervical discectomy.   10/22 D& C      CC:   Chief Complaint   Patient presents with    Chronic systolic congestive heart failure       Allergies  Allergies   Allergen Reactions    Sulfa Antibiotics Anaphylaxis     Lip swelling       Current Medications    Current Outpatient Medications:     allopurinol (ZYLOPRIM) 100 MG tablet, Take 2 tablets by mouth Daily., Disp: , Rfl:     aspirin 81 MG EC tablet, Take 1 tablet by mouth Daily., Disp: , Rfl:     bisoprolol (ZEBeta) 5 MG tablet, TAKE 1/2 TABLET EVERY DAY, Disp: 45 tablet, Rfl: 1    Blood Glucose Monitoring Suppl (True Metrix Air Glucose Meter) w/Device kit, , Disp: , Rfl:     DULoxetine (CYMBALTA) 60 MG capsule, Take 1 capsule by mouth Daily., Disp: 90 capsule, Rfl: 1    empagliflozin (Jardiance) 25 MG tablet tablet, Take 1 tablet by mouth Daily., Disp: 90 tablet, Rfl: 1    folic acid (FOLVITE) 1 MG tablet, Take  by mouth Daily., Disp: , Rfl:     furosemide (LASIX) 40 MG tablet, Take 0.5 tablets by mouth 2 (Two) Times a Day. May take an additional tablet PRN for weight gain of 3lbs in one day or 5lbs in 1 wk (Patient taking differently: Take 0.5 tablets by mouth Daily. May take an additional tablet PRN for weight gain of 3lbs in one day or 5lbs in 1 wk), Disp: , Rfl:     glimepiride (AMARYL) 2 MG tablet, Take 2 tablets by mouth Daily., Disp: 180 tablet, Rfl: 1    metFORMIN (GLUCOPHAGE) 850 MG tablet, Take 1 tablet by mouth Daily With Breakfast., Disp: 180 tablet, Rfl: 1    methotrexate 2.5 MG tablet, Take 1 tablet by mouth 1 (One) Time Per Week., Disp: , Rfl:     midodrine (PROAMATINE) 5 MG tablet, TAKE 2 TABLETS TWICE DAILY, Disp: 360 tablet, Rfl: 1    nitroglycerin (NITROSTAT) 0.4 MG SL tablet, 1 under the tongue as needed for angina, may repeat q5mins for up three doses, Disp: 25 tablet, Rfl: 3    ondansetron (ZOFRAN) 4 MG tablet, Take 1 tablet by mouth Every 8 (Eight) Hours  "As Needed for Nausea or Vomiting., Disp: , Rfl:     potassium chloride (MICRO-K) 10 MEQ CR capsule, Take 1 capsule by mouth Daily., Disp: , Rfl:     predniSONE (DELTASONE) 5 MG tablet, Take 1 tablet by mouth Daily., Disp: , Rfl:     pregabalin (LYRICA) 100 MG capsule, Take 1 capsule by mouth 3 (Three) Times a Day., Disp: 90 capsule, Rfl: 2    rizatriptan (MAXALT) 10 MG tablet, Take 1 tablet by mouth 1 (One) Time As Needed for Migraine. May repeat in 2 hours if needed, Disp: , Rfl:     rosuvastatin (CRESTOR) 20 MG tablet, Take 1 tablet by mouth Daily., Disp: 90 tablet, Rfl: 1    spironolactone (ALDACTONE) 25 MG tablet, Take 1 tablet by mouth Daily., Disp: , Rfl:     temazepam (RESTORIL) 15 MG capsule, Take 1 capsule by mouth At Night As Needed., Disp: , Rfl:     True Metrix Blood Glucose Test test strip, , Disp: , Rfl:     Vascepa 1 g capsule capsule, TAKE 2 CAPSULES BY MOUTH TWICE DAILY WITH  MEALS, Disp: 360 capsule, Rfl: 2      History of Present Illness   Lucia Stevens is a 63 y.o. year old female here for follow up.  States that she had a litany of noncardiac issues since last visit.  She was noted with inflammatory thyroidism and follow-up with Dr. Diamond.  She was initiated on methotrexate and steroids.  There has been no plan that she knows of regarding weaning off steroids.  She also has a chronic right ear drainage and thinks that she may need antibiotics.  Anecdotally she has had a high-pitched voice over the last 2 weeks she states this is her\" heart failure voice\".  She was not gaining fluid weights nor more short of breath and took additional loop diuretic.  She states her voice did improve for 1 day but then recurred.        OBJECTIVE:  Vitals:    02/22/24 1538   BP: 130/74   BP Location: Right arm   Patient Position: Sitting   Cuff Size: Adult   Pulse: 88   SpO2: 97%   Weight: 92.5 kg (204 lb)   Height: 162.6 cm (64\")       Body mass index is 35.02 kg/m².    Constitutional:       Appearance: " Healthy appearance. Not in distress.   Neck:      Vascular: No JVR. JVD normal.   Pulmonary:      Effort: Pulmonary effort is normal.      Breath sounds: Normal breath sounds. No wheezing. No rhonchi. No rales.   Chest:      Chest wall: Not tender to palpatation.   Cardiovascular:      PMI at left midclavicular line. Normal rate. Regular rhythm. Normal S1. Normal S2.       Murmurs: There is a systolic murmur.      No gallop.  No click. No rub.   Pulses:     Intact distal pulses.   Edema:     Peripheral edema present.  Abdominal:      General: Bowel sounds are normal.      Palpations: Abdomen is soft.      Tenderness: There is no abdominal tenderness.   Musculoskeletal: Normal range of motion.         General: No tenderness. Skin:     General: Skin is warm and dry.   Neurological:      General: No focal deficit present.      Mental Status: Alert and oriented to person, place and time.         Diagnostic Data:  Procedures  Device check  Acceptable thresholds/ impedances  No events  9% a pacing, 6% RV pacing, 100% LV pacing as programmed  Generator 5 years    ASSESSMENT:   Diagnosis Plan   1. Chronic systolic congestive heart failure        2. Primary hypertension        3. Type 2 diabetes mellitus with hyperglycemia, without long-term current use of insulin              PLAN:  Ischemic cardiomyopathy status post revascularization BiV pacer appears euvolemic New York Heart Association class III CHF at current continued aggressive medical regimen as tolerant with hypotension.  ACE/ ARB or Arni intolerant    Hypotension tolerant of low-dose beta-blockade with concomitant midodrine    Mixed dyslipidemia controlled on statin therapy    Diabetes A1c most recent > 9 now on added glucocorticoids    Discussed with patient to contact Dr. Diamond's office in regards to plan regarding continuation or weaning glucocorticoid      Virgilio Borden MD, [facility]

## 2024-02-23 ENCOUNTER — TELEPHONE (OUTPATIENT)
Dept: NEUROLOGY | Facility: CLINIC | Age: 64
End: 2024-02-23
Payer: MEDICARE

## 2024-02-23 NOTE — TELEPHONE ENCOUNTER
Caller: Lucia Stevens    Relationship: Self    Best call back number: [phone]    Preferred pharmacy: [facility] [address]  [place] - [address] [phone] [facility] [phone]      What was the call regarding: PT CALLED STATES THAT DR. PINEDA SAW HER 3-4 YEARS AGO FOR INTRACTABLE CYCLICAL VOMITING WITH NAUSEA. PT STATES SHE IS STILL SUFFERING WITH SYMPTOMS. SHE IS WONDERING IF DR. PINEDA WOULD LIKE TO SEE HER BACK IN CLINIC FOR A SOONER APPT TO FURTHER DISCUSS? SHE STATES SHE IS INTERESTED IN RESTARTING THE AMITRIPTYLINE MEDICATION TO AID IN HER SLEEP; PT IS OPEN TO OTHER MEDICATION OPTIONS AS WELL.    Do you require a callback: YES, PLEASE.    PLEASE REVIEW AND ADVISE.

## 2024-02-26 ENCOUNTER — OFFICE VISIT (OUTPATIENT)
Dept: FAMILY MEDICINE CLINIC | Facility: CLINIC | Age: 64
End: 2024-02-26
Payer: MEDICARE

## 2024-02-26 VITALS
HEIGHT: 64 IN | TEMPERATURE: 97.8 F | HEART RATE: 90 BPM | WEIGHT: 210 LBS | RESPIRATION RATE: 16 BRPM | SYSTOLIC BLOOD PRESSURE: 132 MMHG | BODY MASS INDEX: 35.85 KG/M2 | DIASTOLIC BLOOD PRESSURE: 78 MMHG | OXYGEN SATURATION: 95 %

## 2024-02-26 DIAGNOSIS — R49.8: Primary | ICD-10-CM

## 2024-02-26 RX ORDER — MIDODRINE HYDROCHLORIDE 10 MG/1
10 TABLET ORAL 2 TIMES DAILY
Qty: 180 TABLET | Refills: 1 | Status: SHIPPED | OUTPATIENT
Start: 2024-02-26

## 2024-02-26 RX ORDER — CEFDINIR 300 MG/1
600 CAPSULE ORAL DAILY
Qty: 14 CAPSULE | Refills: 0 | Status: SHIPPED | OUTPATIENT
Start: 2024-02-26

## 2024-02-26 RX ORDER — MIDODRINE HYDROCHLORIDE 10 MG/1
10 TABLET ORAL 2 TIMES DAILY
Qty: 180 TABLET | Refills: 1 | Status: SHIPPED | OUTPATIENT
Start: 2024-02-26 | End: 2024-02-26 | Stop reason: SDUPTHER

## 2024-02-26 RX ORDER — NYSTATIN 100000 U/G
1 OINTMENT TOPICAL 2 TIMES DAILY
Qty: 90 G | Refills: 2 | Status: SHIPPED | OUTPATIENT
Start: 2024-02-26

## 2024-02-26 RX ORDER — PREDNISONE 20 MG/1
40 TABLET ORAL DAILY
Qty: 8 TABLET | Refills: 0 | Status: SHIPPED | OUTPATIENT
Start: 2024-02-26

## 2024-02-26 NOTE — PROGRESS NOTES
Subjective   Lucia Stevens is a 63 y.o. female.     History of Present Illness     She has a high pitched voice which is new  This has been ongoing for about 2 weeks  She thinks when she gets ill than her voice gets high, bur maybe only one time  Does not have dysphagia      Review of Systems   HENT:  Negative for sore throat.        Objective   Physical Exam  Vitals and nursing note reviewed.   Constitutional:       Appearance: She is well-developed.   HENT:      Head: Normocephalic and atraumatic.      Right Ear: Hearing, tympanic membrane, ear canal and external ear normal.      Left Ear: Hearing, tympanic membrane, ear canal and external ear normal.      Nose: Nose normal.      Mouth/Throat:      Pharynx: Uvula midline.   Eyes:      Conjunctiva/sclera: Conjunctivae normal.   Cardiovascular:      Rate and Rhythm: Normal rate and regular rhythm.      Heart sounds: Normal heart sounds.   Pulmonary:      Effort: Pulmonary effort is normal.      Breath sounds: Normal breath sounds.   Musculoskeletal:      Cervical back: Normal range of motion.   Lymphadenopathy:      Cervical: No cervical adenopathy.   Psychiatric:         Behavior: Behavior normal.       Assessment & Plan   Diagnoses and all orders for this visit:    1. High pitched voice (Primary)  -     predniSONE (DELTASONE) 20 MG tablet; Take 2 tablets by mouth Daily.  Dispense: 8 tablet; Refill: 0  -     cefdinir (OMNICEF) 300 MG capsule; Take 2 capsules by mouth Daily.  Dispense: 14 capsule; Refill: 0  -     Ambulatory Referral to ENT (Otolaryngology)    Other orders  -     nystatin (MYCOSTATIN) 100482 UNIT/GM ointment; Apply 1 Application topically to the appropriate area as directed 2 (Two) Times a Day.  Dispense: 90 g; Refill: 2    Not sure what cause of voice change is as I do not see any OP swelling, mass, nor uvular movement as uvula is midline.  I am going to start steroids and Abx and refer to ENT for evaluation of cause.  She denies SOA as well as  dysphagia, but if these developshe will f/u or go to ER for evaluation.  Will see what ENT thinks about her voice and get help detemrineing cause

## 2024-02-28 ENCOUNTER — TELEPHONE (OUTPATIENT)
Dept: FAMILY MEDICINE CLINIC | Facility: CLINIC | Age: 64
End: 2024-02-28
Payer: MEDICARE

## 2024-02-28 NOTE — TELEPHONE ENCOUNTER
Caller: Lucia Stevens    Relationship: Self    Best call back number: [phone]     What medication are you requesting: NYSTATIN TRIAMCINOLONE OINTMENT    What are your current symptoms: ITCHES IN PRIVATES AND FOLDS    Have you had these symptoms before:    [x] Yes  [] No    Have you been treated for these symptoms before:   [x] Yes  [] No    If a prescription is needed, what is your preferred pharmacy and phone number: [facility] - [facility] [place] - [address] [phone] [facility] [phone]      Additional notes: PATIENT STATES THAT THE OINTMENT WORKS THE BEST     PATIENT WOULD ALSO LIKE TO CHECK THE STATUS OF THE ENT REFERRAL. PLEASE FOLLOW UP.

## 2024-03-04 NOTE — TELEPHONE ENCOUNTER
I do not prescribe that type of cream.  Ok to be seen for evaluation though.  Ok for female provider if she feels more comfortable with that.    ENT referral in system from 2/26/24

## 2024-03-18 ENCOUNTER — TELEPHONE (OUTPATIENT)
Dept: FAMILY MEDICINE CLINIC | Facility: CLINIC | Age: 64
End: 2024-03-18
Payer: MEDICARE

## 2024-03-18 NOTE — TELEPHONE ENCOUNTER
Caller: Lucia Stevens    Relationship: Self    Best call back number: [phone]    Requested Prescriptions:   Requested Prescriptions     Pending Prescriptions Disp Refills    spironolactone (ALDACTONE) 25 MG tablet       Sig: Take 1 tablet by mouth Daily.    allopurinol (ZYLOPRIM) 100 MG tablet       Sig: Take 2 tablets by mouth Daily.        Pharmacy where request should be sent: [facility] - [place], [place] - [address] [phone] [facility] [phone]      Last office visit with prescribing clinician: 2/26/2024   Last telemedicine visit with prescribing clinician: Visit date not found   Next office visit with prescribing clinician: 4/17/2024     Additional details provided by patient: NEW PHARMACY , NEEDS NEW PRESCRIPTIONS SENT.    Does the patient have less than a 3 day supply:  [] Yes  [x] No    Would you like a call back once the refill request has been completed: [] Yes [x] No    If the office needs to give you a call back, can they leave a voicemail: [x] Yes [] No    Zari Hall MA   03/18/24 10:23 EDT

## 2024-03-21 NOTE — TELEPHONE ENCOUNTER
Pt checking on request that was sent Monday. States these were prescribed by her old PCP and just needs them refilled if Dr Beckwith is OK with her staying on them.

## 2024-03-21 NOTE — TELEPHONE ENCOUNTER
Caller: Lucia Stevens    Relationship: Self    Best call back number: [phone]     What was the call regarding: PATIENT IS CALLING BACK WANTING TO CHECK ON THE STATUS OF THIS. SHE WILL BE OUT TOMORROW.

## 2024-03-22 RX ORDER — SPIRONOLACTONE 25 MG/1
25 TABLET ORAL DAILY
Qty: 90 TABLET | Refills: 1
Start: 2024-03-22 | End: 2024-03-25 | Stop reason: SDUPTHER

## 2024-03-22 RX ORDER — ALLOPURINOL 100 MG/1
200 TABLET ORAL DAILY
Qty: 180 TABLET | Refills: 1 | Status: SHIPPED | OUTPATIENT
Start: 2024-03-22

## 2024-03-25 ENCOUNTER — TELEPHONE (OUTPATIENT)
Dept: FAMILY MEDICINE CLINIC | Facility: CLINIC | Age: 64
End: 2024-03-25
Payer: MEDICARE

## 2024-03-25 RX ORDER — SPIRONOLACTONE 25 MG/1
25 TABLET ORAL DAILY
Qty: 90 TABLET | Refills: 1 | Status: SHIPPED | OUTPATIENT
Start: 2024-03-25

## 2024-03-25 RX ORDER — SPIRONOLACTONE 25 MG/1
25 TABLET ORAL DAILY
Status: CANCELLED
Start: 2024-03-25

## 2024-03-25 NOTE — TELEPHONE ENCOUNTER
CAN WE RESEND SCRIPT     spironolactone (ALDACTONE) 25 MG tablet       THE PHARMACY DIDN'T RECEIVE THIS?   [facility] - [place], [place] - [address] [phone] [facility] [phone] FX

## 2024-03-25 NOTE — TELEPHONE ENCOUNTER
"This was done as a \"no print\". I was resending it in then got an interaction and I can't. Can you resend to wm. Thanks   "

## 2024-04-17 ENCOUNTER — OFFICE VISIT (OUTPATIENT)
Dept: FAMILY MEDICINE CLINIC | Facility: CLINIC | Age: 64
End: 2024-04-17
Payer: MEDICARE

## 2024-04-17 VITALS
HEIGHT: 64 IN | SYSTOLIC BLOOD PRESSURE: 124 MMHG | BODY MASS INDEX: 36.02 KG/M2 | WEIGHT: 211 LBS | HEART RATE: 89 BPM | RESPIRATION RATE: 16 BRPM | DIASTOLIC BLOOD PRESSURE: 68 MMHG | OXYGEN SATURATION: 99 % | TEMPERATURE: 97.8 F

## 2024-04-17 DIAGNOSIS — Z11.59 ENCOUNTER FOR HEPATITIS C SCREENING TEST FOR LOW RISK PATIENT: ICD-10-CM

## 2024-04-17 DIAGNOSIS — I10 PRIMARY HYPERTENSION: ICD-10-CM

## 2024-04-17 DIAGNOSIS — E87.6 HYPOKALEMIA: ICD-10-CM

## 2024-04-17 DIAGNOSIS — E78.5 DYSLIPIDEMIA: ICD-10-CM

## 2024-04-17 DIAGNOSIS — N39.46 MIXED STRESS AND URGE URINARY INCONTINENCE: ICD-10-CM

## 2024-04-17 DIAGNOSIS — E11.40 TYPE 2 DIABETES MELLITUS WITH DIABETIC NEUROPATHY, WITHOUT LONG-TERM CURRENT USE OF INSULIN: Primary | ICD-10-CM

## 2024-04-17 DIAGNOSIS — F33.1 MODERATE EPISODE OF RECURRENT MAJOR DEPRESSIVE DISORDER: ICD-10-CM

## 2024-04-17 RX ORDER — POTASSIUM CHLORIDE 750 MG/1
10 CAPSULE, EXTENDED RELEASE ORAL DAILY
Qty: 90 CAPSULE | Refills: 1 | Status: SHIPPED | OUTPATIENT
Start: 2024-04-17

## 2024-04-17 RX ORDER — BISOPROLOL FUMARATE 5 MG/1
TABLET, FILM COATED ORAL
Qty: 45 TABLET | Refills: 1 | Status: SHIPPED | OUTPATIENT
Start: 2024-04-17

## 2024-04-17 RX ORDER — OMEPRAZOLE 40 MG/1
40 CAPSULE, DELAYED RELEASE ORAL EVERY MORNING
COMMUNITY
Start: 2024-04-01

## 2024-04-17 RX ORDER — ERGOCALCIFEROL 1.25 MG/1
1 CAPSULE ORAL WEEKLY
COMMUNITY
Start: 2024-03-05

## 2024-04-17 RX ORDER — ROSUVASTATIN CALCIUM 20 MG/1
20 TABLET, COATED ORAL DAILY
Qty: 90 TABLET | Refills: 1 | Status: SHIPPED | OUTPATIENT
Start: 2024-04-17

## 2024-04-17 RX ORDER — GLIMEPIRIDE 2 MG/1
4 TABLET ORAL DAILY
Qty: 180 TABLET | Refills: 1 | Status: SHIPPED | OUTPATIENT
Start: 2024-04-17

## 2024-04-17 RX ORDER — TIRZEPATIDE 2.5 MG/.5ML
INJECTION, SOLUTION SUBCUTANEOUS
COMMUNITY
Start: 2024-03-19 | End: 2024-04-17

## 2024-04-17 RX ORDER — FUROSEMIDE 40 MG/1
TABLET ORAL
Qty: 90 TABLET | Refills: 1
Start: 2024-04-17

## 2024-04-17 RX ORDER — OXYBUTYNIN CHLORIDE 15 MG/1
15 TABLET, EXTENDED RELEASE ORAL DAILY
Qty: 30 TABLET | Refills: 2 | Status: SHIPPED | OUTPATIENT
Start: 2024-04-17

## 2024-04-17 NOTE — PROGRESS NOTES
Subjective   Lucia Stevens is a 63 y.o. female.     Diabetes    Hypertension       She is seeing rheum for scleroderma  Had been placed on steroids by them without any clear plan  Prednisone has helped her body pains      Diabetes Mellitus Type II, Follow-up:   Lucia Stevens is a 63 y.o. female who is here for follow-up of Type 2 diabetes mellitus.  Current symptoms/problems include none and have been stable. Patient is adherent with medications.  Known diabetic complications: none  Cardiovascular risk factors: diabetes mellitus, dyslipidemia, hypertension, and obesity (BMI >= 30 kg/m2)  Current diabetic medications include  jardiance, amaryl, metformin . Mounjaro 2.5  Sugars doing ok at home, checking in AM and doing ok  She is on ACE inhibitor or angiotensin II receptor blocker. Patient is on a statin.         Lucia Stevens  is here for follow-up of hypertension of several years duration. She is not exercising and is not adherent to a low-salt diet. Patient does not check her blood pressure.    She is compliant with meds.        Lucia Stevens returns today for follow up of Hyperlipidemia  Lucia indicates her exercise level as not at all.  Diet: unchanged  Patient is compliant with medications   Any side effects to medications:   chest pain No myalgia No memory change No  Pt is not due for labs      The following portions of the patient's history were reviewed and updated as appropriate: allergies, current medications, past family history, past medical history, past social history, past surgical history, and problem list.    Review of Systems   Constitutional: Negative.    Respiratory: Negative.     Cardiovascular: Negative.    Psychiatric/Behavioral: Negative.         Objective   Physical Exam  Vitals and nursing note reviewed.   Constitutional:       General: She is not in acute distress.     Appearance: Normal appearance. She is well-developed.   Cardiovascular:      Rate and Rhythm: Normal rate and regular  rhythm.      Heart sounds: Normal heart sounds.   Pulmonary:      Effort: Pulmonary effort is normal.      Breath sounds: Normal breath sounds.   Neurological:      Mental Status: She is alert and oriented to person, place, and time.   Psychiatric:         Mood and Affect: Mood normal.         Behavior: Behavior normal.         Thought Content: Thought content normal.         Judgment: Judgment normal.       Assessment & Plan   Diagnoses and all orders for this visit:    1. Type 2 diabetes mellitus with diabetic neuropathy, without long-term current use of insulin (Primary)  -     metFORMIN (GLUCOPHAGE) 850 MG tablet; Take 1 tablet by mouth Daily With Breakfast.  Dispense: 180 tablet; Refill: 1  -     glimepiride (AMARYL) 2 MG tablet; Take 2 tablets by mouth Daily.  Dispense: 180 tablet; Refill: 1  -     empagliflozin (Jardiance) 25 MG tablet tablet; Take 1 tablet by mouth Daily.  Dispense: 90 tablet; Refill: 1  -     Tirzepatide (Mounjaro) 5 MG/0.5ML solution pen-injector pen; Inject 0.5 mL under the skin into the appropriate area as directed 1 (One) Time Per Week.  Dispense: 2 mL; Refill: 1  -     Comprehensive Metabolic Panel  -     Hemoglobin A1c    2. Dyslipidemia  -     rosuvastatin (CRESTOR) 20 MG tablet; Take 1 tablet by mouth Daily.  Dispense: 90 tablet; Refill: 1  -     Comprehensive Metabolic Panel  -     Lipid Panel    3. Primary hypertension  -     bisoprolol (ZEBeta) 5 MG tablet; TAKE 1/2 TABLET EVERY DAY  Dispense: 45 tablet; Refill: 1  -     CBC & Differential  -     Comprehensive Metabolic Panel    4. Hypokalemia  -     potassium chloride (MICRO-K) 10 MEQ CR capsule; Take 1 capsule by mouth Daily.  Dispense: 90 capsule; Refill: 1    5. Mixed stress and urge urinary incontinence  -     oxybutynin XL (DITROPAN XL) 15 MG 24 hr tablet; Take 1 tablet by mouth Daily.  Dispense: 30 tablet; Refill: 2    6. Moderate episode of recurrent major depressive disorder    7. Encounter for hepatitis C screening test  for low risk patient  -     Hepatitis C Antibody    Other orders  -     furosemide (LASIX) 40 MG tablet; 1 PO QM  Dispense: 90 tablet; Refill: 1    She is currently on steroids from rheum will ill defined long term plan at this time.  Pt has appointment with rheum on 4/30/24 to discuss issues but if she is going to stay on steroids, will likely need insulin to control her DM.  Will check DM labs and see what rheum says at end of this month  Continue crestor and check lipids  BP is stable, she is not on ACE-I, may need one in future  Will try ditropan for urianry incontinence and see if this works.  She has been on lasix for edema and potassium while on lasix.  May have to look into this in future.

## 2024-04-18 LAB
ALBUMIN SERPL-MCNC: 4.6 G/DL (ref 3.9–4.9)
ALBUMIN/GLOB SERPL: 1.6 {RATIO} (ref 1.2–2.2)
ALP SERPL-CCNC: 110 IU/L (ref 44–121)
ALT SERPL-CCNC: 18 IU/L (ref 0–32)
AST SERPL-CCNC: 20 IU/L (ref 0–40)
BASOPHILS # BLD AUTO: 0.1 X10E3/UL (ref 0–0.2)
BASOPHILS NFR BLD AUTO: 1 %
BILIRUB SERPL-MCNC: 0.5 MG/DL (ref 0–1.2)
BUN SERPL-MCNC: 30 MG/DL (ref 8–27)
BUN/CREAT SERPL: 30 (ref 12–28)
CALCIUM SERPL-MCNC: 10.3 MG/DL (ref 8.7–10.3)
CHLORIDE SERPL-SCNC: 97 MMOL/L (ref 96–106)
CHOLEST SERPL-MCNC: 191 MG/DL (ref 100–199)
CO2 SERPL-SCNC: 25 MMOL/L (ref 20–29)
CREAT SERPL-MCNC: 0.99 MG/DL (ref 0.57–1)
EGFRCR SERPLBLD CKD-EPI 2021: 64 ML/MIN/1.73
EOSINOPHIL # BLD AUTO: 0.2 X10E3/UL (ref 0–0.4)
EOSINOPHIL NFR BLD AUTO: 1 %
ERYTHROCYTE [DISTWIDTH] IN BLOOD BY AUTOMATED COUNT: 16.1 % (ref 11.7–15.4)
GLOBULIN SER CALC-MCNC: 2.8 G/DL (ref 1.5–4.5)
GLUCOSE SERPL-MCNC: 273 MG/DL (ref 70–99)
HBA1C MFR BLD: 10.1 % (ref 4.8–5.6)
HCT VFR BLD AUTO: 41.9 % (ref 34–46.6)
HCV IGG SERPL QL IA: NON REACTIVE
HDLC SERPL-MCNC: 32 MG/DL
HGB BLD-MCNC: 13.9 G/DL (ref 11.1–15.9)
IMM GRANULOCYTES # BLD AUTO: 0.1 X10E3/UL (ref 0–0.1)
IMM GRANULOCYTES NFR BLD AUTO: 1 %
LDLC SERPL CALC-MCNC: 67 MG/DL (ref 0–99)
LYMPHOCYTES # BLD AUTO: 1.6 X10E3/UL (ref 0.7–3.1)
LYMPHOCYTES NFR BLD AUTO: 11 %
MCH RBC QN AUTO: 31 PG (ref 26.6–33)
MCHC RBC AUTO-ENTMCNC: 33.2 G/DL (ref 31.5–35.7)
MCV RBC AUTO: 94 FL (ref 79–97)
MONOCYTES # BLD AUTO: 1.2 X10E3/UL (ref 0.1–0.9)
MONOCYTES NFR BLD AUTO: 9 %
NEUTROPHILS # BLD AUTO: 10.7 X10E3/UL (ref 1.4–7)
NEUTROPHILS NFR BLD AUTO: 77 %
PLATELET # BLD AUTO: 204 X10E3/UL (ref 150–450)
POTASSIUM SERPL-SCNC: 4.7 MMOL/L (ref 3.5–5.2)
PROT SERPL-MCNC: 7.4 G/DL (ref 6–8.5)
RBC # BLD AUTO: 4.48 X10E6/UL (ref 3.77–5.28)
SODIUM SERPL-SCNC: 137 MMOL/L (ref 134–144)
TRIGL SERPL-MCNC: 597 MG/DL (ref 0–149)
VLDLC SERPL CALC-MCNC: 92 MG/DL (ref 5–40)
WBC # BLD AUTO: 13.9 X10E3/UL (ref 3.4–10.8)

## 2024-04-19 ENCOUNTER — TELEPHONE (OUTPATIENT)
Dept: FAMILY MEDICINE CLINIC | Facility: CLINIC | Age: 64
End: 2024-04-19
Payer: MEDICARE

## 2024-04-19 DIAGNOSIS — E11.40 TYPE 2 DIABETES MELLITUS WITH DIABETIC NEUROPATHY, WITHOUT LONG-TERM CURRENT USE OF INSULIN: Primary | ICD-10-CM

## 2024-04-19 NOTE — TELEPHONE ENCOUNTER
Caller: Lucia Stevens    Relationship: Self    Best call back number: [phone]    What is the best time to reach you: ANYTIME     Who are you requesting to speak with (clinical staff, provider,  specific staff member): CLINICAL STAFF    What was the call regarding: PATIENT IS CALLING IN REGARDS TO GETTING A NOTIFICATION FROM THE PHARMACY FOR THE LANTUS SOLOSTAR. SHE SAYS THAT THIS IS NOT SOMETHING THAT SHE IS FAMILIAR WITH AND IS UNSURE IF THIS IS A MISTAKE OF IF THE PROVIDER ACTUALLY CALLED THIS IN.     Is it okay if the provider responds through MyChart: YES

## 2024-04-22 ENCOUNTER — TELEPHONE (OUTPATIENT)
Dept: FAMILY MEDICINE CLINIC | Facility: CLINIC | Age: 64
End: 2024-04-22
Payer: MEDICARE

## 2024-04-28 DIAGNOSIS — E11.42 DIABETIC POLYNEUROPATHY ASSOCIATED WITH TYPE 2 DIABETES MELLITUS: ICD-10-CM

## 2024-04-29 RX ORDER — PREGABALIN 100 MG/1
100 CAPSULE ORAL 3 TIMES DAILY
Qty: 90 CAPSULE | Refills: 0 | Status: SHIPPED | OUTPATIENT
Start: 2024-04-29

## 2024-04-29 RX ORDER — FUROSEMIDE 40 MG/1
TABLET ORAL
Qty: 30 TABLET | Refills: 3 | Status: SHIPPED | OUTPATIENT
Start: 2024-04-29

## 2024-04-29 NOTE — TELEPHONE ENCOUNTER
Lasix sent to pharmacy. Patient advised per Dr Beckwith, to wait for mounjaro to be back in stock. Was prescribed daily insulin.   Left detailed message on patient identifiable vm. Advised to call the office back with any questions.

## 2024-04-29 NOTE — TELEPHONE ENCOUNTER
Caller: Lucia Stevens    Relationship: Self    Best call back number: [phone]    Requested Prescriptions:   Requested Prescriptions     Pending Prescriptions Disp Refills    furosemide (LASIX) 40 MG tablet       Si PO         Pharmacy where request should be sent:    [facility] - [facility] [place] - [address] [phone] [facility] [phone] FX     Last office visit with prescribing clinician: 2024   Last telemedicine visit with prescribing clinician: Visit date not found   Next office visit with prescribing clinician: 2024     Additional details provided by patient:     Does the patient have less than a 3 day supply:  [] Yes  [x] No    Would you like a call back once the refill request has been completed: [x] Yes [] No    If the office needs to give you a call back, can they leave a voicemail: [x] Yes [] No    Jermaine Scott Rep   24 10:23 EDT

## 2024-04-29 NOTE — TELEPHONE ENCOUNTER
Hub staff attempted to follow warm transfer process and was unsuccessful     Caller: Lucia Stevens    Relationship to patient: Self    Best call back number:     PATIENT IS UNABLE TO FIND Tirzepatide (Mounjaro) 5 MG/0.5ML solution pen-injector pen. SHE HAS TRIED ALL SURROUNDING AREAS

## 2024-04-30 ENCOUNTER — TELEPHONE (OUTPATIENT)
Dept: FAMILY MEDICINE CLINIC | Facility: CLINIC | Age: 64
End: 2024-04-30
Payer: MEDICARE

## 2024-04-30 DIAGNOSIS — E11.40 TYPE 2 DIABETES MELLITUS WITH DIABETIC NEUROPATHY, WITHOUT LONG-TERM CURRENT USE OF INSULIN: ICD-10-CM

## 2024-04-30 NOTE — TELEPHONE ENCOUNTER
Patient called with complaints of on going hyperglycemia. States her blood sugar spikes in the afternoon. Is now taking 20 units of Lantus. Her fasting glucose this morning was 212.  On 4/23 she had a reading of 500.  4/  4/29- 464.   Reports visual disturbance and eye twitching with these readings.  Patient refused to schedule appointment. States she would like to try resuming her mounjaro first. Has not been able to find it locally but wants to try mail order.  Patient states she is going to verify she can for sure get the mounjaro--if not, she will call the office to schedule appt. Patient was educated on the dangers of hyperglycemia and advised to go to ER if she develops more high blood sugar readings and visual disturbances.

## 2024-05-01 ENCOUNTER — TELEPHONE (OUTPATIENT)
Dept: FAMILY MEDICINE CLINIC | Facility: CLINIC | Age: 64
End: 2024-05-01
Payer: MEDICARE

## 2024-05-01 NOTE — TELEPHONE ENCOUNTER
Caller: Lucia Stevens    Relationship: Self    Best call back number: [phone]     What was the call regarding: PATIENT CAN NOT FIND MONJARO ANYWHERE. CAN PATIENT CHANGE TO OZEMPIC?     Is it okay if the provider responds through MyChart: NO    [facility] - [place], [place] - [address] [phone] [facility] [phone] FX

## 2024-05-01 NOTE — TELEPHONE ENCOUNTER
Patient advised Dr Beckwith is not in the office this week. Due to having glucose readings in the 500's she was advised to schedule an appointment with another provider. Pt agreed.

## 2024-05-02 ENCOUNTER — TELEPHONE (OUTPATIENT)
Dept: FAMILY MEDICINE CLINIC | Facility: CLINIC | Age: 64
End: 2024-05-02
Payer: MEDICARE

## 2024-05-02 ENCOUNTER — OFFICE VISIT (OUTPATIENT)
Dept: FAMILY MEDICINE CLINIC | Facility: CLINIC | Age: 64
End: 2024-05-02
Payer: MEDICARE

## 2024-05-02 VITALS
HEIGHT: 64 IN | HEART RATE: 70 BPM | BODY MASS INDEX: 36.88 KG/M2 | TEMPERATURE: 97.3 F | RESPIRATION RATE: 14 BRPM | SYSTOLIC BLOOD PRESSURE: 120 MMHG | WEIGHT: 216 LBS | OXYGEN SATURATION: 98 % | DIASTOLIC BLOOD PRESSURE: 78 MMHG

## 2024-05-02 DIAGNOSIS — E11.40 TYPE 2 DIABETES MELLITUS WITH DIABETIC NEUROPATHY, WITHOUT LONG-TERM CURRENT USE OF INSULIN: Primary | ICD-10-CM

## 2024-05-02 DIAGNOSIS — M34.9 SCLERODERMA: ICD-10-CM

## 2024-05-02 DIAGNOSIS — E11.40 TYPE 2 DIABETES MELLITUS WITH DIABETIC NEUROPATHY, WITHOUT LONG-TERM CURRENT USE OF INSULIN: ICD-10-CM

## 2024-05-02 DIAGNOSIS — Z79.4 LONG-TERM INSULIN USE: ICD-10-CM

## 2024-05-02 PROCEDURE — 3074F SYST BP LT 130 MM HG: CPT | Performed by: FAMILY MEDICINE

## 2024-05-02 PROCEDURE — 3078F DIAST BP <80 MM HG: CPT | Performed by: FAMILY MEDICINE

## 2024-05-02 PROCEDURE — 3046F HEMOGLOBIN A1C LEVEL >9.0%: CPT | Performed by: FAMILY MEDICINE

## 2024-05-02 PROCEDURE — 99214 OFFICE O/P EST MOD 30 MIN: CPT | Performed by: FAMILY MEDICINE

## 2024-05-02 RX ORDER — SEMAGLUTIDE 0.68 MG/ML
0.5 INJECTION, SOLUTION SUBCUTANEOUS WEEKLY
Qty: 3 ML | Refills: 0 | Status: SHIPPED | OUTPATIENT
Start: 2024-05-02

## 2024-05-02 RX ORDER — BLOOD-GLUCOSE SENSOR
1 EACH MISCELLANEOUS
Qty: 6 EACH | Refills: 3 | Status: SHIPPED | OUTPATIENT
Start: 2024-05-02

## 2024-05-02 NOTE — ASSESSMENT & PLAN NOTE
Diabetes is worsening.   Medication changes per orders.  Diabetes will be reassessed  as scheduled    Start Ozempic.  Sample given in office with instructions on how to begin medication and uptitrate to 0.5 mg.  Prescription for 0.5 mg dosing sent to pharmacy.    Patient will continue uptitrating insulin by 5 units every 3 days as long as fasting glucose is greater than 200.    Patient will need a continuous glucose monitor to monitor for hypoglycemia as medicines are titrated.  It is also anticipation patient will need long-term insulin therapy due to her concurrent prednisone use

## 2024-05-02 NOTE — PROGRESS NOTES
"Chief Complaint   Patient presents with    Blood Sugar Problem     Running high for a couple of weeks now       Subjective      Lucia Stevens is a 63 y.o. who presents for worsening hyperglycemia.  Patient has known diabetes and prednisone for her scleroderma.  Blood sugars in the afternoon and evening are greater than 500.  Patient had her regularly scheduled diabetes maintenance visit about 2 weeks ago and at that time after A1c was found to be greater than 10 patient was started on insulin with gradual up titration.  She is currently taking glargine 20 units each night.  She also takes Jardiance, metformin, glimepiride.  Patient was prescribed Mounjaro but this is in short supply and she has not had the medication within 2 weeks.  Pharmacist has indicated to the patient that Ozempic is in stock.  Previously she took Trulicity.  Patient is taking steroids for scleroderma.  Her rheumatologist reduced her dose of prednisone recently due to her hyperglycemia    The following portions of the patient's history were reviewed and updated as appropriate: allergies, current medications, past family history, past medical history, past social history, past surgical history, and problem list.    Review of Systems    Objective   Vital Signs:  /78   Pulse 70   Temp 97.3 °F (36.3 °C)   Resp 14   Ht 162.6 cm (64\")   Wt 98 kg (216 lb)   SpO2 98%   BMI 37.08 kg/m²               Physical Exam  Vitals reviewed.   Neurological:      Mental Status: She is alert.          Result Review   The following data was reviewed by: Guicho Arzola MD on 05/02/2024:  Most Recent A1C          4/17/2024    13:00   HGBA1C Most Recent   Hemoglobin A1C 10.1                    Assessment and Plan  Diagnoses and all orders for this visit:    1. Type 2 diabetes mellitus with diabetic neuropathy, without long-term current use of insulin (Primary)  Assessment & Plan:  Diabetes is worsening.   Medication changes per orders.  Diabetes " will be reassessed  as scheduled    Start Ozempic.  Sample given in office with instructions on how to begin medication and uptitrate to 0.5 mg.  Prescription for 0.5 mg dosing sent to pharmacy.    Patient will continue uptitrating insulin by 5 units every 3 days as long as fasting glucose is greater than 200.    Patient will need a continuous glucose monitor to monitor for hypoglycemia as medicines are titrated.  It is also anticipation patient will need long-term insulin therapy due to her concurrent prednisone use    Orders:  -     Insulin Glargine (LANTUS SOLOSTAR) 100 UNIT/ML injection pen; Inject 50 Units under the skin into the appropriate area as directed Every Morning.  Dispense: 15 mL; Refill: 5  -     Semaglutide,0.25 or 0.5MG/DOS, (Ozempic, 0.25 or 0.5 MG/DOSE,) 2 MG/3ML solution pen-injector; Inject 0.5 mg under the skin into the appropriate area as directed 1 (One) Time Per Week.  Dispense: 3 mL; Refill: 0  -     Continuous Glucose Sensor (FreeStyle Zafar 3 Sensor) misc; Use 1 each Every 14 (Fourteen) Days.  Dispense: 6 each; Refill: 3    2. Long-term insulin use  -     Continuous Glucose Sensor (FreeStyle Zafar 3 Sensor) misc; Use 1 each Every 14 (Fourteen) Days.  Dispense: 6 each; Refill: 3    3. Scleroderma            Follow Up  No follow-ups on file.  Patient was given instructions and counseling regarding her condition or for health maintenance advice. Please see specific information pulled into the AVS if appropriate.

## 2024-05-06 ENCOUNTER — OFFICE VISIT (OUTPATIENT)
Dept: PULMONOLOGY | Facility: CLINIC | Age: 64
End: 2024-05-06
Payer: MEDICARE

## 2024-05-06 VITALS
TEMPERATURE: 96.8 F | HEIGHT: 64 IN | DIASTOLIC BLOOD PRESSURE: 78 MMHG | WEIGHT: 213 LBS | SYSTOLIC BLOOD PRESSURE: 126 MMHG | BODY MASS INDEX: 36.37 KG/M2 | OXYGEN SATURATION: 94 % | HEART RATE: 62 BPM

## 2024-05-06 DIAGNOSIS — J45.20 MILD INTERMITTENT ASTHMA WITHOUT COMPLICATION: ICD-10-CM

## 2024-05-06 DIAGNOSIS — M34.9 SCLERODERMA: Primary | ICD-10-CM

## 2024-05-06 DIAGNOSIS — I25.5 ISCHEMIC CARDIOMYOPATHY: ICD-10-CM

## 2024-05-06 PROCEDURE — 3074F SYST BP LT 130 MM HG: CPT | Performed by: INTERNAL MEDICINE

## 2024-05-06 PROCEDURE — 1159F MED LIST DOCD IN RCRD: CPT | Performed by: INTERNAL MEDICINE

## 2024-05-06 PROCEDURE — 1160F RVW MEDS BY RX/DR IN RCRD: CPT | Performed by: INTERNAL MEDICINE

## 2024-05-06 PROCEDURE — 94729 DIFFUSING CAPACITY: CPT | Performed by: INTERNAL MEDICINE

## 2024-05-06 PROCEDURE — 94375 RESPIRATORY FLOW VOLUME LOOP: CPT | Performed by: INTERNAL MEDICINE

## 2024-05-06 PROCEDURE — 94726 PLETHYSMOGRAPHY LUNG VOLUMES: CPT | Performed by: INTERNAL MEDICINE

## 2024-05-06 PROCEDURE — 99204 OFFICE O/P NEW MOD 45 MIN: CPT | Performed by: INTERNAL MEDICINE

## 2024-05-06 PROCEDURE — 3078F DIAST BP <80 MM HG: CPT | Performed by: INTERNAL MEDICINE

## 2024-05-06 RX ORDER — ICOSAPENT ETHYL 1 G/1
2 CAPSULE ORAL 2 TIMES DAILY WITH MEALS
COMMUNITY

## 2024-05-07 RX ORDER — BLOOD-GLUCOSE SENSOR
1 EACH MISCELLANEOUS
Qty: 6 EACH | Refills: 3 | Status: SHIPPED | OUTPATIENT
Start: 2024-05-07

## 2024-05-08 ENCOUNTER — OFFICE VISIT (OUTPATIENT)
Dept: NEUROLOGY | Facility: CLINIC | Age: 64
End: 2024-05-08
Payer: MEDICARE

## 2024-05-08 VITALS
DIASTOLIC BLOOD PRESSURE: 80 MMHG | HEIGHT: 64 IN | SYSTOLIC BLOOD PRESSURE: 112 MMHG | BODY MASS INDEX: 36.54 KG/M2 | HEART RATE: 76 BPM | OXYGEN SATURATION: 98 %

## 2024-05-08 DIAGNOSIS — E11.42 DIABETIC POLYNEUROPATHY ASSOCIATED WITH TYPE 2 DIABETES MELLITUS: Primary | ICD-10-CM

## 2024-05-08 DIAGNOSIS — R11.15 CYCLICAL VOMITING SYNDROME: ICD-10-CM

## 2024-05-08 PROCEDURE — 3074F SYST BP LT 130 MM HG: CPT | Performed by: PSYCHIATRY & NEUROLOGY

## 2024-05-08 PROCEDURE — 1159F MED LIST DOCD IN RCRD: CPT | Performed by: PSYCHIATRY & NEUROLOGY

## 2024-05-08 PROCEDURE — 3079F DIAST BP 80-89 MM HG: CPT | Performed by: PSYCHIATRY & NEUROLOGY

## 2024-05-08 PROCEDURE — 1160F RVW MEDS BY RX/DR IN RCRD: CPT | Performed by: PSYCHIATRY & NEUROLOGY

## 2024-05-08 PROCEDURE — 99214 OFFICE O/P EST MOD 30 MIN: CPT | Performed by: PSYCHIATRY & NEUROLOGY

## 2024-05-08 RX ORDER — AMITRIPTYLINE HYDROCHLORIDE 25 MG/1
25 TABLET, FILM COATED ORAL NIGHTLY
Qty: 30 TABLET | Refills: 4 | Status: SHIPPED | OUTPATIENT
Start: 2024-05-08 | End: 2024-06-07

## 2024-05-09 ENCOUNTER — PATIENT ROUNDING (BHMG ONLY) (OUTPATIENT)
Dept: PULMONOLOGY | Facility: CLINIC | Age: 64
End: 2024-05-09
Payer: MEDICARE

## 2024-05-13 ENCOUNTER — TELEPHONE (OUTPATIENT)
Dept: FAMILY MEDICINE CLINIC | Facility: CLINIC | Age: 64
End: 2024-05-13

## 2024-05-13 DIAGNOSIS — E11.40 TYPE 2 DIABETES MELLITUS WITH DIABETIC NEUROPATHY, WITHOUT LONG-TERM CURRENT USE OF INSULIN: Primary | ICD-10-CM

## 2024-05-13 RX ORDER — BLOOD-GLUCOSE SENSOR
1 EACH MISCELLANEOUS
Qty: 2 EACH | Refills: 5 | Status: SHIPPED | OUTPATIENT
Start: 2024-05-13

## 2024-05-13 RX ORDER — KETOROLAC TROMETHAMINE 30 MG/ML
1 INJECTION, SOLUTION INTRAMUSCULAR; INTRAVENOUS DAILY
Qty: 1 EACH | Refills: 1 | Status: SHIPPED | OUTPATIENT
Start: 2024-05-13

## 2024-05-13 NOTE — TELEPHONE ENCOUNTER
PATIENT HAS CALLED REQUESTING A NEW PRESCRIPTION TO BE CALLED IN FOR FOR THE Interse 3 READER. PATIENT STATES CURRENT READER IS NOT WORKING WITH HER PHONE. PATIENT IS REQUESTING PRESCRIPTION TO GO TO [facility] IN [place].    CALL BACK NUMBER [phone]

## 2024-05-14 ENCOUNTER — TELEPHONE (OUTPATIENT)
Age: 64
End: 2024-05-14
Payer: MEDICARE

## 2024-05-14 NOTE — TELEPHONE ENCOUNTER
PATIENT STATES THAT SHE IS NOT DOING AS WELL WITH MEDICATION CHANGES.  HER METHOTREXATE WAS INCREASED AND PREDNISONE WAS DECREASE.  SHE WANTED TO DISCUSS THIS WITH SOMEONE.

## 2024-05-16 ENCOUNTER — TELEPHONE (OUTPATIENT)
Dept: NEUROLOGY | Facility: CLINIC | Age: 64
End: 2024-05-16
Payer: MEDICARE

## 2024-05-16 NOTE — TELEPHONE ENCOUNTER
Caller: Lucia Stevens    Relationship: Self    Best call back number: [phone]  What was the call regarding: PATIENT STARTED TO TAKE HER AMITRIPTYLINE TWO NIGHTS AGO AND SHE NOW CURRENTLY HAS AN ABDOMINAL MIGRAINE.     PATIENT IS SEEKING ADVICE FROM DR PINEDA AS TO HOW TO  MOVE FORWARD.     PLEASE ADVISE  THANK YOU

## 2024-05-17 DIAGNOSIS — E11.40 TYPE 2 DIABETES MELLITUS WITH DIABETIC NEUROPATHY, WITHOUT LONG-TERM CURRENT USE OF INSULIN: ICD-10-CM

## 2024-05-17 NOTE — TELEPHONE ENCOUNTER
Caller: Lucia Stevens    Relationship: Self    Best call back number: [phone]     Requested Prescriptions:   Requested Prescriptions     Pending Prescriptions Disp Refills    Insulin Glargine (LANTUS SOLOSTAR) 100 UNIT/ML injection pen 15 mL 5     Sig: Inject 50 Units under the skin into the appropriate area as directed Every Morning.   - PEN NEEDLES     Pharmacy where request should be sent: [facility] - [facility] [place] - [address] [phone] [facility] [phone]      Last office visit with prescribing clinician: 4/17/2024   Last telemedicine visit with prescribing clinician: Visit date not found   Next office visit with prescribing clinician: 7/17/2024     Additional details provided by patient: PATIENT STATES SHE IS NEEDING TO INCREASE THE DOSE TO 35 UNITS AND HER PHARMACY WILL NOT FILL THIS UNTIL 7/10/24 SO SHE WILL NEED A NEW PRESCRIPTION CALLED IN.     Does the patient have less than a 3 day supply:  [x] Yes  [] No    Would you like a call back once the refill request has been completed: [] Yes [x] No    If the office needs to give you a call back, can they leave a voicemail: [] Yes [x] No    Cadance Dunaway, RegSched Rep   05/17/24 09:43 EDT

## 2024-05-21 RX ORDER — AMITRIPTYLINE HYDROCHLORIDE 25 MG/1
50 TABLET, FILM COATED ORAL NIGHTLY
Qty: 60 TABLET | Refills: 4 | Status: SHIPPED | OUTPATIENT
Start: 2024-05-21

## 2024-05-21 NOTE — TELEPHONE ENCOUNTER
Returned call to Lucia, she will try the increase. I sent updated rx to [facility] but until then she can continue to use her remaining 25mg tabs and take two versus 1 at night. Will call back if issue persists or any additional questions or concerns.     AMBER Miller

## 2024-05-22 NOTE — TELEPHONE ENCOUNTER
Called and spoke with pt. She stated she is going to try the new dosages of the prednisone and methotrexate Dr. Diamond changed her to, but if she has any more problems, she will call back.

## 2024-05-24 ENCOUNTER — OFFICE VISIT (OUTPATIENT)
Dept: FAMILY MEDICINE CLINIC | Facility: CLINIC | Age: 64
End: 2024-05-24
Payer: MEDICARE

## 2024-05-24 VITALS
TEMPERATURE: 97.1 F | SYSTOLIC BLOOD PRESSURE: 104 MMHG | HEIGHT: 64 IN | HEART RATE: 70 BPM | DIASTOLIC BLOOD PRESSURE: 74 MMHG | RESPIRATION RATE: 16 BRPM | BODY MASS INDEX: 36.54 KG/M2 | OXYGEN SATURATION: 98 % | WEIGHT: 214 LBS

## 2024-05-24 DIAGNOSIS — K62.6 ANAL ULCERATION: Primary | ICD-10-CM

## 2024-05-24 DIAGNOSIS — E11.40 TYPE 2 DIABETES MELLITUS WITH DIABETIC NEUROPATHY, WITHOUT LONG-TERM CURRENT USE OF INSULIN: ICD-10-CM

## 2024-05-24 PROCEDURE — 3074F SYST BP LT 130 MM HG: CPT | Performed by: NURSE PRACTITIONER

## 2024-05-24 PROCEDURE — 1159F MED LIST DOCD IN RCRD: CPT | Performed by: NURSE PRACTITIONER

## 2024-05-24 PROCEDURE — 99214 OFFICE O/P EST MOD 30 MIN: CPT | Performed by: NURSE PRACTITIONER

## 2024-05-24 PROCEDURE — 3046F HEMOGLOBIN A1C LEVEL >9.0%: CPT | Performed by: NURSE PRACTITIONER

## 2024-05-24 PROCEDURE — 1160F RVW MEDS BY RX/DR IN RCRD: CPT | Performed by: NURSE PRACTITIONER

## 2024-05-24 PROCEDURE — 3078F DIAST BP <80 MM HG: CPT | Performed by: NURSE PRACTITIONER

## 2024-05-24 PROCEDURE — 1126F AMNT PAIN NOTED NONE PRSNT: CPT | Performed by: NURSE PRACTITIONER

## 2024-05-24 RX ORDER — PEN NEEDLE, DIABETIC 30 GX5/16"
1 NEEDLE, DISPOSABLE MISCELLANEOUS DAILY
Qty: 90 EACH | Refills: 1 | Status: SHIPPED | OUTPATIENT
Start: 2024-05-24

## 2024-05-24 RX ORDER — VALACYCLOVIR HYDROCHLORIDE 1 G/1
1000 TABLET, FILM COATED ORAL 2 TIMES DAILY
Qty: 14 TABLET | Refills: 0 | Status: SHIPPED | OUTPATIENT
Start: 2024-05-24 | End: 2024-05-31

## 2024-05-24 NOTE — PROGRESS NOTES
Date: 2024   Patient Name: Lucia Stevens  : 1960   MRN: [id number]     Chief Complaint:    Chief Complaint   Patient presents with    Spot next to anus     X 1mo. Very sore.        History of Present Illness: Lucia Stevens is a 63 y.o. female who is here today for Lesions near her anus that started about 1 month ago  When wiping she she will have a mild presence of blood  She is having burning and pain  No tingling or numbness present.  Normal bowel movements.             Review of Systems:   Review of Systems   Skin:  Positive for skin lesions.       Past Medical History:   Past Medical History:   Diagnosis Date    Abdominal migraine     Arthritis     CHF (congestive heart failure)     Coronary artery disease     2 stents    Depression     Diabetes mellitus     type 2 dm, 3 years, checks blood sugar every am    Gout     Hyperlipidemia     Sjoegren syndrome     SOB (shortness of breath) on exertion     Tobacco abuse 2011    cessation     Type 2 diabetes mellitus with diabetic neuropathy, without long-term current use of insulin 2024    Wears dentures     full set    Wears glasses        Past Surgical History:   Past Surgical History:   Procedure Laterality Date    APPENDECTOMY      CARDIAC CATHETERIZATION      CARDIAC CATHETERIZATION N/A 2017    Procedure: Left Heart Cath;  Surgeon: Galina Leonard MD;  Location:  [facility];  Service:     CARDIAC ELECTROPHYSIOLOGY PROCEDURE N/A 2018    Procedure: Device Implant BiV ICD;  Surgeon: Sarbjit Ellison DO;  Location:  [facility];  Service: Cardiovascular    CERVICAL DISCECTOMY ANTERIOR      CHOLECYSTECTOMY      COLONOSCOPY  2022    CORONARY ANGIOPLASTY      CORONARY ANGIOPLASTY WITH STENT PLACEMENT      CORONARY ANGIOPLASTY WITH STENT PLACEMENT      CORONARY STENT PLACEMENT      D & C HYSTEROSCOPY N/A 10/06/2022    Procedure: DILATATION AND CURETTAGE HYSTEROSCOPY;  Surgeon: Davide Durbin,  MD;  [facility];  Service: Obstetrics/Gynecology;  Laterality: N/A;    ENDOSCOPY  2022    OTHER SURGICAL HISTORY      growth removed from small intestine as a child    POLYPECTOMY      Colon polyps status    RECTOVAGINAL FISTULA REPAIR      TONSILLECTOMY      TUBAL ABDOMINAL LIGATION  ?    Only 1    WISDOM TOOTH EXTRACTION         Family History:   Family History   Problem Relation Age of Onset    Heart disease Mother     Osteoporosis Mother     Hypertension Mother     Cancer Mother     Heart attack Father     Colon cancer Father     Prostate cancer Father     Coronary artery disease Father     Diabetes Father     Cancer Father     No Known Problems Brother     Cancer Maternal Grandmother     Brain cancer Maternal Grandmother     Stomach cancer Maternal Grandfather     Heart failure Paternal Grandmother     Breast cancer Paternal Grandmother     Heart attack Paternal Grandfather        Social History:   Social History     Socioeconomic History    Marital status:     Number of children: 2    Years of education: 13    Highest education level: Some college, no degree   Tobacco Use    Smoking status: Former     Current packs/day: 0.00     Average packs/day: 0.5 packs/day for 20.0 years (10.0 ttl pk-yrs)     Types: Cigarettes     Start date: 1991     Quit date: 2011     Years since quittin.4     Passive exposure: Past    Smokeless tobacco: Never   Vaping Use    Vaping status: Never Used   Substance and Sexual Activity    Alcohol use: Yes     Alcohol/week: 1.0 standard drink of alcohol     Types: 1 Shots of liquor per week     Comment: Social drinker    Drug use: No    Sexual activity: Defer     Partners: Male       Medications:     Current Outpatient Medications:     allopurinol (ZYLOPRIM) 100 MG tablet, Take 2 tablets by mouth Daily., Disp: 180 tablet, Rfl: 1    amitriptyline (ELAVIL) 25 MG tablet, Take 2 tablets by mouth Every Night., Disp: 60 tablet, Rfl: 4    aspirin 81 MG EC  tablet, Take 1 tablet by mouth Daily., Disp: , Rfl:     bisoprolol (ZEBeta) 5 MG tablet, TAKE 1/2 TABLET EVERY DAY, Disp: 45 tablet, Rfl: 1    Blood Glucose Monitoring Suppl (True Metrix Air Glucose Meter) w/Device kit, , Disp: , Rfl:     Continuous Glucose  (FreeStyle Zafar 3 [place]) device, Use 1 each Daily., Disp: 1 each, Rfl: 1    Continuous Glucose Sensor (FreeStyle Zafar 3 Sensor) misc, Use 1 each Every 14 (Fourteen) Days., Disp: 6 each, Rfl: 3    Continuous Glucose Sensor (FreeStyle Zafar 3 Sensor) misc, Use 1 each Every 14 (Fourteen) Days., Disp: 2 each, Rfl: 5    DULoxetine (CYMBALTA) 60 MG capsule, Take 1 capsule by mouth Daily., Disp: 90 capsule, Rfl: 1    empagliflozin (Jardiance) 25 MG tablet tablet, Take 1 tablet by mouth Daily., Disp: 90 tablet, Rfl: 1    folic acid (FOLVITE) 1 MG tablet, Take  by mouth Daily., Disp: , Rfl:     furosemide (LASIX) 40 MG tablet, 1 PO QM (Patient taking differently: Take 1 tablet by mouth Daily.), Disp: 30 tablet, Rfl: 3    glimepiride (AMARYL) 2 MG tablet, Take 2 tablets by mouth Daily., Disp: 180 tablet, Rfl: 1    icosapent ethyl (Vascepa) 1 g capsule capsule, Take 2 g by mouth 2 (Two) Times a Day With Meals., Disp: , Rfl:     Insulin Glargine (LANTUS SOLOSTAR) 100 UNIT/ML injection pen, Inject 50 Units under the skin into the appropriate area as directed Every Morning., Disp: 30 mL, Rfl: 5    metFORMIN (GLUCOPHAGE) 850 MG tablet, Take 1 tablet by mouth Daily With Breakfast., Disp: 180 tablet, Rfl: 1    methotrexate 2.5 MG tablet, Take 8 tablets by mouth 1 (One) Time Per Week., Disp: , Rfl:     midodrine (PROAMATINE) 10 MG tablet, Take 1 tablet by mouth 2 (Two) Times a Day., Disp: 180 tablet, Rfl: 1    nitroglycerin (NITROSTAT) 0.4 MG SL tablet, 1 under the tongue as needed for angina, may repeat q5mins for up three doses, Disp: 25 tablet, Rfl: 3    nystatin (MYCOSTATIN) 361950 UNIT/GM ointment, Apply 1 Application topically to the appropriate area as directed  "2 (Two) Times a Day., Disp: 90 g, Rfl: 2    omeprazole (priLOSEC) 40 MG capsule, Take 1 capsule by mouth Every Morning., Disp: , Rfl:     ondansetron (ZOFRAN) 4 MG tablet, Take 1 tablet by mouth Every 8 (Eight) Hours As Needed for Nausea or Vomiting., Disp: , Rfl:     oxybutynin XL (DITROPAN XL) 15 MG 24 hr tablet, Take 1 tablet by mouth Daily., Disp: 30 tablet, Rfl: 2    potassium chloride (MICRO-K) 10 MEQ CR capsule, Take 1 capsule by mouth Daily., Disp: 90 capsule, Rfl: 1    predniSONE (DELTASONE) 5 MG tablet, Take 0.5 tablets by mouth Daily., Disp: , Rfl:     rizatriptan (MAXALT) 10 MG tablet, Take 1 tablet by mouth 1 (One) Time As Needed for Migraine. May repeat in 2 hours if needed, Disp: , Rfl:     rosuvastatin (CRESTOR) 20 MG tablet, Take 1 tablet by mouth Daily., Disp: 90 tablet, Rfl: 1    Semaglutide,0.25 or 0.5MG/DOS, (Ozempic, 0.25 or 0.5 MG/DOSE,) 2 MG/3ML solution pen-injector, Inject 0.5 mg under the skin into the appropriate area as directed 1 (One) Time Per Week. (Patient taking differently: Inject 0.25 mg under the skin into the appropriate area as directed 1 (One) Time Per Week.), Disp: 3 mL, Rfl: 0    spironolactone (ALDACTONE) 25 MG tablet, Take 1 tablet by mouth Daily., Disp: 90 tablet, Rfl: 1    True Metrix Blood Glucose Test test strip, , Disp: , Rfl:     Insulin Pen Needle (Pen Needles 5/16\") 31G X 8 MM misc, Use 1 Needle Daily., Disp: 90 each, Rfl: 1    pregabalin (LYRICA) 100 MG capsule, TAKE 1 CAPSULE BY MOUTH THREE TIMES DAILY, Disp: 90 capsule, Rfl: 2    Allergies:   Allergies   Allergen Reactions    Sulfa Antibiotics Anaphylaxis     Lip swelling         Physical Exam:  Vital Signs:   Vitals:    05/24/24 1013   BP: 104/74   Pulse: 70   Resp: 16   Temp: 97.1 °F (36.2 °C)   SpO2: 98%   Weight: 97.1 kg (214 lb)   Height: 162.6 cm (64.02\")     Body mass index is 36.71 kg/m².     Physical Exam  Vitals and nursing note reviewed.   Constitutional:       Appearance: Normal appearance.   HENT: " "     Head: Normocephalic and atraumatic.   Cardiovascular:      Rate and Rhythm: Normal rate and regular rhythm.   Pulmonary:      Effort: Pulmonary effort is normal.      Breath sounds: Normal breath sounds.   Abdominal:      General: Bowel sounds are normal.   Genitourinary:      Skin:     General: Skin is warm.   Neurological:      General: No focal deficit present.      Mental Status: She is alert and oriented to person, place, and time.   Psychiatric:         Mood and Affect: Mood normal.           Assessment/Plan:   Diagnoses and all orders for this visit:    1. Anal ulceration (Primary)  -     valACYclovir (Valtrex) 1000 MG tablet; Take 1 tablet by mouth 2 (Two) Times a Day for 7 days.  Dispense: 14 tablet; Refill: 0  -     HSV 1 & 2 - Specific Antibody, IgG    2. Type 2 diabetes mellitus with diabetic neuropathy, without long-term current use of insulin  -     Insulin Pen Needle (Pen Needles 5/16\") 31G X 8 MM misc; Use 1 Needle Daily.  Dispense: 90 each; Refill: 1       Monitor for worsneing symptoms, preparation H, increase fluid intake, use cream as prescribed, avoid straining when have a bowel movement, use wet wipes to clean with.   Needing a refill of insulin needles.       Follow Up:   Return if symptoms worsen or fail to improve.    Nicole Maya. YEIMI   [facility]   "

## 2024-05-25 LAB
HSV1 IGG SER IA-ACNC: 32.5 INDEX (ref 0–0.9)
HSV2 IGG SER IA-ACNC: <0.91 INDEX (ref 0–0.9)

## 2024-05-26 DIAGNOSIS — E11.42 DIABETIC POLYNEUROPATHY ASSOCIATED WITH TYPE 2 DIABETES MELLITUS: ICD-10-CM

## 2024-05-28 ENCOUNTER — TELEPHONE (OUTPATIENT)
Dept: FAMILY MEDICINE CLINIC | Facility: CLINIC | Age: 64
End: 2024-05-28

## 2024-05-28 NOTE — TELEPHONE ENCOUNTER
Caller: Lucia Stevens    Relationship: Self    Best call back number: [phone]     What is the best time to reach you: ASAP    Who are you requesting to speak with (clinical staff, provider,  specific staff member): CLINICAL     What was the call regarding: PATIENT STATED SHE SEEN YEIMI DAVIS 05/24/24. SHE STATED ALTHOUGH SHE HAS A DIAGNOSIS SHE WANTS TO KNOW IF THE CREAM THEY DISCUSSED WOULD STILL BE DELIVERED TO HER. PLEASE ADVISE

## 2024-05-28 NOTE — TELEPHONE ENCOUNTER
Caller: Lucia Stevens    Relationship: Self    Best call back number: [phone]     Requested Prescriptions:   Requested Prescriptions     Pending Prescriptions Disp Refills    pregabalin (LYRICA) 100 MG capsule [Pharmacy Med Name: Pregabalin 100 MG Oral Capsule] 90 capsule 0     Sig: TAKE 1 CAPSULE BY MOUTH THREE TIMES DAILY        Pharmacy where request should be sent: [id number] [place] [place] - [address] [phone] [facility] [phone]      Last office visit with prescribing clinician: 4/17/2024   Last telemedicine visit with prescribing clinician: Visit date not found   Next office visit with prescribing clinician: 7/17/2024     Additional details provided by patient: PATIENT NEEDS BY 05/30/2024    Does the patient have less than a 3 day supply:  [x] Yes  [] No    Would you like a call back once the refill request has been completed: [x] Yes [] No    If the office needs to give you a call back, can they leave a voicemail: [x] Yes [] No    Jermaine Ramirez Rep   05/28/24 11:24 EDT

## 2024-05-29 RX ORDER — PREGABALIN 100 MG/1
100 CAPSULE ORAL 3 TIMES DAILY
Qty: 90 CAPSULE | Refills: 2 | Status: SHIPPED | OUTPATIENT
Start: 2024-05-29

## 2024-05-30 ENCOUNTER — TELEPHONE (OUTPATIENT)
Dept: NEUROLOGY | Facility: CLINIC | Age: 64
End: 2024-05-30
Payer: MEDICARE

## 2024-05-30 NOTE — TELEPHONE ENCOUNTER
Spoke with Lucia. OK to stop Elavil and asked her to let us know how she's doing in a few weeks and we can go from there. I offered this option as she mentioned maybe wanting to try a new med

## 2024-05-30 NOTE — TELEPHONE ENCOUNTER
Provider: EDWIN    Caller: KELLI    Pharmacy: [id number]    Phone Number: [phone]    Reason for Call: PT CALLED AND STATES THAT THE AMITRIPTYLINE IS STILL MAKING HER VERY TIRED TO THE POINT SHE IS UNABLE TO EVEN GET UP AND SHOWER. PT IS WANTING TO KNOW IF THERE IS OTHER MEDICATION THAT SHE CAN TRY AS THIS MEDICATION SEEMS TO BE TO STRONG FOR HER. PT STATES TO PLEASE LEAVE A MESSAGE IF SHE DOES NOT ANSWER.    PT ALSO WANTED TO LET PROVIDER KNOW THAT MEDICATION IS MAKING HER BLOOD PRESSURE LOW. PT STATES THAT IT HAS BEEN AROUND 93/51. PT STATES THAT SHE HAS HEART FAILURE SO THIS MEDICATION IS MAKING IT LOWER.    PLEASE REVIEW AND ADVISE  THANK YOU

## 2024-06-17 RX ORDER — ICOSAPENT ETHYL 1000 MG/1
2 CAPSULE ORAL 2 TIMES DAILY WITH MEALS
Qty: 360 CAPSULE | Refills: 0 | Status: SHIPPED | OUTPATIENT
Start: 2024-06-17

## 2024-06-18 PROBLEM — M19.90 OSTEOARTHRITIS: Status: ACTIVE | Noted: 2024-06-18

## 2024-06-18 PROBLEM — M34.9 SCLERODERMA: Status: ACTIVE | Noted: 2024-06-18

## 2024-06-18 PROBLEM — M06.9 RHEUMATOID ARTHRITIS: Status: ACTIVE | Noted: 2024-06-18

## 2024-06-18 PROBLEM — M35.00 SJOGREN'S SYNDROME: Status: ACTIVE | Noted: 2024-06-18

## 2024-06-18 PROBLEM — R76.8 ANA POSITIVE: Status: ACTIVE | Noted: 2024-06-18

## 2024-07-02 ENCOUNTER — TELEPHONE (OUTPATIENT)
Dept: FAMILY MEDICINE CLINIC | Facility: CLINIC | Age: 64
End: 2024-07-02
Payer: MEDICARE

## 2024-07-02 DIAGNOSIS — E11.40 TYPE 2 DIABETES MELLITUS WITH DIABETIC NEUROPATHY, WITHOUT LONG-TERM CURRENT USE OF INSULIN: Primary | ICD-10-CM

## 2024-07-02 RX ORDER — SEMAGLUTIDE 1.34 MG/ML
1 INJECTION, SOLUTION SUBCUTANEOUS WEEKLY
Qty: 3 ML | Refills: 2 | Status: SHIPPED | OUTPATIENT
Start: 2024-07-02

## 2024-07-02 NOTE — TELEPHONE ENCOUNTER
Has she been on ozempic 0.25 for 4 weeks and then 0.5 for 4 weeks?  If so I will send int he 1 mg dose!

## 2024-07-02 NOTE — TELEPHONE ENCOUNTER
Hub staff attempted to follow warm transfer process and was unsuccessful     Caller: Lucia Stevens    Relationship to patient: Self    Best call back number: [phone]    PATIENT WAS ASKED BY PHARMACY IF SHE WAS GOING TO HAVE AN INCREASED DOSAGE OF OZEMPIC    PHARMACY  [facility] - [place], [place] - [address] [phone] [facility] [phone]

## 2024-07-17 ENCOUNTER — OFFICE VISIT (OUTPATIENT)
Dept: FAMILY MEDICINE CLINIC | Facility: CLINIC | Age: 64
End: 2024-07-17
Payer: MEDICARE

## 2024-07-17 VITALS
BODY MASS INDEX: 36.54 KG/M2 | HEART RATE: 84 BPM | OXYGEN SATURATION: 95 % | RESPIRATION RATE: 16 BRPM | WEIGHT: 214 LBS | SYSTOLIC BLOOD PRESSURE: 140 MMHG | DIASTOLIC BLOOD PRESSURE: 68 MMHG | TEMPERATURE: 97.6 F | HEIGHT: 64 IN

## 2024-07-17 DIAGNOSIS — E11.40 TYPE 2 DIABETES MELLITUS WITH DIABETIC NEUROPATHY, WITHOUT LONG-TERM CURRENT USE OF INSULIN: Primary | ICD-10-CM

## 2024-07-17 DIAGNOSIS — B00.89 HERPETIC DERMATITIS: ICD-10-CM

## 2024-07-17 DIAGNOSIS — N39.46 MIXED STRESS AND URGE URINARY INCONTINENCE: ICD-10-CM

## 2024-07-17 DIAGNOSIS — I10 PRIMARY HYPERTENSION: ICD-10-CM

## 2024-07-17 DIAGNOSIS — L30.4 INTERTRIGO: ICD-10-CM

## 2024-07-17 PROCEDURE — 1159F MED LIST DOCD IN RCRD: CPT | Performed by: FAMILY MEDICINE

## 2024-07-17 PROCEDURE — G2211 COMPLEX E/M VISIT ADD ON: HCPCS | Performed by: FAMILY MEDICINE

## 2024-07-17 PROCEDURE — 3077F SYST BP >= 140 MM HG: CPT | Performed by: FAMILY MEDICINE

## 2024-07-17 PROCEDURE — 1160F RVW MEDS BY RX/DR IN RCRD: CPT | Performed by: FAMILY MEDICINE

## 2024-07-17 PROCEDURE — 99214 OFFICE O/P EST MOD 30 MIN: CPT | Performed by: FAMILY MEDICINE

## 2024-07-17 PROCEDURE — 3046F HEMOGLOBIN A1C LEVEL >9.0%: CPT | Performed by: FAMILY MEDICINE

## 2024-07-17 PROCEDURE — 3078F DIAST BP <80 MM HG: CPT | Performed by: FAMILY MEDICINE

## 2024-07-17 PROCEDURE — 1126F AMNT PAIN NOTED NONE PRSNT: CPT | Performed by: FAMILY MEDICINE

## 2024-07-17 RX ORDER — CLOTRIMAZOLE 1 %
1 CREAM (GRAM) TOPICAL EVERY 12 HOURS SCHEDULED
Qty: 30 G | Refills: 1 | Status: SHIPPED | OUTPATIENT
Start: 2024-07-17

## 2024-07-17 RX ORDER — VALACYCLOVIR HYDROCHLORIDE 1 G/1
1000 TABLET, FILM COATED ORAL DAILY
Qty: 30 TABLET | Refills: 0 | Status: SHIPPED | OUTPATIENT
Start: 2024-07-17

## 2024-07-17 RX ORDER — SEMAGLUTIDE 2.68 MG/ML
2 INJECTION, SOLUTION SUBCUTANEOUS WEEKLY
Qty: 3 ML | Refills: 3 | Status: SHIPPED | OUTPATIENT
Start: 2024-07-17

## 2024-07-17 RX ORDER — BLOOD SUGAR DIAGNOSTIC
STRIP MISCELLANEOUS
Qty: 100 EACH | Refills: 5 | Status: SHIPPED | OUTPATIENT
Start: 2024-07-17

## 2024-07-17 NOTE — PROGRESS NOTES
Subjective   Lucia Stevens is a 64 y.o. female.     Diabetes       She does note she continues to have bladder issues without being able to completely empty her bladder  The ditropan did not work  She still will have loss of bladder control though        Diabetes Mellitus Type II, Follow-up:   Lucia Stevens is a 64 y.o. female who is here for follow-up of Type 2 diabetes mellitus.  Current symptoms/problems include none and have been stable. Patient is adherent with medications.  Known diabetic complications: peripheral neuropathy  Cardiovascular risk factors: diabetes mellitus, dyslipidemia, hypertension, and obesity (BMI >= 30 kg/m2)  Current diabetic medications include  lantus 52 units, amaryl, ozemic 1, maeformina dn jardiance .   Current monitoring regimen: home blood tests - daily  Home blood sugar records: fasting range: doing well  Any episodes of hypoglycemia? no  Eye exam current (within one year): yes  She is on ACE inhibitor or angiotensin II receptor blocker. Patient is on a statin.       Lucia Stevens  is here for follow-up of hypertension of several years duration. She is not exercising and is not adherent to a low-salt diet. Patient does not check her blood pressure.  She is compliant with meds.        Lucia Stevens returns today for follow up of Hyperlipidemia  Lucia indicates her exercise level as irregularly.  Diet: unchanged  Patient is compliant with medications   Any side effects to medications:   chest pain No myalgia No memory change No  Pt is due for labs      The following portions of the patient's history were reviewed and updated as appropriate: allergies, current medications, past family history, past medical history, past social history, past surgical history, and problem list.    Review of Systems   Constitutional: Negative.    Respiratory: Negative.     Psychiatric/Behavioral: Negative.         Objective   Physical Exam  Vitals and nursing note reviewed.   Constitutional:        General: She is not in acute distress.     Appearance: Normal appearance. She is well-developed.   Cardiovascular:      Rate and Rhythm: Normal rate and regular rhythm.      Heart sounds: Normal heart sounds.   Pulmonary:      Effort: Pulmonary effort is normal.      Breath sounds: Normal breath sounds.   Neurological:      Mental Status: She is alert and oriented to person, place, and time.   Psychiatric:         Mood and Affect: Mood normal.         Behavior: Behavior normal.         Thought Content: Thought content normal.         Judgment: Judgment normal.       Assessment & Plan   Diagnoses and all orders for this visit:    1. Type 2 diabetes mellitus with diabetic neuropathy, without long-term current use of insulin (Primary)  -     Insulin Pen Needle (Pen Needles) 31G X 6 MM misc; 1 daily for lantus  Dispense: 100 each; Refill: 5  -     Semaglutide, 2 MG/DOSE, (Ozempic, 2 MG/DOSE,) 8 MG/3ML solution pen-injector; Inject 2 mg under the skin into the appropriate area as directed 1 (One) Time Per Week.  Dispense: 3 mL; Refill: 3  -     CBC & Differential  -     Comprehensive Metabolic Panel  -     Hemoglobin A1c    2. Mixed stress and urge urinary incontinence  -     Ambulatory Referral to Urology    3. Intertrigo  -     clotrimazole (LOTRIMIN) 1 % cream; Apply 1 Application topically to the appropriate area as directed Every 12 (Twelve) Hours.  Dispense: 30 g; Refill: 1    4. Herpetic dermatitis  -     valACYclovir (Valtrex) 1000 MG tablet; Take 1 tablet by mouth Daily.  Dispense: 30 tablet; Refill: 0    5. Primary hypertension      On lantus 52 units, wondering if this needs to be increased.  Will check labs and increase ozempic.  F/u pending labs    Bladder still with leakage, meds did not help. Will work on urology referral    Ok valtrex for + herpes test and anal lesions noted bu NP    Will try clotrimazole for under breast rash    BP up a little, will recheck in future

## 2024-07-18 ENCOUNTER — TELEPHONE (OUTPATIENT)
Dept: OBSTETRICS AND GYNECOLOGY | Facility: CLINIC | Age: 64
End: 2024-07-18

## 2024-07-18 LAB
ALBUMIN SERPL-MCNC: 4.4 G/DL (ref 3.9–4.9)
ALP SERPL-CCNC: 93 IU/L (ref 44–121)
ALT SERPL-CCNC: 16 IU/L (ref 0–32)
AST SERPL-CCNC: 21 IU/L (ref 0–40)
BASOPHILS # BLD AUTO: 0 X10E3/UL (ref 0–0.2)
BASOPHILS NFR BLD AUTO: 0 %
BILIRUB SERPL-MCNC: 0.5 MG/DL (ref 0–1.2)
BUN SERPL-MCNC: 19 MG/DL (ref 8–27)
BUN/CREAT SERPL: 26 (ref 12–28)
CALCIUM SERPL-MCNC: 9.5 MG/DL (ref 8.7–10.3)
CHLORIDE SERPL-SCNC: 100 MMOL/L (ref 96–106)
CO2 SERPL-SCNC: 22 MMOL/L (ref 20–29)
CREAT SERPL-MCNC: 0.72 MG/DL (ref 0.57–1)
EGFRCR SERPLBLD CKD-EPI 2021: 93 ML/MIN/1.73
EOSINOPHIL # BLD AUTO: 0.2 X10E3/UL (ref 0–0.4)
EOSINOPHIL NFR BLD AUTO: 2 %
ERYTHROCYTE [DISTWIDTH] IN BLOOD BY AUTOMATED COUNT: 15.3 % (ref 11.7–15.4)
GLOBULIN SER CALC-MCNC: 2.6 G/DL (ref 1.5–4.5)
GLUCOSE SERPL-MCNC: 179 MG/DL (ref 70–99)
HBA1C MFR BLD: 7.6 % (ref 4.8–5.6)
HCT VFR BLD AUTO: 39.9 % (ref 34–46.6)
HGB BLD-MCNC: 13 G/DL (ref 11.1–15.9)
IMM GRANULOCYTES # BLD AUTO: 0.1 X10E3/UL (ref 0–0.1)
IMM GRANULOCYTES NFR BLD AUTO: 1 %
LYMPHOCYTES # BLD AUTO: 1.5 X10E3/UL (ref 0.7–3.1)
LYMPHOCYTES NFR BLD AUTO: 15 %
MCH RBC QN AUTO: 31.2 PG (ref 26.6–33)
MCHC RBC AUTO-ENTMCNC: 32.6 G/DL (ref 31.5–35.7)
MCV RBC AUTO: 96 FL (ref 79–97)
MONOCYTES # BLD AUTO: 0.7 X10E3/UL (ref 0.1–0.9)
MONOCYTES NFR BLD AUTO: 7 %
NEUTROPHILS # BLD AUTO: 7.2 X10E3/UL (ref 1.4–7)
NEUTROPHILS NFR BLD AUTO: 75 %
PLATELET # BLD AUTO: 232 X10E3/UL (ref 150–450)
POTASSIUM SERPL-SCNC: 3.9 MMOL/L (ref 3.5–5.2)
PROT SERPL-MCNC: 7 G/DL (ref 6–8.5)
RBC # BLD AUTO: 4.17 X10E6/UL (ref 3.77–5.28)
SODIUM SERPL-SCNC: 139 MMOL/L (ref 134–144)
WBC # BLD AUTO: 9.6 X10E3/UL (ref 3.4–10.8)

## 2024-07-20 DIAGNOSIS — E11.40 TYPE 2 DIABETES MELLITUS WITH DIABETIC NEUROPATHY, WITHOUT LONG-TERM CURRENT USE OF INSULIN: ICD-10-CM

## 2024-07-25 ENCOUNTER — TELEPHONE (OUTPATIENT)
Age: 64
End: 2024-07-25

## 2024-07-25 NOTE — TELEPHONE ENCOUNTER
Hub staff attempted to follow warm transfer process and was unsuccessful     Caller: Lucia Stevens    Relationship to patient: Self    Best call back number: [phone]     Patient is needing: THE PT WANTS TO RESCHEDULE HER APPOINTMENT FROM 07/19/24. PLEASE ADVISE.        
Alert-The patient is alert, awake and responds to voice. The patient is oriented to time, place, and person. The triage nurse is able to obtain subjective information.

## 2024-08-01 ENCOUNTER — OFFICE VISIT (OUTPATIENT)
Dept: UROLOGY | Facility: CLINIC | Age: 64
End: 2024-08-01
Payer: MEDICARE

## 2024-08-01 VITALS
WEIGHT: 214 LBS | HEIGHT: 64 IN | BODY MASS INDEX: 36.54 KG/M2 | DIASTOLIC BLOOD PRESSURE: 77 MMHG | SYSTOLIC BLOOD PRESSURE: 117 MMHG | HEART RATE: 77 BPM | OXYGEN SATURATION: 96 %

## 2024-08-01 DIAGNOSIS — R31.0 GROSS HEMATURIA: Primary | ICD-10-CM

## 2024-08-01 DIAGNOSIS — Z87.442 HISTORY OF NEPHROLITHIASIS: ICD-10-CM

## 2024-08-01 DIAGNOSIS — N39.41 URGENCY INCONTINENCE: ICD-10-CM

## 2024-08-01 LAB
BILIRUB BLD-MCNC: NEGATIVE MG/DL
CLARITY, POC: CLEAR
COLOR UR: YELLOW
EXPIRATION DATE: ABNORMAL
GLUCOSE UR STRIP-MCNC: ABNORMAL MG/DL
KETONES UR QL: NEGATIVE
LEUKOCYTE EST, POC: NEGATIVE
Lab: ABNORMAL
NITRITE UR-MCNC: NEGATIVE MG/ML
PH UR: 6.5 [PH] (ref 5–8)
PROT UR STRIP-MCNC: NEGATIVE MG/DL
RBC # UR STRIP: NEGATIVE /UL
SP GR UR: 1.01 (ref 1–1.03)
UROBILINOGEN UR QL: NORMAL

## 2024-08-01 NOTE — PROGRESS NOTES
"       Office Visit New Urology      Patient Name: Lucia Stevens  : 1960   MRN: [id number]     Chief Complaint:    Chief Complaint   Patient presents with    Urinary Incontinence       Referring Provider: Benjamin Beckwith MD    History of Present Illness: Lucia Stevens is a 64 y.o. female who presents to Urology today for evaluation of LUTS. Patient has a complex history including Srojens, psoriasis, DMII on multiple meds, scleroderma, heart failure, CAD s/p stent.     Reports uncontrolled urgency incontinence worse with standing from seated position.     She reports she saw visible gross hematuria this morning with light pink urine. She is a former smoker.     She reports a strong desire to \"push to urinate\" at the end of her stream for some reason. She denies significant UTI issues. She is on Jardiance, has been told her Jardiance may make these symptoms worse.     She reports a history of kidney stones. She has passed stones. No imaging recently.     Her mother had bladder cancer reportedly.     She is wearing Depends for daily leakage, changes diaper every 12 hours.     She denies significant caffeine intake.     She also reports some mild to moderate DIGNA with coughing or sneezing.  Bowel and bladder symptom score 17 out of 28.    Subjective      Review of System: Review of Systems   Genitourinary:  Positive for enuresis, frequency and hematuria. Negative for decreased urine volume, difficulty urinating, dysuria, flank pain and urgency.      I have reviewed the ROS documented by my clinical staff, I have updated appropriately and I agree. Avtar Rivero MD    Past Medical History:   Past Medical History:   Diagnosis Date    CHF (congestive heart failure)     Coronary artery disease     2 stents    Depression     Gout     Hypercholesterolemia     Hypertension     Neuropathy     Psoriasis     Rheumatoid arthritis     Scleroderma     Sjoegren syndrome     SOB (shortness of breath) on exertion     " Tobacco abuse 2011    cessation     Type 2 diabetes mellitus with diabetic neuropathy, without long-term current use of insulin 01/17/2024    Wears dentures     full set    Wears glasses        Past Surgical History:   Past Surgical History:   Procedure Laterality Date    APPENDECTOMY      CARDIAC CATHETERIZATION      CARDIAC CATHETERIZATION N/A 11/30/2017    Procedure: Left Heart Cath;  Surgeon: Galina Leonard MD;  Location:  [facility];  Service:     CARDIAC ELECTROPHYSIOLOGY PROCEDURE N/A 03/28/2018    Procedure: Device Implant BiV ICD;  Surgeon: Sarbjit Ellison DO;  Location:  [facility];  Service: Cardiovascular    CERVICAL DISCECTOMY ANTERIOR      CHOLECYSTECTOMY      COLONOSCOPY  09/2022    CORONARY ANGIOPLASTY      CORONARY ANGIOPLASTY WITH STENT PLACEMENT      Percutaneous transluminal balloon angioplasty with insertion of stent into coronary artery    CORONARY ANGIOPLASTY WITH STENT PLACEMENT      CORONARY STENT PLACEMENT      D & C HYSTEROSCOPY N/A 10/06/2022    Procedure: DILATATION AND CURETTAGE HYSTEROSCOPY;  Surgeon: Davide Durbin MD;  Location:  [facility];  Service: Obstetrics/Gynecology;  Laterality: N/A;    ENDOSCOPY  09/2022    OTHER SURGICAL HISTORY      growth removed from small intestine as a child    POLYPECTOMY      Colon polyps status    RECTOVAGINAL FISTULA REPAIR      TONSILLECTOMY      TUBAL ABDOMINAL LIGATION  1998?    Only 1    WISDOM TOOTH EXTRACTION         Family History:   Family History   Problem Relation Age of Onset    Heart disease Mother     Osteoporosis Mother     Hypertension Mother     Cancer Mother         bladder    Heart attack Father     Colon cancer Father     Prostate cancer Father     Coronary artery disease Father     Diabetes Father     Cancer Father     Arthritis Father     Heart disease Father         heart problems    No Known Problems Brother     Cancer Maternal Grandmother     Brain cancer Maternal Grandmother     Stomach  cancer Maternal Grandfather     Heart failure Paternal Grandmother     Breast cancer Paternal Grandmother     Heart attack Paternal Grandfather        Social History:   Social History     Socioeconomic History    Marital status:     Number of children: 2    Years of education: 13    Highest education level: Some college, no degree   Tobacco Use    Smoking status: Former     Current packs/day: 0.00     Average packs/day: 0.5 packs/day for 20.0 years (10.0 ttl pk-yrs)     Types: Cigarettes     Start date: 1991     Quit date: 2011     Years since quittin.5     Passive exposure: Past    Smokeless tobacco: Never    Tobacco comments:     Tobacco use status: Occasional cigarette smoker; Smoking status: Heavy tobacco smoker.   Vaping Use    Vaping status: Never Used   Substance and Sexual Activity    Alcohol use: Yes     Alcohol/week: 1.0 standard drink of alcohol     Types: 1 Shots of liquor per week     Comment: Social drinker; There is a history of alcohol use. Consumed rarely.    Drug use: No    Sexual activity: Defer     Partners: Male       Medications:     Current Outpatient Medications:     allopurinol (ZYLOPRIM) 100 MG tablet, Take 2 tablets by mouth Daily., Disp: 180 tablet, Rfl: 1    Aspirin ([place]) 81 MG capsule, Take 81 mg by mouth Daily. [place] 81 mg capsule, Disp: , Rfl:     bisoprolol (ZEBeta) 5 MG tablet, TAKE 1/2 TABLET EVERY DAY, Disp: 45 tablet, Rfl: 1    Blood Glucose Monitoring Suppl (True Metrix Air Glucose Meter) w/Device kit, , Disp: , Rfl:     clotrimazole (LOTRIMIN) 1 % cream, Apply 1 Application topically to the appropriate area as directed Every 12 (Twelve) Hours., Disp: 30 g, Rfl: 1    Continuous Glucose  (FreeStyle Zafar 3 [place]) device, Use 1 each Daily., Disp: 1 each, Rfl: 1    Continuous Glucose Sensor (FreeStyle Zafar 3 Sensor) misc, Use 1 each Every 14 (Fourteen) Days., Disp: 6 each, Rfl: 3    Continuous Glucose Sensor (FreeStyle Zafar 3 Sensor) misc, Use  1 each Every 14 (Fourteen) Days., Disp: 2 each, Rfl: 5    DULoxetine (CYMBALTA) 60 MG capsule, Take 1 capsule by mouth Daily., Disp: 90 capsule, Rfl: 1    empagliflozin (Jardiance) 25 MG tablet tablet, Take 1 tablet by mouth Daily., Disp: 90 tablet, Rfl: 1    folic acid (FOLVITE) 1 MG tablet, Take  by mouth Daily., Disp: , Rfl:     furosemide (LASIX) 40 MG tablet, 1 PO QM (Patient taking differently: Take 1 tablet by mouth Daily.), Disp: 30 tablet, Rfl: 3    glimepiride (AMARYL) 2 MG tablet, Take 2 tablets by mouth Daily., Disp: 180 tablet, Rfl: 1    Insulin Glargine (LANTUS SOLOSTAR) 100 UNIT/ML injection pen, Inject 55 Units under the skin into the appropriate area as directed Every Morning., Disp: 35 mL, Rfl: 5    Insulin Pen Needle (Pen Needles) 31G X 6 MM misc, 1 daily for lantus, Disp: 100 each, Rfl: 5    metFORMIN (GLUCOPHAGE) 850 MG tablet, Take 1 tablet by mouth Daily With Breakfast., Disp: 180 tablet, Rfl: 1    methotrexate 2.5 MG tablet, Take 8 tablets by mouth 1 (One) Time Per Week., Disp: , Rfl:     midodrine (PROAMATINE) 10 MG tablet, Take 1 tablet by mouth 2 (Two) Times a Day., Disp: 180 tablet, Rfl: 1    nitroglycerin (NITROSTAT) 0.4 MG SL tablet, 1 under the tongue as needed for angina, may repeat q5mins for up three doses, Disp: 25 tablet, Rfl: 3    nystatin (MYCOSTATIN) 004850 UNIT/GM ointment, Apply 1 Application topically to the appropriate area as directed 2 (Two) Times a Day., Disp: 90 g, Rfl: 2    omeprazole (priLOSEC) 40 MG capsule, Take 1 capsule by mouth Every Morning., Disp: , Rfl:     ondansetron (ZOFRAN) 4 MG tablet, Take 1 tablet by mouth Every 8 (Eight) Hours As Needed for Nausea or Vomiting., Disp: , Rfl:     potassium chloride (MICRO-K) 10 MEQ CR capsule, Take 1 capsule by mouth Daily., Disp: 90 capsule, Rfl: 1    predniSONE (DELTASONE) 5 MG tablet, Take 0.5 tablets by mouth Daily., Disp: , Rfl:     pregabalin (LYRICA) 100 MG capsule, TAKE 1 CAPSULE BY MOUTH THREE TIMES DAILY, Disp:  90 capsule, Rfl: 2    rizatriptan (MAXALT) 10 MG tablet, Take 1 tablet by mouth 1 (One) Time As Needed for Migraine. May repeat in 2 hours if needed, Disp: , Rfl:     rosuvastatin (CRESTOR) 20 MG tablet, Take 1 tablet by mouth Daily., Disp: 90 tablet, Rfl: 1    Semaglutide, 2 MG/DOSE, (Ozempic, 2 MG/DOSE,) 8 MG/3ML solution pen-injector, Inject 2 mg under the skin into the appropriate area as directed 1 (One) Time Per Week., Disp: 3 mL, Rfl: 3    spironolactone (ALDACTONE) 25 MG tablet, Take 1 tablet by mouth Daily., Disp: 90 tablet, Rfl: 1    True Metrix Blood Glucose Test test strip, , Disp: , Rfl:     valACYclovir (Valtrex) 1000 MG tablet, Take 1 tablet by mouth Daily., Disp: 30 tablet, Rfl: 0    Vascepa 1 g capsule capsule, TAKE 2 CAPSULES BY MOUTH TWICE DAILY WITH MEALS, Disp: 360 capsule, Rfl: 0    aspirin 81 MG EC tablet, Take 1 tablet by mouth Daily., Disp: , Rfl:     furosemide (LASIX) 20 MG tablet, Take 1 tablet by mouth Take As Directed. Take 1/2 tablet by oral route  every day, Disp: , Rfl:     midodrine (PROAMATINE) 5 MG tablet, Take 1 tablet by mouth Take As Directed. Take 2 tablet by oral route 2 times every day, Disp: , Rfl:     Vibegron 75 MG tablet, Take 1 tablet by mouth Daily., Disp: 28 tablet, Rfl: 0    Allergies:   Allergies   Allergen Reactions    Sulfa Antibiotics Anaphylaxis     Lip swelling       Bladder & Bowel Symptom Questionnaire    How often do you usually urinate during the day ?   3 - About every 1-2 hours   2.   How many timed do you urinate at night?   2 - 3 times at night   3.   What is the reason that you usually urinate?   3 - Severe urge (can delay less than 10 min)   4.   Once you get the urge to go, how long can you     comfortably delay?   0 - More than 60 min   5.   How often do you get a sudden urge that makes you rush to the bathroom?   3 - A few times a week   6.   How often does a sudden urge to urinate result in you leaking urine or wetting pads?   3 - A few times a  "week   7.  In your opinion, how good is your bladder control?   3 - Poor   8.  Do you have accidental bowel leakage?   yes   9.  Do you have difficulty fully emptying your bladder?   no   10.  Do you experience accidental leakage when performing some physical activity such as coughing, sneezing, laughing or exercise?   yes   11. Have you tried medications to help improve your symptoms?   yes   12. Would you be interested in learning about a long-lasting option that may help you with your symptoms?   yes                                                                             Total Score   17     0-7 (Mild) 8-16 (Moderate) 17-28 (Severe)       Objective     Physical Exam:   Vital Signs:   Vitals:    08/01/24 1504   BP: 117/77   BP Location: Left arm   Patient Position: Sitting   Cuff Size: Adult   Pulse: 77   SpO2: 96%   Weight: 97.1 kg (214 lb)   Height: 162.6 cm (64.02\")     Body mass index is 36.71 kg/m².     Physical Exam  Constitutional:       Appearance: Normal appearance.   HENT:      Head: Normocephalic and atraumatic.      Nose: Nose normal.      Mouth/Throat:      Mouth: Mucous membranes are moist.      Pharynx: Oropharynx is clear.   Eyes:      Extraocular Movements: Extraocular movements intact.      Pupils: Pupils are equal, round, and reactive to light.   Pulmonary:      Effort: Pulmonary effort is normal. No respiratory distress.   Musculoskeletal:         General: No swelling or deformity. Normal range of motion.      Cervical back: Normal range of motion and neck supple.   Skin:     General: Skin is warm and dry.   Neurological:      General: No focal deficit present.      Mental Status: She is alert and oriented to person, place, and time. Mental status is at baseline.   Psychiatric:         Mood and Affect: Mood normal.         Behavior: Behavior normal.         Labs:   Brief Urine Lab Results  (Last result in the past 365 days)        Color   Clarity   Blood   Leuk Est   Nitrite   Protein   " CREAT   Urine HCG        08/01/24 1531 Yellow   Clear   Negative   Negative   Negative   Negative                        Lab Results   Component Value Date    GLUCOSE 179 (H) 07/17/2024    CALCIUM 9.5 07/17/2024     07/17/2024    K 3.9 07/17/2024    CO2 22 07/17/2024     07/17/2024    BUN 19 07/17/2024    CREATININE 0.72 07/17/2024    EGFRIFNONA 71 02/05/2019    BCR 26 07/17/2024    ANIONGAP 12.0 10/05/2022       Lab Results   Component Value Date    WBC 9.6 07/17/2024    HGB 13.0 07/17/2024    HCT 39.9 07/17/2024    MCV 96 07/17/2024     07/17/2024       Images:   No Images in the past 120 days found..    Measures:   Tobacco:   Lucia Stevens  reports that she quit smoking about 13 years ago. Her smoking use included cigarettes. She started smoking about 33 years ago. She has a 10 pack-year smoking history. She has been exposed to tobacco smoke. She has never used smokeless tobacco.       Assessment / Plan      Assessment/Plan:   Lucia Stevens is a 64 y.o. female who presented today for numerous urologic issues.  Primary issue is mixed urinary incontinence, she has previously trialed oxybutynin with no improvement.  She has uncontrolled urgency incontinence especially with standing from a seated position.  This is likely consistent with OAB which is potentially exacerbated by Jardiance and history of chronic diabetes.  She has numerous medical comorbidities.  Stop the oxybutynin, trial Gemtesa 75 mg daily, risks discussed.  She has new onset gross hematuria, urinalysis completely clear.  This is concerning given her family history of bladder cancer and she is a former smoker.  I recommend a cystoscopy and CT urogram for prompt workup.  She reports understanding.  Risks of cystoscopy discussed.    Diagnoses and all orders for this visit:    1. Gross hematuria (Primary)  -     CT Abdomen Pelvis With & Without Contrast; Future  -     POC Urinalysis Dipstick, Automated    2. History of  nephrolithiasis  -     CT Abdomen Pelvis With & Without Contrast; Future  -     POC Urinalysis Dipstick, Automated    3. Urgency incontinence  -     Vibegron 75 MG tablet; Take 1 tablet by mouth Daily.  Dispense: 28 tablet; Refill: 0  -     POC Urinalysis Dipstick, Automated           Follow Up:   Return in 12 days (on 8/13/2024) for 8/13/24 cystoscopy, with CT scan prior 9 AM.    I spent approximately 45 minutes providing clinical care for this patient; including review of patient's chart and provider documentation, face to face time spent with patient in examination room (obtaining history, performing physical exam, discussing diagnosis and management options), placing orders, and completing patient documentation.     Avtar Rivero MD  [facility] [place]

## 2024-08-06 RX ORDER — ONDANSETRON 4 MG/1
4 TABLET, FILM COATED ORAL EVERY 8 HOURS PRN
Status: CANCELLED | OUTPATIENT
Start: 2024-08-06

## 2024-08-06 RX ORDER — RIZATRIPTAN BENZOATE 10 MG/1
10 TABLET ORAL ONCE AS NEEDED
Status: CANCELLED | OUTPATIENT
Start: 2024-08-06

## 2024-08-06 NOTE — TELEPHONE ENCOUNTER
Patient advised she will need an appointment to discuss getting these medications prescribed by Dr Beckwith. Pt states she will call back to scheduled.

## 2024-08-06 NOTE — TELEPHONE ENCOUNTER
Caller: Lucia Stevens    Relationship: Self    Best call back number: [phone]     Requested Prescriptions:   Requested Prescriptions     Pending Prescriptions Disp Refills    rizatriptan (MAXALT) 10 MG tablet       Sig: Take 1 tablet by mouth 1 (One) Time As Needed for Migraine. May repeat in 2 hours if needed    ondansetron (ZOFRAN) 4 MG tablet       Sig: Take 1 tablet by mouth Every 8 (Eight) Hours As Needed for Nausea or Vomiting.        Pharmacy where request should be sent: [id number] - [facility] [place] - [address] [phone] [facility] [phone] [facility]     Last office visit with prescribing clinician: 7/17/2024   Last telemedicine visit with prescribing clinician: Visit date not found   Next office visit with prescribing clinician: 10/18/2024     Additional details provided by patient:     Does the patient have less than a 3 day supply:  [x] Yes  [] No    Would you like a call back once the refill request has been completed: [] Yes [x] No    If the office needs to give you a call back, can they leave a voicemail: [] Yes [x] No    Jermaine Crabtree   08/06/24 12:17 EDT

## 2024-08-08 ENCOUNTER — TELEPHONE (OUTPATIENT)
Dept: FAMILY MEDICINE CLINIC | Facility: CLINIC | Age: 64
End: 2024-08-08
Payer: MEDICARE

## 2024-08-09 ENCOUNTER — TELEPHONE (OUTPATIENT)
Dept: NEUROLOGY | Facility: CLINIC | Age: 64
End: 2024-08-09

## 2024-08-09 ENCOUNTER — HOSPITAL ENCOUNTER (OUTPATIENT)
Dept: CT IMAGING | Facility: HOSPITAL | Age: 64
Discharge: HOME | End: 2024-08-09
Payer: MEDICARE

## 2024-08-09 DIAGNOSIS — Z87.442 HISTORY OF NEPHROLITHIASIS: ICD-10-CM

## 2024-08-09 DIAGNOSIS — R31.0 GROSS HEMATURIA: ICD-10-CM

## 2024-08-09 PROCEDURE — 74178 CT ABD&PLV WO CNTR FLWD CNTR: CPT

## 2024-08-09 PROCEDURE — 25510000001 IOPAMIDOL PER 1 ML: Performed by: STUDENT IN AN ORGANIZED HEALTH CARE EDUCATION/TRAINING PROGRAM

## 2024-08-09 RX ORDER — TOPIRAMATE 25 MG/1
50 TABLET ORAL DAILY
Qty: 60 TABLET | Refills: 3 | Status: SHIPPED | OUTPATIENT
Start: 2024-08-09 | End: 2024-09-08

## 2024-08-09 RX ADMIN — IOPAMIDOL 150 ML: 755 INJECTION, SOLUTION INTRAVENOUS at 13:45

## 2024-08-09 NOTE — TELEPHONE ENCOUNTER
Provider: DR PINEDA    Caller: PATIENT    Relationship to Patient: SELF    Pharmacy: [id number]    Phone Number: [phone]    Reason for Call: STATES SHE HAD AN ABDOMINAL MIGRAINE IN MAY, JUNE AND AUGUST. WANTED TO SEE IF HE HAS ANY OTHER SUGGESTS THAN THE AMITRIPTYLINE OR IF SHE NEEDS TO BE SEEN AGAIN. STATES THE DOUBLE DOSAGE WAS KNOCKING HER OUT & SHE HAS STOPPED TAKING IT. PLEASE ADVISE, THANK YOU.

## 2024-08-12 ENCOUNTER — OFFICE VISIT (OUTPATIENT)
Dept: FAMILY MEDICINE CLINIC | Facility: CLINIC | Age: 64
End: 2024-08-12
Payer: MEDICARE

## 2024-08-12 VITALS
OXYGEN SATURATION: 98 % | DIASTOLIC BLOOD PRESSURE: 78 MMHG | BODY MASS INDEX: 35.59 KG/M2 | RESPIRATION RATE: 18 BRPM | HEIGHT: 64 IN | SYSTOLIC BLOOD PRESSURE: 134 MMHG | HEART RATE: 78 BPM | WEIGHT: 208.5 LBS | TEMPERATURE: 97.7 F

## 2024-08-12 DIAGNOSIS — G43.D0 ABDOMINAL MIGRAINE, NOT INTRACTABLE: ICD-10-CM

## 2024-08-12 DIAGNOSIS — R06.02 SHORTNESS OF BREATH: Primary | ICD-10-CM

## 2024-08-12 PROCEDURE — 1160F RVW MEDS BY RX/DR IN RCRD: CPT | Performed by: FAMILY MEDICINE

## 2024-08-12 PROCEDURE — G2211 COMPLEX E/M VISIT ADD ON: HCPCS | Performed by: FAMILY MEDICINE

## 2024-08-12 PROCEDURE — 3051F HG A1C>EQUAL 7.0%<8.0%: CPT | Performed by: FAMILY MEDICINE

## 2024-08-12 PROCEDURE — 1159F MED LIST DOCD IN RCRD: CPT | Performed by: FAMILY MEDICINE

## 2024-08-12 PROCEDURE — 1126F AMNT PAIN NOTED NONE PRSNT: CPT | Performed by: FAMILY MEDICINE

## 2024-08-12 PROCEDURE — 99214 OFFICE O/P EST MOD 30 MIN: CPT | Performed by: FAMILY MEDICINE

## 2024-08-12 PROCEDURE — 3075F SYST BP GE 130 - 139MM HG: CPT | Performed by: FAMILY MEDICINE

## 2024-08-12 PROCEDURE — 3078F DIAST BP <80 MM HG: CPT | Performed by: FAMILY MEDICINE

## 2024-08-12 RX ORDER — ONDANSETRON 4 MG/1
4 TABLET, FILM COATED ORAL EVERY 8 HOURS PRN
Qty: 20 TABLET | Refills: 1 | Status: SHIPPED | OUTPATIENT
Start: 2024-08-12

## 2024-08-12 RX ORDER — RIZATRIPTAN BENZOATE 10 MG/1
10 TABLET ORAL ONCE AS NEEDED
Qty: 9 TABLET | Refills: 5 | Status: SHIPPED | OUTPATIENT
Start: 2024-08-12

## 2024-08-12 NOTE — PROGRESS NOTES
Subjective   Lucia Stevens is a 64 y.o. female.     Shortness of Breath       She has had some SOA  She gets these every once in a while and can be associated with PERRY  More NAYLOR and some generalized weakness  No fever and no cough noted  She has had PFTs completed and had mild obstructive disease      She notes she has a history of abdominal migraines, she will use maxalt  This works great for her  She would like refills to use for this  Associted with significant N/V/D      Review of Systems   Respiratory:  Positive for shortness of breath.        Objective   Physical Exam  Vitals and nursing note reviewed.   Constitutional:       General: She is not in acute distress.     Appearance: Normal appearance. She is well-developed.   Cardiovascular:      Rate and Rhythm: Normal rate and regular rhythm.      Heart sounds: Normal heart sounds.   Pulmonary:      Effort: Pulmonary effort is normal.      Breath sounds: Normal breath sounds.   Neurological:      Mental Status: She is alert and oriented to person, place, and time.   Psychiatric:         Mood and Affect: Mood normal.         Behavior: Behavior normal.         Thought Content: Thought content normal.         Judgment: Judgment normal.       Assessment & Plan   Diagnoses and all orders for this visit:    1. Shortness of breath (Primary)    2. Abdominal migraine, not intractable  -     rizatriptan (MAXALT) 10 MG tablet; Take 1 tablet by mouth 1 (One) Time As Needed for Migraine. May repeat in 2 hours if needed  Dispense: 9 tablet; Refill: 5  -     ondansetron (ZOFRAN) 4 MG tablet; Take 1 tablet by mouth Every 8 (Eight) Hours As Needed for Nausea or Vomiting.  Dispense: 20 tablet; Refill: 1      I did give samples of brextri, 2 puffs BID, to see if this would help her breathing.  She will f/u in future INB.  She has had SOA intermittently in past.  Worse this past week though.    We discussed her long Hx of abdominal migraines.  I will refille maxalt aand PRN zofran  for her as this regimen worked well

## 2024-08-13 ENCOUNTER — PROCEDURE VISIT (OUTPATIENT)
Dept: UROLOGY | Facility: CLINIC | Age: 64
End: 2024-08-13
Payer: MEDICARE

## 2024-08-13 VITALS — SYSTOLIC BLOOD PRESSURE: 129 MMHG | HEART RATE: 76 BPM | OXYGEN SATURATION: 100 % | DIASTOLIC BLOOD PRESSURE: 79 MMHG

## 2024-08-13 DIAGNOSIS — N30.01 ACUTE CYSTITIS WITH HEMATURIA: ICD-10-CM

## 2024-08-13 DIAGNOSIS — R31.0 GROSS HEMATURIA: Primary | ICD-10-CM

## 2024-08-13 DIAGNOSIS — N39.0 RECURRENT UTI: ICD-10-CM

## 2024-08-13 DIAGNOSIS — N39.41 URGENCY INCONTINENCE: ICD-10-CM

## 2024-08-13 LAB
BILIRUB BLD-MCNC: NEGATIVE MG/DL
CLARITY, POC: ABNORMAL
COLOR UR: YELLOW
EXPIRATION DATE: ABNORMAL
GLUCOSE UR STRIP-MCNC: ABNORMAL MG/DL
KETONES UR QL: ABNORMAL
LEUKOCYTE EST, POC: NEGATIVE
Lab: ABNORMAL
NITRITE UR-MCNC: NEGATIVE MG/ML
PH UR: 6.5 [PH] (ref 5–8)
PROT UR STRIP-MCNC: NEGATIVE MG/DL
RBC # UR STRIP: NEGATIVE /UL
SP GR UR: 1.01 (ref 1–1.03)
UROBILINOGEN UR QL: NORMAL

## 2024-08-13 PROCEDURE — 87086 URINE CULTURE/COLONY COUNT: CPT | Performed by: STUDENT IN AN ORGANIZED HEALTH CARE EDUCATION/TRAINING PROGRAM

## 2024-08-13 RX ORDER — NITROFURANTOIN 25; 75 MG/1; MG/1
100 CAPSULE ORAL NIGHTLY
Qty: 30 CAPSULE | Refills: 0 | Status: SHIPPED | OUTPATIENT
Start: 2024-08-13

## 2024-08-13 RX ORDER — CEFUROXIME AXETIL 500 MG/1
500 TABLET ORAL 2 TIMES DAILY
Qty: 10 TABLET | Refills: 0 | Status: SHIPPED | OUTPATIENT
Start: 2024-08-13

## 2024-08-13 NOTE — PROGRESS NOTES
Preprocedure diagnosis  Gross hematuria  Urgency Incontinence    Postprocedure diagnosis  Gross hematuria  Urgency Incontinence    Procedure  Flexible Cystourethroscopy    Attending surgeon  Avtar Rivero MD    Anesthesia  2% lidocaine jelly intraurethrally    Complications  None    Indications  64 y.o. female undergoing a flexible cystoscopy for the above mentioned indications.  CT urogram recently negative, imaging reviewed.    Informed consent was obtained prior to the procedure start.       Findings  Cystoscopy revealed chronic cystitis cystica indicative of likely multiple recurrent UTI.    Procedure  The patient was placed in supine position and prepped and draped in sterile fashion with lidocaine jelly instilled 5 minutes pre-procedure start.  A brief timeout including available nursing staff and awake patient was performed.  The 16 Fr digital flexible cystoscope was lubricated and gently placed into the urethral meatus. The proximal and distal portions of the urethra appeared well vascularized and normal in appearance. The bladder neck was visualized and appeared well vascularized without mucosal lesions or abnormal appearance. The bladder was then entered and  completely visualized including the trigone. There were bilateral orthotopic ureteral orifices which appeared patent and effluxed clear yellow urine. The posterior wall, lateral walls, anterior wall, and dome were visualized.  Patient has what appears to be chronic edema around the trigone and bladder neck and erythema consistent with cystitis cystica.  Her urinalysis today is clear but is cloudy.  The cystoscope was then retroflexed and the bladder neck was further visualized and appeared normal.  The scope was gently withdrawn and the procedure terminated.  The patient tolerated the procedure well.       Vaginal examination briefly demonstrates a posterior wall prolapse consistent with a rectocele.    Follow Up:   Patient likely suffering from  recurrent or refractory urinary tract infection, she has evidence of cystitis cystica.  This may be worsened by ongoing Jardiance use.  I do not see any obvious malignancy.  Her CT urogram was negative.  I recommend 30 days of nightly Macrobid.   Urine culture today and a course of Cefuroxime for what appears to be a smoldering UTI. Start the macrobid after finishing Cefuroxime. Gemtesa working for her UUI. Sending a formal script. Further follow up with NP/PA.     Consider vag estrace in future if recurrent UTI documented.     Avtar Rivero MD

## 2024-08-14 ENCOUNTER — TELEPHONE (OUTPATIENT)
Dept: UROLOGY | Facility: CLINIC | Age: 64
End: 2024-08-14
Payer: MEDICARE

## 2024-08-14 DIAGNOSIS — B37.31 VAGINAL YEAST INFECTION: Primary | ICD-10-CM

## 2024-08-14 RX ORDER — FLUCONAZOLE 150 MG/1
150 TABLET ORAL ONCE
Qty: 1 TABLET | Refills: 0 | Status: SHIPPED | OUTPATIENT
Start: 2024-08-14 | End: 2024-08-14

## 2024-08-14 NOTE — TELEPHONE ENCOUNTER
Pt called in and stated that when she takes antibiotics, she normally has to take Diflucan as well. She asked if Dr. Rivero would send that in. I let her know I would send a message over and he should see the message soon.

## 2024-08-15 LAB — BACTERIA SPEC AEROBE CULT: NO GROWTH

## 2024-08-20 ENCOUNTER — TELEPHONE (OUTPATIENT)
Dept: FAMILY MEDICINE CLINIC | Facility: CLINIC | Age: 64
End: 2024-08-20
Payer: MEDICARE

## 2024-08-20 NOTE — TELEPHONE ENCOUNTER
PATIENT STATES THAT HER BLOOD SUGAR HAS BEEN DROPPING AT NIGHT AND HAS GOTTEN DOWN TO AS LOW AS IN THE 50'S. SHE STATED THAT SHE TAKES MULTIPLE MEDICATIONS AT NIGHT BEFORE BED TO HELP WITH HER BLOOD SUGAR AND THE LAST THING IS METFORMIN . SHE'S CURIOUS IF SHE SHOULD STOP TAKING IT TO SEE IF THAT WOULD HELP WITH HER SUGAR DROPPING.        PLEASE ADVISE

## 2024-08-20 NOTE — TELEPHONE ENCOUNTER
Last HgA1C was 7.6!  May need to decrease her insulin, not sure exactly how much she has been taking.    Ok for appointment to discuss complicated question but we don't want her having lows!

## 2024-08-23 ENCOUNTER — OFFICE VISIT (OUTPATIENT)
Dept: FAMILY MEDICINE CLINIC | Facility: CLINIC | Age: 64
End: 2024-08-23
Payer: MEDICARE

## 2024-08-23 VITALS
WEIGHT: 205 LBS | RESPIRATION RATE: 18 BRPM | HEIGHT: 64 IN | SYSTOLIC BLOOD PRESSURE: 130 MMHG | DIASTOLIC BLOOD PRESSURE: 68 MMHG | BODY MASS INDEX: 35 KG/M2 | TEMPERATURE: 97.8 F | HEART RATE: 99 BPM

## 2024-08-23 DIAGNOSIS — E11.40 TYPE 2 DIABETES MELLITUS WITH DIABETIC NEUROPATHY, WITHOUT LONG-TERM CURRENT USE OF INSULIN: Primary | ICD-10-CM

## 2024-08-23 DIAGNOSIS — R53.82 CHRONIC FATIGUE: ICD-10-CM

## 2024-08-23 RX ORDER — FLUCONAZOLE 150 MG/1
150 TABLET ORAL
COMMUNITY
Start: 2024-08-14 | End: 2024-08-23

## 2024-08-23 RX ORDER — FLUCONAZOLE 200 MG/1
TABLET ORAL
Qty: 3 TABLET | Refills: 0 | Status: SHIPPED | OUTPATIENT
Start: 2024-08-23

## 2024-08-23 NOTE — PROGRESS NOTES
Subjective   Lucia Stevens is a 64 y.o. female.     Diabetes  Associated symptoms include fatigue.        Her numbers are going down to 50 and 60 in the PM  She is taking her lantus and her amaryl  in the AM  taking metformin in the PM    Not going low during the daytime at all    Her voice is still high pitched  Saw ENT without clear answer    The following portions of the patient's history were reviewed and updated as appropriate: allergies, current medications, past family history, past medical history, past social history, past surgical history, and problem list.    Review of Systems   Constitutional:  Positive for fatigue.   HENT:  Positive for sore throat.        Objective   Physical Exam  Vitals and nursing note reviewed.   Constitutional:       General: She is not in acute distress.     Appearance: Normal appearance. She is well-developed.   HENT:      Mouth/Throat:      Pharynx: No oropharyngeal exudate or posterior oropharyngeal erythema.   Cardiovascular:      Rate and Rhythm: Normal rate and regular rhythm.      Heart sounds: Normal heart sounds.   Pulmonary:      Effort: Pulmonary effort is normal.      Breath sounds: Normal breath sounds.   Neurological:      Mental Status: She is alert and oriented to person, place, and time.   Psychiatric:         Mood and Affect: Mood normal.         Behavior: Behavior normal.         Thought Content: Thought content normal.         Judgment: Judgment normal.       Assessment & Plan   Diagnoses and all orders for this visit:    1. Type 2 diabetes mellitus with diabetic neuropathy, without long-term current use of insulin (Primary)    2. Chronic fatigue  -     CBC & Differential  -     Comprehensive Metabolic Panel  -     TSH    Other orders  -     fluconazole (Diflucan) 200 MG tablet; 1 po QD  Dispense: 3 tablet; Refill: 0      Hypoglycemia at night.  Likely somodgyi affect.  Will stop the amarayl and take metformin in AM.  She will call back INB, no change in  mazin at this time as she has no lows in the day  Lasrt HgA1C was 7.6 but we are seeing improvement in DM control    Will check labs for fatigue.  Being woken up at night may be playing a role    Ok diflucan for recurrent yeast infection

## 2024-08-24 LAB
ALBUMIN SERPL-MCNC: 4.8 G/DL (ref 3.9–4.9)
ALP SERPL-CCNC: 103 IU/L (ref 44–121)
ALT SERPL-CCNC: 21 IU/L (ref 0–32)
AST SERPL-CCNC: 27 IU/L (ref 0–40)
BASOPHILS # BLD AUTO: 0.1 X10E3/UL (ref 0–0.2)
BASOPHILS NFR BLD AUTO: 1 %
BILIRUB SERPL-MCNC: 0.5 MG/DL (ref 0–1.2)
BUN SERPL-MCNC: 21 MG/DL (ref 8–27)
BUN/CREAT SERPL: 25 (ref 12–28)
CALCIUM SERPL-MCNC: 9.9 MG/DL (ref 8.7–10.3)
CHLORIDE SERPL-SCNC: 99 MMOL/L (ref 96–106)
CO2 SERPL-SCNC: 21 MMOL/L (ref 20–29)
CREAT SERPL-MCNC: 0.85 MG/DL (ref 0.57–1)
EGFRCR SERPLBLD CKD-EPI 2021: 76 ML/MIN/1.73
EOSINOPHIL # BLD AUTO: 0.2 X10E3/UL (ref 0–0.4)
EOSINOPHIL NFR BLD AUTO: 2 %
ERYTHROCYTE [DISTWIDTH] IN BLOOD BY AUTOMATED COUNT: 15.6 % (ref 11.7–15.4)
GLOBULIN SER CALC-MCNC: 2.8 G/DL (ref 1.5–4.5)
GLUCOSE SERPL-MCNC: 153 MG/DL (ref 70–99)
HCT VFR BLD AUTO: 43.1 % (ref 34–46.6)
HGB BLD-MCNC: 13.9 G/DL (ref 11.1–15.9)
IMM GRANULOCYTES # BLD AUTO: 0 X10E3/UL (ref 0–0.1)
IMM GRANULOCYTES NFR BLD AUTO: 0 %
LYMPHOCYTES # BLD AUTO: 1.8 X10E3/UL (ref 0.7–3.1)
LYMPHOCYTES NFR BLD AUTO: 18 %
MCH RBC QN AUTO: 31.7 PG (ref 26.6–33)
MCHC RBC AUTO-ENTMCNC: 32.3 G/DL (ref 31.5–35.7)
MCV RBC AUTO: 98 FL (ref 79–97)
MONOCYTES # BLD AUTO: 0.9 X10E3/UL (ref 0.1–0.9)
MONOCYTES NFR BLD AUTO: 10 %
NEUTROPHILS # BLD AUTO: 6.7 X10E3/UL (ref 1.4–7)
NEUTROPHILS NFR BLD AUTO: 69 %
PLATELET # BLD AUTO: 214 X10E3/UL (ref 150–450)
POTASSIUM SERPL-SCNC: 3.5 MMOL/L (ref 3.5–5.2)
PROT SERPL-MCNC: 7.6 G/DL (ref 6–8.5)
RBC # BLD AUTO: 4.39 X10E6/UL (ref 3.77–5.28)
SODIUM SERPL-SCNC: 140 MMOL/L (ref 134–144)
TSH SERPL DL<=0.005 MIU/L-ACNC: 3.4 UIU/ML (ref 0.45–4.5)
WBC # BLD AUTO: 9.7 X10E3/UL (ref 3.4–10.8)

## 2024-08-26 ENCOUNTER — TELEPHONE (OUTPATIENT)
Dept: NEUROLOGY | Facility: CLINIC | Age: 64
End: 2024-08-26
Payer: MEDICARE

## 2024-08-26 NOTE — TELEPHONE ENCOUNTER
Caller: Lucia Stevens    Relationship: Self    Best call back number: [phone]    Which medication are you concerned about: TOPIRAMATE    Who prescribed you this medication: DR PINEDA    When did you start taking this medication: NOT SURE    What are your concerns: PATIENT HAS BEEN EXPERIENCING CONFUSION AND THE OTHER NIGHT WASN'T EVEN SURE HOW TO GET HOME IN HER OWN TOWN. SHE THINKS THAT 2 PILLS IS TOO MUCH, SHE WOULD LIKE TO KNOW IF IT WOULD BE OK TO GO BACK DOWN TO 1 PILL. PLEASE ADVISE

## 2024-08-27 RX ORDER — TOPIRAMATE 25 MG/1
25 TABLET, FILM COATED ORAL DAILY
Qty: 30 TABLET | Refills: 1 | Status: SHIPPED | OUTPATIENT
Start: 2024-08-27 | End: 2024-09-26

## 2024-08-27 NOTE — TELEPHONE ENCOUNTER
"Returned call to Lucia, she is fine to decrease dose to 1 tablet daily. I have updated her chart as well. Confirmed we can see confusion/fogginess or \"word finding difficulties\" with topiramate. Typically at higher doses but can still be seen with the medicine at all. I asked she call back if symptoms do not resolve with lower dose.     AMBER Miller     "

## 2024-09-01 DIAGNOSIS — E11.42 DIABETIC POLYNEUROPATHY ASSOCIATED WITH TYPE 2 DIABETES MELLITUS: ICD-10-CM

## 2024-09-03 PROBLEM — Z79.52 LONG TERM CURRENT USE OF SYSTEMIC STEROIDS: Status: ACTIVE | Noted: 2024-09-03

## 2024-09-03 PROBLEM — Z79.899 ENCOUNTER FOR LONG-TERM (CURRENT) USE OF HIGH-RISK MEDICATION: Status: ACTIVE | Noted: 2024-09-03

## 2024-09-03 NOTE — ASSESSMENT & PLAN NOTE
1. She is PREET and Centromere test positive.   2. 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 She sees a cardiologist  4. She has been having issues with reflux. She follows with gastroenterology. She has upcoming EGD with GI.  5. No history of renal crisis.  6. She denies any issues with Raynaud's  7. Continue/refill MTX & folic acid  8. Continue annual follow up with Dr. Gay pulmonology for ILD screening  9. We recommend patients' with scleroderma be screened for ILD and pulmonary HTN once/year

## 2024-09-03 NOTE — ASSESSMENT & PLAN NOTE
* Prednisone 5 mg/day for RA/Sjogren's/Scleroderma.    Ideally she will taper off as her condition improves/stabilizes. Today try stopping prednisone.

## 2024-09-03 NOTE — ASSESSMENT & PLAN NOTE
* Methotrexate 20 mg PO once/week for RA/Sjogren's/Scleroderma  * Started 2/16/24.    1. CBC and CMP every 8-12 weeks to monitor for medication toxicity.   2. Take folate supplements daily.  3. No recent serious infections

## 2024-09-03 NOTE — ASSESSMENT & PLAN NOTE
* 12/20/23: CBC normal, PREET positive, Centromere 2.4 (< 0.9), Chromatin normal, DS DNA normal, Genevieve 1 normal, Ribosomal P normal, RNP normal, SCL 70 normal, Clemente normal, SSA > 8.0 (<0.9), SSB negative, CCP 3.7 (<3.0 normal), CRP 1.37 (<0.50), RF negative, Uric acid normal, urinalysis showed elevated glucose but otherwise was ok, CBC was fine]  * Medications/treatments/interventions tried include: Tylenol, she is sulfa allergic (Cannot take sulfasalazine), pregabalin, Duloxetine, Aspirin, allopurinol, steroids, Advil, Aleve, indomethacin, she saw [facility], MTX/folic acid, prednisone.    1. She has previously seen [facility]   2. Dr. Diamond wanted to avoid hydroxychloroquine because she has a history of CHF and coronary artery disease  3. Continue MTX 20 mg/week.  4. Continue prednisone 5 mg/day for now. Risks and benefits reviewed.  5. We will PA a biologic for her today. We will avoid TNF inhibitors with history of CHF.   5. Follow up in 3-4 months

## 2024-09-03 NOTE — ASSESSMENT & PLAN NOTE
1. Tylenol PRN is ok as directed  2. She has tried taking NSAIDS like Advil, Aleve, and indomethacin   3. She is taking pregabalin for neuropathic pain  4. She is prescribed duloxetine from other provider

## 2024-09-03 NOTE — ASSESSMENT & PLAN NOTE
1. She needs regular dental/opthalmic examinations   2. Continue medications as above  3. Discussed conservative measures to help with dry mouth and dry eyes

## 2024-09-04 ENCOUNTER — OFFICE VISIT (OUTPATIENT)
Age: 64
End: 2024-09-04
Payer: MEDICARE

## 2024-09-04 ENCOUNTER — TELEPHONE (OUTPATIENT)
Age: 64
End: 2024-09-04

## 2024-09-04 ENCOUNTER — LAB (OUTPATIENT)
Facility: HOSPITAL | Age: 64
End: 2024-09-04
Payer: MEDICARE

## 2024-09-04 VITALS
HEIGHT: 64 IN | BODY MASS INDEX: 35.12 KG/M2 | SYSTOLIC BLOOD PRESSURE: 120 MMHG | TEMPERATURE: 97.3 F | WEIGHT: 205.7 LBS | DIASTOLIC BLOOD PRESSURE: 70 MMHG | HEART RATE: 79 BPM

## 2024-09-04 DIAGNOSIS — Z79.899 ENCOUNTER FOR LONG-TERM (CURRENT) USE OF HIGH-RISK MEDICATION: ICD-10-CM

## 2024-09-04 DIAGNOSIS — R53.83 FATIGUE, UNSPECIFIED TYPE: ICD-10-CM

## 2024-09-04 DIAGNOSIS — Z79.899 IMMUNODEFICIENCY DUE TO TREATMENT WITH IMMUNOSUPPRESSIVE MEDICATION: ICD-10-CM

## 2024-09-04 DIAGNOSIS — D84.821 IMMUNODEFICIENCY DUE TO TREATMENT WITH IMMUNOSUPPRESSIVE MEDICATION: ICD-10-CM

## 2024-09-04 DIAGNOSIS — M34.9 SCLERODERMA: ICD-10-CM

## 2024-09-04 DIAGNOSIS — Z79.52 LONG TERM CURRENT USE OF SYSTEMIC STEROIDS: ICD-10-CM

## 2024-09-04 DIAGNOSIS — Z51.81 ENCOUNTER FOR MEDICATION MONITORING: ICD-10-CM

## 2024-09-04 DIAGNOSIS — M35.00 SECONDARY SJOGREN'S SYNDROME: ICD-10-CM

## 2024-09-04 DIAGNOSIS — M05.9 RHEUMATOID ARTHRITIS WITH POSITIVE RHEUMATOID FACTOR, INVOLVING UNSPECIFIED SITE: ICD-10-CM

## 2024-09-04 DIAGNOSIS — M15.9 OSTEOARTHRITIS OF MULTIPLE JOINTS, UNSPECIFIED OSTEOARTHRITIS TYPE: ICD-10-CM

## 2024-09-04 DIAGNOSIS — M10.9 GOUT, UNSPECIFIED CAUSE, UNSPECIFIED CHRONICITY, UNSPECIFIED SITE: ICD-10-CM

## 2024-09-04 DIAGNOSIS — L40.9 PSORIASIS: ICD-10-CM

## 2024-09-04 DIAGNOSIS — M05.9 RHEUMATOID ARTHRITIS WITH POSITIVE RHEUMATOID FACTOR, INVOLVING UNSPECIFIED SITE: Primary | ICD-10-CM

## 2024-09-04 PROBLEM — Z79.60 IMMUNODEFICIENCY DUE TO TREATMENT WITH IMMUNOSUPPRESSIVE MEDICATION: Status: ACTIVE | Noted: 2024-09-04

## 2024-09-04 PROBLEM — T45.1X5A IMMUNODEFICIENCY DUE TO TREATMENT WITH IMMUNOSUPPRESSIVE MEDICATION: Status: ACTIVE | Noted: 2024-09-04

## 2024-09-04 LAB
ALBUMIN SERPL-MCNC: 4.6 G/DL (ref 3.5–5.2)
ALBUMIN/GLOB SERPL: 1.7 G/DL
ALP SERPL-CCNC: 89 U/L (ref 39–117)
ALT SERPL W P-5'-P-CCNC: 19 U/L (ref 1–33)
ANION GAP SERPL CALCULATED.3IONS-SCNC: 12.6 MMOL/L (ref 5–15)
AST SERPL-CCNC: 23 U/L (ref 1–32)
BASOPHILS # BLD AUTO: 0.05 10*3/MM3 (ref 0–0.2)
BASOPHILS NFR BLD AUTO: 0.5 % (ref 0–1.5)
BILIRUB SERPL-MCNC: 0.5 MG/DL (ref 0–1.2)
BILIRUB UR QL STRIP: NEGATIVE
BUN SERPL-MCNC: 20 MG/DL (ref 8–23)
BUN/CREAT SERPL: 26.3 (ref 7–25)
CALCIUM SPEC-SCNC: 9.7 MG/DL (ref 8.6–10.5)
CHLORIDE SERPL-SCNC: 104 MMOL/L (ref 98–107)
CLARITY UR: CLEAR
CO2 SERPL-SCNC: 23.4 MMOL/L (ref 22–29)
COLOR UR: YELLOW
CREAT SERPL-MCNC: 0.76 MG/DL (ref 0.57–1)
CRP SERPL-MCNC: <0.3 MG/DL (ref 0–0.5)
DEPRECATED RDW RBC AUTO: 53.5 FL (ref 37–54)
EGFRCR SERPLBLD CKD-EPI 2021: 87.1 ML/MIN/1.73
EOSINOPHIL # BLD AUTO: 0.19 10*3/MM3 (ref 0–0.4)
EOSINOPHIL NFR BLD AUTO: 1.8 % (ref 0.3–6.2)
ERYTHROCYTE [DISTWIDTH] IN BLOOD BY AUTOMATED COUNT: 15.6 % (ref 12.3–15.4)
ERYTHROCYTE [SEDIMENTATION RATE] IN BLOOD: 23 MM/HR (ref 0–30)
GLOBULIN UR ELPH-MCNC: 2.7 GM/DL
GLUCOSE SERPL-MCNC: 123 MG/DL (ref 65–99)
GLUCOSE UR STRIP-MCNC: ABNORMAL MG/DL
HAV IGM SERPL QL IA: NORMAL
HBV CORE IGM SERPL QL IA: NORMAL
HBV SURFACE AG SERPL QL IA: NORMAL
HCT VFR BLD AUTO: 39 % (ref 34–46.6)
HCV AB SER QL: NORMAL
HGB BLD-MCNC: 13.1 G/DL (ref 12–15.9)
HGB UR QL STRIP.AUTO: NEGATIVE
IMM GRANULOCYTES # BLD AUTO: 0.04 10*3/MM3 (ref 0–0.05)
IMM GRANULOCYTES NFR BLD AUTO: 0.4 % (ref 0–0.5)
KETONES UR QL STRIP: NEGATIVE
LEUKOCYTE ESTERASE UR QL STRIP.AUTO: NEGATIVE
LYMPHOCYTES # BLD AUTO: 1.34 10*3/MM3 (ref 0.7–3.1)
LYMPHOCYTES NFR BLD AUTO: 13 % (ref 19.6–45.3)
MCH RBC QN AUTO: 32 PG (ref 26.6–33)
MCHC RBC AUTO-ENTMCNC: 33.6 G/DL (ref 31.5–35.7)
MCV RBC AUTO: 95.4 FL (ref 79–97)
MONOCYTES # BLD AUTO: 0.82 10*3/MM3 (ref 0.1–0.9)
MONOCYTES NFR BLD AUTO: 8 % (ref 5–12)
NEUTROPHILS NFR BLD AUTO: 7.85 10*3/MM3 (ref 1.7–7)
NEUTROPHILS NFR BLD AUTO: 76.3 % (ref 42.7–76)
NITRITE UR QL STRIP: NEGATIVE
NRBC BLD AUTO-RTO: 0 /100 WBC (ref 0–0.2)
PH UR STRIP.AUTO: 6 [PH] (ref 5–8)
PLATELET # BLD AUTO: 190 10*3/MM3 (ref 140–450)
PMV BLD AUTO: 10.5 FL (ref 6–12)
POTASSIUM SERPL-SCNC: 4 MMOL/L (ref 3.5–5.2)
PROT SERPL-MCNC: 7.3 G/DL (ref 6–8.5)
PROT UR QL STRIP: NEGATIVE
RBC # BLD AUTO: 4.09 10*6/MM3 (ref 3.77–5.28)
SODIUM SERPL-SCNC: 140 MMOL/L (ref 136–145)
SP GR UR STRIP: 1.01 (ref 1–1.03)
UROBILINOGEN UR QL STRIP: ABNORMAL
WBC NRBC COR # BLD AUTO: 10.29 10*3/MM3 (ref 3.4–10.8)

## 2024-09-04 PROCEDURE — 86480 TB TEST CELL IMMUN MEASURE: CPT

## 2024-09-04 PROCEDURE — 85652 RBC SED RATE AUTOMATED: CPT

## 2024-09-04 PROCEDURE — 81003 URINALYSIS AUTO W/O SCOPE: CPT

## 2024-09-04 PROCEDURE — 86140 C-REACTIVE PROTEIN: CPT

## 2024-09-04 PROCEDURE — 36415 COLL VENOUS BLD VENIPUNCTURE: CPT

## 2024-09-04 PROCEDURE — 80074 ACUTE HEPATITIS PANEL: CPT

## 2024-09-04 PROCEDURE — 85025 COMPLETE CBC W/AUTO DIFF WBC: CPT

## 2024-09-04 PROCEDURE — 80053 COMPREHEN METABOLIC PANEL: CPT

## 2024-09-04 RX ORDER — PREDNISONE 2.5 MG/1
1 TABLET ORAL DAILY
COMMUNITY
End: 2024-09-04 | Stop reason: SDUPTHER

## 2024-09-04 RX ORDER — PREGABALIN 100 MG/1
100 CAPSULE ORAL 3 TIMES DAILY
Qty: 90 CAPSULE | Refills: 4 | Status: SHIPPED | OUTPATIENT
Start: 2024-09-04

## 2024-09-04 RX ORDER — METHOTREXATE 2.5 MG/1
20 TABLET ORAL WEEKLY
Qty: 40 TABLET | Refills: 3 | Status: SHIPPED | OUTPATIENT
Start: 2024-09-04

## 2024-09-04 RX ORDER — PREDNISONE 5 MG/1
5 TABLET ORAL DAILY
Qty: 30 TABLET | Refills: 3 | Status: SHIPPED | OUTPATIENT
Start: 2024-09-04

## 2024-09-04 RX ORDER — FOLIC ACID 1 MG/1
1 TABLET ORAL DAILY
Qty: 90 TABLET | Refills: 1 | Status: SHIPPED | OUTPATIENT
Start: 2024-09-04

## 2024-09-04 NOTE — ASSESSMENT & PLAN NOTE
No hepatitis. No diverticulitis. No DVT/MI. She has CAD. She has a history of skin cancer. No other cancers. No COPD. She does have a history of CHF.    We will work on PA for a biologic.  Update QTB and hepatitis panel today  CXR 2/16/24 normal

## 2024-09-04 NOTE — TELEPHONE ENCOUNTER
Please PA Actemra or Kevzara. She cannot take Plaquenil or TNF inhibitors related to cardiac disease. She is on MTX currently.

## 2024-09-05 ENCOUNTER — SPECIALTY PHARMACY (OUTPATIENT)
Dept: GENERAL RADIOLOGY | Facility: HOSPITAL | Age: 64
End: 2024-09-05
Payer: MEDICARE

## 2024-09-05 RX ORDER — TOCILIZUMAB 180 MG/ML
162 INJECTION, SOLUTION SUBCUTANEOUS
Qty: 1.8 ML | Refills: 2 | Status: SHIPPED | OUTPATIENT
Start: 2024-09-05

## 2024-09-05 NOTE — TELEPHONE ENCOUNTER
PA request has been approved and pharmacy notified (Filling pharmacy will be notified by phone, fax, or submitted prescription)    Authorized Medication: Actemra ACTPen 162mg/0.9ml  Name of Insurance Approving PA: [employer]  Pharmacy PA Number: [id number]  PA Effective Dates: 6/6/24-9/5/25  Dispensing Pharmacy: [facility]

## 2024-09-05 NOTE — PROGRESS NOTES
Specialty Pharmacy Patient Management Program  Rheumatology Initial Assessment     Lucia Stevens was referred by an Rheumatology provider to the Rheumatology Patient Management program offered by [facility] for Rheumatoid Arthritis on 09/05/24.  An initial outreach was conducted, including assessment of therapy appropriateness and specialty medication education for Actemra ACTPen. The patient was introduced to services offered by [facility], including: regular assessments, refill coordination, curbside pick-up or mail order delivery options, prior authorization maintenance, and financial assistance programs as applicable. The patient was also provided with contact information for the pharmacy team.     Insurance Coverage & Financial Support  [facility]/[employer] Medicare Part D      Relevant Past Medical History and Comorbidities  Relevant medical history and concomitant health conditions were discussed with the patient. The patient's chart has been reviewed for relevant past medical history and comorbid conditions and updated as necessary.  Past Medical History:   Diagnosis Date    Abdominal migraine, not intractable 08/12/2024    CHF (congestive heart failure)     Coronary artery disease     2 stents    Depression     Gout     Hypercholesterolemia     Hypertension     Neuropathy     Psoriasis     Rheumatoid arthritis     Scleroderma     Sjoegren syndrome     SOB (shortness of breath) on exertion     Tobacco abuse 2011    cessation     Type 2 diabetes mellitus with diabetic neuropathy, without long-term current use of insulin 01/17/2024    Wears dentures     full set    Wears glasses      Social History     Socioeconomic History    Marital status:     Number of children: 2    Years of education: 13    Highest education level: Some college, no degree   Tobacco Use    Smoking status: Former     Current packs/day: 0.00     Average packs/day: 0.5 packs/day for 20.0 years  (10.0 ttl pk-yrs)     Types: Cigarettes     Start date: 1991     Quit date: 2011     Years since quittin.6     Passive exposure: Past    Smokeless tobacco: Never    Tobacco comments:     Tobacco use status: Occasional cigarette smoker; Smoking status: Heavy tobacco smoker.   Vaping Use    Vaping status: Never Used   Substance and Sexual Activity    Alcohol use: Yes     Alcohol/week: 1.0 standard drink of alcohol     Types: 1 Shots of liquor per week     Comment: Social drinker; There is a history of alcohol use. Consumed rarely.    Drug use: No    Sexual activity: Not Currently     Partners: Male       Problem list reviewed by Bull Puente PharmD on 2024 at  9:49 AM    Allergies  Known allergies and reactions were discussed with the patient. The patient's chart has been reviewed for  allergy information and updated as necessary.   Allergies   Allergen Reactions    Sulfa Antibiotics Anaphylaxis     Lip swelling       Allergies reviewed by Bull Puente PharmD on 2024 at  9:49 AM    Relevant Laboratory Values  Relevant laboratory values were discussed with the patient. The following specialty medication dose adjustment(s) are recommended: n/a  A1C Last 3 Results          2024    15:50 2024    13:00 2024    14:44   HGBA1C Last 3 Results   Hemoglobin A1C 9.5  10.1  7.6      Lab Results   Component Value Date    HGBA1C 7.6 (H) 2024     Lab Results   Component Value Date    GLUCOSE 123 (H) 2024    CALCIUM 9.7 2024     2024    K 4.0 2024    CO2 23.4 2024     2024    BUN 20 2024    CREATININE 0.76 2024    EGFRIFNONA 71 2019    BCR 26.3 (H) 2024    ANIONGAP 12.6 2024     Lab Results   Component Value Date    CHOL 120 2017    CHLPL 191 2024    TRIG 597 (H) 2024    HDL 32 (L) 2024    LDL 67 2024         Current Medication List  This medication list has been  reviewed with the patient and evaluated for any interactions or necessary modifications/recommendations, and updated to include all prescription medications, OTC medications, and supplements the patient is currently taking.  This list reflects what is contained in the patient's profile, which has also been marked as reviewed to communicate to other providers it is the most up to date version of the patient's current medication therapy.     Current Outpatient Medications:     allopurinol (ZYLOPRIM) 100 MG tablet, Take 2 tablets by mouth Daily., Disp: 180 tablet, Rfl: 1    aspirin 81 MG EC tablet, Take 1 tablet by mouth Daily., Disp: , Rfl:     bisoprolol (ZEBeta) 5 MG tablet, TAKE 1/2 TABLET EVERY DAY, Disp: 45 tablet, Rfl: 1    Blood Glucose Monitoring Suppl (True Metrix Air Glucose Meter) w/Device kit, , Disp: , Rfl:     clotrimazole (LOTRIMIN) 1 % cream, Apply 1 Application topically to the appropriate area as directed Every 12 (Twelve) Hours., Disp: 30 g, Rfl: 1    Continuous Glucose  (FreeStyle Zafar 3 Eglon) device, Use 1 each Daily., Disp: 1 each, Rfl: 1    Continuous Glucose Sensor (FreeStyle Zafar 3 Sensor) misc, Use 1 each Every 14 (Fourteen) Days., Disp: 6 each, Rfl: 3    Continuous Glucose Sensor (FreeStyle Zafar 3 Sensor) misc, Use 1 each Every 14 (Fourteen) Days., Disp: 2 each, Rfl: 5    DULoxetine (CYMBALTA) 60 MG capsule, Take 1 capsule by mouth Daily., Disp: 90 capsule, Rfl: 1    empagliflozin (Jardiance) 25 MG tablet tablet, Take 1 tablet by mouth Daily., Disp: 90 tablet, Rfl: 1    folic acid (FOLVITE) 1 MG tablet, Take 1 tablet by mouth Daily., Disp: 90 tablet, Rfl: 1    furosemide (LASIX) 40 MG tablet, 1 PO QM (Patient taking differently: Take 1 tablet by mouth Daily.), Disp: 30 tablet, Rfl: 3    Insulin Glargine (LANTUS SOLOSTAR) 100 UNIT/ML injection pen, Inject 55 Units under the skin into the appropriate area as directed Every Morning., Disp: 35 mL, Rfl: 5    Insulin Pen Needle (Pen  Needles) 31G X 6 MM misc, 1 daily for lantus, Disp: 100 each, Rfl: 5    metFORMIN (GLUCOPHAGE) 850 MG tablet, Take 1 tablet by mouth Daily With Breakfast., Disp: 180 tablet, Rfl: 1    methotrexate 2.5 MG tablet, Take 8 tablets by mouth 1 (One) Time Per Week., Disp: 40 tablet, Rfl: 3    midodrine (PROAMATINE) 5 MG tablet, Take 1 tablet by mouth Take As Directed. Take 2 tablet by oral route 2 times every day, Disp: , Rfl:     nitroglycerin (NITROSTAT) 0.4 MG SL tablet, 1 under the tongue as needed for angina, may repeat q5mins for up three doses, Disp: 25 tablet, Rfl: 3    nystatin (MYCOSTATIN) 492612 UNIT/GM ointment, Apply 1 Application topically to the appropriate area as directed 2 (Two) Times a Day., Disp: 90 g, Rfl: 2    omeprazole (priLOSEC) 40 MG capsule, Take 1 capsule by mouth Every Morning., Disp: , Rfl:     ondansetron (ZOFRAN) 4 MG tablet, Take 1 tablet by mouth Every 8 (Eight) Hours As Needed for Nausea or Vomiting., Disp: 20 tablet, Rfl: 1    potassium chloride (MICRO-K) 10 MEQ CR capsule, Take 1 capsule by mouth Daily., Disp: 90 capsule, Rfl: 1    predniSONE (DELTASONE) 5 MG tablet, Take 1 tablet by mouth Daily., Disp: 30 tablet, Rfl: 3    pregabalin (LYRICA) 100 MG capsule, TAKE 1 CAPSULE BY MOUTH THREE TIMES DAILY, Disp: 90 capsule, Rfl: 4    rizatriptan (MAXALT) 10 MG tablet, Take 1 tablet by mouth 1 (One) Time As Needed for Migraine. May repeat in 2 hours if needed, Disp: 9 tablet, Rfl: 5    rosuvastatin (CRESTOR) 20 MG tablet, Take 1 tablet by mouth Daily., Disp: 90 tablet, Rfl: 1    Semaglutide, 2 MG/DOSE, (Ozempic, 2 MG/DOSE,) 8 MG/3ML solution pen-injector, Inject 2 mg under the skin into the appropriate area as directed 1 (One) Time Per Week., Disp: 3 mL, Rfl: 3    spironolactone (ALDACTONE) 25 MG tablet, Take 1 tablet by mouth Daily., Disp: 90 tablet, Rfl: 1    Tocilizumab (Actemra ACTPen) 162 MG/0.9ML solution auto-injector injection, Inject 0.9 mL under the skin into the appropriate area as  directed Every 14 (Fourteen) Days., Disp: 1.8 mL, Rfl: 2    topiramate (Topamax) 25 MG tablet, Take 1 tablet by mouth Daily for 30 days., Disp: 30 tablet, Rfl: 1    True Metrix Blood Glucose Test test strip, , Disp: , Rfl:     Vascepa 1 g capsule capsule, TAKE 2 CAPSULES BY MOUTH TWICE DAILY WITH MEALS, Disp: 360 capsule, Rfl: 0    Vibegron 75 MG tablet, Take 1 tablet by mouth Daily., Disp: 30 tablet, Rfl: 5  No current facility-administered medications for this visit.    Medicines reviewed by Blul Puente, PharmD on 9/5/2024 at  9:49 AM    Drug Interactions  N/a    Initial Education Provided for Specialty Medication  The patient has been provided with the following education and any applicable administration techniques (i.e. self-injection) have been demonstrated for the therapies indicated. All questions and concerns have been addressed prior to the patient receiving the medication, and the patient has verbalized comprehension of the education and any materials provided. Additional patient education shall be provided and documented upon request by the patient, provider, or payer.          Actemra (tocilizumab)           Medication Expectations   Why am I taking this medication? You are taking this medication for polyarticular juvenile idiopathic arthritis (PIJA), systemic juvenile idiopathic arthritis (SJIA), rheumatoid arthritis, giant cell arteritis, or systemic sclerosis-associated interstitial lung disease.   What should I expect while on this medication? You should expect a decrease in the frequency and severity of symptoms.   How does the medication work? Tocilizumab is a humanized IL-6 receptor-inhibiting monoclonal antibody. Inhibiting IL-6 receptors leads to a reduction in the production of proinflammatory cytokines.   How long will I be on this medication for? The amount of time you will be on this medication will be determined by your doctor and your response to the medication.    How do I  take this medication? Take as directed on your prescription label.  This medication is a subcutaneous injection given in the fatty part of the skin on the top of the thigh, upper outer arm, or stomach area. You should rotate injection sites. Allow to reach room temperature prior to injection (~30 minutes for syringe or ~45 minutes for auto-injector).   What are some possible side effects? Injection site reactions and hypersensitivity reactions, increased liver enzymes or cholesterol, constipation or diarrhea, high blood pressure, signs of a common cold or upper respiratory tract infection, or infusion-related reaction (flushing, chills, headache).   What happens if I miss a dose? If you miss a dose, take it as soon as you remember. If it is close to the time for your next dose, skip the missed dose and go back to your normal time.                    Medication Safety   What are things I should warn my doctor immediately about? Allergic reaction such has hives or trouble breathing. If you develop symptoms of infection that do not go away, weight loss, changes in bowel habits or severe abdominal pain, or muscle pain or weakness.     What are things that I should be cautious of? Injection site reaction and upper respiratory infection. Be sure to complete any follow-up labs ordered by your doctor. You may have more chance of getting an infection.  Wash your hands often and stay away from people with infections, colds, or flu.   What are some medications that can interact with this one? Immunosuppressants and vaccines.            Medication Storage/Handling   How should I handle this medication? Keep this medication our of reach of pets/children in original container. Store in the original container to protect from light. Do not freeze or shake. Do not inject where the skin is tender, bruised, red, hard, or where there are moles, scars, or stretch marks.   How does this medication need to be stored? Store in refrigerator  and keep dry. If needed, you may store at room temperature for up to 24 hours. Do not return to fridge once stored at room temperature.    How should I dispose of this medication? You can dispose of the syringe in a sharps container, and if this is not available you may use an empty hard plastic container such as a milk jug or tide container. Discard any unused portion or if stored outside of refrigerator > 5 days.            Resources/Support   How can I remind myself to take this medication? You can download a reminder alayna on your phone or use a calandar  to help with your injection.   Is financial support available?  Yes, [employer] can provide co-pay assisstance if you have commercial insurance or patient assistance if you have Medicare or no insurance.    Which vaccines are recommended for me? Talk to your doctor about these vaccines: Flu, Coronavirus (COVID-19), Pneumococcal (pneumonia), Tdap, Hepatitis B, Zoster (shingles).              Adherence and Self-Administration  Adherence related to the patient's specialty therapy was discussed with the patient. The Adherence segment of this outreach has been reviewed and updated.              Methods for Supporting Patient Adherence and/or Self-Administration: n/a    Open Medication Therapy Problems  No medication therapy recommendations to display    Goals of Therapy  Goals related to the patient's specialty therapy were discussed with the patient. The Patient Goals segment of this outreach has been reviewed and updated.   Goals Addressed Today    None       Reassessment Plan & Follow-Up  1. Medication Therapy Changes: Actemra ACTPen 162mg/0.9ml subq every 14 days  2. Related Plans, Therapy Recommendations, or Therapy Problems to Be Addressed: Patient new to therapy.  Patient did not have any questions or concerns about medication or administration.  Patient comfortable with administration using pen autoinjector.  Patient currently on antibiotic for bladder  infection.  Patient will call provider prescribing antibiotic and ensure she can start Actemra.  Patient will hold accordingly.  Advised patient to call direct line with any further questions.  Advised patient medication scheduled for delivery on Tuesday.   3. Pharmacist to perform regular assessments no more than (6) months from the previous assessment.  4. Care Coordinator to set up future refill outreaches, coordinate prescription delivery, and escalate clinical questions to pharmacist.  5. Welcome information and patient satisfaction survey to be sent by specialty pharmacy team with patient's initial fill.    Attestation  Therapeutic appropriateness: Appropriate   I attest the patient was actively involved in and has agreed to the above plan of care. If the prescribed therapy is at any point deemed not appropriate based on the current or future assessments, a consultation will be initiated with the patient's specialty care provider to determine the best course of action. The revised plan of therapy will be documented along with any required assessments and/or additional patient education provided.     Bull Puente, PharmD  Clinical Specialty Pharmacist, Rheumatology  9/5/2024  09:57 EDT

## 2024-09-07 LAB
GAMMA INTERFERON BACKGROUND BLD IA-ACNC: 0 IU/ML
M TB IFN-G BLD-IMP: NEGATIVE
M TB IFN-G CD4+ BCKGRND COR BLD-ACNC: 0 IU/ML
M TB IFN-G CD4+CD8+ BCKGRND COR BLD-ACNC: 0 IU/ML
MITOGEN IGNF BCKGRD COR BLD-ACNC: >10 IU/ML
QUANTIFERON INCUBATION: NORMAL
SERVICE CMNT-IMP: NORMAL

## 2024-09-10 RX ORDER — MIDODRINE HYDROCHLORIDE 10 MG/1
10 TABLET ORAL 2 TIMES DAILY
Qty: 180 TABLET | Refills: 0 | OUTPATIENT
Start: 2024-09-10

## 2024-09-11 ENCOUNTER — TELEPHONE (OUTPATIENT)
Age: 64
End: 2024-09-11
Payer: MEDICARE

## 2024-09-11 RX ORDER — FUROSEMIDE 40 MG
TABLET ORAL
Qty: 30 TABLET | Refills: 0 | Status: SHIPPED | OUTPATIENT
Start: 2024-09-11

## 2024-09-11 RX ORDER — MIDODRINE HYDROCHLORIDE 5 MG/1
5 TABLET ORAL TAKE AS DIRECTED
Qty: 180 TABLET | Refills: 1 | Status: SHIPPED | OUTPATIENT
Start: 2024-09-11

## 2024-09-11 NOTE — TELEPHONE ENCOUNTER
Patient called wanting to clarify how to take the medications she was prescribed. I clarified with her that per the prescriptions that were sent she is to take prednisone 5 mg and folic acid 1 mg daily, and methotrexate 8 tablets 1 day per week. She expressed understanding.

## 2024-09-24 ENCOUNTER — OFFICE VISIT (OUTPATIENT)
Dept: FAMILY MEDICINE CLINIC | Facility: CLINIC | Age: 64
End: 2024-09-24
Payer: MEDICARE

## 2024-09-24 VITALS
DIASTOLIC BLOOD PRESSURE: 78 MMHG | BODY MASS INDEX: 34.66 KG/M2 | OXYGEN SATURATION: 97 % | HEIGHT: 64 IN | RESPIRATION RATE: 16 BRPM | SYSTOLIC BLOOD PRESSURE: 122 MMHG | HEART RATE: 90 BPM | WEIGHT: 203 LBS

## 2024-09-24 DIAGNOSIS — L30.9 DERMATITIS: Primary | ICD-10-CM

## 2024-09-24 DIAGNOSIS — F33.1 MODERATE EPISODE OF RECURRENT MAJOR DEPRESSIVE DISORDER: ICD-10-CM

## 2024-09-24 PROCEDURE — 99213 OFFICE O/P EST LOW 20 MIN: CPT | Performed by: FAMILY MEDICINE

## 2024-09-24 PROCEDURE — 3078F DIAST BP <80 MM HG: CPT | Performed by: FAMILY MEDICINE

## 2024-09-24 PROCEDURE — 1159F MED LIST DOCD IN RCRD: CPT | Performed by: FAMILY MEDICINE

## 2024-09-24 PROCEDURE — 1160F RVW MEDS BY RX/DR IN RCRD: CPT | Performed by: FAMILY MEDICINE

## 2024-09-24 PROCEDURE — 1125F AMNT PAIN NOTED PAIN PRSNT: CPT | Performed by: FAMILY MEDICINE

## 2024-09-24 PROCEDURE — G2211 COMPLEX E/M VISIT ADD ON: HCPCS | Performed by: FAMILY MEDICINE

## 2024-09-24 PROCEDURE — 3074F SYST BP LT 130 MM HG: CPT | Performed by: FAMILY MEDICINE

## 2024-09-24 PROCEDURE — 3051F HG A1C>EQUAL 7.0%<8.0%: CPT | Performed by: FAMILY MEDICINE

## 2024-09-24 RX ORDER — HYDROXYZINE HYDROCHLORIDE 50 MG/1
TABLET, FILM COATED ORAL
Qty: 20 TABLET | Refills: 1 | Status: SHIPPED | OUTPATIENT
Start: 2024-09-24

## 2024-09-24 RX ORDER — DULOXETIN HYDROCHLORIDE 60 MG/1
60 CAPSULE, DELAYED RELEASE ORAL DAILY
Qty: 90 CAPSULE | Refills: 1 | Status: SHIPPED | OUTPATIENT
Start: 2024-09-24

## 2024-09-24 RX ORDER — PREDNISONE 20 MG/1
TABLET ORAL
Qty: 16 TABLET | Refills: 0 | Status: SHIPPED | OUTPATIENT
Start: 2024-09-24

## 2024-09-25 DIAGNOSIS — I10 PRIMARY HYPERTENSION: ICD-10-CM

## 2024-09-25 RX ORDER — BISOPROLOL FUMARATE 5 MG/1
TABLET, FILM COATED ORAL
Qty: 45 TABLET | Refills: 1 | Status: SHIPPED | OUTPATIENT
Start: 2024-09-25

## 2024-09-27 RX ORDER — ICOSAPENT ETHYL 1000 MG/1
2 CAPSULE ORAL 2 TIMES DAILY WITH MEALS
Qty: 360 CAPSULE | Refills: 0 | Status: SHIPPED | OUTPATIENT
Start: 2024-09-27

## 2024-10-02 ENCOUNTER — TELEPHONE (OUTPATIENT)
Age: 64
End: 2024-10-02
Payer: MEDICARE

## 2024-10-02 ENCOUNTER — SPECIALTY PHARMACY (OUTPATIENT)
Age: 64
End: 2024-10-02
Payer: MEDICARE

## 2024-10-02 NOTE — PROGRESS NOTES
Specialty Pharmacy Refill Coordination Note     Lucia is a 64 y.o. female contacted today regarding refills of  Actemra specialty medication(s).    Reviewed and verified with patient: Yes      Specialty medication(s) and dose(s) confirmed: yes    Refill Questions      Flowsheet Row Most Recent Value   Changes to allergies? No   Changes to medications? No   New conditions or infections since last clinic visit No   Unplanned office visit, urgent care, ED, or hospital admission in the last 4 weeks  No   How does patient/caregiver feel medication is working? Good   Financial problems or insurance changes  No   Since the previous refill, were any specialty medication doses or scheduled injections missed or delayed?  No   Does this patient require a clinical escalation to a pharmacist? No            Delivery Questions      Flowsheet Row Most Recent Value   Delivery method [employer]   Delivery address verified with patient/caregiver? Yes   Delivery address Home   Number of medications in delivery 1   Medication(s) being filled and delivered Tocilizumab   Doses left of specialty medications 3 due to pausing for antibiotics   Copay verified? Yes   Copay amount $0   Copay form of payment No copayment ($0)   Ship Date 10/2/24   Delivery Date 10/3/24   Signature Required No                   Follow-up: 21 day(s)     Lm Eduardo, Pharmacy Technician  Specialty Pharmacy Technician

## 2024-10-04 RX ORDER — SPIRONOLACTONE 25 MG/1
25 TABLET ORAL DAILY
Qty: 90 TABLET | Refills: 0 | Status: SHIPPED | OUTPATIENT
Start: 2024-10-04

## 2024-10-08 RX ORDER — ALLOPURINOL 100 MG/1
200 TABLET ORAL DAILY
Qty: 180 TABLET | Refills: 0 | Status: SHIPPED | OUTPATIENT
Start: 2024-10-08

## 2024-10-08 RX ORDER — FUROSEMIDE 40 MG
TABLET ORAL
Qty: 30 TABLET | Refills: 0 | Status: SHIPPED | OUTPATIENT
Start: 2024-10-08

## 2024-10-18 ENCOUNTER — OFFICE VISIT (OUTPATIENT)
Dept: FAMILY MEDICINE CLINIC | Facility: CLINIC | Age: 64
End: 2024-10-18
Payer: MEDICARE

## 2024-10-18 VITALS
BODY MASS INDEX: 35.17 KG/M2 | DIASTOLIC BLOOD PRESSURE: 76 MMHG | TEMPERATURE: 97.1 F | HEART RATE: 76 BPM | HEIGHT: 64 IN | OXYGEN SATURATION: 98 % | WEIGHT: 206 LBS | SYSTOLIC BLOOD PRESSURE: 112 MMHG | RESPIRATION RATE: 14 BRPM

## 2024-10-18 DIAGNOSIS — E11.40 TYPE 2 DIABETES MELLITUS WITH DIABETIC NEUROPATHY, WITHOUT LONG-TERM CURRENT USE OF INSULIN: Primary | ICD-10-CM

## 2024-10-18 DIAGNOSIS — I10 PRIMARY HYPERTENSION: ICD-10-CM

## 2024-10-18 DIAGNOSIS — Z23 NEED FOR INFLUENZA VACCINATION: ICD-10-CM

## 2024-10-18 DIAGNOSIS — I25.119 CORONARY ARTERY DISEASE INVOLVING NATIVE CORONARY ARTERY OF NATIVE HEART WITH ANGINA PECTORIS: ICD-10-CM

## 2024-10-18 DIAGNOSIS — E78.00 HYPERCHOLESTEREMIA: ICD-10-CM

## 2024-10-18 PROBLEM — Z51.81 ENCOUNTER FOR MEDICATION MONITORING: Status: RESOLVED | Noted: 2024-09-04 | Resolved: 2024-10-18

## 2024-10-18 PROCEDURE — 99214 OFFICE O/P EST MOD 30 MIN: CPT | Performed by: FAMILY MEDICINE

## 2024-10-18 PROCEDURE — 1125F AMNT PAIN NOTED PAIN PRSNT: CPT | Performed by: FAMILY MEDICINE

## 2024-10-18 PROCEDURE — 1159F MED LIST DOCD IN RCRD: CPT | Performed by: FAMILY MEDICINE

## 2024-10-18 PROCEDURE — 3078F DIAST BP <80 MM HG: CPT | Performed by: FAMILY MEDICINE

## 2024-10-18 PROCEDURE — 90662 IIV NO PRSV INCREASED AG IM: CPT | Performed by: FAMILY MEDICINE

## 2024-10-18 PROCEDURE — G0008 ADMIN INFLUENZA VIRUS VAC: HCPCS | Performed by: FAMILY MEDICINE

## 2024-10-18 PROCEDURE — 1160F RVW MEDS BY RX/DR IN RCRD: CPT | Performed by: FAMILY MEDICINE

## 2024-10-18 PROCEDURE — 3051F HG A1C>EQUAL 7.0%<8.0%: CPT | Performed by: FAMILY MEDICINE

## 2024-10-18 PROCEDURE — 3074F SYST BP LT 130 MM HG: CPT | Performed by: FAMILY MEDICINE

## 2024-10-18 RX ORDER — SEMAGLUTIDE 2.68 MG/ML
2 INJECTION, SOLUTION SUBCUTANEOUS WEEKLY
Qty: 3 ML | Refills: 3 | Status: SHIPPED | OUTPATIENT
Start: 2024-10-18

## 2024-10-18 NOTE — PROGRESS NOTES
Subjective   Lucia Stevens is a 64 y.o. female.     History of Present Illness     Diabetes Mellitus Type II, Follow-up:   Lucia Stevens is a 64 y.o. female who is here for follow-up of Type 2 diabetes mellitus.  Current symptoms/problems include none and have been stable. Patient is adherent with medications.  Known diabetic complications: peripheral neuropathy  Cardiovascular risk factors: diabetes mellitus, dyslipidemia, hypertension, and obesity (BMI >= 30 kg/m2)  Current diabetic medications include  ozempic 2, metformin, lantus and jardiance .   Current monitoring regimen: home blood tests - multiple times daily  Home blood sugar records:  doing ok but higher when on her steroids  Any episodes of hypoglycemia? no  Eye exam current (within one year): yes  She is not on ACE inhibitor or angiotensin II receptor blocker. Patient is on a statin.       Lucia Stevens  is here for follow-up of hypertension of several years duration. She is not exercising and is not adherent to a low-salt diet. Patient does not check her blood pressure.   She is compliant with meds.        Lucia Stevens returns today for follow up of Hyperlipidemia  Lucia indicates her exercise level as irregularly.  Diet: unchanged  Patient is compliant with medications   Any side effects to medications:   chest pain No myalgia No memory change No  Pt is due for labs      The following portions of the patient's history were reviewed and updated as appropriate: allergies, current medications, past family history, past medical history, past social history, past surgical history, and problem list.    Review of Systems   Constitutional: Negative.    Respiratory: Negative.     Psychiatric/Behavioral: Negative.         Objective   Physical Exam  Vitals and nursing note reviewed.   Constitutional:       General: She is not in acute distress.     Appearance: Normal appearance. She is well-developed.   Cardiovascular:      Rate and Rhythm: Normal rate and  regular rhythm.      Heart sounds: Normal heart sounds.   Pulmonary:      Effort: Pulmonary effort is normal.      Breath sounds: Normal breath sounds.   Neurological:      Mental Status: She is alert and oriented to person, place, and time.   Psychiatric:         Mood and Affect: Mood normal.         Behavior: Behavior normal.         Thought Content: Thought content normal.         Judgment: Judgment normal.       Assessment & Plan   Diagnoses and all orders for this visit:    1. Type 2 diabetes mellitus with diabetic neuropathy, without long-term current use of insulin (Primary)  -     empagliflozin (Jardiance) 25 MG tablet tablet; Take 1 tablet by mouth Daily.  Dispense: 90 tablet; Refill: 1  -     CBC & Differential  -     Comprehensive Metabolic Panel  -     Hemoglobin A1c  -     Semaglutide, 2 MG/DOSE, (Ozempic, 2 MG/DOSE,) 8 MG/3ML solution pen-injector; Inject 2 mg under the skin into the appropriate area as directed 1 (One) Time Per Week.  Dispense: 3 mL; Refill: 3    2. Need for influenza vaccination  -     Fluzone High-Dose 65+yrs (3516-6955)    3. Primary hypertension    4. Hypercholesteremia    5. Coronary artery disease involving native coronary artery of native heart with angina pectoris      Continue ozempic and increase to 2 mg, no change in jardiance and recheck labs  BP doing well, continue meds  Will recheck lpids in future when fasting, continue statinj  We are going to increase lantus form 50 to 55 units and continue other meds

## 2024-10-19 LAB
ALBUMIN SERPL-MCNC: 4.5 G/DL (ref 3.9–4.9)
ALP SERPL-CCNC: 103 IU/L (ref 44–121)
ALT SERPL-CCNC: 18 IU/L (ref 0–32)
AST SERPL-CCNC: 18 IU/L (ref 0–40)
BASOPHILS # BLD AUTO: 0 X10E3/UL (ref 0–0.2)
BASOPHILS NFR BLD AUTO: 1 %
BILIRUB SERPL-MCNC: 0.5 MG/DL (ref 0–1.2)
BUN SERPL-MCNC: 21 MG/DL (ref 8–27)
BUN/CREAT SERPL: 26 (ref 12–28)
CALCIUM SERPL-MCNC: 9.8 MG/DL (ref 8.7–10.3)
CHLORIDE SERPL-SCNC: 102 MMOL/L (ref 96–106)
CO2 SERPL-SCNC: 23 MMOL/L (ref 20–29)
CREAT SERPL-MCNC: 0.82 MG/DL (ref 0.57–1)
EGFRCR SERPLBLD CKD-EPI 2021: 79 ML/MIN/1.73
EOSINOPHIL # BLD AUTO: 0.1 X10E3/UL (ref 0–0.4)
EOSINOPHIL NFR BLD AUTO: 3 %
ERYTHROCYTE [DISTWIDTH] IN BLOOD BY AUTOMATED COUNT: 15.4 % (ref 11.7–15.4)
GLOBULIN SER CALC-MCNC: 2.2 G/DL (ref 1.5–4.5)
GLUCOSE SERPL-MCNC: 158 MG/DL (ref 70–99)
HBA1C MFR BLD: 7.1 % (ref 4.8–5.6)
HCT VFR BLD AUTO: 42.5 % (ref 34–46.6)
HGB BLD-MCNC: 13.8 G/DL (ref 11.1–15.9)
IMM GRANULOCYTES # BLD AUTO: 0 X10E3/UL (ref 0–0.1)
IMM GRANULOCYTES NFR BLD AUTO: 1 %
LYMPHOCYTES # BLD AUTO: 1.1 X10E3/UL (ref 0.7–3.1)
LYMPHOCYTES NFR BLD AUTO: 25 %
MCH RBC QN AUTO: 32.3 PG (ref 26.6–33)
MCHC RBC AUTO-ENTMCNC: 32.5 G/DL (ref 31.5–35.7)
MCV RBC AUTO: 100 FL (ref 79–97)
MONOCYTES # BLD AUTO: 0.6 X10E3/UL (ref 0.1–0.9)
MONOCYTES NFR BLD AUTO: 13 %
NEUTROPHILS # BLD AUTO: 2.6 X10E3/UL (ref 1.4–7)
NEUTROPHILS NFR BLD AUTO: 57 %
PLATELET # BLD AUTO: 161 X10E3/UL (ref 150–450)
POTASSIUM SERPL-SCNC: 3.9 MMOL/L (ref 3.5–5.2)
PROT SERPL-MCNC: 6.7 G/DL (ref 6–8.5)
RBC # BLD AUTO: 4.27 X10E6/UL (ref 3.77–5.28)
SODIUM SERPL-SCNC: 141 MMOL/L (ref 134–144)
WBC # BLD AUTO: 4.4 X10E3/UL (ref 3.4–10.8)

## 2024-10-21 DIAGNOSIS — E11.40 TYPE 2 DIABETES MELLITUS WITH DIABETIC NEUROPATHY, WITHOUT LONG-TERM CURRENT USE OF INSULIN: ICD-10-CM

## 2024-10-21 NOTE — TELEPHONE ENCOUNTER
Caller: Lucia Stevens    Relationship: Self    Best call back number: [phone]     Requested Prescriptions:   Requested Prescriptions     Pending Prescriptions Disp Refills    Insulin Glargine (LANTUS SOLOSTAR) 100 UNIT/ML injection pen 35 mL 5     Sig: Inject 55 Units under the skin into the appropriate area as directed Every Morning.        Pharmacy where request should be sent: [id number] [place] [facility] [address] [phone] [facility] [phone]      Last office visit with prescribing clinician: 10/18/2024   Last telemedicine visit with prescribing clinician: Visit date not found   Next office visit with prescribing clinician: 1/13/2025     Additional details provided by patient: PATIENT STATES HER PHARMACY ONLY HAS A PRESCRIPTION FOR 50 UNITS DAILY SO SHE WOULD LIKE TO KNOW IF THIS NEW PRESCRIPTION FOR 55 UNITS CAN BE CALLED IN.     Does the patient have less than a 3 day supply:  [] Yes  [x] No    Would you like a call back once the refill request has been completed: [] Yes [x] No    If the office needs to give you a call back, can they leave a voicemail: [] Yes [x] No    Cadance Dunaway, RegSched Rep   10/21/24 09:55 EDT

## 2024-10-25 ENCOUNTER — SPECIALTY PHARMACY (OUTPATIENT)
Dept: PHARMACY | Facility: HOSPITAL | Age: 64
End: 2024-10-25
Payer: MEDICARE

## 2024-10-25 NOTE — PROGRESS NOTES
Specialty Pharmacy Refill Coordination Note     Lucia is a 64 y.o. female contacted today regarding refills of  Actemra Pen specialty medication(s).    Reviewed and verified with patient:     YES  Specialty medication(s) and dose(s) confirmed: yes    Refill Questions      Flowsheet Row Most Recent Value   Changes to allergies? No   Changes to medications? No   New conditions or infections since last clinic visit No   Unplanned office visit, urgent care, ED, or hospital admission in the last 4 weeks  No   How does patient/caregiver feel medication is working? Good   Financial problems or insurance changes  No   Since the previous refill, were any specialty medication doses or scheduled injections missed or delayed?  No   Does this patient require a clinical escalation to a pharmacist? No            Delivery Questions      Flowsheet Row Most Recent Value   Delivery method UPS   Delivery address verified with patient/caregiver? Yes   Delivery address Home   Number of medications in delivery 1   Medication(s) being filled and delivered Tocilizumab (Actemra ACTPen)   Doses left of specialty medications 1   Copay verified? Yes   Copay amount 0   Copay form of payment No copayment ($0)   Ship Date 10/28   Delivery Date 10/29/2024   Signature Required No                   Follow-up: 21 day(s)     Shavon Sampson, Pharmacy Technician  Specialty Pharmacy Technician

## 2024-11-03 DIAGNOSIS — E87.6 HYPOKALEMIA: ICD-10-CM

## 2024-11-05 ENCOUNTER — OFFICE VISIT (OUTPATIENT)
Dept: NEUROLOGY | Facility: CLINIC | Age: 64
End: 2024-11-05
Payer: MEDICARE

## 2024-11-05 VITALS
DIASTOLIC BLOOD PRESSURE: 82 MMHG | BODY MASS INDEX: 35.34 KG/M2 | SYSTOLIC BLOOD PRESSURE: 124 MMHG | OXYGEN SATURATION: 94 % | HEART RATE: 64 BPM | HEIGHT: 64 IN

## 2024-11-05 DIAGNOSIS — E11.42 DIABETIC POLYNEUROPATHY ASSOCIATED WITH TYPE 2 DIABETES MELLITUS: Primary | ICD-10-CM

## 2024-11-05 DIAGNOSIS — R11.15 CYCLICAL VOMITING SYNDROME: ICD-10-CM

## 2024-11-05 PROCEDURE — 3079F DIAST BP 80-89 MM HG: CPT | Performed by: PSYCHIATRY & NEUROLOGY

## 2024-11-05 PROCEDURE — 99214 OFFICE O/P EST MOD 30 MIN: CPT | Performed by: PSYCHIATRY & NEUROLOGY

## 2024-11-05 PROCEDURE — 1159F MED LIST DOCD IN RCRD: CPT | Performed by: PSYCHIATRY & NEUROLOGY

## 2024-11-05 PROCEDURE — 1160F RVW MEDS BY RX/DR IN RCRD: CPT | Performed by: PSYCHIATRY & NEUROLOGY

## 2024-11-05 PROCEDURE — 3074F SYST BP LT 130 MM HG: CPT | Performed by: PSYCHIATRY & NEUROLOGY

## 2024-11-05 RX ORDER — FUROSEMIDE 40 MG/1
TABLET ORAL
Qty: 30 TABLET | Refills: 0 | Status: SHIPPED | OUTPATIENT
Start: 2024-11-05

## 2024-11-05 RX ORDER — TOPIRAMATE 25 MG/1
25 TABLET, FILM COATED ORAL DAILY
Qty: 90 TABLET | Refills: 1 | Status: SHIPPED | OUTPATIENT
Start: 2024-11-05 | End: 2025-02-03

## 2024-11-05 RX ORDER — POTASSIUM CHLORIDE 750 MG/1
10 CAPSULE, EXTENDED RELEASE ORAL DAILY
Qty: 90 CAPSULE | Refills: 0 | Status: SHIPPED | OUTPATIENT
Start: 2024-11-05

## 2024-11-05 NOTE — PROGRESS NOTES
Subjective:    CC: Lucia Stevens is in clinic today for follow up for history of diabetic polyneuropathy and cyclical vomiting syndrome.    HPI:  Initial visit: 1/17/2024: Patient is a 63-year-old female with past medical history of type 2 diabetes, hypertension, hyperlipidemia, ischemic heart disease referred to the clinic to establish care for diabetic polyneuropathy.  She reports that she started experiencing symptoms of pain, tingling, numbness and burning in both her feet extending up to the ankles and lower aspect of legs bilaterally about 1 and half years ago.  She reports that symptoms continue to become somewhat worse slowly over the period of time.  Because of this she underwent EMG/nerve conduction study.  I reviewed the results personally which showed moderate axonal polyneuropathy.  She was then started on Lyrica 100 mg 3 times a day about 6 months ago and reports 70 to 80% reduction in her symptoms of neuropathy.  She denies any symptoms in her hands.  Denies any problems with walking, gait or balance currently.  She is trying to keep her blood sugars under good control and last A1c was 7.1.  She denies any side effects with Lyrica use.    5/8/2024: She is in clinic for sooner than her scheduled appointment as after her last visit with me in January 2024, she reports having to intractable episodes of vomiting.  Back in 2019, had started her on amitriptyline 25 mg at bedtime and it had helped her tremendously in bringing the symptoms of cyclical vomiting under control.  She is interested in retrying it and see if it helps again.  She has a detailed GI workup in the past which did not reveal any abnormalities.  As far as diabetic neuropathy symptoms are concerned, they are under control with use of Lyrica 100 mg 3 times daily and Cymbalta 60 mg daily.    11/5/2024: She is in clinic for regular follow-up.  Since her last visit in May 2024, she was restarted on amitriptyline but it caused her to have  significant side effects her amitriptyline was stopped and instead was started on Topamax.  She reports that with 50 mg daily dose, she had side effects with it with 25 mg daily dose, she has had excellent response and has had only 1 abdominal migraine.  She has tried rizatriptan as an abortive treatment and it has helped but it caused her to have side effects.  She is also reporting worsening in neuropathy symptoms primarily involving the left great toe.  Other than that, she is using Lyrica 100 mg 3 times daily along with Cymbalta 60 mg daily and seems to be helping.    The following portions of the patient's history were reviewed and updated as of 11/05/2024: allergies, social history, and problem list.       Current Outpatient Medications:     allopurinol (ZYLOPRIM) 100 MG tablet, Take 2 tablets by mouth once daily, Disp: 180 tablet, Rfl: 0    aspirin 81 MG EC tablet, Take 1 tablet by mouth Daily., Disp: , Rfl:     bisoprolol (ZEBeta) 5 MG tablet, TAKE 1/2 TABLET EVERY DAY, Disp: 45 tablet, Rfl: 1    Blood Glucose Monitoring Suppl (True Metrix Air Glucose Meter) w/Device kit, , Disp: , Rfl:     clotrimazole (LOTRIMIN) 1 % cream, Apply 1 Application topically to the appropriate area as directed Every 12 (Twelve) Hours., Disp: 30 g, Rfl: 1    Continuous Glucose  (FreeStyle Zafar 3 Campbell) device, Use 1 each Daily., Disp: 1 each, Rfl: 1    Continuous Glucose Sensor (FreeStyle Zafar 3 Sensor) misc, Use 1 each Every 14 (Fourteen) Days., Disp: 6 each, Rfl: 3    Continuous Glucose Sensor (FreeStyle Zafar 3 Sensor) misc, Use 1 each Every 14 (Fourteen) Days., Disp: 2 each, Rfl: 5    DULoxetine (CYMBALTA) 60 MG capsule, Take 1 capsule by mouth Daily., Disp: 90 capsule, Rfl: 1    empagliflozin (Jardiance) 25 MG tablet tablet, Take 1 tablet by mouth Daily., Disp: 90 tablet, Rfl: 1    folic acid (FOLVITE) 1 MG tablet, Take 1 tablet by mouth Daily., Disp: 90 tablet, Rfl: 1    furosemide (LASIX) 40 MG tablet, TAKE 1  TABLET BY MOUTH IN THE MORNING, Disp: 30 tablet, Rfl: 0    Insulin Glargine (LANTUS SOLOSTAR) 100 UNIT/ML injection pen, Inject 55 Units under the skin into the appropriate area as directed Every Morning., Disp: 35 mL, Rfl: 5    Insulin Pen Needle (Pen Needles) 31G X 6 MM misc, 1 daily for lantus, Disp: 100 each, Rfl: 5    metFORMIN (GLUCOPHAGE) 850 MG tablet, Take 1 tablet by mouth Daily With Breakfast., Disp: 180 tablet, Rfl: 1    methotrexate 2.5 MG tablet, Take 8 tablets by mouth 1 (One) Time Per Week., Disp: 40 tablet, Rfl: 3    midodrine (PROAMATINE) 5 MG tablet, Take 1 tablet by mouth Take As Directed. Take 2 tablet by oral route 2 times every day, Disp: 180 tablet, Rfl: 1    nitroglycerin (NITROSTAT) 0.4 MG SL tablet, 1 under the tongue as needed for angina, may repeat q5mins for up three doses, Disp: 25 tablet, Rfl: 3    nystatin (MYCOSTATIN) [id number] UNIT/GM ointment, Apply 1 Application topically to the appropriate area as directed 2 (Two) Times a Day., Disp: 90 g, Rfl: 2    omeprazole (priLOSEC) 40 MG capsule, Take 1 capsule by mouth Every Morning., Disp: , Rfl:     ondansetron (ZOFRAN) 4 MG tablet, Take 1 tablet by mouth Every 8 (Eight) Hours As Needed for Nausea or Vomiting., Disp: 20 tablet, Rfl: 1    potassium chloride (MICRO-K) 10 MEQ CR capsule, Take 1 capsule by mouth once daily, Disp: 90 capsule, Rfl: 0    predniSONE (DELTASONE) 5 MG tablet, Take 1 tablet by mouth Daily., Disp: 30 tablet, Rfl: 3    pregabalin (LYRICA) 100 MG capsule, TAKE 1 CAPSULE BY MOUTH THREE TIMES DAILY, Disp: 90 capsule, Rfl: 4    rizatriptan (MAXALT) 10 MG tablet, Take 1 tablet by mouth 1 (One) Time As Needed for Migraine. May repeat in 2 hours if needed, Disp: 9 tablet, Rfl: 5    rosuvastatin (CRESTOR) 20 MG tablet, Take 1 tablet by mouth Daily., Disp: 90 tablet, Rfl: 1    Semaglutide, 2 MG/DOSE, (Ozempic, 2 MG/DOSE,) 8 MG/3ML solution pen-injector, Inject 2 mg under the skin into the appropriate area as directed 1 (One)  Time Per Week., Disp: 3 mL, Rfl: 3    spironolactone (ALDACTONE) 25 MG tablet, Take 1 tablet by mouth once daily, Disp: 90 tablet, Rfl: 0    Tocilizumab (Actemra ACTPen) 162 MG/0.9ML solution auto-injector injection, Inject 0.9 mL under the skin into the appropriate area as directed Every 14 (Fourteen) Days., Disp: 1.8 mL, Rfl: 2    topiramate (Topamax) 25 MG tablet, Take 1 tablet by mouth Daily for 90 days., Disp: 90 tablet, Rfl: 1    True Metrix Blood Glucose Test test strip, , Disp: , Rfl:     Vascepa 1 g capsule capsule, TAKE 2 CAPSULES BY MOUTH TWICE DAILY WITH MEALS, Disp: 360 capsule, Rfl: 0    Vibegron 75 MG tablet, Take 1 tablet by mouth Daily., Disp: 30 tablet, Rfl: 5   Past Medical History:   Diagnosis Date    Abdominal migraine, not intractable 08/12/2024    CHF (congestive heart failure)     Coronary artery disease     2 stents    Depression     Gout     Hypercholesterolemia     Hypertension     Neuropathy     Psoriasis     Rheumatoid arthritis     Scleroderma     Sjoegren syndrome     SOB (shortness of breath) on exertion     Tobacco abuse 2011    cessation     Type 2 diabetes mellitus with diabetic neuropathy, without long-term current use of insulin 01/17/2024    Wears dentures     full set    Wears glasses       Past Surgical History:   Procedure Laterality Date    APPENDECTOMY      CARDIAC CATHETERIZATION      CARDIAC CATHETERIZATION N/A 11/30/2017    Procedure: Left Heart Cath;  Surgeon: Galina Leonard MD;  Location:  [facility] CATH INVASIVE LOCATION;  Service:     CARDIAC ELECTROPHYSIOLOGY PROCEDURE N/A 03/28/2018    Procedure: Device Implant BiV ICD;  Surgeon: Sarbjit Ellison DO;  Location:  [facility];  Service: Cardiovascular    CERVICAL DISCECTOMY ANTERIOR      CHOLECYSTECTOMY      COLONOSCOPY  09/2022    CORONARY ANGIOPLASTY      CORONARY ANGIOPLASTY WITH STENT PLACEMENT      Percutaneous transluminal balloon angioplasty with insertion of stent into coronary artery    CORONARY  "ANGIOPLASTY WITH STENT PLACEMENT      CORONARY STENT PLACEMENT      D & C HYSTEROSCOPY N/A 10/06/2022    Procedure: DILATATION AND CURETTAGE HYSTEROSCOPY;  Surgeon: Davide Durbin MD;  Location: [facility];  Service: Obstetrics/Gynecology;  Laterality: N/A;    ENDOSCOPY  09/2022    OTHER SURGICAL HISTORY      growth removed from small intestine as a child    POLYPECTOMY      Colon polyps status    RECTOVAGINAL FISTULA REPAIR      TONSILLECTOMY      TUBAL ABDOMINAL LIGATION  1998?    Only 1    WISDOM TOOTH EXTRACTION        Family History   Problem Relation Age of Onset    Heart disease Mother     Osteoporosis Mother     Hypertension Mother     Cancer Mother         bladder    Heart attack Father     Colon cancer Father     Prostate cancer Father     Coronary artery disease Father     Diabetes Father     Cancer Father     Arthritis Father     Heart disease Father         heart problems    No Known Problems Brother     Cancer Maternal Grandmother     Brain cancer Maternal Grandmother     Stomach cancer Maternal Grandfather     Heart failure Paternal Grandmother     Breast cancer Paternal Grandmother     Heart attack Paternal Grandfather         Review of Systems  Objective:    /82   Pulse 64   Ht 162.6 cm (64.02\")   SpO2 94%   BMI 35.34 kg/m²     Neurology Exam:  General apperance: NAD.     Mental status: Alert, awake and oriented to time place and person.    Language and Speech: No aphasia or dysarthria.    CN II to XII: Intact.    Opthalmoscopic Exam: No papilledema.    Motor:  Right UE muscle strength 5/5. Normal tone.     Left UE muscle strength 5/5. Normal tone.      Right LE muscle strength 5/5. Normal tone.     Left LE muscle strength 5/5. Normal tone.      Sensory: Normal light touch, vibration and pinprick sensation bilaterally.    DTRs: 1+ bilaterally.    Babinski: Negative bilaterally.    Co-ordination: Normal finger-to-nose, heel to shin B/L.    Rhomberg: Negative.    Gait: " Normal.    Cardiovascular: Regular rate and rhythm without murmur, gallop or rub.    Assessment and Plan:  1. Diabetic polyneuropathy associated with type 2 diabetes mellitus  2. Cyclical vomiting syndrome  She has responded very well to low-dose Topamax 25 mg daily and has helped with her abdominal migraines.  I have given her samples of Ubrelvy to try and see if it works better than Maxalt as an abortive treatment.  For left great toe pain, I will be prescribing her a compounded cream to be applied locally.  Continue with Cymbalta 60 mg daily and Lyrica 100 mg 3 times daily for neuropathy symptoms and I will see her back in clinic in 6 months for follow-up.       I spent 30 minutes in patient care: Reviewing records prior to the visit, entering orders and documentation and spent more than caruso 50% of this time face-to-face in management, instructions and education regarding above mentioned diagnosis and also on counseling and discussing about taking medication regularly, possible side effects with medication use, importance of good sleep hygiene, good hydration and regular exercise.    Return in about 6 months (around 5/5/2025).       Note to patient: The 21st Century Cures Act makes medical notes like these available to patients in the interest of transparency. However, be advised this is a medical document. It is intended as peer to peer communication. It is written in medical language and may contain abbreviations or verbiage that are unfamiliar. It may appear blunt or direct. Medical documents are intended to carry relevant information, facts as evident, and the clinical opinion of the physician.

## 2024-11-06 ENCOUNTER — OFFICE VISIT (OUTPATIENT)
Dept: FAMILY MEDICINE CLINIC | Facility: CLINIC | Age: 64
End: 2024-11-06
Payer: MEDICARE

## 2024-11-06 ENCOUNTER — TELEPHONE (OUTPATIENT)
Dept: NEUROLOGY | Facility: CLINIC | Age: 64
End: 2024-11-06
Payer: MEDICARE

## 2024-11-06 ENCOUNTER — TELEPHONE (OUTPATIENT)
Age: 64
End: 2024-11-06
Payer: MEDICARE

## 2024-11-06 VITALS
TEMPERATURE: 97.8 F | HEART RATE: 85 BPM | DIASTOLIC BLOOD PRESSURE: 68 MMHG | HEIGHT: 64 IN | RESPIRATION RATE: 16 BRPM | SYSTOLIC BLOOD PRESSURE: 136 MMHG | BODY MASS INDEX: 35.68 KG/M2 | WEIGHT: 209 LBS

## 2024-11-06 DIAGNOSIS — M54.9 MID BACK PAIN ON LEFT SIDE: Primary | ICD-10-CM

## 2024-11-06 DIAGNOSIS — M94.0 COSTOCHONDRITIS: ICD-10-CM

## 2024-11-06 PROCEDURE — 1160F RVW MEDS BY RX/DR IN RCRD: CPT | Performed by: FAMILY MEDICINE

## 2024-11-06 PROCEDURE — 3078F DIAST BP <80 MM HG: CPT | Performed by: FAMILY MEDICINE

## 2024-11-06 PROCEDURE — G2211 COMPLEX E/M VISIT ADD ON: HCPCS | Performed by: FAMILY MEDICINE

## 2024-11-06 PROCEDURE — 3075F SYST BP GE 130 - 139MM HG: CPT | Performed by: FAMILY MEDICINE

## 2024-11-06 PROCEDURE — 1159F MED LIST DOCD IN RCRD: CPT | Performed by: FAMILY MEDICINE

## 2024-11-06 PROCEDURE — 1125F AMNT PAIN NOTED PAIN PRSNT: CPT | Performed by: FAMILY MEDICINE

## 2024-11-06 PROCEDURE — 3051F HG A1C>EQUAL 7.0%<8.0%: CPT | Performed by: FAMILY MEDICINE

## 2024-11-06 PROCEDURE — 99213 OFFICE O/P EST LOW 20 MIN: CPT | Performed by: FAMILY MEDICINE

## 2024-11-06 RX ORDER — ETODOLAC 400 MG/1
400 TABLET, FILM COATED ORAL 2 TIMES DAILY
Qty: 30 TABLET | Refills: 0 | Status: SHIPPED | OUTPATIENT
Start: 2024-11-06

## 2024-11-06 RX ORDER — NEOMYCIN SULFATE, POLYMYXIN B SULFATE, AND DEXAMETHASONE 3.5; 10000; 1 MG/G; [USP'U]/G; MG/G
OINTMENT OPHTHALMIC
COMMUNITY
Start: 2024-10-28 | End: 2024-11-06

## 2024-11-06 NOTE — TELEPHONE ENCOUNTER
Provider: DR PINEDA    Caller: AMY WITH [facility]    Phone Number: [phone]    Reason for Call: STATES THEY GOT A FAX FOR A GABAPENTIN PAIN CREAM THAT WAS NOT SIGNED OFF ON. JUST NEED A VERBAL AUTH TO FILL MEDICATION. PLEASE REVIEW & ADVISE, THANK YOU.

## 2024-11-06 NOTE — PROGRESS NOTES
Subjective   Lucia Stevens is a 64 y.o. female.     Back Pain       She has had some mid back pain the past several weeks  This is left rib cage  Pain is worse with breathing  No known injury to this area        The following portions of the patient's history were reviewed and updated as appropriate: allergies, current medications, past family history, past medical history, past social history, past surgical history, and problem list.    Review of Systems   Musculoskeletal:  Positive for back pain.       Objective   Physical Exam  Vitals and nursing note reviewed.   Constitutional:       General: She is not in acute distress.     Appearance: Normal appearance. She is well-developed.   Cardiovascular:      Rate and Rhythm: Normal rate and regular rhythm.      Heart sounds: Normal heart sounds.   Pulmonary:      Effort: Pulmonary effort is normal.      Breath sounds: Normal breath sounds.   Musculoskeletal:        Back:    Neurological:      Mental Status: She is alert and oriented to person, place, and time.   Psychiatric:         Mood and Affect: Mood normal.         Behavior: Behavior normal.         Thought Content: Thought content normal.         Judgment: Judgment normal.       Assessment & Plan   Diagnoses and all orders for this visit:    1. Mid back pain on left side (Primary)  -     tiZANidine (ZANAFLEX) 4 MG tablet; Take 1 tablet by mouth At Night As Needed for Muscle Spasms.  Dispense: 20 tablet; Refill: 0  -     etodolac (LODINE) 400 MG tablet; Take 1 tablet by mouth 2 (Two) Times a Day.  Dispense: 30 tablet; Refill: 0    2. Costochondritis  -     tiZANidine (ZANAFLEX) 4 MG tablet; Take 1 tablet by mouth At Night As Needed for Muscle Spasms.  Dispense: 20 tablet; Refill: 0  -     etodolac (LODINE) 400 MG tablet; Take 1 tablet by mouth 2 (Two) Times a Day.  Dispense: 30 tablet; Refill: 0    I truly think this is rib cage pain.  No indicaiton for imaging at this time but she will call back INB  Ok muscle  relaxer and NSAID BID.  Discussed this type of injury does take weeks to heal,  hug herself or hug a pillow with cough/sneeze,/etc.

## 2024-11-06 NOTE — TELEPHONE ENCOUNTER
----- Message from Cristiana COTE sent at 11/5/2024  4:22 PM EST -----  Pt was supposed to follow up with NP, was put on [facility] schedule.    Cristiana

## 2024-11-14 ENCOUNTER — TELEPHONE (OUTPATIENT)
Dept: NEUROLOGY | Facility: CLINIC | Age: 64
End: 2024-11-14

## 2024-11-14 NOTE — TELEPHONE ENCOUNTER
Provider: EDWIN    Caller: Lucia Stevens    Relationship to Patient: Self    Phone Number: [phone]    Reason for Call: PATIENT EXPERIENCED AN ABDOMINAL MIGRAINE ON TUESDAY. PATIENT IS REQUESTING A CALL BACK TO DISCUSS SUGGESTIONS. PATIENT WAS UNABLE TO TAKE NEW MEDICATION IN TIME DUE TO IT BEING OUT OF REACH. PATIENT WANTS TO CONFIRM IF ITS OKAY TO TAKE NOW.

## 2024-11-19 ENCOUNTER — SPECIALTY PHARMACY (OUTPATIENT)
Age: 64
End: 2024-11-19
Payer: MEDICARE

## 2024-11-19 ENCOUNTER — SPECIALTY PHARMACY (OUTPATIENT)
Dept: GENERAL RADIOLOGY | Facility: HOSPITAL | Age: 64
End: 2024-11-19
Payer: MEDICARE

## 2024-11-19 RX ORDER — TOCILIZUMAB 180 MG/ML
162 INJECTION, SOLUTION SUBCUTANEOUS
Qty: 1.8 ML | Refills: 5 | Status: SHIPPED | OUTPATIENT
Start: 2024-11-19 | End: 2024-11-19 | Stop reason: SDUPTHER

## 2024-11-19 RX ORDER — TOCILIZUMAB 180 MG/ML
162 INJECTION, SOLUTION SUBCUTANEOUS
Qty: 1.8 ML | Refills: 5 | Status: SHIPPED | OUTPATIENT
Start: 2024-11-19

## 2024-11-19 NOTE — PROGRESS NOTES
Specialty Pharmacy Refill Coordination Note     Lucia is a 64 y.o. female contacted today regarding refills of  Actemra specialty medication(s).    Reviewed and verified with patient:  Meds       Specialty medication(s) and dose(s) confirmed: yes    Refill Questions      Flowsheet Row Most Recent Value   Changes to allergies? No   Changes to medications? No   New conditions or infections since last clinic visit No   Unplanned office visit, urgent care, ED, or hospital admission in the last 4 weeks  No   How does patient/caregiver feel medication is working? Good   Financial problems or insurance changes  No   Since the previous refill, were any specialty medication doses or scheduled injections missed or delayed?  No   Does this patient require a clinical escalation to a pharmacist? No            Delivery Questions      Flowsheet Row Most Recent Value   Delivery method UPS   Delivery address verified with patient/caregiver? Yes   Delivery address Home   Medication(s) being filled and delivered Tocilizumab (Actemra ACTPen)   Copay verified? Yes   Copay amount 0   Copay form of payment No copayment ($0)   Ship Date 11/19   Delivery Date 11/20   Signature Required No                   Follow-up: 21 day(s)     Shavon Sampson, Pharmacy Technician  Specialty Pharmacy Technician

## 2024-11-19 NOTE — TELEPHONE ENCOUNTER
Specialty Pharmacy Patient Management Program  Per Protocol Prescription Order/Refill     Patient currently fills medications at [facility] and is enrolled in an Rheumatology Patient Management Program.     Requested Prescriptions     Pending Prescriptions Disp Refills    Tocilizumab (Actemra ACTPen) 162 MG/0.9ML solution auto-injector injection 1.8 mL 5     Sig: Inject 0.9 mL under the skin into the appropriate area as directed Every 14 (Fourteen) Days.     Prescription orders above were sent to the pharmacy per Collaborative Care Agreement Protocol.     Mark MckinneyD, BCPS  Clinical Specialty Pharmacist, Rheumatology   11/19/2024  08:40 EST

## 2024-12-02 ENCOUNTER — OFFICE VISIT (OUTPATIENT)
Dept: FAMILY MEDICINE CLINIC | Facility: CLINIC | Age: 64
End: 2024-12-02
Payer: MEDICARE

## 2024-12-02 VITALS
WEIGHT: 203 LBS | DIASTOLIC BLOOD PRESSURE: 62 MMHG | HEART RATE: 86 BPM | TEMPERATURE: 97.8 F | HEIGHT: 64 IN | SYSTOLIC BLOOD PRESSURE: 110 MMHG | BODY MASS INDEX: 34.66 KG/M2 | RESPIRATION RATE: 16 BRPM

## 2024-12-02 DIAGNOSIS — J40 BRONCHITIS: ICD-10-CM

## 2024-12-02 DIAGNOSIS — E87.6 HYPOKALEMIA: ICD-10-CM

## 2024-12-02 DIAGNOSIS — J01.00 ACUTE NON-RECURRENT MAXILLARY SINUSITIS: Primary | ICD-10-CM

## 2024-12-02 DIAGNOSIS — E11.40 TYPE 2 DIABETES MELLITUS WITH DIABETIC NEUROPATHY, WITHOUT LONG-TERM CURRENT USE OF INSULIN: ICD-10-CM

## 2024-12-02 PROCEDURE — 3051F HG A1C>EQUAL 7.0%<8.0%: CPT | Performed by: FAMILY MEDICINE

## 2024-12-02 PROCEDURE — 1125F AMNT PAIN NOTED PAIN PRSNT: CPT | Performed by: FAMILY MEDICINE

## 2024-12-02 PROCEDURE — 99214 OFFICE O/P EST MOD 30 MIN: CPT | Performed by: FAMILY MEDICINE

## 2024-12-02 PROCEDURE — 3078F DIAST BP <80 MM HG: CPT | Performed by: FAMILY MEDICINE

## 2024-12-02 PROCEDURE — 3074F SYST BP LT 130 MM HG: CPT | Performed by: FAMILY MEDICINE

## 2024-12-02 RX ORDER — GLIMEPIRIDE 2 MG/1
2 TABLET ORAL DAILY
Qty: 90 TABLET | Refills: 1 | Status: SHIPPED | OUTPATIENT
Start: 2024-12-02

## 2024-12-02 RX ORDER — GLIMEPIRIDE 2 MG/1
2 TABLET ORAL DAILY
COMMUNITY
Start: 2024-11-08 | End: 2024-12-02 | Stop reason: SDUPTHER

## 2024-12-02 RX ORDER — DOXYCYCLINE 100 MG/1
100 TABLET ORAL 2 TIMES DAILY
Qty: 20 TABLET | Refills: 0 | Status: SHIPPED | OUTPATIENT
Start: 2024-12-02 | End: 2024-12-12

## 2024-12-02 NOTE — PROGRESS NOTES
Subjective   Lucia Stevens is a 64 y.o. female.     History of Present Illness     She has had a couple episodes of extreme N/V  She went to ER last week  Was dehydrated and had low K, they replaced this in ER  She has not had any recent N/V/D      Now she has congestion and sinus pressure  Bad cough as well  Mucous is discolored the past several days    She has noted that when we increased her lantus from 50 to 55 she had more nausea  She is now taking 1 amaryl at this time      The following portions of the patient's history were reviewed and updated as appropriate: allergies, current medications, past family history, past medical history, past social history, past surgical history, and problem list.    Review of Systems   Constitutional:  Negative for fever.   HENT:  Positive for congestion.    Respiratory:  Positive for cough.        Objective   Physical Exam  Vitals and nursing note reviewed.   Constitutional:       Appearance: She is well-developed.   HENT:      Head: Normocephalic and atraumatic.      Right Ear: Hearing, tympanic membrane, ear canal and external ear normal.      Left Ear: Hearing, tympanic membrane, ear canal and external ear normal.      Nose: Nose normal.      Mouth/Throat:      Pharynx: Uvula midline.   Eyes:      Conjunctiva/sclera: Conjunctivae normal.   Cardiovascular:      Rate and Rhythm: Normal rate and regular rhythm.      Heart sounds: Normal heart sounds.   Pulmonary:      Effort: Pulmonary effort is normal.      Breath sounds: Rhonchi present.   Musculoskeletal:      Cervical back: Normal range of motion.   Lymphadenopathy:      Cervical: No cervical adenopathy.   Psychiatric:         Behavior: Behavior normal.       Assessment & Plan   Diagnoses and all orders for this visit:    1. Acute non-recurrent maxillary sinusitis (Primary)  -     doxycycline (ADOXA) 100 MG tablet; Take 1 tablet by mouth 2 (Two) Times a Day for 10 days.  Dispense: 20 tablet; Refill: 0  -     CBC &  Differential    2. Bronchitis  -     doxycycline (ADOXA) 100 MG tablet; Take 1 tablet by mouth 2 (Two) Times a Day for 10 days.  Dispense: 20 tablet; Refill: 0  -     CBC & Differential    3. Hypokalemia  -     Comprehensive Metabolic Panel    4. Type 2 diabetes mellitus with diabetic neuropathy, without long-term current use of insulin  -     CBC & Differential  -     Comprehensive Metabolic Panel  -     glimepiride (AMARYL) 2 MG tablet; Take 1 tablet by mouth Daily.  Dispense: 90 tablet; Refill: 1    Doxy for sinus infection.  Pt to call back INB  Will recheck potassium.  I discussed this was low due to significant N/V/D she had prior to ER visit and had been normal in past.  Will f/u pending labs  She has been doing better with DM and taking just one amaryl.  She could not tolerate increase in lantus

## 2024-12-03 LAB
ALBUMIN SERPL-MCNC: 3.9 G/DL (ref 3.9–4.9)
ALP SERPL-CCNC: 108 IU/L (ref 44–121)
ALT SERPL-CCNC: 17 IU/L (ref 0–32)
AST SERPL-CCNC: 21 IU/L (ref 0–40)
BASOPHILS # BLD AUTO: 0.1 X10E3/UL (ref 0–0.2)
BASOPHILS NFR BLD AUTO: 1 %
BILIRUB SERPL-MCNC: 0.4 MG/DL (ref 0–1.2)
BUN SERPL-MCNC: 16 MG/DL (ref 8–27)
BUN/CREAT SERPL: 21 (ref 12–28)
CALCIUM SERPL-MCNC: 9.6 MG/DL (ref 8.7–10.3)
CHLORIDE SERPL-SCNC: 101 MMOL/L (ref 96–106)
CO2 SERPL-SCNC: 26 MMOL/L (ref 20–29)
CREAT SERPL-MCNC: 0.76 MG/DL (ref 0.57–1)
EGFRCR SERPLBLD CKD-EPI 2021: 87 ML/MIN/1.73
EOSINOPHIL # BLD AUTO: 0.2 X10E3/UL (ref 0–0.4)
EOSINOPHIL NFR BLD AUTO: 1 %
ERYTHROCYTE [DISTWIDTH] IN BLOOD BY AUTOMATED COUNT: 14.4 % (ref 11.7–15.4)
GLOBULIN SER CALC-MCNC: 2.8 G/DL (ref 1.5–4.5)
GLUCOSE SERPL-MCNC: 114 MG/DL (ref 70–99)
HCT VFR BLD AUTO: 40.7 % (ref 34–46.6)
HGB BLD-MCNC: 13.4 G/DL (ref 11.1–15.9)
IMM GRANULOCYTES # BLD AUTO: 0.3 X10E3/UL (ref 0–0.1)
IMM GRANULOCYTES NFR BLD AUTO: 1 %
LYMPHOCYTES # BLD AUTO: 1.4 X10E3/UL (ref 0.7–3.1)
LYMPHOCYTES NFR BLD AUTO: 8 %
MCH RBC QN AUTO: 32.8 PG (ref 26.6–33)
MCHC RBC AUTO-ENTMCNC: 32.9 G/DL (ref 31.5–35.7)
MCV RBC AUTO: 100 FL (ref 79–97)
MONOCYTES # BLD AUTO: 1.8 X10E3/UL (ref 0.1–0.9)
MONOCYTES NFR BLD AUTO: 10 %
NEUTROPHILS # BLD AUTO: 14.8 X10E3/UL (ref 1.4–7)
NEUTROPHILS NFR BLD AUTO: 79 %
PLATELET # BLD AUTO: 334 X10E3/UL (ref 150–450)
POTASSIUM SERPL-SCNC: 4.2 MMOL/L (ref 3.5–5.2)
PROT SERPL-MCNC: 6.7 G/DL (ref 6–8.5)
RBC # BLD AUTO: 4.08 X10E6/UL (ref 3.77–5.28)
SODIUM SERPL-SCNC: 143 MMOL/L (ref 134–144)
WBC # BLD AUTO: 18.6 X10E3/UL (ref 3.4–10.8)

## 2024-12-04 ENCOUNTER — TELEPHONE (OUTPATIENT)
Dept: FAMILY MEDICINE CLINIC | Facility: CLINIC | Age: 64
End: 2024-12-04
Payer: MEDICARE

## 2024-12-04 ENCOUNTER — TELEPHONE (OUTPATIENT)
Age: 64
End: 2024-12-04
Payer: MEDICARE

## 2024-12-04 RX ORDER — FLUCONAZOLE 200 MG/1
TABLET ORAL
Qty: 2 TABLET | Refills: 0 | Status: SHIPPED | OUTPATIENT
Start: 2024-12-04

## 2024-12-04 NOTE — TELEPHONE ENCOUNTER
Actemra can have a side effect of nasopharyngitis, yes, but it may just be that she has an upper respiratory infection.  Recommend holding the Actemra until she is feeling improved and then she can restart.  If symptoms are persistent she should see her PCP in the event that she needs an antibiotic.

## 2024-12-04 NOTE — TELEPHONE ENCOUNTER
Hub staff attempted to follow warm transfer process and was unsuccessful     Caller: Lucia Stevens    Relationship to patient: Self    Best call back number: [phone]    Patient is needing: PATIENT IS REQUESTING TO SPEAK TO SOMEONE REGARDING ACTEMRA INJECTION. SHE STATES SHE HAS A UPPER RESPIRATORY INFECTION AND ONE OF THE SIDE AFFECTS OF HER MEDICATION IS A UPPER RESPIRATORY INFECTION AND SHE IS WANTING TO KNOW IF THE MEDICATION IS CAUSING HER SYMPTOMS. PLEASE ADVISE.

## 2024-12-04 NOTE — TELEPHONE ENCOUNTER
Caller: Lucia Stevens    Relationship: Self    Best call back number: [phone]     What medication are you requesting: DIFLUCAN    What are your current symptoms: MOUTH IS SORE AND SWOLLEN    How long have you been experiencing symptoms: SINCE PATIENT STARTED TAKING ANTIBIOTICS    Have you had these symptoms before:    [x] Yes  [] No    Have you been treated for these symptoms before:   [x] Yes  [] No    If a prescription is needed, what is your preferred pharmacy and phone number:  [facility] - [place], [place] - [address] [phone] [facility] [phone]      Additional notes: PATIENT NEEDS THIS MEDICATION WHEN TAKING ANTIBIOTICS.

## 2024-12-05 NOTE — TELEPHONE ENCOUNTER
Called and spoke with pt, Pt states that she understood the response below from provider and would follow up with PCP is symptoms persist.

## 2024-12-09 RX ORDER — MIDODRINE HYDROCHLORIDE 5 MG/1
10 TABLET ORAL 2 TIMES DAILY
Qty: 180 TABLET | Refills: 0 | Status: SHIPPED | OUTPATIENT
Start: 2024-12-09

## 2024-12-12 ENCOUNTER — TELEPHONE (OUTPATIENT)
Dept: FAMILY MEDICINE CLINIC | Facility: CLINIC | Age: 64
End: 2024-12-12
Payer: MEDICARE

## 2024-12-12 ENCOUNTER — SPECIALTY PHARMACY (OUTPATIENT)
Age: 64
End: 2024-12-12
Payer: MEDICARE

## 2024-12-12 DIAGNOSIS — E11.40 TYPE 2 DIABETES MELLITUS WITH DIABETIC NEUROPATHY, WITHOUT LONG-TERM CURRENT USE OF INSULIN: Primary | ICD-10-CM

## 2024-12-12 NOTE — PROGRESS NOTES
Specialty Pharmacy Refill Coordination Note     Lucia is a 64 y.o. female contacted today regarding refills of  Actemra specialty medication(s).    Reviewed and verified with patient: Yes      Specialty medication(s) and dose(s) confirmed: yes    Refill Questions      Flowsheet Row Most Recent Value   Changes to allergies? No   Changes to medications? No   New conditions or infections since last clinic visit No   Unplanned office visit, urgent care, ED, or hospital admission in the last 4 weeks  No   How does patient/caregiver feel medication is working? Good   Financial problems or insurance changes  No   Since the previous refill, were any specialty medication doses or scheduled injections missed or delayed?  No   Does this patient require a clinical escalation to a pharmacist? No            Delivery Questions      Flowsheet Row Most Recent Value   Delivery method UPS   Delivery address verified with patient/caregiver? Yes   Delivery address Home   Number of medications in delivery 1   Medication(s) being filled and delivered Tocilizumab (Actemra ACTPen)   Doses left of specialty medications 1   Copay verified? Yes   Copay amount $0   Copay form of payment No copayment ($0)   Ship Date 12/12/24   Delivery Date 12/13/24   Signature Required No                   Follow-up: 21 day(s)     Lm Eduardo, Pharmacy Technician  Specialty Pharmacy Technician

## 2024-12-12 NOTE — TELEPHONE ENCOUNTER
Lucia called in wanting to know if Dr. Beckwith could place a referral for podiatry due to she is a diabetic. I did advise that she does have an upcoming apt in Jan 2025

## 2024-12-16 RX ORDER — FUROSEMIDE 40 MG/1
TABLET ORAL
Qty: 30 TABLET | Refills: 0 | Status: SHIPPED | OUTPATIENT
Start: 2024-12-16

## 2024-12-17 ENCOUNTER — OFFICE VISIT (OUTPATIENT)
Dept: UROLOGY | Facility: CLINIC | Age: 64
End: 2024-12-17
Payer: MEDICARE

## 2024-12-17 VITALS — BODY MASS INDEX: 34.66 KG/M2 | WEIGHT: 203 LBS | HEIGHT: 64 IN

## 2024-12-17 DIAGNOSIS — N39.0 RECURRENT UTI: ICD-10-CM

## 2024-12-17 DIAGNOSIS — N39.41 URGENCY INCONTINENCE: Primary | ICD-10-CM

## 2024-12-17 LAB
BILIRUB BLD-MCNC: NEGATIVE MG/DL
CLARITY, POC: CLEAR
COLOR UR: YELLOW
EXPIRATION DATE: ABNORMAL
GLUCOSE UR STRIP-MCNC: ABNORMAL MG/DL
KETONES UR QL: NEGATIVE
LEUKOCYTE EST, POC: NEGATIVE
Lab: ABNORMAL
NITRITE UR-MCNC: NEGATIVE MG/ML
PH UR: 6 [PH] (ref 5–8)
PROT UR STRIP-MCNC: NEGATIVE MG/DL
RBC # UR STRIP: NEGATIVE /UL
SP GR UR: 1.01 (ref 1–1.03)
UROBILINOGEN UR QL: NORMAL

## 2024-12-17 NOTE — PROGRESS NOTES
Follow Up Office Visit      Patient Name: Lucia Stevens  : 1960   MRN: [id number]     Chief Complaint:    Chief Complaint   Patient presents with    Gross hematuria    Urge Incontinence        Referring Provider: No ref. provider found    History of Present Illness: Lucia Stevens is a 64 y.o. female who presents today for follow up of  OAB, recurrent UTI and gross hematuria. Patient underwent evaluation for gross hematuria with Dr. Rivero in August. Cystoscopy revealed chronic cystitis indicative of likely multiple UTI. NO tumors, masses, stones or trabeculations noted. Prior CT Urogram with no abnormalities    Patient reports last UTI in August. Denies dysuria, suprapubic pain or fevers. Urinalysis negative for infection or blood. Denies gross hematuria.     Patient prescribed Gemtesa daily for urinary urge incontinence. She reports she wears depends daily and does not see improvement on Gemtesa. Recently trialed, oxybutynin. Discontinued due to increased dry mouth and constipation. She has increased her water intake. She reports she in not overly bothered with OAB symptoms.     Patient's sister-in-law present with her today.    Subjective      Review of System: Review of Systems   Genitourinary:  Positive for urgency.   All other systems reviewed and are negative.     I have reviewed the ROS documented by my clinical staff, I have updated appropriately and I agree. YEIMI Lazcano    I have reviewed and the following portions of the patient's history were updated as appropriate: past family history, past medical history, past social history, past surgical history and problem list.    Medications:     Current Outpatient Medications:     allopurinol (ZYLOPRIM) 100 MG tablet, Take 2 tablets by mouth once daily, Disp: 180 tablet, Rfl: 0    aspirin 81 MG EC tablet, Take 1 tablet by mouth Daily., Disp: , Rfl:     bisoprolol (ZEBeta) 5 MG tablet, TAKE 1/2 TABLET EVERY DAY, Disp: 45 tablet, Rfl: 1     Blood Glucose Monitoring Suppl (True Metrix Air Glucose Meter) w/Device kit, , Disp: , Rfl:     clotrimazole (LOTRIMIN) 1 % cream, Apply 1 Application topically to the appropriate area as directed Every 12 (Twelve) Hours., Disp: 30 g, Rfl: 1    Continuous Glucose  (FreeStyle Zafar 3 Otis) device, Use 1 each Daily., Disp: 1 each, Rfl: 1    Continuous Glucose Sensor (FreeStyle Zafar 3 Sensor) misc, Use 1 each Every 14 (Fourteen) Days., Disp: 6 each, Rfl: 3    Continuous Glucose Sensor (FreeStyle Zafar 3 Sensor) misc, Use 1 each Every 14 (Fourteen) Days., Disp: 2 each, Rfl: 5    DULoxetine (CYMBALTA) 60 MG capsule, Take 1 capsule by mouth Daily., Disp: 90 capsule, Rfl: 1    empagliflozin (Jardiance) 25 MG tablet tablet, Take 1 tablet by mouth Daily., Disp: 90 tablet, Rfl: 1    fluconazole (Diflucan) 200 MG tablet, 1 po now, ok to repeat in 3 days if needed, Disp: 2 tablet, Rfl: 0    folic acid (FOLVITE) 1 MG tablet, Take 1 tablet by mouth Daily., Disp: 90 tablet, Rfl: 1    furosemide (LASIX) 40 MG tablet, TAKE 1 TABLET BY MOUTH IN THE MORNING, Disp: 30 tablet, Rfl: 0    glimepiride (AMARYL) 2 MG tablet, Take 1 tablet by mouth Daily., Disp: 90 tablet, Rfl: 1    Insulin Glargine (LANTUS SOLOSTAR) 100 UNIT/ML injection pen, Inject 55 Units under the skin into the appropriate area as directed Every Morning., Disp: 35 mL, Rfl: 5    Insulin Pen Needle (Pen Needles) 31G X 6 MM misc, 1 daily for lantus, Disp: 100 each, Rfl: 5    metFORMIN (GLUCOPHAGE) 850 MG tablet, Take 1 tablet by mouth Daily With Breakfast., Disp: 180 tablet, Rfl: 1    methotrexate 2.5 MG tablet, Take 8 tablets by mouth 1 (One) Time Per Week., Disp: 40 tablet, Rfl: 3    midodrine (PROAMATINE) 5 MG tablet, Take 2 tablets by mouth twice daily, Disp: 180 tablet, Rfl: 0    nitroglycerin (NITROSTAT) 0.4 MG SL tablet, 1 under the tongue as needed for angina, may repeat q5mins for up three doses, Disp: 25 tablet, Rfl: 3    omeprazole (priLOSEC) 40 MG  capsule, Take 1 capsule by mouth Every Morning., Disp: , Rfl:     ondansetron (ZOFRAN) 4 MG tablet, Take 1 tablet by mouth Every 8 (Eight) Hours As Needed for Nausea or Vomiting., Disp: 20 tablet, Rfl: 1    potassium chloride (MICRO-K) 10 MEQ CR capsule, Take 1 capsule by mouth once daily, Disp: 90 capsule, Rfl: 0    predniSONE (DELTASONE) 5 MG tablet, Take 1 tablet by mouth Daily., Disp: 30 tablet, Rfl: 3    pregabalin (LYRICA) 100 MG capsule, TAKE 1 CAPSULE BY MOUTH THREE TIMES DAILY, Disp: 90 capsule, Rfl: 4    rizatriptan (MAXALT) 10 MG tablet, Take 1 tablet by mouth 1 (One) Time As Needed for Migraine. May repeat in 2 hours if needed, Disp: 9 tablet, Rfl: 5    rosuvastatin (CRESTOR) 20 MG tablet, Take 1 tablet by mouth Daily., Disp: 90 tablet, Rfl: 1    Semaglutide, 2 MG/DOSE, (Ozempic, 2 MG/DOSE,) 8 MG/3ML solution pen-injector, Inject 2 mg under the skin into the appropriate area as directed 1 (One) Time Per Week., Disp: 3 mL, Rfl: 3    spironolactone (ALDACTONE) 25 MG tablet, Take 1 tablet by mouth once daily, Disp: 90 tablet, Rfl: 0    Tocilizumab (Actemra ACTPen) 162 MG/0.9ML solution auto-injector injection, Inject 0.9 mL under the skin into the appropriate area as directed Every 14 (Fourteen) Days., Disp: 1.8 mL, Rfl: 5    topiramate (Topamax) 25 MG tablet, Take 1 tablet by mouth Daily for 90 days., Disp: 90 tablet, Rfl: 1    True Metrix Blood Glucose Test test strip, , Disp: , Rfl:     Vascepa 1 g capsule capsule, TAKE 2 CAPSULES BY MOUTH TWICE DAILY WITH MEALS, Disp: 360 capsule, Rfl: 0    Vibegron 75 MG tablet, Take 1 tablet by mouth Daily., Disp: 30 tablet, Rfl: 5    Allergies:   Allergies   Allergen Reactions    Sulfa Antibiotics Anaphylaxis     Lip swelling     Bladder & Bowel Symptom Questionnaire    How often do you usually urinate during the day ?   4 - At least once an hour    2.   How many timed do you urinate at night?   0 - 0-1 time at night   3.   What is the reason that you usually  "urinate?   3 - Severe urge (can delay less than 10 min)   4.   Once you get the urge to go, how long can you     comfortably delay?   2 - 10-30 min   5.   How often do you get a sudden urge that makes you rush to the bathroom?   4 - At least once a day   6.   How often does a sudden urge to urinate result in you leaking urine or wetting pads?   3 - A few times a week   7.  In your opinion, how good is your bladder control?   3 - Poor   8.  Do you have accidental bowel leakage?   no   9.  Do you have difficulty fully emptying your bladder?   no   10.  Do you experience accidental leakage when performing some physical activity such as coughing, sneezing, laughing or exercise?   yes   11. Have you tried medications to help improve your symptoms?   yes   12. Would you be interested in learning about a long-lasting option that may help you with your symptoms?   yes                                                                             Total Score   19     0-7 (Mild) 8-16 (Moderate) 17-28 (Severe)        Objective     Physical Exam:   Vital Signs:   Vitals:    12/17/24 1513   Weight: 92.1 kg (203 lb)   Height: 162.6 cm (64\")     Body mass index is 34.84 kg/m².     Physical Exam  Vitals and nursing note reviewed.   Constitutional:       Appearance: Normal appearance.   Pulmonary:      Effort: Pulmonary effort is normal.   Neurological:      Mental Status: She is alert and oriented to person, place, and time.   Psychiatric:         Mood and Affect: Mood normal.         Behavior: Behavior normal.         Labs:   Brief Urine Lab Results  (Last result in the past 365 days)        Color   Clarity   Blood   Leuk Est   Nitrite   Protein   CREAT   Urine HCG        12/17/24 1600 Yellow   Clear   Negative   Negative   Negative   Negative                   Urine Culture          8/13/2024    13:49   Urine Culture   Urine Culture No growth         Lab Results   Component Value Date    GLUCOSE 114 (H) 12/02/2024    CALCIUM 9.6 " 12/02/2024     12/02/2024    K 4.2 12/02/2024    CO2 26 12/02/2024     12/02/2024    BUN 16 12/02/2024    CREATININE 0.76 12/02/2024    EGFRIFNONA 71 02/05/2019    BCR 21 12/02/2024    ANIONGAP 12.6 09/04/2024       Lab Results   Component Value Date    WBC 18.6 (H) 12/02/2024    HGB 13.4 12/02/2024    HCT 40.7 12/02/2024     (H) 12/02/2024     12/02/2024       Images:   No Images in the past 120 days found..    Measures:   Tobacco:   Lucia Stevens  reports that she quit smoking about 13 years ago. Her smoking use included cigarettes. She started smoking about 33 years ago. She has a 10 pack-year smoking history. She has been exposed to tobacco smoke. She has never used smokeless tobacco.        Urine Incontinence: Patient reports that she is currently experiencing intermittent symptoms of urinary incontinence.       Assessment / Plan      Assessment/Plan:   64 y.o. female who presented today for follow up of OAB, recurrent UTI and gross hematuria.We discussed level of bothersome of urinary urgency. Patient would like to defer beta 3 agonist at this time. We discussed possibly initiating Hip- Rell in the if recurrent UTI occurs.    Diagnoses and all orders for this visit:    1. Urgency incontinence (Primary)  -     POC Urinalysis Dipstick, Automated    2. Recurrent UTI  -     POC Urinalysis Dipstick, Automated           Follow Up:   Return in about 3 months (around 3/17/2025) for Next scheduled follow up.    I spent approximately 20 minutes providing clinical care for this patient; including review of patient's chart and provider documentation, face to face time spent with patient in examination room (obtaining history, performing physical exam, discussing diagnosis and management options), placing orders, and completing patient documentation.     YEIMI Lazcano  [facility] [place]

## 2024-12-23 RX ORDER — ICOSAPENT ETHYL 1000 MG/1
2 CAPSULE ORAL 2 TIMES DAILY WITH MEALS
Qty: 360 CAPSULE | Refills: 0 | Status: SHIPPED | OUTPATIENT
Start: 2024-12-23

## 2024-12-30 RX ORDER — SPIRONOLACTONE 25 MG/1
25 TABLET ORAL DAILY
Qty: 90 TABLET | Refills: 0 | Status: SHIPPED | OUTPATIENT
Start: 2024-12-30

## 2024-12-31 RX ORDER — PREDNISONE 2.5 MG/1
2.5 TABLET ORAL DAILY
Qty: 30 TABLET | Refills: 0 | Status: SHIPPED | OUTPATIENT
Start: 2024-12-31

## 2025-01-09 ENCOUNTER — SPECIALTY PHARMACY (OUTPATIENT)
Age: 65
End: 2025-01-09
Payer: MEDICARE

## 2025-01-09 NOTE — PROGRESS NOTES
Specialty Pharmacy Patient Management Program  Rheumatology Refill Outreach      Lucia is a 64 y.o. female contacted today regarding refills of her medication(s).    Reviewed and verified with patient: yes    Specialty medication(s) and dose(s) confirmed: Actemra 162mg every 14 days    Refill Questions      Flowsheet Row Most Recent Value   Changes to allergies? No   Changes to medications? No   New conditions or infections since last clinic visit No   Unplanned office visit, urgent care, ED, or hospital admission in the last 4 weeks  No   How does patient/caregiver feel medication is working? Good   Financial problems or insurance changes  No   Since the previous refill, were any specialty medication doses or scheduled injections missed or delayed?  No   Does this patient require a clinical escalation to a pharmacist? No          Delivery Questions      Flowsheet Row Most Recent Value   Delivery method UPS   Delivery address verified with patient/caregiver? Yes   Delivery address Home   Number of medications in delivery 1   Medication(s) being filled and delivered Tocilizumab (Actemra ACTPen)   Doses left of specialty medications 2   Copay verified? Yes   Copay amount $0   Copay form of payment No copayment ($0)   Ship Date 1.13.25   Delivery Date 1.14.25   Signature Required No            Follow-Up: 21 days    Cierra Barcenas, PharmD, BCPS  Pharmacist, Rheumatology

## 2025-01-13 ENCOUNTER — OFFICE VISIT (OUTPATIENT)
Dept: FAMILY MEDICINE CLINIC | Facility: CLINIC | Age: 65
End: 2025-01-13
Payer: MEDICARE

## 2025-01-13 VITALS
HEIGHT: 64 IN | SYSTOLIC BLOOD PRESSURE: 114 MMHG | DIASTOLIC BLOOD PRESSURE: 78 MMHG | BODY MASS INDEX: 35.17 KG/M2 | OXYGEN SATURATION: 93 % | TEMPERATURE: 97.6 F | WEIGHT: 206 LBS | HEART RATE: 80 BPM | RESPIRATION RATE: 16 BRPM

## 2025-01-13 DIAGNOSIS — G43.D0 ABDOMINAL MIGRAINE, NOT INTRACTABLE: ICD-10-CM

## 2025-01-13 DIAGNOSIS — J31.0 CHRONIC RHINITIS: ICD-10-CM

## 2025-01-13 DIAGNOSIS — M1A.9XX0 CHRONIC GOUT WITHOUT TOPHUS, UNSPECIFIED CAUSE, UNSPECIFIED SITE: ICD-10-CM

## 2025-01-13 DIAGNOSIS — E78.5 DYSLIPIDEMIA: ICD-10-CM

## 2025-01-13 DIAGNOSIS — E11.40 TYPE 2 DIABETES MELLITUS WITH DIABETIC NEUROPATHY, WITHOUT LONG-TERM CURRENT USE OF INSULIN: Primary | ICD-10-CM

## 2025-01-13 DIAGNOSIS — I10 PRIMARY HYPERTENSION: ICD-10-CM

## 2025-01-13 DIAGNOSIS — R30.0 DYSURIA: ICD-10-CM

## 2025-01-13 DIAGNOSIS — F33.1 MODERATE EPISODE OF RECURRENT MAJOR DEPRESSIVE DISORDER: ICD-10-CM

## 2025-01-13 DIAGNOSIS — E87.6 HYPOKALEMIA: ICD-10-CM

## 2025-01-13 LAB
ALBUMIN/CREATININE RATIO, URINE: ABNORMAL
BILIRUB BLD-MCNC: NEGATIVE MG/DL
CLARITY, POC: CLEAR
COLOR UR: YELLOW
EXPIRATION DATE: ABNORMAL
EXPIRATION DATE: NORMAL
GLUCOSE UR STRIP-MCNC: NORMAL MG/DL
KETONES UR QL: NEGATIVE
LEUKOCYTE EST, POC: NEGATIVE
Lab: ABNORMAL
Lab: NORMAL
NITRITE UR-MCNC: NEGATIVE MG/ML
PH UR: 6 [PH] (ref 5–8)
POC CREATININE URINE: 10
POC MICROALBUMIN URINE: 10
PROT UR STRIP-MCNC: NEGATIVE MG/DL
RBC # UR STRIP: NEGATIVE /UL
SP GR UR: 1.01 (ref 1–1.03)
UROBILINOGEN UR QL: NORMAL

## 2025-01-13 PROCEDURE — 81003 URINALYSIS AUTO W/O SCOPE: CPT | Performed by: FAMILY MEDICINE

## 2025-01-13 PROCEDURE — 1170F FXNL STATUS ASSESSED: CPT | Performed by: FAMILY MEDICINE

## 2025-01-13 PROCEDURE — 82044 UR ALBUMIN SEMIQUANTITATIVE: CPT | Performed by: FAMILY MEDICINE

## 2025-01-13 PROCEDURE — 99214 OFFICE O/P EST MOD 30 MIN: CPT | Performed by: FAMILY MEDICINE

## 2025-01-13 PROCEDURE — 1125F AMNT PAIN NOTED PAIN PRSNT: CPT | Performed by: FAMILY MEDICINE

## 2025-01-13 PROCEDURE — 1160F RVW MEDS BY RX/DR IN RCRD: CPT | Performed by: FAMILY MEDICINE

## 2025-01-13 PROCEDURE — 3078F DIAST BP <80 MM HG: CPT | Performed by: FAMILY MEDICINE

## 2025-01-13 PROCEDURE — 1159F MED LIST DOCD IN RCRD: CPT | Performed by: FAMILY MEDICINE

## 2025-01-13 PROCEDURE — 3074F SYST BP LT 130 MM HG: CPT | Performed by: FAMILY MEDICINE

## 2025-01-13 RX ORDER — FUROSEMIDE 40 MG/1
TABLET ORAL
Qty: 90 TABLET | Refills: 1 | Status: SHIPPED | OUTPATIENT
Start: 2025-01-13

## 2025-01-13 RX ORDER — FUROSEMIDE 40 MG/1
TABLET ORAL
Qty: 30 TABLET | Refills: 0 | Status: SHIPPED | OUTPATIENT
Start: 2025-01-13 | End: 2025-01-13 | Stop reason: SDUPTHER

## 2025-01-13 RX ORDER — ROSUVASTATIN CALCIUM 20 MG/1
20 TABLET, COATED ORAL DAILY
Qty: 90 TABLET | Refills: 1 | Status: SHIPPED | OUTPATIENT
Start: 2025-01-13

## 2025-01-13 RX ORDER — BISOPROLOL FUMARATE 5 MG/1
TABLET, FILM COATED ORAL
Qty: 45 TABLET | Refills: 1 | Status: SHIPPED | OUTPATIENT
Start: 2025-01-13

## 2025-01-13 RX ORDER — FLUTICASONE PROPIONATE 50 MCG
2 SPRAY, SUSPENSION (ML) NASAL DAILY
Qty: 48 G | Refills: 1 | Status: SHIPPED | OUTPATIENT
Start: 2025-01-13

## 2025-01-13 RX ORDER — ALLOPURINOL 100 MG/1
200 TABLET ORAL DAILY
Qty: 180 TABLET | Refills: 0 | Status: SHIPPED | OUTPATIENT
Start: 2025-01-13

## 2025-01-13 RX ORDER — DULOXETIN HYDROCHLORIDE 60 MG/1
60 CAPSULE, DELAYED RELEASE ORAL DAILY
Qty: 90 CAPSULE | Refills: 1 | Status: SHIPPED | OUTPATIENT
Start: 2025-01-13

## 2025-01-13 RX ORDER — RIZATRIPTAN BENZOATE 10 MG/1
10 TABLET ORAL ONCE AS NEEDED
Qty: 9 TABLET | Refills: 5 | Status: SHIPPED | OUTPATIENT
Start: 2025-01-13

## 2025-01-13 RX ORDER — LISINOPRIL 5 MG/1
5 TABLET ORAL DAILY
Qty: 90 TABLET | Refills: 1 | Status: SHIPPED | OUTPATIENT
Start: 2025-01-13

## 2025-01-13 RX ORDER — ALLOPURINOL 100 MG/1
200 TABLET ORAL DAILY
Qty: 180 TABLET | Refills: 0 | Status: SHIPPED | OUTPATIENT
Start: 2025-01-13 | End: 2025-01-13 | Stop reason: SDUPTHER

## 2025-01-13 RX ORDER — SEMAGLUTIDE 1.34 MG/ML
INJECTION, SOLUTION SUBCUTANEOUS
Qty: 3 ML | Refills: 1 | Status: SHIPPED | OUTPATIENT
Start: 2025-01-13

## 2025-01-13 RX ORDER — POTASSIUM CHLORIDE 750 MG/1
10 CAPSULE, EXTENDED RELEASE ORAL DAILY
Qty: 90 CAPSULE | Refills: 0 | Status: SHIPPED | OUTPATIENT
Start: 2025-01-13

## 2025-01-13 RX ORDER — FLUTICASONE PROPIONATE 50 MCG
SPRAY, SUSPENSION (ML) NASAL
COMMUNITY
Start: 2025-01-03 | End: 2025-01-13 | Stop reason: SDUPTHER

## 2025-01-13 NOTE — PROGRESS NOTES
Subjective   Lucia Stevens is a 64 y.o. female.     History of Present Illness     Diabetes Mellitus Type II, Follow-up:   Lucia Stevens is a 64 y.o. female who is here for follow-up of Type 2 diabetes mellitus.  Current symptoms/problems include none and have been stable. Patient is adherent with medications.  Known diabetic complications: none  Cardiovascular risk factors: advanced age (older than 55 for men, 65 for women), diabetes mellitus, dyslipidemia, hypertension, and obesity (BMI >= 30 kg/m2)  Current diabetic medications include  ozempic 2, metformin , amaryl, jardiance, lantus .   Current monitoring regimen: home blood tests - daily  Home blood sugar records: fasting range: very high  Any episodes of hypoglycemia? no  Eye exam current (within one year): yes  She is on ACE inhibitor or angiotensin II receptor blocker. Patient is on a statin.   Her mood has been worse and so she has not been as compliance recently with her medications.      Lucia Stevens  is here for follow-up of hypertension of several years duration. She is not exercising and is not adherent to a low-salt diet. Patient does not check her blood pressure.   She is compliant with meds.        Lucia Stevens returns today for follow up of Hyperlipidemia  Lucia indicates her exercise level as not at all.  Diet: unchanged  Patient is compliant with medications   Any side effects to medications:   chest pain No myalgia No memory change No  Pt is not due for labs      The following portions of the patient's history were reviewed and updated as appropriate: allergies, current medications, past family history, past medical history, past social history, past surgical history, and problem list.    Review of Systems   Constitutional: Negative.        Objective   Physical Exam  Vitals and nursing note reviewed.   Constitutional:       General: She is not in acute distress.     Appearance: Normal appearance. She is well-developed.   Cardiovascular:       Rate and Rhythm: Normal rate and regular rhythm.      Heart sounds: Normal heart sounds.   Pulmonary:      Effort: Pulmonary effort is normal.      Breath sounds: Normal breath sounds.   Neurological:      Mental Status: She is alert and oriented to person, place, and time.   Psychiatric:         Mood and Affect: Mood normal.         Behavior: Behavior normal.         Thought Content: Thought content normal.         Judgment: Judgment normal.         Assessment & Plan   Diagnoses and all orders for this visit:    1. Type 2 diabetes mellitus with diabetic neuropathy, without long-term current use of insulin (Primary)  -     metFORMIN (GLUCOPHAGE) 850 MG tablet; Take 1 tablet by mouth Daily With Breakfast.  Dispense: 180 tablet; Refill: 1  -     Semaglutide,0.25 or 0.5MG/DOS, (Ozempic, 0.25 or 0.5 MG/DOSE,) 2 MG/1.5ML solution pen-injector; 0.25mg SQ weekly 4 weeks then 0.5mg SQ weekly all subsequent weeks  Dispense: 3 mL; Refill: 1  -     CBC & Differential  -     Comprehensive Metabolic Panel  -     Hemoglobin A1c  -     POCT microalbumin    2. Primary hypertension  -     bisoprolol (ZEBeta) 5 MG tablet; TAKE 1/2 TABLET EVERY DAY  Dispense: 45 tablet; Refill: 1  -     lisinopril (PRINIVIL,ZESTRIL) 5 MG tablet; Take 1 tablet by mouth Daily.  Dispense: 90 tablet; Refill: 1  -     CBC & Differential  -     Comprehensive Metabolic Panel    3. Dyslipidemia  -     rosuvastatin (CRESTOR) 20 MG tablet; Take 1 tablet by mouth Daily.  Dispense: 90 tablet; Refill: 1    4. Abdominal migraine, not intractable  -     rizatriptan (MAXALT) 10 MG tablet; Take 1 tablet by mouth 1 (One) Time As Needed for Migraine. May repeat in 2 hours if needed  Dispense: 9 tablet; Refill: 5    5. Hypokalemia  -     potassium chloride (MICRO-K) 10 MEQ CR capsule; Take 1 capsule by mouth Daily.  Dispense: 90 capsule; Refill: 0  -     Comprehensive Metabolic Panel    6. Moderate episode of recurrent major depressive disorder  -     DULoxetine  (CYMBALTA) 60 MG capsule; Take 1 capsule by mouth Daily.  Dispense: 90 capsule; Refill: 1    7. Chronic gout without tophus, unspecified cause, unspecified site  -     allopurinol (ZYLOPRIM) 100 MG tablet; Take 2 tablets by mouth Daily.  Dispense: 180 tablet; Refill: 0    8. Chronic rhinitis  -     fluticasone (FLONASE) 50 MCG/ACT nasal spray; Administer 2 sprays into the nostril(s) as directed by provider Daily.  Dispense: 48 g; Refill: 1    9. Dysuria  -     Urine Culture - Urine, Urine, Clean Catch  -     POCT urinalysis dipstick, automated    Other orders  -     furosemide (LASIX) 40 MG tablet; TAKE 1 TABLET BY MOUTH IN THE MORNING  Dispense: 90 tablet; Refill: 1      She is going to be more consistent with taking medicine and do better as we move forward.  She simply has not been doing well with taking medicine the past several weeks.  Recheck labs  No change in BP nor cholesterol medicine  Will check urine culture to look for infection but suspect DM not being controlled as cause for polyuria

## 2025-01-14 LAB
ALBUMIN SERPL-MCNC: 5 G/DL (ref 3.9–4.9)
ALP SERPL-CCNC: 98 IU/L (ref 44–121)
ALT SERPL-CCNC: 22 IU/L (ref 0–32)
AST SERPL-CCNC: 23 IU/L (ref 0–40)
BASOPHILS # BLD AUTO: 0.1 X10E3/UL (ref 0–0.2)
BASOPHILS NFR BLD AUTO: 1 %
BILIRUB SERPL-MCNC: 0.5 MG/DL (ref 0–1.2)
BUN SERPL-MCNC: 23 MG/DL (ref 8–27)
BUN/CREAT SERPL: 29 (ref 12–28)
CALCIUM SERPL-MCNC: 9.8 MG/DL (ref 8.7–10.3)
CHLORIDE SERPL-SCNC: 98 MMOL/L (ref 96–106)
CO2 SERPL-SCNC: 25 MMOL/L (ref 20–29)
CREAT SERPL-MCNC: 0.8 MG/DL (ref 0.57–1)
EGFRCR SERPLBLD CKD-EPI 2021: 82 ML/MIN/1.73
EOSINOPHIL # BLD AUTO: 0.2 X10E3/UL (ref 0–0.4)
EOSINOPHIL NFR BLD AUTO: 2 %
ERYTHROCYTE [DISTWIDTH] IN BLOOD BY AUTOMATED COUNT: 15.4 % (ref 11.7–15.4)
GLOBULIN SER CALC-MCNC: 2.5 G/DL (ref 1.5–4.5)
GLUCOSE SERPL-MCNC: 220 MG/DL (ref 70–99)
HBA1C MFR BLD: 7.6 % (ref 4.8–5.6)
HCT VFR BLD AUTO: 45.2 % (ref 34–46.6)
HGB BLD-MCNC: 14.8 G/DL (ref 11.1–15.9)
IMM GRANULOCYTES # BLD AUTO: 0.1 X10E3/UL (ref 0–0.1)
IMM GRANULOCYTES NFR BLD AUTO: 1 %
LYMPHOCYTES # BLD AUTO: 1.3 X10E3/UL (ref 0.7–3.1)
LYMPHOCYTES NFR BLD AUTO: 12 %
MCH RBC QN AUTO: 32.8 PG (ref 26.6–33)
MCHC RBC AUTO-ENTMCNC: 32.7 G/DL (ref 31.5–35.7)
MCV RBC AUTO: 100 FL (ref 79–97)
MONOCYTES # BLD AUTO: 1.3 X10E3/UL (ref 0.1–0.9)
MONOCYTES NFR BLD AUTO: 12 %
NEUTROPHILS # BLD AUTO: 7.8 X10E3/UL (ref 1.4–7)
NEUTROPHILS NFR BLD AUTO: 72 %
PLATELET # BLD AUTO: 195 X10E3/UL (ref 150–450)
POTASSIUM SERPL-SCNC: 4.2 MMOL/L (ref 3.5–5.2)
PROT SERPL-MCNC: 7.5 G/DL (ref 6–8.5)
RBC # BLD AUTO: 4.51 X10E6/UL (ref 3.77–5.28)
SODIUM SERPL-SCNC: 140 MMOL/L (ref 134–144)
WBC # BLD AUTO: 10.8 X10E3/UL (ref 3.4–10.8)

## 2025-01-16 LAB
BACTERIA UR CULT: ABNORMAL
BACTERIA UR CULT: ABNORMAL
OTHER ANTIBIOTIC SUSC ISLT: ABNORMAL

## 2025-01-22 ENCOUNTER — TELEPHONE (OUTPATIENT)
Dept: UROLOGY | Facility: CLINIC | Age: 65
End: 2025-01-22
Payer: MEDICARE

## 2025-01-22 ENCOUNTER — PATIENT MESSAGE (OUTPATIENT)
Dept: UROLOGY | Facility: CLINIC | Age: 65
End: 2025-01-22
Payer: MEDICARE

## 2025-01-22 DIAGNOSIS — N39.0 RECURRENT UTI: Primary | ICD-10-CM

## 2025-01-22 RX ORDER — PHENAZOPYRIDINE HYDROCHLORIDE 200 MG/1
200 TABLET, FILM COATED ORAL 3 TIMES DAILY PRN
Qty: 20 TABLET | Refills: 0 | Status: SHIPPED | OUTPATIENT
Start: 2025-01-22

## 2025-01-22 RX ORDER — NITROFURANTOIN 25; 75 MG/1; MG/1
100 CAPSULE ORAL 2 TIMES DAILY
Qty: 14 CAPSULE | Refills: 0 | Status: SHIPPED | OUTPATIENT
Start: 2025-01-22 | End: 2025-01-29

## 2025-01-22 NOTE — TELEPHONE ENCOUNTER
Pt called the clinic. She is having extreme urgency, gross hematuria, and dysuria. She saw Dr. Beckwith on the 13th and did a UA dip was clear. Upon investigation, there is a urine culture in her chart that resulted with Klesbsiella aerogenes. Dr. Beckwith did NOT put her on any abx. And the communication does not reflect that one was sent in for it. She denies any fever, chills, n/v at this time but is miserable and at times unable to control her urine.  Can you please send her in a abx?

## 2025-01-22 NOTE — TELEPHONE ENCOUNTER
Hub staff attempted to follow warm transfer process and was unsuccessful     Caller: Lucia Stevens    Relationship to patient: Self    Best call back number: [phone] (home)      Patient is needing: PATIENT STATED THAT SHE CALLED EARLIER TODAY AND SPOKE WITH AMY, HOWEVER SHE NOW HAS SOME ADDITIONAL MEDICAL QUESTIONS AND WOULD LIKE A CALL BACK.  PLEASE CALL PT.  THANK YOU.

## 2025-01-26 RX ORDER — CIPROFLOXACIN 500 MG/1
500 TABLET, FILM COATED ORAL 2 TIMES DAILY
Qty: 14 TABLET | Refills: 0 | Status: SHIPPED | OUTPATIENT
Start: 2025-01-26

## 2025-01-27 ENCOUNTER — TELEPHONE (OUTPATIENT)
Dept: FAMILY MEDICINE CLINIC | Facility: CLINIC | Age: 65
End: 2025-01-27
Payer: MEDICARE

## 2025-01-27 NOTE — TELEPHONE ENCOUNTER
[facility] CALLED AND STATED LISINOPRIL WAS JUST FILLED AND PATIENT HAS BEEN TAKING SPIRONOLACTONE AND THEY ARE WONDERING IF POTASSIUM HAS BEEN CHECKED OR IF THERE ARE FUTURE LABS TO MONITOR POTASSIUM DUE TO THE FACT BOTH MEDICATIONS CAN RAISE IT

## 2025-01-28 DIAGNOSIS — B37.31 VAGINAL YEAST INFECTION: Primary | ICD-10-CM

## 2025-01-28 RX ORDER — FLUCONAZOLE 150 MG/1
150 TABLET ORAL ONCE
Qty: 1 TABLET | Refills: 0 | Status: SHIPPED | OUTPATIENT
Start: 2025-01-28 | End: 2025-01-28

## 2025-02-03 ENCOUNTER — OFFICE VISIT (OUTPATIENT)
Dept: UROLOGY | Facility: CLINIC | Age: 65
End: 2025-02-03
Payer: MEDICARE

## 2025-02-03 VITALS — HEART RATE: 83 BPM | OXYGEN SATURATION: 97 % | DIASTOLIC BLOOD PRESSURE: 74 MMHG | SYSTOLIC BLOOD PRESSURE: 118 MMHG

## 2025-02-03 DIAGNOSIS — N39.0 RECURRENT UTI: Primary | ICD-10-CM

## 2025-02-03 DIAGNOSIS — N30.01 ACUTE CYSTITIS WITH HEMATURIA: ICD-10-CM

## 2025-02-03 LAB
BILIRUB BLD-MCNC: NEGATIVE MG/DL
CLARITY, POC: CLEAR
COLOR UR: ABNORMAL
EXPIRATION DATE: ABNORMAL
GLUCOSE UR STRIP-MCNC: ABNORMAL MG/DL
KETONES UR QL: NEGATIVE
LEUKOCYTE EST, POC: NEGATIVE
Lab: ABNORMAL
NITRITE UR-MCNC: NEGATIVE MG/ML
PH UR: 6 [PH] (ref 5–8)
PROT UR STRIP-MCNC: NEGATIVE MG/DL
RBC # UR STRIP: NEGATIVE /UL
SP GR UR: 1.01 (ref 1–1.03)
UROBILINOGEN UR QL: NORMAL

## 2025-02-03 RX ORDER — ESTRADIOL 0.1 MG/G
CREAM VAGINAL
Qty: 42.5 G | Refills: 12 | Status: SHIPPED | OUTPATIENT
Start: 2025-02-03

## 2025-02-03 RX ORDER — NITROFURANTOIN 25; 75 MG/1; MG/1
100 CAPSULE ORAL NIGHTLY
Qty: 30 CAPSULE | Refills: 2 | Status: SHIPPED | OUTPATIENT
Start: 2025-02-03

## 2025-02-03 NOTE — PROGRESS NOTES
Follow Up Office Visit      Patient Name: Lucia Stevens  : 1960   MRN: [id number]     Chief Complaint:    Chief Complaint   Patient presents with    Urinary Incontinence    Recurrent UTI       Referring Provider: No ref. provider found    History of Present Illness: Lucia Steevns is a 64 y.o. female who presents today for follow up of recurrent UTI and OAB.     Patient reports UTI related symptoms including dysuria, feeling of incomplete bladder emptying and suprapubic pressure. She currently denies these symptoms.     Urinalysis today is negative for infection or blood.     Creatinine obtained on 25; 0.80     Patient continues Gemtesa for OAB related symptoms. She reports mild improvement with urinary frequency, urgency and intermittent urinary leakage.      Subjective      Review of System: Review of Systems   Genitourinary:  Positive for frequency and urgency.   All other systems reviewed and are negative.     I have reviewed the ROS documented by my clinical staff, I have updated appropriately and I agree. YEIMI Lazcano    I have reviewed and the following portions of the patient's history were updated as appropriate: past family history, past medical history, past social history, past surgical history and problem list.    Medications:     Current Outpatient Medications:     allopurinol (ZYLOPRIM) 100 MG tablet, Take 2 tablets by mouth Daily., Disp: 180 tablet, Rfl: 0    aspirin 81 MG EC tablet, Take 1 tablet by mouth Daily., Disp: , Rfl:     bisoprolol (ZEBeta) 5 MG tablet, TAKE 1/2 TABLET EVERY DAY, Disp: 45 tablet, Rfl: 1    Blood Glucose Monitoring Suppl (True Metrix Air Glucose Meter) w/Device kit, , Disp: , Rfl:     ciprofloxacin (Cipro) 500 MG tablet, Take 1 tablet by mouth 2 (Two) Times a Day., Disp: 14 tablet, Rfl: 0    clotrimazole (LOTRIMIN) 1 % cream, Apply 1 Application topically to the appropriate area as directed Every 12 (Twelve) Hours., Disp: 30 g, Rfl: 1    Continuous  Glucose  (FreeStyle Zafar 3 Evening Shade) device, Use 1 each Daily., Disp: 1 each, Rfl: 1    Continuous Glucose Sensor (FreeStyle Zafar 3 Sensor) misc, Use 1 each Every 14 (Fourteen) Days., Disp: 6 each, Rfl: 3    Continuous Glucose Sensor (FreeStyle Zafar 3 Sensor) misc, Use 1 each Every 14 (Fourteen) Days., Disp: 2 each, Rfl: 5    DULoxetine (CYMBALTA) 60 MG capsule, Take 1 capsule by mouth Daily., Disp: 90 capsule, Rfl: 1    empagliflozin (Jardiance) 25 MG tablet tablet, Take 1 tablet by mouth Daily., Disp: 90 tablet, Rfl: 1    fluconazole (Diflucan) 200 MG tablet, 1 po now, ok to repeat in 3 days if needed, Disp: 2 tablet, Rfl: 0    fluticasone (FLONASE) 50 MCG/ACT nasal spray, Administer 2 sprays into the nostril(s) as directed by provider Daily., Disp: 48 g, Rfl: 1    folic acid (FOLVITE) 1 MG tablet, Take 1 tablet by mouth Daily., Disp: 90 tablet, Rfl: 1    furosemide (LASIX) 40 MG tablet, TAKE 1 TABLET BY MOUTH IN THE MORNING, Disp: 90 tablet, Rfl: 1    glimepiride (AMARYL) 2 MG tablet, Take 1 tablet by mouth Daily., Disp: 90 tablet, Rfl: 1    Insulin Glargine (LANTUS SOLOSTAR) 100 UNIT/ML injection pen, Inject 55 Units under the skin into the appropriate area as directed Every Morning., Disp: 35 mL, Rfl: 5    Insulin Pen Needle (Pen Needles) 31G X 6 MM misc, 1 daily for lantus, Disp: 100 each, Rfl: 5    lisinopril (PRINIVIL,ZESTRIL) 5 MG tablet, Take 1 tablet by mouth Daily., Disp: 90 tablet, Rfl: 1    metFORMIN (GLUCOPHAGE) 850 MG tablet, Take 1 tablet by mouth Daily With Breakfast., Disp: 180 tablet, Rfl: 1    methotrexate 2.5 MG tablet, Take 8 tablets by mouth 1 (One) Time Per Week., Disp: 40 tablet, Rfl: 3    nitroglycerin (NITROSTAT) 0.4 MG SL tablet, 1 under the tongue as needed for angina, may repeat q5mins for up three doses, Disp: 25 tablet, Rfl: 3    omeprazole (priLOSEC) 40 MG capsule, Take 1 capsule by mouth Every Morning., Disp: , Rfl:     ondansetron (ZOFRAN) 4 MG tablet, Take 1 tablet by  mouth Every 8 (Eight) Hours As Needed for Nausea or Vomiting., Disp: 20 tablet, Rfl: 1    phenazopyridine (Pyridium) 200 MG tablet, Take 1 tablet by mouth 3 (Three) Times a Day As Needed for Bladder Spasms or Dysuria., Disp: 20 tablet, Rfl: 0    potassium chloride (MICRO-K) 10 MEQ CR capsule, Take 1 capsule by mouth Daily., Disp: 90 capsule, Rfl: 0    pregabalin (LYRICA) 100 MG capsule, TAKE 1 CAPSULE BY MOUTH THREE TIMES DAILY, Disp: 90 capsule, Rfl: 4    rizatriptan (MAXALT) 10 MG tablet, Take 1 tablet by mouth 1 (One) Time As Needed for Migraine. May repeat in 2 hours if needed, Disp: 9 tablet, Rfl: 5    rosuvastatin (CRESTOR) 20 MG tablet, Take 1 tablet by mouth Daily., Disp: 90 tablet, Rfl: 1    spironolactone (ALDACTONE) 25 MG tablet, Take 1 tablet by mouth once daily, Disp: 90 tablet, Rfl: 0    Tocilizumab (Actemra ACTPen) 162 MG/0.9ML solution auto-injector injection, Inject 0.9 mL under the skin into the appropriate area as directed Every 14 (Fourteen) Days., Disp: 1.8 mL, Rfl: 5    topiramate (Topamax) 25 MG tablet, Take 1 tablet by mouth Daily for 90 days., Disp: 90 tablet, Rfl: 1    True Metrix Blood Glucose Test test strip, , Disp: , Rfl:     Vascepa 1 g capsule capsule, TAKE 2 CAPSULES BY MOUTH TWICE DAILY WITH MEALS, Disp: 360 capsule, Rfl: 0    Vibegron 75 MG tablet, Take 1 tablet by mouth Daily., Disp: 30 tablet, Rfl: 5    estradiol (ESTRACE) 0.1 MG/GM vaginal cream, Apply Monday, Wednesday, Friday to urethra and vaginal opening., Disp: 42.5 g, Rfl: 12    midodrine (PROAMATINE) 5 MG tablet, Take 2 tablets by mouth twice daily, Disp: 180 tablet, Rfl: 1    nitrofurantoin, macrocrystal-monohydrate, (Macrobid) 100 MG capsule, Take 1 capsule by mouth Every Night., Disp: 30 capsule, Rfl: 2    nitrofurantoin, macrocrystal-monohydrate, (Macrobid) 100 MG capsule, Take 1 capsule by mouth 2 (Two) Times a Day., Disp: 14 capsule, Rfl: 0    Semaglutide, 1 MG/DOSE, (Ozempic, 1 MG/DOSE,) 4 MG/3ML solution  pen-injector, Inject 1 mg under the skin into the appropriate area as directed 1 (One) Time Per Week., Disp: 3 mL, Rfl: 2    Allergies:   Allergies   Allergen Reactions    Sulfa Antibiotics Anaphylaxis     Lip swelling     Bladder & Bowel Symptom Questionnaire    How often do you usually urinate during the day ?   3 - About every 1-2 hours   2.   How many timed do you urinate at night?   1 - 2 times at night   3.   What is the reason that you usually urinate?   3 - Severe urge (can delay less than 10 min)   4.   Once you get the urge to go, how long can you     comfortably delay?   3 - Less than 10 min   5.   How often do you get a sudden urge that makes you rush to the bathroom?   4 - At least once a day   6.   How often does a sudden urge to urinate result in you leaking urine or wetting pads?   4 - At least once a day   7.  In your opinion, how good is your bladder control?   3 - Poor   8.  Do you have accidental bowel leakage?   no   9.  Do you have difficulty fully emptying your bladder?   no   10.  Do you experience accidental leakage when performing some physical activity such as coughing, sneezing, laughing or exercise?   yes   11. Have you tried medications to help improve your symptoms?   yes   12. Would you be interested in learning about a long-lasting option that may help you with your symptoms?   yes                                                                             Total Score   22     0-7 (Mild) 8-16 (Moderate) 17-28 (Severe)        Objective     Physical Exam:   Vital Signs:   Vitals:    02/03/25 1611   BP: 118/74   Pulse: 83   SpO2: 97%     There is no height or weight on file to calculate BMI.     Physical Exam  Vitals and nursing note reviewed.   Constitutional:       Appearance: Normal appearance.   Pulmonary:      Effort: Pulmonary effort is normal.   Neurological:      Mental Status: She is alert and oriented to person, place, and time.   Psychiatric:         Mood and Affect: Mood  normal.         Behavior: Behavior normal.         Labs:   Brief Urine Lab Results  (Last result in the past 365 days)        Color   Clarity   Blood   Leuk Est   Nitrite   Protein   CREAT   Urine HCG        02/03/25 1614 Straw   Clear   Negative   Negative   Negative   Negative                   Urine Culture          8/13/2024    13:49 1/13/2025    12:37   Urine Culture   Urine Culture No growth  Final report         Lab Results   Component Value Date    GLUCOSE 220 (H) 01/13/2025    CALCIUM 9.8 01/13/2025     01/13/2025    K 4.2 01/13/2025    CO2 25 01/13/2025    CL 98 01/13/2025    BUN 23 01/13/2025    CREATININE 0.80 01/13/2025    EGFRIFNONA 71 02/05/2019    BCR 29 (H) 01/13/2025    ANIONGAP 12.6 09/04/2024       Lab Results   Component Value Date    WBC 10.8 01/13/2025    HGB 14.8 01/13/2025    HCT 45.2 01/13/2025     (H) 01/13/2025     01/13/2025       Images:   No Images in the past 120 days found..    Measures:   Tobacco:   Lucia Stevens  reports that she quit smoking about 14 years ago. Her smoking use included cigarettes. She started smoking about 34 years ago. She has a 10 pack-year smoking history. She has been exposed to tobacco smoke. She has never used smokeless tobacco.            Urine Incontinence: Patient reports that she is not currently experiencing any symptoms of urinary incontinence.       Assessment / Plan      Assessment/Plan:   64 y.o. female who presented today for follow up of recurrent UTI and OAB. Urinalysis is negative for infection today. We will send urine for guidance PCR to assess for uropathogen's. We will notify patient of results once available. We discussed vaginal estrace cream three times weekly and nightly Macrobid for UTI prophylaxis. Patient is understanding and agreeable with plan of care.    Diagnoses and all orders for this visit:    1. Recurrent UTI (Primary)  -     POC Urinalysis Dipstick, Automated  -     estradiol (ESTRACE) 0.1 MG/GM vaginal  cream; Apply Monday, Wednesday, Friday to urethra and vaginal opening.  Dispense: 42.5 g; Refill: 12  -     nitrofurantoin, macrocrystal-monohydrate, (Macrobid) 100 MG capsule; Take 1 capsule by mouth Every Night.  Dispense: 30 capsule; Refill: 2  -     phenazopyridine (Pyridium) 200 MG tablet; Take 1 tablet by mouth 3 (Three) Times a Day As Needed for Bladder Spasms or Dysuria.  Dispense: 20 tablet; Refill: 0    2. Acute cystitis with hematuria  -     nitrofurantoin, macrocrystal-monohydrate, (Macrobid) 100 MG capsule; Take 1 capsule by mouth 2 (Two) Times a Day.  Dispense: 14 capsule; Refill: 0           Follow Up:   Return in about 3 months (around 5/3/2025) for Next scheduled follow up.    I spent approximately 30 minutes providing clinical care for this patient; including review of patient's chart and provider documentation, face to face time spent with patient in examination room (obtaining history, performing physical exam, discussing diagnosis and management options), placing orders, and completing patient documentation.       YEIMI Lazcano  [facility] [place]

## 2025-02-04 ENCOUNTER — TELEPHONE (OUTPATIENT)
Dept: FAMILY MEDICINE CLINIC | Facility: CLINIC | Age: 65
End: 2025-02-04
Payer: MEDICARE

## 2025-02-04 DIAGNOSIS — E11.40 TYPE 2 DIABETES MELLITUS WITH DIABETIC NEUROPATHY, WITHOUT LONG-TERM CURRENT USE OF INSULIN: Primary | ICD-10-CM

## 2025-02-04 RX ORDER — MIDODRINE HYDROCHLORIDE 5 MG/1
10 TABLET ORAL 2 TIMES DAILY
Qty: 180 TABLET | Refills: 1 | Status: SHIPPED | OUTPATIENT
Start: 2025-02-04

## 2025-02-04 RX ORDER — SEMAGLUTIDE 1.34 MG/ML
1 INJECTION, SOLUTION SUBCUTANEOUS WEEKLY
Qty: 3 ML | Refills: 2 | Status: SHIPPED | OUTPATIENT
Start: 2025-02-04

## 2025-02-04 NOTE — TELEPHONE ENCOUNTER
"Copied from [employer] Mobile message:  \" I will be starting second month on ozempic. After the second month could I please move up. We know already I can take this. Sugar is coming down but not like on the higher dose \"  "

## 2025-02-05 ENCOUNTER — TELEPHONE (OUTPATIENT)
Dept: UROLOGY | Facility: CLINIC | Age: 65
End: 2025-02-05
Payer: MEDICARE

## 2025-02-05 ENCOUNTER — CLINICAL SUPPORT (OUTPATIENT)
Dept: UROLOGY | Facility: CLINIC | Age: 65
End: 2025-02-05
Payer: MEDICARE

## 2025-02-05 DIAGNOSIS — N39.0 RECURRENT UTI: Primary | ICD-10-CM

## 2025-02-05 LAB
BILIRUB BLD-MCNC: NEGATIVE MG/DL
CLARITY, POC: ABNORMAL
COLOR UR: YELLOW
EXPIRATION DATE: ABNORMAL
GLUCOSE UR STRIP-MCNC: ABNORMAL MG/DL
KETONES UR QL: NEGATIVE
LEUKOCYTE EST, POC: NEGATIVE
Lab: ABNORMAL
NITRITE UR-MCNC: NEGATIVE MG/ML
PH UR: 6 [PH] (ref 5–8)
PROT UR STRIP-MCNC: NEGATIVE MG/DL
RBC # UR STRIP: ABNORMAL /UL
SP GR UR: 1.01 (ref 1–1.03)
UROBILINOGEN UR QL: NORMAL

## 2025-02-05 PROCEDURE — 81003 URINALYSIS AUTO W/O SCOPE: CPT

## 2025-02-05 RX ORDER — NITROFURANTOIN 25; 75 MG/1; MG/1
100 CAPSULE ORAL 2 TIMES DAILY
Qty: 14 CAPSULE | Refills: 0 | Status: SHIPPED | OUTPATIENT
Start: 2025-02-05

## 2025-02-05 RX ORDER — PHENAZOPYRIDINE HYDROCHLORIDE 200 MG/1
200 TABLET, FILM COATED ORAL 3 TIMES DAILY PRN
Qty: 20 TABLET | Refills: 0 | Status: SHIPPED | OUTPATIENT
Start: 2025-02-05

## 2025-02-05 NOTE — TELEPHONE ENCOUNTER
Pt was seen on Monday 02/03. Pt states that she is passing blood, and having pain with urination. Pt is being put on for NV to leave a urine sample today.

## 2025-02-05 NOTE — TELEPHONE ENCOUNTER
[facility] pharmacy called, needing clarification on how many Grams per application for the cream prescribed. Please advise and call them, thank you

## 2025-02-07 ENCOUNTER — SPECIALTY PHARMACY (OUTPATIENT)
Age: 65
End: 2025-02-07
Payer: MEDICARE

## 2025-02-07 NOTE — PROGRESS NOTES
Specialty Pharmacy Refill Coordination Note     Lucia is a 64 y.o. female contacted today regarding refills of  Actemra specialty medication(s).    Reviewed and verified with patient:       Specialty medication(s) and dose(s) confirmed: yes    Refill Questions      Flowsheet Row Most Recent Value   Changes to allergies? No   Changes to medications? No   New conditions or infections since last clinic visit Yes   If yes, please describe  U.T.I.   Unplanned office visit, urgent care, ED, or hospital admission in the last 4 weeks  No   How does patient/caregiver feel medication is working? Good   Financial problems or insurance changes  No   Since the previous refill, were any specialty medication doses or scheduled injections missed or delayed?  No   Does this patient require a clinical escalation to a pharmacist? No            Delivery Questions      Flowsheet Row Most Recent Value   Delivery method [employer]   Delivery address verified with patient/caregiver? Yes   Delivery address Home   Number of medications in delivery 1   Medication(s) being filled and delivered Tocilizumab (Actemra ACTPen)   Doses left of specialty medications 2   Copay verified? Yes   Copay amount 0   Copay form of payment No copayment ($0)   Ship Date 02/10/25   Delivery Date Selection 02/11/25   Signature Required No                   Follow-up: 21 day(s)     Irma Zhao, Pharmacy Technician  Specialty Pharmacy Technician

## 2025-02-09 DIAGNOSIS — E11.42 DIABETIC POLYNEUROPATHY ASSOCIATED WITH TYPE 2 DIABETES MELLITUS: ICD-10-CM

## 2025-02-10 ENCOUNTER — TELEPHONE (OUTPATIENT)
Dept: UROLOGY | Facility: CLINIC | Age: 65
End: 2025-02-10
Payer: MEDICARE

## 2025-02-10 DIAGNOSIS — B37.31 VAGINAL YEAST INFECTION: Primary | ICD-10-CM

## 2025-02-10 RX ORDER — FLUCONAZOLE 150 MG/1
150 TABLET ORAL ONCE
Qty: 1 TABLET | Refills: 0 | Status: SHIPPED | OUTPATIENT
Start: 2025-02-10 | End: 2025-02-10

## 2025-02-10 NOTE — TELEPHONE ENCOUNTER
Notified patient of PCR Guidance results. Culture is positive for infection.  Patient is on appropriate antibiotic coverage, Macrobid.  Patient reports UTI symptoms are improving. I have encouraged her to increase water intake. She will alert with any questions or concerns

## 2025-02-10 NOTE — TELEPHONE ENCOUNTER
Patient called and would like for a diflucan sent in to her pharmacy to take once she completes her antibiotic.

## 2025-02-11 RX ORDER — PREGABALIN 100 MG/1
100 CAPSULE ORAL 3 TIMES DAILY
Qty: 90 CAPSULE | Refills: 2 | Status: SHIPPED | OUTPATIENT
Start: 2025-02-11

## 2025-02-21 ENCOUNTER — TELEPHONE (OUTPATIENT)
Dept: FAMILY MEDICINE CLINIC | Facility: CLINIC | Age: 65
End: 2025-02-21
Payer: MEDICARE

## 2025-02-21 DIAGNOSIS — E11.40 TYPE 2 DIABETES MELLITUS WITH DIABETIC NEUROPATHY, WITHOUT LONG-TERM CURRENT USE OF INSULIN: Primary | ICD-10-CM

## 2025-02-21 RX ORDER — SEMAGLUTIDE 2.68 MG/ML
2 INJECTION, SOLUTION SUBCUTANEOUS WEEKLY
Qty: 3 ML | Refills: 3 | Status: SHIPPED | OUTPATIENT
Start: 2025-02-21

## 2025-02-21 NOTE — TELEPHONE ENCOUNTER
Caller: Lucia Stevens    Relationship: Self    Best call back number:   Telephone Information:   Mobile [phone]     What is the best time to reach you: ANYTIME    Who are you requesting to speak with (clinical staff, provider,  specific staff member): PCP OR MA    Do you know the name of the person who called:     What was the call regarding: PATIENT CALLED IN STATED PCP STARTED HER ON A BLOOD PRESSURE MEDICATION THAT IS CAUSING HER TO SLEEP IN THE AFTERNOON ALSO PATIENT STATED SHE WOULD LIKE TO MOVE UP TO THE NEXT LEVEL OF OZEMPIC    Is it okay if the provider responds through MyChart: PREFERS CALL

## 2025-02-21 NOTE — TELEPHONE ENCOUNTER
We did not start any new medicine but resumed medicine she had supposed to be taking!  Which medicine does she feel like it causing the problem?    I am ok to stop this and we will recheck her BP in future.      Ozempic sent in.  She is now on 2 mg, the max dose

## 2025-02-23 DIAGNOSIS — N39.41 URGENCY INCONTINENCE: ICD-10-CM

## 2025-02-24 RX ORDER — VIBEGRON 75 MG/1
1 TABLET, FILM COATED ORAL DAILY
Qty: 30 TABLET | Refills: 0 | Status: SHIPPED | OUTPATIENT
Start: 2025-02-24

## 2025-02-24 NOTE — TELEPHONE ENCOUNTER
Rx Refill Note  Requested Prescriptions     Pending Prescriptions Disp Refills    Gemtesa 75 MG tablet [Pharmacy Med Name: Gemtesa 75 MG Oral Tablet] 30 tablet 0     Sig: Take 1 tablet by mouth once daily      Last office visit with prescribing clinician: 8/1/2024   Next office visit with prescribing clinician: 05/12/2025    Xena Vicente MA  02/24/25, 07:35 EST

## 2025-02-25 ENCOUNTER — SPECIALTY PHARMACY (OUTPATIENT)
Age: 65
End: 2025-02-25
Payer: MEDICARE

## 2025-02-25 ENCOUNTER — TELEPHONE (OUTPATIENT)
Dept: FAMILY MEDICINE CLINIC | Facility: CLINIC | Age: 65
End: 2025-02-25
Payer: MEDICARE

## 2025-02-25 DIAGNOSIS — M05.9 RHEUMATOID ARTHRITIS WITH POSITIVE RHEUMATOID FACTOR, INVOLVING UNSPECIFIED SITE: ICD-10-CM

## 2025-02-25 DIAGNOSIS — Z79.899 ENCOUNTER FOR LONG-TERM (CURRENT) USE OF HIGH-RISK MEDICATION: ICD-10-CM

## 2025-02-25 RX ORDER — FOLIC ACID 1 MG/1
1000 TABLET ORAL DAILY
Qty: 90 TABLET | Refills: 0 | Status: SHIPPED | OUTPATIENT
Start: 2025-02-25

## 2025-02-25 NOTE — PROGRESS NOTES
Specialty Pharmacy Patient Management Program  Rheumatology Reassessment     Lucia Stevens was referred by an Rheumatology provider to the Rheumatology Patient Management program offered by [facility] for Rheumatoid Arthritis. A follow-up outreach was conducted, including assessment of continued therapy appropriateness, medication adherence, and side effect incidence and management for Actemra.    Changes to Insurance Coverage or Financial Support  [employer]    Relevant Past Medical History and Comorbidities  Relevant medical history and concomitant health conditions were discussed with the patient. The patient's chart has been reviewed for relevant past medical history and comorbid health conditions and updated as necessary.   Past Medical History:   Diagnosis Date    Abdominal migraine, not intractable 2024    CHF (congestive heart failure)     Coronary artery disease     2 stents    Depression     Gout     Hypercholesterolemia     Hypertension     Neuropathy     Psoriasis     Rheumatoid arthritis     Scleroderma     Sjoegren syndrome     SOB (shortness of breath) on exertion     Tobacco abuse 2011    cessation     Type 2 diabetes mellitus with diabetic neuropathy, without long-term current use of insulin 2024    Wears dentures     full set    Wears glasses      Social History     Socioeconomic History    Marital status:     Number of children: 2    Years of education: 13    Highest education level: Some college, no degree   Tobacco Use    Smoking status: Former     Current packs/day: 0.00     Average packs/day: 0.5 packs/day for 20.0 years (10.0 ttl pk-yrs)     Types: Cigarettes     Start date: 1991     Quit date: 2011     Years since quittin.1     Passive exposure: Past    Smokeless tobacco: Never    Tobacco comments:     Tobacco use status: Occasional cigarette smoker; Smoking status: Heavy tobacco smoker.   Vaping Use    Vaping status: Never Used    Substance and Sexual Activity    Alcohol use: Yes     Alcohol/week: 1.0 standard drink of alcohol     Types: 1 Shots of liquor per week     Comment: Social drinker; There is a history of alcohol use. Consumed rarely.    Drug use: No    Sexual activity: Not Currently     Partners: Male     Problem list reviewed by Cierra Barcenas PharmD on 2/25/2025 at  1:31 PM    Hospitalizations and Urgent Care Since Last Assessment  ED Visits, Admissions, or Hospitalizations: none  Urgent Office Visits: none    Allergies  Known allergies and reactions were discussed with the patient. The patient's chart has been reviewed for allergy information and updated as necessary.   Allergies   Allergen Reactions    Sulfa Antibiotics Anaphylaxis     Lip swelling     Allergies reviewed by Cierra Barcenas PharmD on 2/25/2025 at  1:31 PM    Relevant Laboratory Values  Relevant laboratory values were discussed with the patient. The following specialty medication dose adjustment(s) are recommended:   A1C Last 3 Results          7/17/2024    14:44 10/18/2024    15:15 1/13/2025    12:39   HGBA1C Last 3 Results   Hemoglobin A1C 7.6  7.1  7.6      Lab Results   Component Value Date    HGBA1C 7.6 (H) 01/13/2025     Lab Results   Component Value Date    GLUCOSE 220 (H) 01/13/2025    CALCIUM 9.8 01/13/2025     01/13/2025    K 4.2 01/13/2025    CO2 25 01/13/2025    CL 98 01/13/2025    BUN 23 01/13/2025    CREATININE 0.80 01/13/2025    EGFRIFNONA 71 02/05/2019    BCR 29 (H) 01/13/2025    ANIONGAP 12.6 09/04/2024     Lab Results   Component Value Date    CHOL 120 11/30/2017    CHLPL 191 04/17/2024    TRIG 597 (H) 04/17/2024    HDL 32 (L) 04/17/2024    LDL 67 04/17/2024       Current Medication List  This medication list has been reviewed with the patient and evaluated for any interactions or necessary modifications/recommendations, and updated to include all prescription medications, OTC medications, and supplements the patient is currently  taking.  This list reflects what is contained in the patient's profile, which has also been marked as reviewed to communicate to other providers it is the most up to date version of the patient's current medication therapy.     Current Outpatient Medications:     allopurinol (ZYLOPRIM) 100 MG tablet, Take 2 tablets by mouth Daily., Disp: 180 tablet, Rfl: 0    aspirin 81 MG EC tablet, Take 1 tablet by mouth Daily., Disp: , Rfl:     bisoprolol (ZEBeta) 5 MG tablet, TAKE 1/2 TABLET EVERY DAY, Disp: 45 tablet, Rfl: 1    Blood Glucose Monitoring Suppl (True Metrix Air Glucose Meter) w/Device kit, , Disp: , Rfl:     ciprofloxacin (Cipro) 500 MG tablet, Take 1 tablet by mouth 2 (Two) Times a Day., Disp: 14 tablet, Rfl: 0    clotrimazole (LOTRIMIN) 1 % cream, Apply 1 Application topically to the appropriate area as directed Every 12 (Twelve) Hours., Disp: 30 g, Rfl: 1    Continuous Glucose  (FreeStyle Zafar 3 Crescent) device, Use 1 each Daily., Disp: 1 each, Rfl: 1    Continuous Glucose Sensor (FreeStyle Zafar 3 Sensor) misc, Use 1 each Every 14 (Fourteen) Days., Disp: 6 each, Rfl: 3    Continuous Glucose Sensor (FreeStyle Zafar 3 Sensor) misc, Use 1 each Every 14 (Fourteen) Days., Disp: 2 each, Rfl: 5    DULoxetine (CYMBALTA) 60 MG capsule, Take 1 capsule by mouth Daily., Disp: 90 capsule, Rfl: 1    empagliflozin (Jardiance) 25 MG tablet tablet, Take 1 tablet by mouth Daily., Disp: 90 tablet, Rfl: 1    estradiol (ESTRACE) 0.1 MG/GM vaginal cream, Apply Monday, Wednesday, Friday to urethra and vaginal opening., Disp: 42.5 g, Rfl: 12    fluconazole (Diflucan) 200 MG tablet, 1 po now, ok to repeat in 3 days if needed, Disp: 2 tablet, Rfl: 0    fluticasone (FLONASE) 50 MCG/ACT nasal spray, Administer 2 sprays into the nostril(s) as directed by provider Daily., Disp: 48 g, Rfl: 1    folic acid (FOLVITE) 1 MG tablet, Take 1 tablet by mouth once daily, Disp: 90 tablet, Rfl: 0    furosemide (LASIX) 40 MG tablet, TAKE 1  TABLET BY MOUTH IN THE MORNING, Disp: 90 tablet, Rfl: 1    Gemtesa 75 MG tablet, Take 1 tablet by mouth once daily, Disp: 30 tablet, Rfl: 0    glimepiride (AMARYL) 2 MG tablet, Take 1 tablet by mouth Daily., Disp: 90 tablet, Rfl: 1    Insulin Glargine (LANTUS SOLOSTAR) 100 UNIT/ML injection pen, Inject 55 Units under the skin into the appropriate area as directed Every Morning., Disp: 35 mL, Rfl: 5    Insulin Pen Needle (Pen Needles) 31G X 6 MM misc, 1 daily for lantus, Disp: 100 each, Rfl: 5    lisinopril (PRINIVIL,ZESTRIL) 5 MG tablet, Take 1 tablet by mouth Daily., Disp: 90 tablet, Rfl: 1    metFORMIN (GLUCOPHAGE) 850 MG tablet, Take 1 tablet by mouth Daily With Breakfast., Disp: 180 tablet, Rfl: 1    methotrexate 2.5 MG tablet, Take 8 tablets by mouth 1 (One) Time Per Week., Disp: 40 tablet, Rfl: 3    midodrine (PROAMATINE) 5 MG tablet, Take 2 tablets by mouth twice daily, Disp: 180 tablet, Rfl: 1    nitrofurantoin, macrocrystal-monohydrate, (Macrobid) 100 MG capsule, Take 1 capsule by mouth Every Night., Disp: 30 capsule, Rfl: 2    nitroglycerin (NITROSTAT) 0.4 MG SL tablet, 1 under the tongue as needed for angina, may repeat q5mins for up three doses, Disp: 25 tablet, Rfl: 3    omeprazole (priLOSEC) 40 MG capsule, Take 1 capsule by mouth Every Morning., Disp: , Rfl:     ondansetron (ZOFRAN) 4 MG tablet, Take 1 tablet by mouth Every 8 (Eight) Hours As Needed for Nausea or Vomiting., Disp: 20 tablet, Rfl: 1    phenazopyridine (Pyridium) 200 MG tablet, Take 1 tablet by mouth 3 (Three) Times a Day As Needed for Bladder Spasms or Dysuria., Disp: 20 tablet, Rfl: 0    potassium chloride (MICRO-K) 10 MEQ CR capsule, Take 1 capsule by mouth Daily., Disp: 90 capsule, Rfl: 0    pregabalin (LYRICA) 100 MG capsule, TAKE 1 CAPSULE BY MOUTH THREE TIMES DAILY, Disp: 90 capsule, Rfl: 2    rizatriptan (MAXALT) 10 MG tablet, Take 1 tablet by mouth 1 (One) Time As Needed for Migraine. May repeat in 2 hours if needed, Disp: 9  tablet, Rfl: 5    rosuvastatin (CRESTOR) 20 MG tablet, Take 1 tablet by mouth Daily., Disp: 90 tablet, Rfl: 1    Semaglutide, 2 MG/DOSE, (Ozempic, 2 MG/DOSE,) 8 MG/3ML solution pen-injector, Inject 2 mg under the skin into the appropriate area as directed 1 (One) Time Per Week., Disp: 3 mL, Rfl: 3    spironolactone (ALDACTONE) 25 MG tablet, Take 1 tablet by mouth once daily, Disp: 90 tablet, Rfl: 0    Tocilizumab (Actemra ACTPen) 162 MG/0.9ML solution auto-injector injection, Inject 0.9 mL under the skin into the appropriate area as directed Every 14 (Fourteen) Days., Disp: 1.8 mL, Rfl: 5    topiramate (Topamax) 25 MG tablet, Take 1 tablet by mouth Daily for 90 days., Disp: 90 tablet, Rfl: 1    True Metrix Blood Glucose Test test strip, , Disp: , Rfl:     Vascepa 1 g capsule capsule, TAKE 2 CAPSULES BY MOUTH TWICE DAILY WITH MEALS, Disp: 360 capsule, Rfl: 0    Medicines reviewed by Cierra Barcenas, PharmD on 2/25/2025 at  1:31 PM    Drug Interactions  No medication interactions    Adverse Drug Reactions  Medication tolerability: Tolerating with no to minimal ADRs  Medication plan: Continue therapy with normal follow-up  Plan for ADR Management: no ADRs    Adherence, Self-Administration, and Current Therapy Problems  Adherence related to the patient's specialty therapy was discussed with the patient. The Adherence segment of this outreach has been reviewed and updated.     Adherence Questions  Linked Medication(s) Assessed: Tocilizumab (Actemra ACTPen)  On average, how many doses/injections does the patient miss per month?: 0  What are the identified reasons for non-adherence or missed doses? : no problems identified  What is the estimated medication adherence level?: %  Based on the patient/caregiver response and refill history, does this patient require an MTP to track adherence improvements?: no    Additional Barriers to Patient Self-Administration: no  Methods for Supporting Patient Self-Administration:  no    Open Medication Therapy Problems  No medication therapy recommendations to display    Goals of Therapy  Goals related to the patient's specialty therapy were discussed with the patient. The Patient Goals segment of this outreach has been reviewed and updated.   Goals Addressed Today        Specialty Pharmacy General Goal      Reduce number of flares and pain score.     2.25.25: Reduction in pain               Quality of Life Assessment   Quality of Life related to the patient's enrollment in the patient management program and services provided was discussed with the patient. The QOL segment of this outreach has been reviewed and updated.  Quality of Life Improvement Scale: 8-Moderately better    Reassessment Plan & Follow-Up  1. Medication Therapy Changes: Actemra 162mg every 14 days  2. Related Plans, Therapy Recommendations, or Issues to Be Addressed: no questions or concerns at this time  3. Pharmacist to perform regular assessments no more than (6) months from the previous assessment.  4. Care Coordinator to set up future refill outreaches, coordinate prescription delivery, and escalate clinical questions to pharmacist.    Attestation  Therapeutic appropriateness: Appropriate   I attest the patient was actively involved in and has agreed to the above plan of care.  If the prescribed therapy is at any point deemed not appropriate based on the current or future assessments, a consultation will be initiated with the patient's specialty care provider to determine the best course of action. The revised plan of therapy will be documented along with any required assessments and/or additional patient education provided.     Cierra Barcenas PharmD, BCPS  Clinical Specialty Pharmacist, Rheumatology   2/25/2025  13:36 EST

## 2025-02-25 NOTE — TELEPHONE ENCOUNTER
Caller: Lucia Stevens    Relationship: Self    Best call back number: [phone]     What is the best time to reach you: ANYTIME    Who are you requesting to speak with (clinical staff, provider,  specific staff member): DR MARTIN'S NURSE    What was the call regarding: PATIENT WAS ASLEEP WHEN SHE RECEIVED HER CALL BACK AND FORGOT TO ASK QUESTIONS.

## 2025-02-25 NOTE — PROGRESS NOTES
Specialty Pharmacy Patient Management Program  Rheumatology Refill Outreach      Lucia is a 64 y.o. female contacted today regarding refills of her medication(s).    Reviewed and verified with patient: yes     Specialty medication(s) and dose(s) confirmed: Actemra 162mg every 14 days    Refill Questions      Flowsheet Row Most Recent Value   Changes to allergies? No   Changes to medications? No   New conditions or infections since last clinic visit No   Unplanned office visit, urgent care, ED, or hospital admission in the last 4 weeks  No   How does patient/caregiver feel medication is working? Very good   Financial problems or insurance changes  No   Since the previous refill, were any specialty medication doses or scheduled injections missed or delayed?  No   Does this patient require a clinical escalation to a pharmacist? No          Delivery Questions      Flowsheet Row Most Recent Value   Delivery method [employer]   Delivery address verified with patient/caregiver? Yes   Delivery address Home   Number of medications in delivery 1   Medication(s) being filled and delivered Tocilizumab (Actemra ACTPen)   Doses left of specialty medications 0   Copay verified? Yes   Copay amount $0   Copay form of payment No copayment ($0)   Delivery Date Selection 02/27/25   Signature Required No            Follow-Up: 28 days    Cierra Barcenas, PharmD, BCPS  Pharmacist, Rheumatology

## 2025-03-14 NOTE — PROGRESS NOTES
Office Visit       Date: 03/20/2025   Patient Name: Lucia Stevens  MRN: [id number]  YOB: 1960    Referring Physician: No ref. provider found     Chief Complaint:   Chief Complaint   Patient presents with    Osteoarthritis    Sjogren's    Scleroderma    Rheumatoid Arthritis       History of Present Illness: Lucia Stevens is a 64 y.o. female who is here today for follow-up of Sjogren's syndrome, scleroderma, rheumatoid arthritis, and osteoarthritis.  She established care with our office as of 2/16/24.     We have prescribed her methotrexate, folic acid, and prednisone 5 mg/day. She started Actemra injections 9/2024. She does not feel it has helped her joints much.  She rates her pain as 5/10 in severity. She has 60 minutes/day of morning stiffness. She has limited mobility in her hands and difficult making a fist. She notes joint swelling in her hands often. She has warmth and erythema in some of her hand joints at time.   No recent serious injuries or infections. No fevers.    Subjective   Review of systems:  Review of Systems   Constitutional:  Positive for activity change, appetite change and fatigue.   HENT:  Positive for dental problem, drooling, ear pain, mouth sores, sore throat, trouble swallowing and voice change.    Eyes:  Positive for discharge and visual disturbance.   Respiratory:  Positive for choking.    Cardiovascular:  Positive for leg swelling.   Gastrointestinal:  Positive for abdominal distention.   Endocrine: Positive for polyuria.   Genitourinary:  Positive for frequency, hematuria and urgency.   Musculoskeletal:  Positive for arthralgias, myalgias and neck stiffness.   Neurological:  Positive for weakness and headaches.   Psychiatric/Behavioral:  Positive for agitation, decreased concentration and dysphoric mood.      Past Medical History:   Past Medical History:   Diagnosis Date    Abdominal migraine, not intractable 08/12/2024    CHF (congestive heart  failure)     Coronary artery disease     2 stents    Depression     Gout     Hypercholesterolemia     Hypertension     Neuropathy     Psoriasis     Rheumatoid arthritis     Scleroderma     Sjoegren syndrome     SOB (shortness of breath) on exertion     Tobacco abuse 2011    cessation     Type 2 diabetes mellitus with diabetic neuropathy, without long-term current use of insulin 01/17/2024    Wears dentures     full set    Wears glasses        Past Surgical History:   Past Surgical History:   Procedure Laterality Date    APPENDECTOMY      CARDIAC CATHETERIZATION      CARDIAC CATHETERIZATION N/A 11/30/2017    Procedure: Left Heart Cath;  Surgeon: Galina Leonard MD;  Location:  [facility];  Service:     CARDIAC ELECTROPHYSIOLOGY PROCEDURE N/A 03/28/2018    Procedure: Device Implant BiV ICD;  Surgeon: Sarbjit Ellison DO;  Location:  [facility];  Service: Cardiovascular    CERVICAL DISCECTOMY ANTERIOR      CHOLECYSTECTOMY      COLONOSCOPY  09/2022    CORONARY ANGIOPLASTY      CORONARY ANGIOPLASTY WITH STENT PLACEMENT      Percutaneous transluminal balloon angioplasty with insertion of stent into coronary artery    CORONARY ANGIOPLASTY WITH STENT PLACEMENT      CORONARY STENT PLACEMENT      D & C HYSTEROSCOPY N/A 10/06/2022    Procedure: DILATATION AND CURETTAGE HYSTEROSCOPY;  Surgeon: Davide Durbin MD;  Location: [facility] OR;  Service: Obstetrics/Gynecology;  Laterality: N/A;    ENDOSCOPY  09/2022    OTHER SURGICAL HISTORY      growth removed from small intestine as a child    POLYPECTOMY      Colon polyps status    RECTOVAGINAL FISTULA REPAIR      TONSILLECTOMY      TUBAL ABDOMINAL LIGATION  1998?    Only 1    WISDOM TOOTH EXTRACTION         Family History:   Family History   Problem Relation Age of Onset    Heart disease Mother     Osteoporosis Mother     Hypertension Mother     Cancer Mother         bladder    Heart attack Father     Colon cancer Father     Prostate cancer Father      Coronary artery disease Father     Diabetes Father     Cancer Father     Arthritis Father     Heart disease Father         heart problems    No Known Problems Brother     Cancer Maternal Grandmother     Brain cancer Maternal Grandmother     Stomach cancer Maternal Grandfather     Heart failure Paternal Grandmother     Breast cancer Paternal Grandmother     Heart attack Paternal Grandfather        Social History:   Social History     Socioeconomic History    Marital status:     Number of children: 2    Years of education: 13    Highest education level: Some college, no degree   Tobacco Use    Smoking status: Former     Current packs/day: 0.00     Average packs/day: 0.5 packs/day for 20.0 years (10.0 ttl pk-yrs)     Types: Cigarettes     Start date: 1991     Quit date: 2011     Years since quittin.2     Passive exposure: Past    Smokeless tobacco: Never    Tobacco comments:     Tobacco use status: Occasional cigarette smoker; Smoking status: Heavy tobacco smoker.   Vaping Use    Vaping status: Never Used   Substance and Sexual Activity    Alcohol use: Yes     Alcohol/week: 1.0 standard drink of alcohol     Types: 1 Shots of liquor per week     Comment: Social drinker; There is a history of alcohol use. Consumed rarely.    Drug use: No    Sexual activity: Not Currently     Partners: Male       Medications:   Current Outpatient Medications:     allopurinol (ZYLOPRIM) 100 MG tablet, Take 2 tablets by mouth Daily., Disp: 180 tablet, Rfl: 0    aspirin 81 MG EC tablet, Take 1 tablet by mouth Daily., Disp: , Rfl:     bisoprolol (ZEBeta) 5 MG tablet, TAKE 1/2 TABLET EVERY DAY, Disp: 45 tablet, Rfl: 1    Blood Glucose Monitoring Suppl (True Metrix Air Glucose Meter) w/Device kit, , Disp: , Rfl:     Continuous Glucose  (FreeStyle Zafar 3 Arab) device, Use 1 each Daily., Disp: 1 each, Rfl: 1    Continuous Glucose Sensor (FreeStyle Zafar 3 Sensor) misc, Use 1 each Every 14 (Fourteen) Days., Disp:  6 each, Rfl: 3    Continuous Glucose Sensor (FreeStyle Zafar 3 Sensor) misc, Use 1 each Every 14 (Fourteen) Days., Disp: 2 each, Rfl: 5    DULoxetine (CYMBALTA) 60 MG capsule, Take 1 capsule by mouth Daily., Disp: 90 capsule, Rfl: 1    empagliflozin (Jardiance) 25 MG tablet tablet, Take 1 tablet by mouth Daily., Disp: 90 tablet, Rfl: 1    folic acid (FOLVITE) 1 MG tablet, Take 1 tablet by mouth Daily., Disp: 90 tablet, Rfl: 0    furosemide (LASIX) 40 MG tablet, TAKE 1 TABLET BY MOUTH IN THE MORNING, Disp: 90 tablet, Rfl: 1    glimepiride (AMARYL) 2 MG tablet, Take 1 tablet by mouth Daily., Disp: 90 tablet, Rfl: 1    Insulin Glargine (LANTUS SOLOSTAR) 100 UNIT/ML injection pen, Inject 55 Units under the skin into the appropriate area as directed Every Morning., Disp: 35 mL, Rfl: 5    Insulin Pen Needle (Pen Needles) 31G X 6 MM misc, 1 daily for lantus, Disp: 100 each, Rfl: 5    lisinopril (PRINIVIL,ZESTRIL) 5 MG tablet, Take 1 tablet by mouth Daily., Disp: 90 tablet, Rfl: 1    metFORMIN (GLUCOPHAGE) 850 MG tablet, Take 1 tablet by mouth Daily With Breakfast., Disp: 180 tablet, Rfl: 1    methotrexate 2.5 MG tablet, Take 8 tablets by mouth 1 (One) Time Per Week., Disp: 40 tablet, Rfl: 3    midodrine (PROAMATINE) 5 MG tablet, Take 2 tablets by mouth twice daily, Disp: 180 tablet, Rfl: 0    nitroglycerin (NITROSTAT) 0.4 MG SL tablet, 1 under the tongue as needed for angina, may repeat q5mins for up three doses, Disp: 25 tablet, Rfl: 3    nystatin (MYCOSTATIN) 100,000 unit/mL suspension, , Disp: , Rfl:     omeprazole (priLOSEC) 40 MG capsule, Take 1 capsule by mouth Every Morning., Disp: , Rfl:     ondansetron (ZOFRAN) 4 MG tablet, Take 1 tablet by mouth Every 8 (Eight) Hours As Needed for Nausea or Vomiting., Disp: 20 tablet, Rfl: 1    phenazopyridine (Pyridium) 200 MG tablet, Take 1 tablet by mouth 3 (Three) Times a Day As Needed for Bladder Spasms or Dysuria., Disp: 20 tablet, Rfl: 0    potassium chloride (MICRO-K) 10  "MEQ CR capsule, Take 1 capsule by mouth Daily., Disp: 90 capsule, Rfl: 0    pregabalin (LYRICA) 100 MG capsule, TAKE 1 CAPSULE BY MOUTH THREE TIMES DAILY, Disp: 90 capsule, Rfl: 2    rizatriptan (MAXALT) 10 MG tablet, Take 1 tablet by mouth 1 (One) Time As Needed for Migraine. May repeat in 2 hours if needed, Disp: 9 tablet, Rfl: 5    rosuvastatin (CRESTOR) 20 MG tablet, Take 1 tablet by mouth Daily., Disp: 90 tablet, Rfl: 1    Semaglutide, 2 MG/DOSE, (Ozempic, 2 MG/DOSE,) 8 MG/3ML solution pen-injector, Inject 2 mg under the skin into the appropriate area as directed 1 (One) Time Per Week., Disp: 3 mL, Rfl: 3    spironolactone (ALDACTONE) 25 MG tablet, Take 1 tablet by mouth once daily, Disp: 90 tablet, Rfl: 0    True Metrix Blood Glucose Test test strip, , Disp: , Rfl:     Vascepa 1 g capsule capsule, TAKE 2 CAPSULES BY MOUTH TWICE DAILY WITH MEALS, Disp: 360 capsule, Rfl: 0    predniSONE (DELTASONE) 5 MG tablet, Take 0.5-1 tablets by mouth Daily., Disp: 30 tablet, Rfl: 3    topiramate (Topamax) 25 MG tablet, Take 1 tablet by mouth Daily for 90 days., Disp: 90 tablet, Rfl: 1    Allergies:   Allergies   Allergen Reactions    Sulfa Antibiotics Anaphylaxis     Lip swelling     I reviewed the patient's chief complaint, history of present illness, review of systems, past medical history, surgical history, family history, social history, medications and allergy list.     Objective    Vital Signs:   Vitals:    03/20/25 1305   BP: 114/78   BP Location: Left arm   Patient Position: Sitting   Cuff Size: Adult   Pulse: 73   Temp: 97.7 °F (36.5 °C)   Weight: 94.3 kg (208 lb)   Height: 162.6 cm (64\")   PainSc: 5    PainLoc: Generalized     Body mass index is 35.7 kg/m².    Defer to PCP    Physical Exam:  Physical Exam  Constitutional:       Appearance: Normal appearance. She is obese.   HENT:      Head: Normocephalic and atraumatic.   Eyes:      Conjunctiva/sclera: Conjunctivae normal.      Pupils: Pupils are equal, round, and " reactive to light.   Cardiovascular:      Rate and Rhythm: Normal rate.   Pulmonary:      Effort: Pulmonary effort is normal.   Musculoskeletal:         General: Normal range of motion.      Cervical back: Normal range of motion.      Comments: Synovitis noted- see joint man  She is unable to make a fist bilaterally. She has contractures in the left 5th and bilateral 4th fingers  Left 2nd and 3rd MCP erythematous and warm to touch   Skin:     General: Skin is warm and dry.   Neurological:      General: No focal deficit present.      Mental Status: She is alert and oriented to person, place, and time.   Psychiatric:         Mood and Affect: Mood normal.         Behavior: Behavior normal.         Thought Content: Thought content normal.         Judgment: Judgment normal.            Metrics  GROVE-28 (ESR): --  GROVE-28 (CRP): --  Tender (GROVE-28): 4 / 28   Swollen (GROVE-28): 8 / 28     This Exam  Provider Global: 30 / 100  Patient Global: 50 / 100  ESR: --  CRP: --      Assessment / Plan    Assessment/Plan:   Diagnoses and all orders for this visit:    1. Rheumatoid arthritis with positive rheumatoid factor, involving unspecified site (Primary)  Assessment & Plan:  * 12/20/23: CBC normal, PREET positive, Centromere 2.4 (< 0.9), Chromatin normal, DS DNA normal, Genevieve 1 normal, Ribosomal P normal, RNP normal, SCL 70 normal, Clemente normal, SSA > 8.0 (<0.9), SSB negative, CCP 3.7 (<3.0 normal), CRP 1.37 (<0.50), RF negative, Uric acid normal, urinalysis showed elevated glucose but otherwise was ok, CBC was fine]  * Medications/treatments/interventions tried include: Tylenol, she is sulfa allergic (Cannot take sulfasalazine), pregabalin, Duloxetine, Aspirin, allopurinol, steroids, Advil, Aleve, indomethacin, she saw [facility], MTX/folic acid, prednisone, SQ Actemra 9/24-3/25    1. She has previously seen [facility]   2. Dr. Diamond wanted to avoid hydroxychloroquine because she has a history of  CHF and coronary artery disease. We will also avoid JAKs with history of CAD/stents.   3. Continue MTX 20 mg/week and daily folic acid.   4. Continue prednisone 2.5 to 5 mg/day.  5. We will avoid TNF inhibitors with history of CHF.   6. She started Actemra 9/2024. She has been on this about 6 months. She has not seen much improvement in her joints.   7. We will PA Kevzara or Orencia.  8. Follow up in 3-4 months    Orders:  -     methotrexate 2.5 MG tablet; Take 8 tablets by mouth 1 (One) Time Per Week.  Dispense: 40 tablet; Refill: 3  -     folic acid (FOLVITE) 1 MG tablet; Take 1 tablet by mouth Daily.  Dispense: 90 tablet; Refill: 0  -     Comprehensive Metabolic Panel; Standing  -     C-reactive Protein; Standing  -     Sedimentation Rate; Standing  -     CBC (No Diff); Standing  -     predniSONE (DELTASONE) 5 MG tablet; Take 0.5-1 tablets by mouth Daily.  Dispense: 30 tablet; Refill: 3    2. Scleroderma  Assessment & Plan:  1. She is PREET and Centromere test positive.   2. 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 She sees a cardiologist  4. She has history of reflux. She follows with gastroenterology.   5. No history of renal crisis.  6. She denies any issues with Raynaud's  7. Continue/refill MTX & folic acid  8. Continue annual follow up with Dr. Gay pulmonology for ILD screening  9. We recommend patients with scleroderma be screened for ILD and pulmonary HTN once/year    Orders:  -     Comprehensive Metabolic Panel; Standing  -     C-reactive Protein; Standing  -     Sedimentation Rate; Standing  -     CBC (No Diff); Standing    3. Secondary Sjogren's syndrome  Assessment & Plan:  1. She needs regular dental/opthalmic examinations   2. Continue medications as above  3. Discussed conservative measures to help with dry mouth and dry eyes    Orders:  -     Comprehensive Metabolic Panel; Standing  -     C-reactive Protein; Standing  -     Sedimentation Rate; Standing  -     CBC (No  Diff); Standing    4. Immunodeficiency due to treatment with immunosuppressive medication  Assessment & Plan:  *PA Orencia or Kevzara 3/2025  *QTB and hepatitis panel negative 9/4/2024  *No diverticulitis. No DVT/MI. She has CAD. She has a history of skin cancer. No other cancers. No COPD. She does have a history of CHF.  *CXR 2/16/24 normal    Hold for illness/infection and resume when well  Hold medication perioperatively  No live vaccines on this medication      5. Long term current use of systemic steroids  Assessment & Plan:  * Prednisone 5 mg/day for RA/Sjogren's/Scleroderma.    Ideally she will taper off as her condition improves/stabilizes.    Orders:  -     Comprehensive Metabolic Panel; Standing  -     C-reactive Protein; Standing  -     Sedimentation Rate; Standing  -     CBC (No Diff); Standing  -     predniSONE (DELTASONE) 5 MG tablet; Take 0.5-1 tablets by mouth Daily.  Dispense: 30 tablet; Refill: 3    6. Encounter for long-term (current) use of high-risk medication  Assessment & Plan:  * Methotrexate 20 mg PO once/week for RA/Sjogren's/Scleroderma  * Started 2/16/24.    1. CBC and CMP every 8-12 weeks to monitor for medication toxicity.   2. Take folate supplements daily.  3. No recent serious infections    Orders:  -     methotrexate 2.5 MG tablet; Take 8 tablets by mouth 1 (One) Time Per Week.  Dispense: 40 tablet; Refill: 3  -     folic acid (FOLVITE) 1 MG tablet; Take 1 tablet by mouth Daily.  Dispense: 90 tablet; Refill: 0  -     Comprehensive Metabolic Panel; Standing  -     C-reactive Protein; Standing  -     Sedimentation Rate; Standing  -     CBC (No Diff); Standing    7. Psoriasis  Assessment & Plan:  Currently stable      8. Osteoarthritis of multiple joints, unspecified osteoarthritis type  Assessment & Plan:  1. Tylenol PRN is ok as directed  2. She has tried taking NSAIDS like Advil, Aleve, and indomethacin   3. She is taking pregabalin for neuropathic pain  4. She is prescribed  duloxetine from other provider      9. Gout, unspecified cause, unspecified chronicity, unspecified site  Assessment & Plan:  She has a history of gout. She takes allopurinol from other provider.        Follow Up:   Return in about 4 months (around 7/20/2025) for Dr. Diamond, YEIMI Leon.    YEIMI Leon  [facility]

## 2025-03-14 NOTE — ASSESSMENT & PLAN NOTE
1. She is PREET and Centromere test positive.   2. 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 She sees a cardiologist  4. She has history of reflux. She follows with gastroenterology.   5. No history of renal crisis.  6. She denies any issues with Raynaud's  7. Continue/refill MTX & folic acid  8. Continue annual follow up with Dr. Gay pulmonology for ILD screening  9. We recommend patients with scleroderma be screened for ILD and pulmonary HTN once/year

## 2025-03-14 NOTE — ASSESSMENT & PLAN NOTE
* Prednisone 5 mg/day for RA/Sjogren's/Scleroderma.    Ideally she will taper off as her condition improves/stabilizes.

## 2025-03-14 NOTE — ASSESSMENT & PLAN NOTE
*PA Orencia or Kevzara 3/2025  *QTB and hepatitis panel negative 9/4/2024  *No diverticulitis. No DVT/MI. She has CAD. She has a history of skin cancer. No other cancers. No COPD. She does have a history of CHF.  *CXR 2/16/24 normal    Hold for illness/infection and resume when well  Hold medication perioperatively  No live vaccines on this medication

## 2025-03-14 NOTE — ASSESSMENT & PLAN NOTE
* 12/20/23: CBC normal, PREET positive, Centromere 2.4 (< 0.9), Chromatin normal, DS DNA normal, Genevieve 1 normal, Ribosomal P normal, RNP normal, SCL 70 normal, Clemente normal, SSA > 8.0 (<0.9), SSB negative, CCP 3.7 (<3.0 normal), CRP 1.37 (<0.50), RF negative, Uric acid normal, urinalysis showed elevated glucose but otherwise was ok, CBC was fine]  * Medications/treatments/interventions tried include: Tylenol, she is sulfa allergic (Cannot take sulfasalazine), pregabalin, Duloxetine, Aspirin, allopurinol, steroids, Advil, Aleve, indomethacin, she saw [facility], MTX/folic acid, prednisone, SQ Actemra 9/24-3/25    1. She has previously seen [facility]   2. Dr. Diamond wanted to avoid hydroxychloroquine because she has a history of CHF and coronary artery disease. We will also avoid JAKs with history of CAD/stents.   3. Continue MTX 20 mg/week and daily folic acid.   4. Continue prednisone 2.5 to 5 mg/day.  5. We will avoid TNF inhibitors with history of CHF.   6. She started Actemra 9/2024. She has been on this about 6 months. She has not seen much improvement in her joints.   7. We will PA Kevzara or Orencia.  8. Follow up in 3-4 months

## 2025-03-17 RX ORDER — MIDODRINE HYDROCHLORIDE 5 MG/1
10 TABLET ORAL 2 TIMES DAILY
Qty: 180 TABLET | Refills: 0 | Status: SHIPPED | OUTPATIENT
Start: 2025-03-17

## 2025-03-20 ENCOUNTER — LAB (OUTPATIENT)
Facility: HOSPITAL | Age: 65
End: 2025-03-20
Payer: MEDICARE

## 2025-03-20 ENCOUNTER — OFFICE VISIT (OUTPATIENT)
Age: 65
End: 2025-03-20
Payer: MEDICARE

## 2025-03-20 ENCOUNTER — TELEPHONE (OUTPATIENT)
Age: 65
End: 2025-03-20

## 2025-03-20 VITALS
BODY MASS INDEX: 35.51 KG/M2 | HEART RATE: 73 BPM | TEMPERATURE: 97.7 F | DIASTOLIC BLOOD PRESSURE: 78 MMHG | WEIGHT: 208 LBS | HEIGHT: 64 IN | SYSTOLIC BLOOD PRESSURE: 114 MMHG

## 2025-03-20 DIAGNOSIS — Z79.899 ENCOUNTER FOR LONG-TERM (CURRENT) USE OF HIGH-RISK MEDICATION: ICD-10-CM

## 2025-03-20 DIAGNOSIS — M35.00 SECONDARY SJOGREN'S SYNDROME: ICD-10-CM

## 2025-03-20 DIAGNOSIS — M34.9 SCLERODERMA: ICD-10-CM

## 2025-03-20 DIAGNOSIS — M05.9 RHEUMATOID ARTHRITIS WITH POSITIVE RHEUMATOID FACTOR, INVOLVING UNSPECIFIED SITE: Primary | ICD-10-CM

## 2025-03-20 DIAGNOSIS — Z79.52 LONG TERM CURRENT USE OF SYSTEMIC STEROIDS: ICD-10-CM

## 2025-03-20 DIAGNOSIS — Z79.899 IMMUNODEFICIENCY DUE TO TREATMENT WITH IMMUNOSUPPRESSIVE MEDICATION: ICD-10-CM

## 2025-03-20 DIAGNOSIS — D84.821 IMMUNODEFICIENCY DUE TO TREATMENT WITH IMMUNOSUPPRESSIVE MEDICATION: ICD-10-CM

## 2025-03-20 DIAGNOSIS — M15.9 OSTEOARTHRITIS OF MULTIPLE JOINTS, UNSPECIFIED OSTEOARTHRITIS TYPE: ICD-10-CM

## 2025-03-20 DIAGNOSIS — L40.9 PSORIASIS: ICD-10-CM

## 2025-03-20 DIAGNOSIS — M05.9 RHEUMATOID ARTHRITIS WITH POSITIVE RHEUMATOID FACTOR, INVOLVING UNSPECIFIED SITE: ICD-10-CM

## 2025-03-20 DIAGNOSIS — M10.9 GOUT, UNSPECIFIED CAUSE, UNSPECIFIED CHRONICITY, UNSPECIFIED SITE: ICD-10-CM

## 2025-03-20 LAB
ALBUMIN SERPL-MCNC: 4.2 G/DL (ref 3.5–5.2)
ALBUMIN/GLOB SERPL: 1.2 G/DL
ALP SERPL-CCNC: 83 U/L (ref 39–117)
ALT SERPL W P-5'-P-CCNC: 15 U/L (ref 1–33)
ANION GAP SERPL CALCULATED.3IONS-SCNC: 13.5 MMOL/L (ref 5–15)
AST SERPL-CCNC: 27 U/L (ref 1–32)
BILIRUB SERPL-MCNC: 0.6 MG/DL (ref 0–1.2)
BUN SERPL-MCNC: 18 MG/DL (ref 8–23)
BUN/CREAT SERPL: 22 (ref 7–25)
CALCIUM SPEC-SCNC: 10.2 MG/DL (ref 8.6–10.5)
CHLORIDE SERPL-SCNC: 103 MMOL/L (ref 98–107)
CO2 SERPL-SCNC: 25.5 MMOL/L (ref 22–29)
CREAT SERPL-MCNC: 0.82 MG/DL (ref 0.57–1)
CRP SERPL-MCNC: <0.3 MG/DL (ref 0–0.5)
DEPRECATED RDW RBC AUTO: 52.8 FL (ref 37–54)
EGFRCR SERPLBLD CKD-EPI 2021: 79.5 ML/MIN/1.73
ERYTHROCYTE [DISTWIDTH] IN BLOOD BY AUTOMATED COUNT: 15.1 % (ref 12.3–15.4)
ERYTHROCYTE [SEDIMENTATION RATE] IN BLOOD: 28 MM/HR (ref 0–30)
GLOBULIN UR ELPH-MCNC: 3.4 GM/DL
GLUCOSE SERPL-MCNC: 108 MG/DL (ref 65–99)
HCT VFR BLD AUTO: 40.8 % (ref 34–46.6)
HGB BLD-MCNC: 14 G/DL (ref 12–15.9)
MCH RBC QN AUTO: 33.1 PG (ref 26.6–33)
MCHC RBC AUTO-ENTMCNC: 34.3 G/DL (ref 31.5–35.7)
MCV RBC AUTO: 96.5 FL (ref 79–97)
PLATELET # BLD AUTO: 226 10*3/MM3 (ref 140–450)
PMV BLD AUTO: 10.4 FL (ref 6–12)
POTASSIUM SERPL-SCNC: 4.4 MMOL/L (ref 3.5–5.2)
PROT SERPL-MCNC: 7.6 G/DL (ref 6–8.5)
RBC # BLD AUTO: 4.23 10*6/MM3 (ref 3.77–5.28)
SODIUM SERPL-SCNC: 142 MMOL/L (ref 136–145)
WBC NRBC COR # BLD AUTO: 7.05 10*3/MM3 (ref 3.4–10.8)

## 2025-03-20 PROCEDURE — 80053 COMPREHEN METABOLIC PANEL: CPT

## 2025-03-20 PROCEDURE — 85652 RBC SED RATE AUTOMATED: CPT

## 2025-03-20 PROCEDURE — 85027 COMPLETE CBC AUTOMATED: CPT

## 2025-03-20 PROCEDURE — 36415 COLL VENOUS BLD VENIPUNCTURE: CPT

## 2025-03-20 PROCEDURE — 86140 C-REACTIVE PROTEIN: CPT

## 2025-03-20 RX ORDER — METHOTREXATE 2.5 MG/1
20 TABLET ORAL WEEKLY
Qty: 40 TABLET | Refills: 3 | Status: SHIPPED | OUTPATIENT
Start: 2025-03-20

## 2025-03-20 RX ORDER — FOLIC ACID 1 MG/1
1000 TABLET ORAL DAILY
Qty: 90 TABLET | Refills: 0 | Status: SHIPPED | OUTPATIENT
Start: 2025-03-20

## 2025-03-20 RX ORDER — NYSTATIN 100000 [USP'U]/ML
SUSPENSION ORAL
COMMUNITY
Start: 2025-03-19

## 2025-03-20 RX ORDER — PREDNISONE 5 MG/1
2.5-5 TABLET ORAL DAILY
Qty: 30 TABLET | Refills: 3 | Status: SHIPPED | OUTPATIENT
Start: 2025-03-20

## 2025-03-20 NOTE — TELEPHONE ENCOUNTER
Please TIA Velarde or Orencia. She is on MTX. She has failed Actemra. Avoiding JAKs with CAD and TNFs with CHF.

## 2025-03-21 ENCOUNTER — SPECIALTY PHARMACY (OUTPATIENT)
Age: 65
End: 2025-03-21
Payer: MEDICARE

## 2025-03-21 RX ORDER — ABATACEPT 125 MG/ML
1 INJECTION, SOLUTION SUBCUTANEOUS
COMMUNITY
End: 2025-03-24 | Stop reason: SDUPTHER

## 2025-03-21 NOTE — TELEPHONE ENCOUNTER
Prior authorization initiated by [facility].  Update will be provided when a determination has been received.     Medication: Orencia Clickject  PA Submission Method: CMM  Case Number/CMM Key: [id number]

## 2025-03-21 NOTE — TELEPHONE ENCOUNTER
PA request has been approved and pharmacy notified (Filling pharmacy will be notified by phone, fax, or submitted prescription)    Authorized Medication: Orencia Clickject  Name of Insurance Approving PA: [employer]  Pharmacy PA Number: [id number]  PA Effective Dates: 12/20/24-3/21/26  Dispensing Pharmacy: [facility]

## 2025-03-24 ENCOUNTER — SPECIALTY PHARMACY (OUTPATIENT)
Facility: HOSPITAL | Age: 65
End: 2025-03-24
Payer: MEDICARE

## 2025-03-24 ENCOUNTER — TELEPHONE (OUTPATIENT)
Dept: CARDIOLOGY | Facility: CLINIC | Age: 65
End: 2025-03-24
Payer: MEDICARE

## 2025-03-24 RX ORDER — ABATACEPT 125 MG/ML
1 INJECTION, SOLUTION SUBCUTANEOUS
Qty: 4 ML | Refills: 5 | Status: SHIPPED | OUTPATIENT
Start: 2025-03-24

## 2025-03-24 NOTE — TELEPHONE ENCOUNTER
Name: Lucia Stevens    Relationship: Self    Best Callback Number: [phone]    Incoming call to the Hub, requesting to  Reschedule their Device Check appointment on 04.24.25.     Per Hub workflow, further review is needed before the task can be completed.    Result of Call: Please reach out to the patient to reschedule

## 2025-03-24 NOTE — PROGRESS NOTES
Specialty Pharmacy Patient Management Program  Rheumatology Initial Assessment     Lucia Stevens was referred by an Rheumatology provider to the Rheumatology Patient Management program offered by [facility] for Rheumatoid Arthritis on 03/24/25.  An initial outreach was conducted, including assessment of therapy appropriateness and specialty medication education for Orencia. The patient was introduced to services offered by [facility], including: regular assessments, refill coordination, curbside pick-up or mail order delivery options, prior authorization maintenance, and financial assistance programs as applicable. The patient was also provided with contact information for the pharmacy team.     Insurance Coverage & Financial Support  [employer]     Relevant Past Medical History and Comorbidities  Relevant medical history and concomitant health conditions were discussed with the patient. The patient's chart has been reviewed for relevant past medical history and comorbid conditions and updated as necessary.  Past Medical History:   Diagnosis Date    Abdominal migraine, not intractable 08/12/2024    CHF (congestive heart failure)     Coronary artery disease     2 stents    Depression     Gout     Hypercholesterolemia     Hypertension     Neuropathy     Psoriasis     Rheumatoid arthritis     Scleroderma     Sjoegren syndrome     SOB (shortness of breath) on exertion     Tobacco abuse 2011    cessation     Type 2 diabetes mellitus with diabetic neuropathy, without long-term current use of insulin 01/17/2024    Wears dentures     full set    Wears glasses      Social History     Socioeconomic History    Marital status:     Number of children: 2    Years of education: 13    Highest education level: Some college, no degree   Tobacco Use    Smoking status: Former     Current packs/day: 0.00     Average packs/day: 0.5 packs/day for 20.0 years (10.0 ttl pk-yrs)     Types:  Cigarettes     Start date: 1991     Quit date: 2011     Years since quittin.2     Passive exposure: Past    Smokeless tobacco: Never    Tobacco comments:     Tobacco use status: Occasional cigarette smoker; Smoking status: Heavy tobacco smoker.   Vaping Use    Vaping status: Never Used   Substance and Sexual Activity    Alcohol use: Yes     Alcohol/week: 1.0 standard drink of alcohol     Types: 1 Shots of liquor per week     Comment: Social drinker; There is a history of alcohol use. Consumed rarely.    Drug use: No    Sexual activity: Not Currently     Partners: Male       Problem list reviewed by Cierra Barcenas, PharmD on 3/24/2025 at 10:19 AM    Allergies  Known allergies and reactions were discussed with the patient. The patient's chart has been reviewed for  allergy information and updated as necessary.   Allergies   Allergen Reactions    Sulfa Antibiotics Anaphylaxis     Lip swelling       Allergies reviewed by Cierra Barcenas PharmD on 3/24/2025 at 10:19 AM    Relevant Laboratory Values  Relevant laboratory values were discussed with the patient. The following specialty medication dose adjustment(s) are recommended:   A1C Last 3 Results          2024    14:44 10/18/2024    15:15 2025    12:39   HGBA1C Last 3 Results   Hemoglobin A1C 7.6  7.1  7.6      Lab Results   Component Value Date    HGBA1C 7.6 (H) 2025     Lab Results   Component Value Date    GLUCOSE 108 (H) 2025    CALCIUM 10.2 2025     2025    K 4.4 2025    CO2 25.5 2025     2025    BUN 18 2025    CREATININE 0.82 2025    EGFRIFNONA 71 2019    BCR 22.0 2025    ANIONGAP 13.5 2025     Lab Results   Component Value Date    CHOL 120 2017    CHLPL 191 2024    TRIG 597 (H) 2024    HDL 32 (L) 2024    LDL 67 2024         Current Medication List  This medication list has been reviewed with the patient and evaluated for any  interactions or necessary modifications/recommendations, and updated to include all prescription medications, OTC medications, and supplements the patient is currently taking.  This list reflects what is contained in the patient's profile, which has also been marked as reviewed to communicate to other providers it is the most up to date version of the patient's current medication therapy.     Current Outpatient Medications:     Abatacept (Orencia ClickJect) 125 MG/ML solution auto-injector, Inject 1 mL under the skin into the appropriate area as directed Every 7 (Seven) Days., Disp: 4 mL, Rfl: 5    allopurinol (ZYLOPRIM) 100 MG tablet, Take 2 tablets by mouth Daily., Disp: 180 tablet, Rfl: 0    aspirin 81 MG EC tablet, Take 1 tablet by mouth Daily., Disp: , Rfl:     bisoprolol (ZEBeta) 5 MG tablet, TAKE 1/2 TABLET EVERY DAY, Disp: 45 tablet, Rfl: 1    Blood Glucose Monitoring Suppl (True Metrix Air Glucose Meter) w/Device kit, , Disp: , Rfl:     Continuous Glucose  (FreeStyle Zafar 3 Thurman) device, Use 1 each Daily., Disp: 1 each, Rfl: 1    Continuous Glucose Sensor (FreeStyle Zafar 3 Sensor) misc, Use 1 each Every 14 (Fourteen) Days., Disp: 6 each, Rfl: 3    Continuous Glucose Sensor (FreeStyle Zafar 3 Sensor) misc, Use 1 each Every 14 (Fourteen) Days., Disp: 2 each, Rfl: 5    DULoxetine (CYMBALTA) 60 MG capsule, Take 1 capsule by mouth Daily., Disp: 90 capsule, Rfl: 1    empagliflozin (Jardiance) 25 MG tablet tablet, Take 1 tablet by mouth Daily., Disp: 90 tablet, Rfl: 1    folic acid (FOLVITE) 1 MG tablet, Take 1 tablet by mouth Daily., Disp: 90 tablet, Rfl: 0    furosemide (LASIX) 40 MG tablet, TAKE 1 TABLET BY MOUTH IN THE MORNING, Disp: 90 tablet, Rfl: 1    glimepiride (AMARYL) 2 MG tablet, Take 1 tablet by mouth Daily., Disp: 90 tablet, Rfl: 1    Insulin Glargine (LANTUS SOLOSTAR) 100 UNIT/ML injection pen, Inject 55 Units under the skin into the appropriate area as directed Every Morning., Disp: 35  mL, Rfl: 5    Insulin Pen Needle (Pen Needles) 31G X 6 MM misc, 1 daily for lantus, Disp: 100 each, Rfl: 5    lisinopril (PRINIVIL,ZESTRIL) 5 MG tablet, Take 1 tablet by mouth Daily., Disp: 90 tablet, Rfl: 1    metFORMIN (GLUCOPHAGE) 850 MG tablet, Take 1 tablet by mouth Daily With Breakfast., Disp: 180 tablet, Rfl: 1    methotrexate 2.5 MG tablet, Take 8 tablets by mouth 1 (One) Time Per Week., Disp: 40 tablet, Rfl: 3    midodrine (PROAMATINE) 5 MG tablet, Take 2 tablets by mouth twice daily, Disp: 180 tablet, Rfl: 0    nitroglycerin (NITROSTAT) 0.4 MG SL tablet, 1 under the tongue as needed for angina, may repeat q5mins for up three doses, Disp: 25 tablet, Rfl: 3    nystatin (MYCOSTATIN) 100,000 unit/mL suspension, , Disp: , Rfl:     omeprazole (priLOSEC) 40 MG capsule, Take 1 capsule by mouth Every Morning., Disp: , Rfl:     ondansetron (ZOFRAN) 4 MG tablet, Take 1 tablet by mouth Every 8 (Eight) Hours As Needed for Nausea or Vomiting., Disp: 20 tablet, Rfl: 1    phenazopyridine (Pyridium) 200 MG tablet, Take 1 tablet by mouth 3 (Three) Times a Day As Needed for Bladder Spasms or Dysuria., Disp: 20 tablet, Rfl: 0    potassium chloride (MICRO-K) 10 MEQ CR capsule, Take 1 capsule by mouth Daily., Disp: 90 capsule, Rfl: 0    predniSONE (DELTASONE) 5 MG tablet, Take 0.5-1 tablets by mouth Daily., Disp: 30 tablet, Rfl: 3    pregabalin (LYRICA) 100 MG capsule, TAKE 1 CAPSULE BY MOUTH THREE TIMES DAILY, Disp: 90 capsule, Rfl: 2    rizatriptan (MAXALT) 10 MG tablet, Take 1 tablet by mouth 1 (One) Time As Needed for Migraine. May repeat in 2 hours if needed, Disp: 9 tablet, Rfl: 5    rosuvastatin (CRESTOR) 20 MG tablet, Take 1 tablet by mouth Daily., Disp: 90 tablet, Rfl: 1    Semaglutide, 2 MG/DOSE, (Ozempic, 2 MG/DOSE,) 8 MG/3ML solution pen-injector, Inject 2 mg under the skin into the appropriate area as directed 1 (One) Time Per Week., Disp: 3 mL, Rfl: 3    spironolactone (ALDACTONE) 25 MG tablet, Take 1 tablet by  mouth once daily, Disp: 90 tablet, Rfl: 0    topiramate (Topamax) 25 MG tablet, Take 1 tablet by mouth Daily for 90 days., Disp: 90 tablet, Rfl: 1    True Metrix Blood Glucose Test test strip, , Disp: , Rfl:     Vascepa 1 g capsule capsule, TAKE 2 CAPSULES BY MOUTH TWICE DAILY WITH MEALS, Disp: 360 capsule, Rfl: 0    Medicines reviewed by Cierra Barcenas, PharmD on 3/24/2025 at 10:19 AM    Drug Interactions  No medication interactions    Initial Education Provided for Specialty Medication  The patient has been provided with the following education and any applicable administration techniques (i.e. self-injection) have been demonstrated for the therapies indicated. All questions and concerns have been addressed prior to the patient receiving the medication, and the patient has verbalized comprehension of the education and any materials provided. Additional patient education shall be provided and documented upon request by the patient, provider, or payer.       Orencia (abatacept)         Medication Expectations   Why am I taking this medication? You are taking this medication for treatment of psoriatic arthritis or rheumatoid arthritis.    What should I expect while on this medication? You should expect a decrease in the frequency and severity of symptoms.   How does the medication work? Abatacept is a selective costimulation modulator; it inhibits T-cell (T-lymphocyte) activation by binding to CD80 and CD86 on antigen presenting cells (APC), thus blocking the required CD28 interaction between APCs and T cells.    How long will I be on this medication for? The amount of time you will be on this medication will be determined by your doctor and your response to the medication.    How do I take this medication? Take as directed on your prescription label.  This medication is a subcutaneous injection administered in the top of the thigh, outer area of upper arms or stomach area. May leave at room temperature for 30-60  minutes prior to injection.   What are some possible side effects? Injection site reactions and hypersensitivity reactions, dizziness, headache, signs of a common cold, nose or throat irritation, upset stomach, stomach pain or diarrhea or back pain.    What happens if I miss a dose? If you miss a dose, take it as soon as you remember. If it is close to the time for your next dose, skip the missed dose and go back to your normal time.               Medication Safety   What are things I should warn my doctor immediately about?  Infection like fever, chills, very bad sore throat, ear or sinus pain, cough, more sputum or change in color of sputum, pain with passing urine, mouth sores, or wound that will not heal     High or low blood pressure like very bad headache or dizziness, passing out, or change in eyesight     Feeling very tired or weak     Flu-like signs     Warm, red, or painful skin or sores on the body     A skin lump or growth     Change in color or size of a mole     Swollen gland, night sweats, shortness of breath, or weight loss without trying     Signs of an allergic reaction, like rash; hives; itching; red, swollen, blistered, or peeling skin with or without fever; wheezing; tightness in the chest or throat; trouble breathing, swallowing, or talking; unusual hoarseness; or swelling of the mouth, face, lips, tongue, or throat.   What are things that I should be cautious of? Injection site reaction, back pain, and headache. You may have more chance of getting an infection.  Wash your hands often and stay away from people with infections, colds, or flu.   What are some medications that can interact with this one? Immunosuppressants and vaccines.            Medication Storage/Handling   How should I handle this medication? Keep this medication our of reach of pets/children in original container.  Store in the original container to protect from light. Do not inject where the skin is tender, bruised, red,  hard, or affected by psoriasis.  Rotate injection sites.   How does this medication need to be stored? Store in refrigerator and do not freeze.    How should I dispose of this medication? You can dispose of the empty syringe in a sharps container, and if this is not available you may use an empty hard plastic container such as a milk jug or tide container.            Resources/Support   How can I remind myself to take this medication? You can download a reminder alayna on your phone or use a calandar  to help with your injection.   Is financial support available?  Yes, [employer] can provide co-pay cards if you have commercial insurance or patient assistance if you have Medicare or no insurance.    Which vaccines are recommended for me? Talk to your doctor about these vaccines: Flu, Coronavirus (COVID-19), Pneumococcal (pneumonia), Tdap, Hepatitis B, Zoster (shingles).              Adherence and Self-Administration  Adherence related to the patient's specialty therapy was discussed with the patient. The Adherence segment of this outreach has been reviewed and updated.     Is there a concern with patient's ability to self administer the medication correctly and without issue?: No  Were any potential barriers to adherence identified during the initial assessment or patient education?: No  Are there any concerns regarding the patient's understanding of the importance of medication adherence?: No  Methods for Supporting Patient Adherence and/or Self-Administration: no    Open Medication Therapy Problems  No medication therapy recommendations to display    Goals of Therapy  Goals related to the patient's specialty therapy were discussed with the patient. The Patient Goals segment of this outreach has been reviewed and updated.   Goals Addressed Today        Specialty Pharmacy General Goal      Reduce number of flares and pain score.     2.25.25: Reduction in pain   3.24.25: Changed to Orencia            Reassessment Plan &  Follow-Up  1. Medication Therapy Changes: Start Orencia 125mg every 7 days  2. Related Plans, Therapy Recommendations, or Therapy Problems to Be Addressed: no questions or concerns at this time  3. Pharmacist to perform regular assessments no more than (6) months from the previous assessment.  4. Care Coordinator to set up future refill outreaches, coordinate prescription delivery, and escalate clinical questions to pharmacist.  5. Welcome information and patient satisfaction survey to be sent by specialty pharmacy team with patient's initial fill.    Attestation  Therapeutic appropriateness: Appropriate   I attest the patient was actively involved in and has agreed to the above plan of care. If the prescribed therapy is at any point deemed not appropriate based on the current or future assessments, a consultation will be initiated with the patient's specialty care provider to determine the best course of action. The revised plan of therapy will be documented along with any required assessments and/or additional patient education provided.     Cierra Barcenas, PharmD, BCPS  Clinical Specialty Pharmacist, Rheumatology   3/24/2025  10:22 EDT

## 2025-04-02 ENCOUNTER — TELEPHONE (OUTPATIENT)
Age: 65
End: 2025-04-02
Payer: MEDICARE

## 2025-04-02 NOTE — TELEPHONE ENCOUNTER
Patient called stating that she recently started Orencia injection and packaging advises you to let your provider know if you are starting any other medications. She is wanting to make sure it is ok for her to take tylenol arthritis as she is having pain and wanting to get some yard work. She states she has only taken one Orencia injection so it has not taken effect yet. I advised her it was ok for her to take tylenol arthritis. She expressed understanding.

## 2025-04-14 RX ORDER — SPIRONOLACTONE 25 MG/1
25 TABLET ORAL DAILY
Qty: 90 TABLET | Refills: 0 | Status: SHIPPED | OUTPATIENT
Start: 2025-04-14

## 2025-04-16 ENCOUNTER — OFFICE VISIT (OUTPATIENT)
Dept: FAMILY MEDICINE CLINIC | Facility: CLINIC | Age: 65
End: 2025-04-16
Payer: MEDICARE

## 2025-04-16 VITALS
RESPIRATION RATE: 18 BRPM | TEMPERATURE: 97.8 F | WEIGHT: 206 LBS | HEIGHT: 64 IN | DIASTOLIC BLOOD PRESSURE: 68 MMHG | SYSTOLIC BLOOD PRESSURE: 126 MMHG | BODY MASS INDEX: 35.17 KG/M2 | OXYGEN SATURATION: 97 % | HEART RATE: 80 BPM

## 2025-04-16 DIAGNOSIS — B37.0 THRUSH: ICD-10-CM

## 2025-04-16 DIAGNOSIS — I10 PRIMARY HYPERTENSION: ICD-10-CM

## 2025-04-16 DIAGNOSIS — E78.5 DYSLIPIDEMIA: ICD-10-CM

## 2025-04-16 DIAGNOSIS — M71.21 BAKER'S CYST OF KNEE, RIGHT: ICD-10-CM

## 2025-04-16 DIAGNOSIS — Z00.00 MEDICARE ANNUAL WELLNESS VISIT, SUBSEQUENT: Primary | ICD-10-CM

## 2025-04-16 DIAGNOSIS — E11.42 DIABETIC POLYNEUROPATHY ASSOCIATED WITH TYPE 2 DIABETES MELLITUS: ICD-10-CM

## 2025-04-16 DIAGNOSIS — E11.40 TYPE 2 DIABETES MELLITUS WITH DIABETIC NEUROPATHY, WITHOUT LONG-TERM CURRENT USE OF INSULIN: ICD-10-CM

## 2025-04-16 RX ORDER — GLIMEPIRIDE 2 MG/1
2 TABLET ORAL DAILY
Qty: 90 TABLET | Refills: 1 | Status: SHIPPED | OUTPATIENT
Start: 2025-04-16

## 2025-04-16 RX ORDER — BISOPROLOL FUMARATE 5 MG/1
TABLET, FILM COATED ORAL
Qty: 45 TABLET | Refills: 1 | Status: SHIPPED | OUTPATIENT
Start: 2025-04-16

## 2025-04-16 RX ORDER — NYSTATIN 100000 [USP'U]/ML
200000 SUSPENSION ORAL 4 TIMES DAILY
Qty: 60 ML | Refills: 1 | Status: SHIPPED | OUTPATIENT
Start: 2025-04-16

## 2025-04-16 RX ORDER — HYDROCHLOROTHIAZIDE 12.5 MG/1
CAPSULE ORAL
Qty: 2 EACH | Refills: 5 | Status: SHIPPED | OUTPATIENT
Start: 2025-04-16

## 2025-04-16 RX ORDER — PREGABALIN 100 MG/1
100 CAPSULE ORAL 3 TIMES DAILY
Qty: 90 CAPSULE | Refills: 3 | Status: SHIPPED | OUTPATIENT
Start: 2025-04-16

## 2025-04-16 RX ORDER — SEMAGLUTIDE 2.68 MG/ML
2 INJECTION, SOLUTION SUBCUTANEOUS WEEKLY
Qty: 3 ML | Refills: 3 | Status: SHIPPED | OUTPATIENT
Start: 2025-04-16

## 2025-04-16 RX ORDER — ROSUVASTATIN CALCIUM 20 MG/1
20 TABLET, COATED ORAL DAILY
Qty: 90 TABLET | Refills: 1 | Status: SHIPPED | OUTPATIENT
Start: 2025-04-16

## 2025-04-16 NOTE — PROGRESS NOTES
Subjective   The ABCs of the Annual Wellness Visit  Medicare Wellness Visit      Lucia Stevens is a 64 y.o. patient who presents for a Medicare Wellness Visit.    The following portions of the patient's history were reviewed and   updated as appropriate: allergies, current medications, past family history, past medical history, past social history, past surgical history, and problem list.    Compared to one year ago, the patient's physical   health is better.  Compared to one year ago, the patient's mental   health is worse.    Recent Hospitalizations:  She was not admitted to the hospital during the last year.     Current Medical Providers:  Patient Care Team:  Benjamin Beckwith MD as PCP - General (Family Medicine)  Virgilio Borden MD as Consulting Physician (Cardiology)  Davide Durbin MD as Consulting Physician (Obstetrics and Gynecology)  Davide Diamond DO as Consulting Physician (Rheumatology)  Arjun Gay DO as Consulting Physician (Pulmonary Disease)  Garrison Thompson APRN as Nurse Practitioner (Rheumatology)    Outpatient Medications Prior to Visit   Medication Sig Dispense Refill    Abatacept (Orencia ClickJect) 125 MG/ML solution auto-injector Inject 1 mL under the skin into the appropriate area as directed Every 7 (Seven) Days. 4 mL 5    allopurinol (ZYLOPRIM) 100 MG tablet Take 2 tablets by mouth Daily. 180 tablet 0    aspirin 81 MG EC tablet Take 1 tablet by mouth Daily.      bisoprolol (ZEBeta) 5 MG tablet TAKE 1/2 TABLET EVERY DAY 45 tablet 1    Blood Glucose Monitoring Suppl (True Metrix Air Glucose Meter) w/Device kit       Continuous Glucose  (FreeStyle Zafar 3 Chamberlain) device Use 1 each Daily. 1 each 1    Continuous Glucose Sensor (FreeStyle Zafar 3 Sensor) [facility] Use 1 each Every 14 (Fourteen) Days. 2 each 5    DULoxetine (CYMBALTA) 60 MG capsule Take 1 capsule by mouth Daily. 90 capsule 1    empagliflozin (Jardiance) 25 MG tablet tablet Take 1 tablet by  mouth Daily. 90 tablet 1    folic acid (FOLVITE) 1 MG tablet Take 1 tablet by mouth Daily. 90 tablet 0    furosemide (LASIX) 40 MG tablet TAKE 1 TABLET BY MOUTH IN THE MORNING 90 tablet 1    glimepiride (AMARYL) 2 MG tablet Take 1 tablet by mouth Daily. 90 tablet 1    Insulin Glargine (LANTUS SOLOSTAR) 100 UNIT/ML injection pen Inject 55 Units under the skin into the appropriate area as directed Every Morning. 35 mL 5    Insulin Pen Needle (Pen Needles) 31G X 6 MM misc 1 daily for lantus 100 each 5    lisinopril (PRINIVIL,ZESTRIL) 5 MG tablet Take 1 tablet by mouth Daily. 90 tablet 1    metFORMIN (GLUCOPHAGE) 850 MG tablet Take 1 tablet by mouth Daily With Breakfast. 180 tablet 1    methotrexate 2.5 MG tablet Take 8 tablets by mouth 1 (One) Time Per Week. 40 tablet 3    midodrine (PROAMATINE) 5 MG tablet Take 2 tablets by mouth twice daily 180 tablet 0    nitroglycerin (NITROSTAT) 0.4 MG SL tablet 1 under the tongue as needed for angina, may repeat q5mins for up three doses 25 tablet 3    nystatin (MYCOSTATIN) 100,000 unit/mL suspension       omeprazole (priLOSEC) 40 MG capsule Take 1 capsule by mouth Every Morning.      ondansetron (ZOFRAN) 4 MG tablet Take 1 tablet by mouth Every 8 (Eight) Hours As Needed for Nausea or Vomiting. 20 tablet 1    phenazopyridine (Pyridium) 200 MG tablet Take 1 tablet by mouth 3 (Three) Times a Day As Needed for Bladder Spasms or Dysuria. 20 tablet 0    potassium chloride (MICRO-K) 10 MEQ CR capsule Take 1 capsule by mouth Daily. 90 capsule 0    predniSONE (DELTASONE) 5 MG tablet Take 0.5-1 tablets by mouth Daily. 30 tablet 3    pregabalin (LYRICA) 100 MG capsule TAKE 1 CAPSULE BY MOUTH THREE TIMES DAILY 90 capsule 2    rizatriptan (MAXALT) 10 MG tablet Take 1 tablet by mouth 1 (One) Time As Needed for Migraine. May repeat in 2 hours if needed 9 tablet 5    rosuvastatin (CRESTOR) 20 MG tablet Take 1 tablet by mouth Daily. 90 tablet 1    Semaglutide, 2 MG/DOSE, (Ozempic, 2 MG/DOSE,) 8  MG/3ML solution pen-injector Inject 2 mg under the skin into the appropriate area as directed 1 (One) Time Per Week. 3 mL 3    spironolactone (ALDACTONE) 25 MG tablet Take 1 tablet by mouth once daily 90 tablet 0    topiramate (Topamax) 25 MG tablet Take 1 tablet by mouth Daily for 90 days. 90 tablet 1    True Metrix Blood Glucose Test test strip       Vascepa 1 g capsule capsule TAKE 2 CAPSULES BY MOUTH TWICE DAILY WITH MEALS 360 capsule 0    Continuous Glucose Sensor (FreeStyle Zafar 3 Sensor) [facility] Use 1 each Every 14 (Fourteen) Days. 6 each 3     No facility-administered medications prior to visit.     No opioid medication identified on active medication list. I have reviewed chart for other potential  high risk medication/s and harmful drug interactions in the elderly.            Patient Active Problem List   Diagnosis    Psoriasis    Secondary Sjogren's syndrome    Hiatal hernia    Hypertension    Ischemic heart disease    Coronary artery disease involving native coronary artery of native heart with angina pectoris    Ischemic cardiomyopathy    Biventricular ICD (implantable cardioverter-defibrillator) in place    Hypercholesteremia    PMB (postmenopausal bleeding)    Thickened endometrium    Type 2 diabetes mellitus with diabetic neuropathy, without long-term current use of insulin    Osteoarthritis    Rheumatoid arthritis    Scleroderma    PREET positive    Abdominal migraine, not intractable    Encounter for long-term (current) use of high-risk medication    Long term current use of systemic steroids    Immunodeficiency due to treatment with immunosuppressive medication    Fatigue    Gout    Recurrent UTI    Vaginal yeast infection     Advance Care Planning Advance Directive is on file.  ACP discussion was held with the patient during this visit. Patient has an advance directive in EMR which is still valid.             Objective   Vitals:    04/16/25 1217   BP: 126/68   Pulse: 80   Resp: 18   Temp: 97.8 °F  "(36.6 °C)   SpO2: 97%   Weight: 93.4 kg (206 lb)   Height: 162.6 cm (64\")       Estimated body mass index is 35.36 kg/m² as calculated from the following:    Height as of this encounter: 162.6 cm (64\").    Weight as of this encounter: 93.4 kg (206 lb).                Does the patient have evidence of cognitive impairment? No                                                                                                Health  Risk Assessment    Smoking Status:  Social History     Tobacco Use   Smoking Status Former    Current packs/day: 0.00    Average packs/day: 0.5 packs/day for 20.0 years (10.0 ttl pk-yrs)    Types: Cigarettes    Start date: 1991    Quit date: 2011    Years since quittin.2    Passive exposure: Past   Smokeless Tobacco Never   Tobacco Comments    Tobacco use status: Occasional cigarette smoker; Smoking status: Heavy tobacco smoker.     Alcohol Consumption:  Social History     Substance and Sexual Activity   Alcohol Use Yes    Alcohol/week: 1.0 standard drink of alcohol    Types: 1 Shots of liquor per week    Comment: Social drinker; There is a history of alcohol use. Consumed rarely.       Fall Risk Screen  STEADI Fall Risk Assessment has not been completed.    Depression Screening   The PHQ has not been completed during this encounter.     Health Habits and Functional and Cognitive Screenin/15/2025    12:00 PM   Functional & Cognitive Status   Do you have difficulty preparing food and eating? No   Do you have difficulty bathing yourself, getting dressed or grooming yourself? No   Do you have difficulty using the toilet? No   Do you have difficulty moving around from place to place? No   Do you have trouble with steps or getting out of a bed or a chair? No   Current Diet Unhealthy Diet   Dental Exam Other   Eye Exam Up to date   Exercise (times per week) 0 times per week   Current Exercises Include Yard Work   Do you need help using the phone?  No   Are you deaf or do you " have serious difficulty hearing?  No   Do you need help to go to places out of walking distance? Yes   Do you need help shopping? No   Do you need help preparing meals?  No   Do you need help with housework?  No   Do you need help with laundry? No   Do you need help taking your medications? No   Do you need help managing money? No   Do you ever drive or ride in a car without wearing a seat belt? Yes   Have you felt unusual stress, anger or loneliness in the last month? Yes   Who do you live with? Alone   If you need help, do you have trouble finding someone available to you? No   Have you been bothered in the last four weeks by sexual problems? No   Do you have difficulty concentrating, remembering or making decisions? Yes           Age-appropriate Screening Schedule:  Refer to the list below for future screening recommendations based on patient's age, sex and/or medical conditions. Orders for these recommended tests are listed in the plan section. The patient has been provided with a written plan.    Health Maintenance List  Health Maintenance   Topic Date Due    DIABETIC FOOT EXAM  Never done    TDAP/TD VACCINES (1 - Tdap) Never done    ZOSTER VACCINE (1 of 2) Never done    Pneumococcal Vaccine 50+ (2 of 2 - PPSV23) 03/21/2018    COVID-19 Vaccine (3 - Moderna risk series) 04/29/2021    ANNUAL WELLNESS VISIT  02/02/2025    LIPID PANEL  04/17/2025    HEMOGLOBIN A1C  07/13/2025    INFLUENZA VACCINE  07/01/2025    MAMMOGRAM  07/21/2025    COLORECTAL CANCER SCREENING  10/27/2025    DIABETIC EYE EXAM  10/28/2025    URINE MICROALBUMIN-CREATININE RATIO (uACR)  01/13/2026    DXA SCAN  07/30/2026    HEPATITIS C SCREENING  Completed                                                                                                                                                CMS Preventative Services Quick Reference  Risk Factors Identified During Encounter  Depression/Dysphoria:  discussed mood and stress.  Med change  declined  Fall Risk-High or Moderate: Discussed Fall Prevention in the home  Inactivity/Sedentary: Patient was advised to exercise at least 150 minutes a week per CDC recommendations.  Polypharmacy: Medication List reviewed    The above risks/problems have been discussed with the patient.  Pertinent information has been shared with the patient in the After Visit Summary.  An After Visit Summary and PPPS were made available to the patient.    Follow Up:   Next Medicare Wellness visit to be scheduled in 1 year.         Additional E&M Note during same encounter follows:  Patient has additional, significant, and separately identifiable condition(s)/problem(s) that require work above and beyond the Medicare Wellness Visit     Chief Complaint  Medicare Wellness-subsequent (Smw /Pt is fasting)    Subjective   HPI  Lucia is also being seen today for additional medical problem/s.    She has been stressed as son and her having issues  She is on cymbalta, does not want change in meds at this time      Diabetes Mellitus Type II, Follow-up:   Lucia Stevens is a 64 y.o. female who is here for follow-up of Type 2 diabetes mellitus.  Current symptoms/problems include none and have been stable. Patient is adherent with medications.  Known diabetic complications: none  Cardiovascular risk factors: diabetes mellitus, dyslipidemia, hypertension, and obesity (BMI >= 30 kg/m2)  Current diabetic medications include  lantus, metfomrin, jarciance, .   Current monitoring regimen: home blood tests - daily  Home blood sugar records: fasting range: doing ok for the mosrt part  Any episodes of hypoglycemia? no  Eye exam current (within one year): yes  She is on ACE inhibitor or angiotensin II receptor blocker. Patient is on a statin.       Lucia Stevens  is here for follow-up of hypertension of several years duration. She is not exercising and is not adherent to a low-salt diet. Patient does not check her blood pressure.  She is compliant with  "meds.        Lucia EUBANKS Hilda returns today for follow up of Hyperlipidemia  Lucia indicates her exercise level as irregularly.  Diet: unchanged  Patient is compliant with medications   Any side effects to medications:   chest pain No myalgia No memory change No  Pt is due for labs      She had some imaging which showed a bakers cyst behind her R knee  This is bothering her    She has thrush, would like refill of her nystatin    Review of Systems   Constitutional: Negative.    Respiratory: Negative.     Cardiovascular: Negative.    Psychiatric/Behavioral:  Positive for dysphoric mood.               Objective   Vital Signs:  /68   Pulse 80   Temp 97.8 °F (36.6 °C)   Resp 18   Ht 162.6 cm (64\")   Wt 93.4 kg (206 lb)   SpO2 97%   BMI 35.36 kg/m²   Physical Exam  Vitals and nursing note reviewed.   Constitutional:       General: She is not in acute distress.     Appearance: Normal appearance. She is well-developed.   Cardiovascular:      Rate and Rhythm: Normal rate and regular rhythm.      Heart sounds: Normal heart sounds.   Pulmonary:      Effort: Pulmonary effort is normal.      Breath sounds: Normal breath sounds.   Musculoskeletal:      Comments: Normal gait   Neurological:      Mental Status: She is alert and oriented to person, place, and time.   Psychiatric:         Mood and Affect: Mood normal.         Behavior: Behavior normal.         Thought Content: Thought content normal.         Judgment: Judgment normal.                Assessment and Plan     Diagnoses and all orders for this visit:    1. Medicare annual wellness visit, subsequent (Primary)    2. Type 2 diabetes mellitus with diabetic neuropathy, without long-term current use of insulin  -     Insulin Glargine (LANTUS SOLOSTAR) 100 UNIT/ML injection pen; Inject 55 Units under the skin into the appropriate area as directed Every Morning.  Dispense: 35 mL; Refill: 5  -     Semaglutide, 2 MG/DOSE, (Ozempic, 2 MG/DOSE,) 8 MG/3ML solution " pen-injector; Inject 2 mg under the skin into the appropriate area as directed 1 (One) Time Per Week.  Dispense: 3 mL; Refill: 3  -     glimepiride (AMARYL) 2 MG tablet; Take 1 tablet by mouth Daily.  Dispense: 90 tablet; Refill: 1  -     empagliflozin (Jardiance) 25 MG tablet tablet; Take 1 tablet by mouth Daily.  Dispense: 90 tablet; Refill: 1  -     Comprehensive Metabolic Panel  -     Hemoglobin A1c  -     Continuous Glucose Sensor (FreeStyle Zafar 3 Plus Sensor); Use Every 15 (Fifteen) Days.  Dispense: 2 each; Refill: 5    3. Primary hypertension  -     bisoprolol (ZEBeta) 5 MG tablet; TAKE 1/2 TABLET EVERY DAY  Dispense: 45 tablet; Refill: 1  -     CBC & Differential  -     Comprehensive Metabolic Panel    4. Dyslipidemia  -     rosuvastatin (CRESTOR) 20 MG tablet; Take 1 tablet by mouth Daily.  Dispense: 90 tablet; Refill: 1  -     Comprehensive Metabolic Panel  -     Lipid Panel    5. Baker's cyst of knee, right  -     Ambulatory Referral to Orthopedic Surgery    6. Thrush  -     nystatin (MYCOSTATIN) 100,000 unit/mL suspension; Take 2 mL by mouth 4 (Four) Times a Day.  Dispense: 60 mL; Refill: 1      Medicare wellness completed.  Healthy eating and exercise encouraged.    Continue DM meds and check labs today.  She continues to use zafar to help monitor levels  BP stable on zebeta and lisinopril 5.  We are going to stop the lisinopril and see how she feels ithout it.  She feels very tired with this medicine so we will see how she does without it.  She will call back in 2 weeks with update.    Ok nystatin    US from outside provider shows baker's cyst.  Will refer to ortho

## 2025-04-17 LAB
ALBUMIN SERPL-MCNC: 4.5 G/DL (ref 3.9–4.9)
ALP SERPL-CCNC: 93 IU/L (ref 44–121)
ALT SERPL-CCNC: 14 IU/L (ref 0–32)
AST SERPL-CCNC: 20 IU/L (ref 0–40)
BASOPHILS # BLD AUTO: 0 X10E3/UL (ref 0–0.2)
BASOPHILS NFR BLD AUTO: 1 %
BILIRUB SERPL-MCNC: 0.6 MG/DL (ref 0–1.2)
BUN SERPL-MCNC: 24 MG/DL (ref 8–27)
BUN/CREAT SERPL: 32 (ref 12–28)
CALCIUM SERPL-MCNC: 10 MG/DL (ref 8.7–10.3)
CHLORIDE SERPL-SCNC: 102 MMOL/L (ref 96–106)
CHOLEST SERPL-MCNC: 132 MG/DL (ref 100–199)
CO2 SERPL-SCNC: 22 MMOL/L (ref 20–29)
CREAT SERPL-MCNC: 0.74 MG/DL (ref 0.57–1)
EGFRCR SERPLBLD CKD-EPI 2021: 90 ML/MIN/1.73
EOSINOPHIL # BLD AUTO: 0.1 X10E3/UL (ref 0–0.4)
EOSINOPHIL NFR BLD AUTO: 2 %
ERYTHROCYTE [DISTWIDTH] IN BLOOD BY AUTOMATED COUNT: 14.5 % (ref 11.7–15.4)
GLOBULIN SER CALC-MCNC: 2.4 G/DL (ref 1.5–4.5)
GLUCOSE SERPL-MCNC: 111 MG/DL (ref 70–99)
HBA1C MFR BLD: 7.2 % (ref 4.8–5.6)
HCT VFR BLD AUTO: 40 % (ref 34–46.6)
HDLC SERPL-MCNC: 28 MG/DL
HGB BLD-MCNC: 13.4 G/DL (ref 11.1–15.9)
IMM GRANULOCYTES # BLD AUTO: 0 X10E3/UL (ref 0–0.1)
IMM GRANULOCYTES NFR BLD AUTO: 1 %
LDLC SERPL CALC-MCNC: 51 MG/DL (ref 0–99)
LYMPHOCYTES # BLD AUTO: 1.7 X10E3/UL (ref 0.7–3.1)
LYMPHOCYTES NFR BLD AUTO: 19 %
MCH RBC QN AUTO: 33.1 PG (ref 26.6–33)
MCHC RBC AUTO-ENTMCNC: 33.5 G/DL (ref 31.5–35.7)
MCV RBC AUTO: 99 FL (ref 79–97)
MONOCYTES # BLD AUTO: 1 X10E3/UL (ref 0.1–0.9)
MONOCYTES NFR BLD AUTO: 12 %
NEUTROPHILS # BLD AUTO: 5.8 X10E3/UL (ref 1.4–7)
NEUTROPHILS NFR BLD AUTO: 65 %
PLATELET # BLD AUTO: 200 X10E3/UL (ref 150–450)
POTASSIUM SERPL-SCNC: 4.4 MMOL/L (ref 3.5–5.2)
PROT SERPL-MCNC: 6.9 G/DL (ref 6–8.5)
RBC # BLD AUTO: 4.05 X10E6/UL (ref 3.77–5.28)
SODIUM SERPL-SCNC: 140 MMOL/L (ref 134–144)
TRIGL SERPL-MCNC: 345 MG/DL (ref 0–149)
VLDLC SERPL CALC-MCNC: 53 MG/DL (ref 5–40)
WBC # BLD AUTO: 8.8 X10E3/UL (ref 3.4–10.8)

## 2025-04-24 ENCOUNTER — SPECIALTY PHARMACY (OUTPATIENT)
Age: 65
End: 2025-04-24
Payer: MEDICARE

## 2025-04-24 ENCOUNTER — OFFICE VISIT (OUTPATIENT)
Dept: CARDIOLOGY | Facility: CLINIC | Age: 65
End: 2025-04-24
Payer: MEDICARE

## 2025-04-24 VITALS
SYSTOLIC BLOOD PRESSURE: 116 MMHG | BODY MASS INDEX: 35.51 KG/M2 | HEIGHT: 64 IN | WEIGHT: 208 LBS | HEART RATE: 78 BPM | DIASTOLIC BLOOD PRESSURE: 64 MMHG | OXYGEN SATURATION: 98 %

## 2025-04-24 DIAGNOSIS — E11.65 TYPE 2 DIABETES MELLITUS WITH HYPERGLYCEMIA, WITHOUT LONG-TERM CURRENT USE OF INSULIN: ICD-10-CM

## 2025-04-24 DIAGNOSIS — I50.22 CHRONIC SYSTOLIC CONGESTIVE HEART FAILURE: Primary | ICD-10-CM

## 2025-04-24 DIAGNOSIS — I10 PRIMARY HYPERTENSION: ICD-10-CM

## 2025-04-24 NOTE — PROGRESS NOTES
[facility] Cardiology  Office Progress Note  Lucia Stevens  1960  [address] [place] [address]       Visit Date: 04/24/25    PCP: Benjamin Beckwith  [address]  [place] [address]    IDENTIFICATION: A 64 y.o. female disabled from Nvest and is a resident of [place], [place].     PROBLEM LIST:   CAD/ischemic cardiomyopathy(ACEI/ARB/ARNI intol w hypotension)  GXT myocardial perfusion study, 06/07/2005, revealing a moderate sized reversible defect in anteroseptal region with diminished myocardial thickening and hypokinesis. LVEF 58%.  Cardiac catheterization, June 2005, Dr. Talbot, due to moderate sized reversible anteroseptal defect; LVEF 38%: JADE stenting of PDA (2.5x13 mm Cypher).  JADE stenting of mid-RCA, 08/08/2011: 15 mm Promus JADE; LVEF 55% to 60%.  11/30/17 echo: EF 30%, LA borderline dilated, moderate to severe MR, mild-to-moderate TR RVSP 41 mmHg  11/30/2017 LHC: Chronic total occlusion of the RCA served by left-sided collaterals, nonobstructive disease of the left coronary system, cardiomyopathy with EF 25 to 30%.  LifeVest initiated 11/2017 2/26/18 echo EF 25%, mild-mod MR  3/28/18 BSc. BiVICD implantation Terra  3/27/2018 echo EF 20%, moderate MR  9/24/18 echo: EF 40%, multiple LV wall segments hypokinetic, grade 1 diastolic dysfunction, mild concentric LVH, no significant valvular abnormalities, no pulmonary hypertension  6/21 echo EF 43% rvsp 23  3/23 ech0 EF >46%  HTN  10/10/2000 1824-hour BP monitor average 127/60  Intolerant to higher doses coreg  HLD  4/25 132/345/28/51  DM  4/25 A1c 7.2   Remote tobacco abuse:   Quit 2011 9/2021 Covid 19 + -received antibiotic infusion  Hiatal hernia.  Sjogren’s syndrome. Inflammatory arthritis- Dr Diamond 2023  Psoriasis.  Colon polyps status polypectomy.  Recurrent R ear drainage- ENT X 3  Depression.PTSD- estranged son 2021  Surgical history:  Appendectomy.  Cholecystectomy.  Rectovaginal fistula  repair.  Cervical discectomy.   10/22 D& C      CC:   Chief Complaint   Patient presents with    Chronic systolic congestive heart failure       Allergies  Allergies   Allergen Reactions    Sulfa Antibiotics Anaphylaxis     Lip swelling       Current Medications    Current Outpatient Medications:     Abatacept (Orencia ClickJect) 125 MG/ML solution auto-injector, Inject 1 mL under the skin into the appropriate area as directed Every 7 (Seven) Days., Disp: 4 mL, Rfl: 5    allopurinol (ZYLOPRIM) 100 MG tablet, Take 2 tablets by mouth Daily., Disp: 180 tablet, Rfl: 0    aspirin 81 MG EC tablet, Take 1 tablet by mouth Daily., Disp: , Rfl:     bisoprolol (ZEBeta) 5 MG tablet, TAKE 1/2 TABLET EVERY DAY, Disp: 45 tablet, Rfl: 1    Blood Glucose Monitoring Suppl (True Metrix Air Glucose Meter) w/Device kit, , Disp: , Rfl:     Continuous Glucose Sensor (FreeStyle Zafar 3 Plus Sensor), Use Every 15 (Fifteen) Days., Disp: 2 each, Rfl: 5    DULoxetine (CYMBALTA) 60 MG capsule, Take 1 capsule by mouth Daily., Disp: 90 capsule, Rfl: 1    empagliflozin (Jardiance) 25 MG tablet tablet, Take 1 tablet by mouth Daily., Disp: 90 tablet, Rfl: 1    folic acid (FOLVITE) 1 MG tablet, Take 1 tablet by mouth Daily., Disp: 90 tablet, Rfl: 0    furosemide (LASIX) 40 MG tablet, TAKE 1 TABLET BY MOUTH IN THE MORNING, Disp: 90 tablet, Rfl: 1    glimepiride (AMARYL) 2 MG tablet, Take 1 tablet by mouth Daily., Disp: 90 tablet, Rfl: 1    Insulin Glargine (LANTUS SOLOSTAR) 100 UNIT/ML injection pen, Inject 55 Units under the skin into the appropriate area as directed Every Morning., Disp: 35 mL, Rfl: 5    Insulin Pen Needle (Pen Needles) 31G X 6 MM misc, 1 daily for lantus, Disp: 100 each, Rfl: 5    lisinopril (PRINIVIL,ZESTRIL) 5 MG tablet, Take 1 tablet by mouth Daily., Disp: 90 tablet, Rfl: 1    metFORMIN (GLUCOPHAGE) 850 MG tablet, Take 1 tablet by mouth Daily With Breakfast., Disp: 180 tablet, Rfl: 1    methotrexate 2.5 MG tablet, Take 8 tablets  by mouth 1 (One) Time Per Week., Disp: 40 tablet, Rfl: 3    midodrine (PROAMATINE) 5 MG tablet, Take 2 tablets by mouth twice daily, Disp: 180 tablet, Rfl: 0    nitroglycerin (NITROSTAT) 0.4 MG SL tablet, 1 under the tongue as needed for angina, may repeat q5mins for up three doses, Disp: 25 tablet, Rfl: 3    nystatin (MYCOSTATIN) 100,000 unit/mL suspension, Take 2 mL by mouth 4 (Four) Times a Day., Disp: 60 mL, Rfl: 1    omeprazole (priLOSEC) 40 MG capsule, Take 1 capsule by mouth Every Morning., Disp: , Rfl:     ondansetron (ZOFRAN) 4 MG tablet, Take 1 tablet by mouth Every 8 (Eight) Hours As Needed for Nausea or Vomiting., Disp: 20 tablet, Rfl: 1    potassium chloride (MICRO-K) 10 MEQ CR capsule, Take 1 capsule by mouth Daily., Disp: 90 capsule, Rfl: 0    predniSONE (DELTASONE) 5 MG tablet, Take 0.5-1 tablets by mouth Daily., Disp: 30 tablet, Rfl: 3    pregabalin (LYRICA) 100 MG capsule, Take 1 capsule by mouth 3 (Three) Times a Day., Disp: 90 capsule, Rfl: 3    rizatriptan (MAXALT) 10 MG tablet, Take 1 tablet by mouth 1 (One) Time As Needed for Migraine. May repeat in 2 hours if needed, Disp: 9 tablet, Rfl: 5    rosuvastatin (CRESTOR) 20 MG tablet, Take 1 tablet by mouth Daily., Disp: 90 tablet, Rfl: 1    Semaglutide, 2 MG/DOSE, (Ozempic, 2 MG/DOSE,) 8 MG/3ML solution pen-injector, Inject 2 mg under the skin into the appropriate area as directed 1 (One) Time Per Week., Disp: 3 mL, Rfl: 3    spironolactone (ALDACTONE) 25 MG tablet, Take 1 tablet by mouth once daily, Disp: 90 tablet, Rfl: 0    Vascepa 1 g capsule capsule, TAKE 2 CAPSULES BY MOUTH TWICE DAILY WITH MEALS, Disp: 360 capsule, Rfl: 0    topiramate (Topamax) 25 MG tablet, Take 1 tablet by mouth Daily for 90 days., Disp: 90 tablet, Rfl: 1      History of Present Illness   Lucia Stevens is a 64 y.o. year old female here for follow up.  Had rechallenge of lisinopril w prompt hypotension.  Had new inflammatory injectable w improvement.  "        OBJECTIVE:  Vitals:    04/24/25 0936   BP: 116/64   BP Location: Right arm   Patient Position: Sitting   Cuff Size: Adult   Pulse: 78   SpO2: 98%   Weight: 94.3 kg (208 lb)   Height: 162.6 cm (64\")         Body mass index is 35.7 kg/m².    Constitutional:       Appearance: Healthy appearance. Not in distress.   Neck:      Vascular: No JVR. JVD normal.   Pulmonary:      Effort: Pulmonary effort is normal.      Breath sounds: Normal breath sounds. No wheezing. No rhonchi. No rales.   Chest:      Chest wall: Not tender to palpatation.   Cardiovascular:      PMI at left midclavicular line. Normal rate. Regular rhythm. Normal S1. Normal S2.       Murmurs: There is a systolic murmur.      No gallop.  No click. No rub.   Pulses:     Intact distal pulses.   Edema:     Peripheral edema present.  Abdominal:      General: Bowel sounds are normal.      Palpations: Abdomen is soft.      Tenderness: There is no abdominal tenderness.   Musculoskeletal: Normal range of motion.         General: No tenderness. Skin:     General: Skin is warm and dry.   Neurological:      General: No focal deficit present.      Mental Status: Alert and oriented to person, place and time.         Diagnostic Data:  Procedures  Device check  LV pacing only 98%  One short rvr  Gen 4.5 yrs    ASSESSMENT:   Diagnosis Plan   1. Chronic systolic congestive heart failure        2. Primary hypertension        3. Type 2 diabetes mellitus with hyperglycemia, without long-term current use of insulin                PLAN:  Ischemic cardiomyopathy status post revascularization BiV pacer appears euvolemic New [place] Heart Association class III CHF at current continued aggressive medical regimen as tolerant with hypotension.  ACE/ ARB or Arni intolerant    Hypotension tolerant of low-dose beta-blockade with concomitant midodrine    Mixed dyslipidemia controlled on statin therapy    Diabetes A1c most recent > 9 now on added glucocorticoids        Virgilio Borden, " MD, FACC

## 2025-04-24 NOTE — PROGRESS NOTES
Specialty Pharmacy Patient Management Program  Refill Outreach     Lucia was contacted today regarding refills of their medication(s).    Refill Questions      Flowsheet Row Most Recent Value   Changes to allergies? No   Changes to medications? No   New conditions or infections since last clinic visit No   Unplanned office visit, urgent care, ED, or hospital admission in the last 4 weeks  No   How does patient/caregiver feel medication is working? Very good   Financial problems or insurance changes  No   Since the previous refill, were any specialty medication doses or scheduled injections missed or delayed?  No   Does this patient require a clinical escalation to a pharmacist? No            Delivery Questions      Flowsheet Row Most Recent Value   Delivery method UPS   Delivery address verified with patient/caregiver? Yes   Delivery address Home   Medication(s) being filled and delivered Abatacept (Orencia ClickJect)   Doses left of specialty medications 0   Copay verified? Yes   Copay amount 0   Copay form of payment No copayment ($0)   Delivery Date Selection 04/29/25   Signature Required No                 Follow-up: 21 day(s)     Irma Zhao, Pharmacy Technician  4/24/2025  15:02 EDT

## 2025-04-28 ENCOUNTER — OFFICE VISIT (OUTPATIENT)
Dept: ORTHOPEDIC SURGERY | Facility: CLINIC | Age: 65
End: 2025-04-28
Payer: MEDICARE

## 2025-04-28 VITALS
WEIGHT: 208 LBS | HEIGHT: 64 IN | DIASTOLIC BLOOD PRESSURE: 64 MMHG | SYSTOLIC BLOOD PRESSURE: 120 MMHG | BODY MASS INDEX: 35.51 KG/M2

## 2025-04-28 DIAGNOSIS — M17.11 PRIMARY OSTEOARTHRITIS OF RIGHT KNEE: ICD-10-CM

## 2025-04-28 DIAGNOSIS — M71.21 BAKER'S CYST OF KNEE, RIGHT: ICD-10-CM

## 2025-04-28 DIAGNOSIS — E11.40 TYPE 2 DIABETES MELLITUS WITH DIABETIC NEUROPATHY, WITHOUT LONG-TERM CURRENT USE OF INSULIN: ICD-10-CM

## 2025-04-28 DIAGNOSIS — M25.561 RIGHT KNEE PAIN, UNSPECIFIED CHRONICITY: Primary | ICD-10-CM

## 2025-04-28 DIAGNOSIS — I10 PRIMARY HYPERTENSION: ICD-10-CM

## 2025-04-28 DIAGNOSIS — I25.9 ISCHEMIC HEART DISEASE: ICD-10-CM

## 2025-04-28 PROCEDURE — 99203 OFFICE O/P NEW LOW 30 MIN: CPT | Performed by: ORTHOPAEDIC SURGERY

## 2025-04-28 PROCEDURE — 3074F SYST BP LT 130 MM HG: CPT | Performed by: ORTHOPAEDIC SURGERY

## 2025-04-28 PROCEDURE — 3078F DIAST BP <80 MM HG: CPT | Performed by: ORTHOPAEDIC SURGERY

## 2025-04-28 NOTE — PROGRESS NOTES
[facility] Note        Subjective     Initial Evaluation of the Right Knee      HPI    Lucia Stevens is a 64 y.o. female.  She is new patient with right knee pain.  She had a Baker's cyst diagnosed on ultrasound in January.  She has a history of gout and her knees in the past.  This does not feel like gout to her.  She does have hypertension and diabetes.  She has tried oral medications and she is on many medications.  She lives in [place].  Dr. Beckwith is her primary care physician.  She also has ischemic heart disease.  She has neuropathy in her feet.  Past Medical History:   Diagnosis Date    Abdominal migraine, not intractable 08/12/2024    Allergic     Ankle sprain     Arthritis of neck     Cervical disc disorder     CHF (congestive heart failure)     Cholelithiasis     Colon polyp     Coronary artery disease     2 stents    CTS (carpal tunnel syndrome)     Depression     Fibroid 7/22    Gout     Herpes 7/22    Would like retested    Hypercholesterolemia     Hypertension     Low back strain     Lumbosacral disc disease     Multiple gestation 5-5-82    I have 2 children    Neuropathy     Obesity     Osteoarthritis     Psoriasis     Rheumatoid arthritis     Scleroderma     Sjoegren syndrome     SOB (shortness of breath) on exertion     Thoracic disc disorder     Tobacco abuse 2011    cessation     Type 2 diabetes mellitus with diabetic neuropathy, without long-term current use of insulin 01/17/2024    Varicella     Wears dentures     full set    Wears glasses       Past Surgical History:   Procedure Laterality Date    APPENDECTOMY      CARDIAC CATHETERIZATION      CARDIAC CATHETERIZATION N/A 11/30/2017    Procedure: Left Heart Cath;  Surgeon: Galina Leonard MD;  Location:  [facility];  Service:     CARDIAC ELECTROPHYSIOLOGY PROCEDURE N/A 03/28/2018    Procedure: Device Implant BiV ICD;  Surgeon: Sarbjit Ellison DO;  Location:  [facility];  Service:  Cardiovascular    CERVICAL DISCECTOMY ANTERIOR      CHOLECYSTECTOMY      COLONOSCOPY  2022    CORONARY ANGIOPLASTY      CORONARY ANGIOPLASTY WITH STENT PLACEMENT      Percutaneous transluminal balloon angioplasty with insertion of stent into coronary artery    CORONARY ANGIOPLASTY WITH STENT PLACEMENT      CORONARY STENT PLACEMENT      D & C HYSTEROSCOPY N/A 10/06/2022    Procedure: DILATATION AND CURETTAGE HYSTEROSCOPY;  Surgeon: Davide Durbin MD;  Location: [facility];  Service: Obstetrics/Gynecology;  Laterality: N/A;    ENDOSCOPY  2022    LAPAROSCOPIC CHOLECYSTECTOMY  1980    NECK SURGERY      OTHER SURGICAL HISTORY      growth removed from small intestine as a child    POLYPECTOMY      Colon polyps status    RECTOVAGINAL FISTULA REPAIR      SMALL INTESTINE SURGERY      TONSILLECTOMY      TUBAL ABDOMINAL LIGATION  ?    Only 1    WISDOM TOOTH EXTRACTION        Family History   Problem Relation Age of Onset    Heart disease Mother     Osteoporosis Mother     Hypertension Mother     Cancer Mother         bladder    Heart attack Father     Colon cancer Father     Prostate cancer Father     Coronary artery disease Father     Diabetes Father     Cancer Father     Arthritis Father     Heart disease Father         heart problems    No Known Problems Brother     Cancer Maternal Grandmother     Brain cancer Maternal Grandmother     Stomach cancer Maternal Grandfather     Heart failure Paternal Grandmother     Breast cancer Paternal Grandmother     Heart attack Paternal Grandfather      Social History     Socioeconomic History    Marital status:     Number of children: 2    Years of education: 13    Highest education level: Some college, no degree   Tobacco Use    Smoking status: Former     Current packs/day: 0.00     Average packs/day: 0.5 packs/day for 20.0 years (10.0 ttl pk-yrs)     Types: Cigarettes     Start date: 1991     Quit date: 2011     Years since quittin.3     Passive  exposure: Past    Smokeless tobacco: Never    Tobacco comments:     Tobacco use status: Occasional cigarette smoker; Smoking status: Heavy tobacco smoker.   Vaping Use    Vaping status: Never Used   Substance and Sexual Activity    Alcohol use: Yes     Alcohol/week: 1.0 standard drink of alcohol     Types: 1 Shots of liquor per week     Comment: Social drinker    Drug use: No    Sexual activity: Yes     Partners: Male     Birth control/protection: Post-menopausal, None      Current Outpatient Medications on File Prior to Visit   Medication Sig Dispense Refill    Abatacept (Orencia ClickJect) 125 MG/ML solution auto-injector Inject 1 mL under the skin into the appropriate area as directed Every 7 (Seven) Days. 4 mL 5    allopurinol (ZYLOPRIM) 100 MG tablet Take 2 tablets by mouth Daily. 180 tablet 0    aspirin 81 MG EC tablet Take 1 tablet by mouth Daily.      bisoprolol (ZEBeta) 5 MG tablet TAKE 1/2 TABLET EVERY DAY 45 tablet 1    Blood Glucose Monitoring Suppl (True Metrix Air Glucose Meter) w/Device kit       Continuous Glucose Sensor (FreeStyle Zafar 3 Plus Sensor) Use Every 15 (Fifteen) Days. 2 each 5    DULoxetine (CYMBALTA) 60 MG capsule Take 1 capsule by mouth Daily. 90 capsule 1    empagliflozin (Jardiance) 25 MG tablet tablet Take 1 tablet by mouth Daily. 90 tablet 1    folic acid (FOLVITE) 1 MG tablet Take 1 tablet by mouth Daily. 90 tablet 0    furosemide (LASIX) 40 MG tablet TAKE 1 TABLET BY MOUTH IN THE MORNING 90 tablet 1    glimepiride (AMARYL) 2 MG tablet Take 1 tablet by mouth Daily. 90 tablet 1    Insulin Glargine (LANTUS SOLOSTAR) 100 UNIT/ML injection pen Inject 55 Units under the skin into the appropriate area as directed Every Morning. 35 mL 5    Insulin Pen Needle (Pen Needles) 31G X 6 MM misc 1 daily for lantus 100 each 5    lisinopril (PRINIVIL,ZESTRIL) 5 MG tablet Take 1 tablet by mouth Daily. 90 tablet 1    metFORMIN (GLUCOPHAGE) 850 MG tablet Take 1 tablet by mouth Daily With Breakfast.  180 tablet 1    methotrexate 2.5 MG tablet Take 8 tablets by mouth 1 (One) Time Per Week. 40 tablet 3    midodrine (PROAMATINE) 5 MG tablet Take 2 tablets by mouth twice daily 180 tablet 0    nitroglycerin (NITROSTAT) 0.4 MG SL tablet 1 under the tongue as needed for angina, may repeat q5mins for up three doses 25 tablet 3    nystatin (MYCOSTATIN) 100,000 unit/mL suspension Take 2 mL by mouth 4 (Four) Times a Day. 60 mL 1    omeprazole (priLOSEC) 40 MG capsule Take 1 capsule by mouth Every Morning.      ondansetron (ZOFRAN) 4 MG tablet Take 1 tablet by mouth Every 8 (Eight) Hours As Needed for Nausea or Vomiting. 20 tablet 1    potassium chloride (MICRO-K) 10 MEQ CR capsule Take 1 capsule by mouth Daily. 90 capsule 0    predniSONE (DELTASONE) 5 MG tablet Take 0.5-1 tablets by mouth Daily. 30 tablet 3    pregabalin (LYRICA) 100 MG capsule Take 1 capsule by mouth 3 (Three) Times a Day. 90 capsule 3    rizatriptan (MAXALT) 10 MG tablet Take 1 tablet by mouth 1 (One) Time As Needed for Migraine. May repeat in 2 hours if needed 9 tablet 5    rosuvastatin (CRESTOR) 20 MG tablet Take 1 tablet by mouth Daily. 90 tablet 1    Semaglutide, 2 MG/DOSE, (Ozempic, 2 MG/DOSE,) 8 MG/3ML solution pen-injector Inject 2 mg under the skin into the appropriate area as directed 1 (One) Time Per Week. 3 mL 3    spironolactone (ALDACTONE) 25 MG tablet Take 1 tablet by mouth once daily 90 tablet 0    topiramate (Topamax) 25 MG tablet Take 1 tablet by mouth Daily for 90 days. 90 tablet 1    Vascepa 1 g capsule capsule TAKE 2 CAPSULES BY MOUTH TWICE DAILY WITH MEALS 360 capsule 0     No current facility-administered medications on file prior to visit.      Allergies   Allergen Reactions    Sulfa Antibiotics Anaphylaxis and Other (See Comments)     Lip swelling    Substance with sulfonamide structure and antibacterial mechanism of action (substance)          Review of Systems   Constitutional: Negative.    HENT: Negative.     Eyes: Negative.   "  Respiratory: Negative.     Cardiovascular: Negative.    Gastrointestinal: Negative.    Endocrine: Negative.    Genitourinary: Negative.    Musculoskeletal:  Positive for arthralgias.   Skin: Negative.    Allergic/Immunologic: Negative.    Neurological: Negative.    Hematological: Negative.    Psychiatric/Behavioral: Negative.          I reviewed the patient's chief complaint, history of present illness, review of systems, past medical history, surgical history, family history, social history, medications and allergy list.        Objective      Physical Exam  /64   Ht 162.6 cm (64\")   Wt 94.3 kg (208 lb)   BMI 35.70 kg/m²     Body mass index is 35.7 kg/m².    General  Mental Status - alert  General Appearance - cooperative, well groomed, not in acute distress  Orientation - Oriented X3  Build & Nutrition - well developed and well nourished  Posture - normal posture  Gait - normal gait       Ortho Exam  Right knee with some tenderness medial joint.  Range of motion 0-1 20.  Stable ligaments.    Imaging/Studies Reviewed and Interpreted:  Imaging Results (Last 24 Hours)       Procedure Component Value Units Date/Time    XR Knee 4+ View Right [[id number]] Resulted: 04/28/25 1325     Updated: 04/28/25 1325    Narrative:      Knee X-Rays  Indication: Pain    Upright AP of bilateral knees. Lateral, skiers and Sunrise views of right   knee     Findings:  No fracture  Right knee with medial joint space narrowing almost bone-on-bone and left   knee with lateral joint space narrowing    No prior studies were available for comparison.          Her ultrasound from January 20 was positive for Baker's cyst      Assessment    Assessment:  1. Right knee pain, unspecified chronicity    2. Primary osteoarthritis of right knee    3. Type 2 diabetes mellitus with diabetic neuropathy, without long-term current use of insulin    4. Primary hypertension    5. Ischemic heart disease    6. Baker's cyst of knee, right  "       Plan:  Continue over-the-counter medication as needed for discomfort  I offered her cortisone injection of the right knee but like to wait until her vacation in June.  She is not having much pain today.  She would like to get stronger.  Therefore I ordered physical therapy.  She will do that close to home.  She will follow-up with me in June for an injection.        Gary Sy MD  04/28/25  13:37 EDT      Dictated Utilizing Dragon Dictation.

## 2025-04-28 NOTE — PROGRESS NOTES
Procedure   - Large Joint Arthrocentesis: R knee on 4/28/2025 1:24 PM  Indications: pain  Details: 21 G needle, anterolateral approach  Medications: 4 mL bupivacaine (PF) 0.25 %; 4 mL lidocaine PF 1% 1 %; 40 mg triamcinolone acetonide 40 MG/ML  Outcome: tolerated well, no immediate complications  Procedure, treatment alternatives, risks and benefits explained, specific risks discussed. Consent was given by the patient. Immediately prior to procedure a time out was called to verify the correct patient, procedure, equipment, support staff and site/side marked as required. Patient was prepped and draped in the usual sterile fashion.

## 2025-05-05 ENCOUNTER — TELEPHONE (OUTPATIENT)
Dept: FAMILY MEDICINE CLINIC | Facility: CLINIC | Age: 65
End: 2025-05-05
Payer: MEDICARE

## 2025-05-05 DIAGNOSIS — E87.6 HYPOKALEMIA: ICD-10-CM

## 2025-05-05 RX ORDER — POTASSIUM CHLORIDE 750 MG/1
10 CAPSULE, EXTENDED RELEASE ORAL DAILY
Qty: 90 CAPSULE | Refills: 0 | Status: SHIPPED | OUTPATIENT
Start: 2025-05-05

## 2025-05-05 NOTE — TELEPHONE ENCOUNTER
THE PATIENT STATES THAT SHE STOPPED TAKING LISINOPRIL SHE STATES THAT SHE FEELS BETTER OFF OF THE MEDICATION AND SHE DOSE NOT WANT TO BE PUT ON ANOTHER MEDICATION THE PATIENT WANTS TO TALK TO THE DOCTOR ABOUT THE MEDICATION WHEN SHE SEES HIM IN [place]     CALL [phone]

## 2025-05-06 ENCOUNTER — OFFICE VISIT (OUTPATIENT)
Dept: NEUROLOGY | Facility: CLINIC | Age: 65
End: 2025-05-06
Payer: MEDICARE

## 2025-05-06 VITALS
WEIGHT: 208 LBS | BODY MASS INDEX: 35.51 KG/M2 | HEART RATE: 92 BPM | OXYGEN SATURATION: 98 % | SYSTOLIC BLOOD PRESSURE: 124 MMHG | HEIGHT: 64 IN | DIASTOLIC BLOOD PRESSURE: 74 MMHG

## 2025-05-06 DIAGNOSIS — R11.15 CYCLICAL VOMITING SYNDROME: ICD-10-CM

## 2025-05-06 DIAGNOSIS — E11.42 DIABETIC POLYNEUROPATHY ASSOCIATED WITH TYPE 2 DIABETES MELLITUS: Primary | ICD-10-CM

## 2025-05-06 RX ORDER — FLUCONAZOLE 150 MG/1
TABLET ORAL
COMMUNITY
Start: 2025-02-10

## 2025-05-06 RX ORDER — TOPIRAMATE 25 MG/1
50 TABLET, FILM COATED ORAL DAILY
Qty: 180 TABLET | Refills: 1 | Status: SHIPPED | OUTPATIENT
Start: 2025-05-06 | End: 2025-08-04

## 2025-05-06 NOTE — PROGRESS NOTES
Subjective:    CC: Lucia Stevens is in clinic today for follow up for history of diabetic polyneuropathy and cyclical vomiting syndrome.    HPI:  Initial visit: 1/17/2024: Patient is a 63-year-old female with past medical history of type 2 diabetes, hypertension, hyperlipidemia, ischemic heart disease referred to the clinic to establish care for diabetic polyneuropathy.  She reports that she started experiencing symptoms of pain, tingling, numbness and burning in both her feet extending up to the ankles and lower aspect of legs bilaterally about 1 and half years ago.  She reports that symptoms continue to become somewhat worse slowly over the period of time.  Because of this she underwent EMG/nerve conduction study.  I reviewed the results personally which showed moderate axonal polyneuropathy.  She was then started on Lyrica 100 mg 3 times a day about 6 months ago and reports 70 to 80% reduction in her symptoms of neuropathy.  She denies any symptoms in her hands.  Denies any problems with walking, gait or balance currently.  She is trying to keep her blood sugars under good control and last A1c was 7.1.  She denies any side effects with Lyrica use.    5/8/2024: She is in clinic for sooner than her scheduled appointment as after her last visit with me in January 2024, she reports having to intractable episodes of vomiting.  Back in 2019, had started her on amitriptyline 25 mg at bedtime and it had helped her tremendously in bringing the symptoms of cyclical vomiting under control.  She is interested in retrying it and see if it helps again.  She has a detailed GI workup in the past which did not reveal any abnormalities.  As far as diabetic neuropathy symptoms are concerned, they are under control with use of Lyrica 100 mg 3 times daily and Cymbalta 60 mg daily.    11/5/2024: She is in clinic for regular follow-up.  Since her last visit in May 2024, she was restarted on amitriptyline but it caused her to have  significant side effects her amitriptyline was stopped and instead was started on Topamax.  She reports that with 50 mg daily dose, she had side effects with it with 25 mg daily dose, she has had excellent response and has had only 1 abdominal migraine.  She has tried rizatriptan as an abortive treatment and it has helped but it caused her to have side effects.  She is also reporting worsening in neuropathy symptoms primarily involving the left great toe.  Other than that, she is using Lyrica 100 mg 3 times daily along with Cymbalta 60 mg daily and seems to be helping.    5/6/2025: She is in clinic for regular follow-up.  Since her last visit November 2024, she reports that with the Topamax 25 mg daily, abdominal migraines under good control.  She denies any side effects with this dose.  She continues to take Lyrica 100 mg 3 times a day and Cymbalta 60 mg daily.  She reports that occasionally she will have intense symptoms of neuropathy involving the left great toe for which she has to use compounded cream and it does help.      The following portions of the patient's history were reviewed and updated as of 05/06/2025: allergies, social history, and problem list.       Current Outpatient Medications:     Abatacept (Orencia ClickJect) 125 MG/ML solution auto-injector, Inject 1 mL under the skin into the appropriate area as directed Every 7 (Seven) Days., Disp: 4 mL, Rfl: 5    allopurinol (ZYLOPRIM) 100 MG tablet, Take 2 tablets by mouth Daily., Disp: 180 tablet, Rfl: 0    aspirin 81 MG EC tablet, Take 1 tablet by mouth Daily., Disp: , Rfl:     bisoprolol (ZEBeta) 5 MG tablet, TAKE 1/2 TABLET EVERY DAY, Disp: 45 tablet, Rfl: 1    Blood Glucose Monitoring Suppl (True Metrix Air Glucose Meter) w/Device kit, , Disp: , Rfl:     Continuous Glucose Sensor (FreeStyle Zafar 3 Plus Sensor), Use Every 15 (Fifteen) Days., Disp: 2 each, Rfl: 5    DULoxetine (CYMBALTA) 60 MG capsule, Take 1 capsule by mouth Daily., Disp: 90  capsule, Rfl: 1    empagliflozin (Jardiance) 25 MG tablet tablet, Take 1 tablet by mouth Daily., Disp: 90 tablet, Rfl: 1    furosemide (LASIX) 40 MG tablet, TAKE 1 TABLET BY MOUTH IN THE MORNING, Disp: 90 tablet, Rfl: 1    glimepiride (AMARYL) 2 MG tablet, Take 1 tablet by mouth Daily., Disp: 90 tablet, Rfl: 1    Insulin Glargine (LANTUS SOLOSTAR) 100 UNIT/ML injection pen, Inject 55 Units under the skin into the appropriate area as directed Every Morning., Disp: 35 mL, Rfl: 5    Insulin Pen Needle (Pen Needles) 31G X 6 MM misc, 1 daily for lantus, Disp: 100 each, Rfl: 5    metFORMIN (GLUCOPHAGE) 850 MG tablet, Take 1 tablet by mouth Daily With Breakfast., Disp: 180 tablet, Rfl: 1    methotrexate 2.5 MG tablet, Take 8 tablets by mouth 1 (One) Time Per Week., Disp: 40 tablet, Rfl: 3    midodrine (PROAMATINE) 5 MG tablet, Take 2 tablets by mouth twice daily, Disp: 180 tablet, Rfl: 0    nitroglycerin (NITROSTAT) 0.4 MG SL tablet, 1 under the tongue as needed for angina, may repeat q5mins for up three doses, Disp: 25 tablet, Rfl: 3    nystatin (MYCOSTATIN) 100,000 unit/mL suspension, Take 2 mL by mouth 4 (Four) Times a Day. (Patient taking differently: Take 2 mL by mouth As Needed.), Disp: 60 mL, Rfl: 1    ondansetron (ZOFRAN) 4 MG tablet, Take 1 tablet by mouth Every 8 (Eight) Hours As Needed for Nausea or Vomiting., Disp: 20 tablet, Rfl: 1    potassium chloride (MICRO-K) 10 MEQ CR capsule, Take 1 capsule by mouth once daily, Disp: 90 capsule, Rfl: 0    predniSONE (DELTASONE) 5 MG tablet, Take 0.5-1 tablets by mouth Daily., Disp: 30 tablet, Rfl: 3    pregabalin (LYRICA) 100 MG capsule, Take 1 capsule by mouth 3 (Three) Times a Day., Disp: 90 capsule, Rfl: 3    rizatriptan (MAXALT) 10 MG tablet, Take 1 tablet by mouth 1 (One) Time As Needed for Migraine. May repeat in 2 hours if needed, Disp: 9 tablet, Rfl: 5    rosuvastatin (CRESTOR) 20 MG tablet, Take 1 tablet by mouth Daily., Disp: 90 tablet, Rfl: 1    Semaglutide, 2  MG/DOSE, (Ozempic, 2 MG/DOSE,) 8 MG/3ML solution pen-injector, Inject 2 mg under the skin into the appropriate area as directed 1 (One) Time Per Week., Disp: 3 mL, Rfl: 3    spironolactone (ALDACTONE) 25 MG tablet, Take 1 tablet by mouth once daily, Disp: 90 tablet, Rfl: 0    Vascepa 1 g capsule capsule, TAKE 2 CAPSULES BY MOUTH TWICE DAILY WITH MEALS, Disp: 360 capsule, Rfl: 0    fluconazole (DIFLUCAN) 150 MG tablet, , Disp: , Rfl:     folic acid (FOLVITE) 1 MG tablet, Take 1 tablet by mouth Daily. (Patient not taking: Reported on 5/6/2025), Disp: 90 tablet, Rfl: 0    lisinopril (PRINIVIL,ZESTRIL) 5 MG tablet, Take 1 tablet by mouth Daily. (Patient not taking: Reported on 5/6/2025), Disp: 90 tablet, Rfl: 1    omeprazole (priLOSEC) 40 MG capsule, Take 1 capsule by mouth Every Morning. (Patient not taking: Reported on 5/6/2025), Disp: , Rfl:     topiramate (Topamax) 25 MG tablet, Take 1 tablet by mouth Daily for 90 days., Disp: 90 tablet, Rfl: 1   Past Medical History:   Diagnosis Date    Abdominal migraine, not intractable 08/12/2024    Allergic     Ankle sprain     Arthritis of neck     Cervical disc disorder     CHF (congestive heart failure)     Cholelithiasis     Colon polyp     Coronary artery disease     2 stents    CTS (carpal tunnel syndrome)     Depression     Fibroid 7/22    Gout     Herpes 7/22    Would like retested    Hypercholesterolemia     Hypertension     Low back strain     Lumbosacral disc disease     Multiple gestation 5-5-82    I have 2 children    Neuropathy     Obesity     Osteoarthritis     Psoriasis     Rheumatoid arthritis     Scleroderma     Sjoegren syndrome     SOB (shortness of breath) on exertion     Thoracic disc disorder     Tobacco abuse 2011    cessation     Type 2 diabetes mellitus with diabetic neuropathy, without long-term current use of insulin 01/17/2024    Varicella     Wears dentures     full set    Wears glasses       Past Surgical History:   Procedure Laterality Date     APPENDECTOMY      CARDIAC CATHETERIZATION      CARDIAC CATHETERIZATION N/A 11/30/2017    Procedure: Left Heart Cath;  Surgeon: Galina Leonard MD;  Location:  [facility];  Service:     CARDIAC ELECTROPHYSIOLOGY PROCEDURE N/A 03/28/2018    Procedure: Device Implant BiV ICD;  Surgeon: Sarbjit Ellison DO;  Location:  [facility];  Service: Cardiovascular    CERVICAL DISCECTOMY ANTERIOR      CHOLECYSTECTOMY      COLONOSCOPY  09/2022    CORONARY ANGIOPLASTY      CORONARY ANGIOPLASTY WITH STENT PLACEMENT      Percutaneous transluminal balloon angioplasty with insertion of stent into coronary artery    CORONARY ANGIOPLASTY WITH STENT PLACEMENT      CORONARY STENT PLACEMENT      D & C HYSTEROSCOPY N/A 10/06/2022    Procedure: DILATATION AND CURETTAGE HYSTEROSCOPY;  Surgeon: Davide Durbin MD;  Location:  [facility];  Service: Obstetrics/Gynecology;  Laterality: N/A;    ENDOSCOPY  09/2022    LAPAROSCOPIC CHOLECYSTECTOMY  1980    NECK SURGERY      OTHER SURGICAL HISTORY      growth removed from small intestine as a child    POLYPECTOMY      Colon polyps status    RECTOVAGINAL FISTULA REPAIR      SMALL INTESTINE SURGERY      TONSILLECTOMY      TUBAL ABDOMINAL LIGATION  1998?    Only 1    WISDOM TOOTH EXTRACTION        Family History   Problem Relation Age of Onset    Heart disease Mother     Osteoporosis Mother     Hypertension Mother     Cancer Mother         bladder    Heart attack Father     Colon cancer Father     Prostate cancer Father     Coronary artery disease Father     Diabetes Father     Cancer Father     Arthritis Father     Heart disease Father         heart problems    No Known Problems Brother     Cancer Maternal Grandmother     Brain cancer Maternal Grandmother     Stomach cancer Maternal Grandfather     Heart failure Paternal Grandmother     Breast cancer Paternal Grandmother     Heart attack Paternal Grandfather         Review of Systems  Objective:    /74   Pulse 92  "  Ht 162.6 cm (64\")   Wt 94.3 kg (208 lb)   SpO2 98%   Breastfeeding No   BMI 35.70 kg/m²     Neurology Exam:  General apperance: NAD.     Mental status: Alert, awake and oriented to time place and person.    Language and Speech: No aphasia or dysarthria.    CN II to XII: Intact.    Opthalmoscopic Exam: No papilledema.    Motor:  Right UE muscle strength 5/5. Normal tone.     Left UE muscle strength 5/5. Normal tone.      Right LE muscle strength 5/5. Normal tone.     Left LE muscle strength 5/5. Normal tone.      Sensory: Normal light touch, vibration and pinprick sensation bilaterally.    DTRs: 2+ bilaterally.    Babinski: Negative bilaterally.    Co-ordination: Normal finger-to-nose, heel to shin B/L.    Rhomberg: Negative.    Gait: Normal.    Cardiovascular: Regular rate and rhythm without murmur, gallop or rub.    Assessment and Plan:  1. Diabetic polyneuropathy associated with type 2 diabetes mellitus  2. Cyclical vomiting syndrome  She is responding well to Topamax 25 mg daily.  Since she does not have any side effects, would like to increase it to 50 mg daily dose for better therapeutic effect to help get help with neuropathy symptoms 2.  Continue with Lyrica 100 mg 3 times daily, Cymbalta 60 mg daily and compounded cream as needed to be applied on left great toe when she has breakthrough symptoms otherwise, I will see her back in clinic in 1 year for follow-up.       I spent 30 minutes in patient care: Reviewing records prior to the visit, entering orders and documentation and spent more than caruso 50% of this time face-to-face in management, instructions and education regarding above mentioned diagnosis and also on counseling and discussing about taking medication regularly, possible side effects with medication use, importance of good sleep hygiene, good hydration and regular exercise.    No follow-ups on file.       Note to patient: The 21st Century Cures Act makes medical notes like these available " to patients in the interest of transparency. However, be advised this is a medical document. It is intended as peer to peer communication. It is written in medical language and may contain abbreviations or verbiage that are unfamiliar. It may appear blunt or direct. Medical documents are intended to carry relevant information, facts as evident, and the clinical opinion of the physician.

## 2025-05-07 ENCOUNTER — OFFICE VISIT (OUTPATIENT)
Dept: PULMONOLOGY | Facility: CLINIC | Age: 65
End: 2025-05-07
Payer: MEDICARE

## 2025-05-07 VITALS
WEIGHT: 209 LBS | OXYGEN SATURATION: 97 % | HEIGHT: 64 IN | BODY MASS INDEX: 35.68 KG/M2 | HEART RATE: 83 BPM | DIASTOLIC BLOOD PRESSURE: 78 MMHG | TEMPERATURE: 96.8 F | SYSTOLIC BLOOD PRESSURE: 120 MMHG

## 2025-05-07 DIAGNOSIS — M34.9 SCLERODERMA: Primary | ICD-10-CM

## 2025-05-07 DIAGNOSIS — J45.20 MILD INTERMITTENT ASTHMA WITHOUT COMPLICATION: ICD-10-CM

## 2025-05-07 PROCEDURE — 3074F SYST BP LT 130 MM HG: CPT | Performed by: INTERNAL MEDICINE

## 2025-05-07 PROCEDURE — 99213 OFFICE O/P EST LOW 20 MIN: CPT | Performed by: INTERNAL MEDICINE

## 2025-05-07 PROCEDURE — 3078F DIAST BP <80 MM HG: CPT | Performed by: INTERNAL MEDICINE

## 2025-05-07 NOTE — PROGRESS NOTES
"Follow Up Office Note       Patient Name: Lucia Stevens    Referring Physician: No ref. provider found    Chief Complaint:    Chief Complaint   Patient presents with    Scleroderma     F/U       History of Present Illness: Lucia Stevens is a 64 y.o. female who is here today to follow-up care with Pulmonary.  Patient has a history of coronary disease, chronic systolic heart failure, hyperlipidemia, and Sjogren's syndrome.  Patient currently doing well denies any chest pain, nausea, fever, or chills.    Review of Systems:   Review of Systems   Constitutional:  Negative for chills, fatigue and fever.   HENT:  Negative for congestion and voice change.    Eyes:  Negative for blurred vision.   Respiratory:  Negative for cough, shortness of breath and wheezing.    Cardiovascular:  Negative for chest pain.   Skin:  Negative for dry skin.   Hematological:  Negative for adenopathy.   Psychiatric/Behavioral:  Negative for agitation and depressed mood.        The following portions of the patient's history were reviewed and updated as appropriate: allergies, current medications, past family history, past medical history, past social history, past surgical history and problem list.    Physical Exam:  Vital Signs:   Vitals:    05/07/25 1520   BP: 120/78   Pulse: 83   Temp: 96.8 °F (36 °C)   TempSrc: Infrared   SpO2: 97%  Comment: Room air at rest   Weight: 94.8 kg (209 lb)   Height: 162.6 cm (64\")       Physical Exam  Vitals and nursing note reviewed.   Constitutional:       General: She is not in acute distress.     Appearance: She is well-developed and normal weight. She is not ill-appearing or toxic-appearing.   HENT:      Head: Normocephalic and atraumatic.   Cardiovascular:      Rate and Rhythm: Normal rate and regular rhythm.      Pulses: Normal pulses.      Heart sounds: Normal heart sounds. No murmur heard.     No friction rub. No gallop.   Pulmonary:      Effort: Pulmonary effort is normal. No respiratory distress.     "  Breath sounds: Normal breath sounds. No wheezing, rhonchi or rales.   Musculoskeletal:      Right lower leg: No edema.      Left lower leg: No edema.   Skin:     General: Skin is warm and dry.   Neurological:      Mental Status: She is alert and oriented to person, place, and time.         Immunization History   Administered Date(s) Administered    COVID-19 (MODERNA) 1st,2nd,3rd Dose Monovalent 03/04/2021, 04/01/2021    Fluzone (or Fluarix & Flulaval for VFC) >6mos 10/04/2019, 10/08/2020, 12/03/2021, 10/17/2022    Fluzone High-Dose 65+YRS 10/18/2024    Hepatitis B Adult/Adolescent IM 12/19/2002    Pneumococcal Conjugate 13-Valent (PCV13) 01/24/2018       Results Review:   -I personally viewed the patient's chest x-ray from 5/7/2025 showing no acute cardiopulmonary process.  - chest x-ray from 5/6/2024 shows no acute cardiopulmonary process.  -I personally viewed the patient's PFTs from 5/7/2025 showing moderate obstruction without restriction, and a normal DLCO.  - PFT from 5/6/2024 shows moderate obstruction without restriction, significant air trapping and a normal DLCO.  - Echo report from 3/16/2023 showed a EF of 46 to 50%, grade 1 diastolic dysfunction, normal RVSP.    Assessment / Plan:   Diagnoses and all orders for this visit:    1. Scleroderma (Primary)  - With scleroderma, at this time PFTs are showing still some obstruction.  But no signs of ILD.  X-ray is unremarkable.  We will continue to monitor on a every 1 to 2-year basis.    2. Mild intermittent asthma without complication  - Still asymptomatic from an asthma standpoint.  We discussed inhalers on today's visit she does not want any new medication at this time.      Follow Up:   Return in about 1 year (around 5/7/2026).       CHRISTY Gay,   Pulmonary and Critical Care Medicine  Note Electronically Signed    Part of this note may be an electronic transcription/translation of spoken language to printed text using the Dragon Dictation  System.

## 2025-05-08 ENCOUNTER — TELEPHONE (OUTPATIENT)
Age: 65
End: 2025-05-08
Payer: MEDICARE

## 2025-05-08 DIAGNOSIS — R13.10 DYSPHAGIA, UNSPECIFIED TYPE: Primary | ICD-10-CM

## 2025-05-08 NOTE — TELEPHONE ENCOUNTER
Patient is inquiring if for you could to recommend her ENT for Scleroderma and Sjogren's. States that she has seen ENT in the past and she has been struggling the past few weeks.     Please advise.

## 2025-05-12 ENCOUNTER — OFFICE VISIT (OUTPATIENT)
Dept: UROLOGY | Facility: CLINIC | Age: 65
End: 2025-05-12
Payer: MEDICARE

## 2025-05-12 ENCOUNTER — CLINICAL SUPPORT (OUTPATIENT)
Dept: ORTHOPEDIC SURGERY | Facility: CLINIC | Age: 65
End: 2025-05-12
Payer: MEDICARE

## 2025-05-12 VITALS — HEIGHT: 64 IN | BODY MASS INDEX: 35.68 KG/M2 | WEIGHT: 209 LBS

## 2025-05-12 VITALS — SYSTOLIC BLOOD PRESSURE: 116 MMHG | OXYGEN SATURATION: 97 % | HEART RATE: 76 BPM | DIASTOLIC BLOOD PRESSURE: 68 MMHG

## 2025-05-12 DIAGNOSIS — N39.0 RECURRENT UTI: Primary | ICD-10-CM

## 2025-05-12 DIAGNOSIS — N39.41 URGENCY INCONTINENCE: ICD-10-CM

## 2025-05-12 DIAGNOSIS — M17.11 PRIMARY OSTEOARTHRITIS OF RIGHT KNEE: Primary | ICD-10-CM

## 2025-05-12 PROCEDURE — G2211 COMPLEX E/M VISIT ADD ON: HCPCS

## 2025-05-12 PROCEDURE — 1160F RVW MEDS BY RX/DR IN RCRD: CPT

## 2025-05-12 PROCEDURE — 1159F MED LIST DOCD IN RCRD: CPT

## 2025-05-12 PROCEDURE — 99214 OFFICE O/P EST MOD 30 MIN: CPT

## 2025-05-12 PROCEDURE — 3074F SYST BP LT 130 MM HG: CPT

## 2025-05-12 PROCEDURE — 3078F DIAST BP <80 MM HG: CPT

## 2025-05-12 PROCEDURE — 20610 DRAIN/INJ JOINT/BURSA W/O US: CPT | Performed by: ORTHOPAEDIC SURGERY

## 2025-05-12 PROCEDURE — 87086 URINE CULTURE/COLONY COUNT: CPT

## 2025-05-12 PROCEDURE — 51798 US URINE CAPACITY MEASURE: CPT

## 2025-05-12 PROCEDURE — 81003 URINALYSIS AUTO W/O SCOPE: CPT

## 2025-05-12 RX ORDER — LIDOCAINE HYDROCHLORIDE 10 MG/ML
4 INJECTION, SOLUTION EPIDURAL; INFILTRATION; INTRACAUDAL; PERINEURAL
Status: COMPLETED | OUTPATIENT
Start: 2025-05-12 | End: 2025-05-12

## 2025-05-12 RX ORDER — BUPIVACAINE HYDROCHLORIDE 2.5 MG/ML
4 INJECTION, SOLUTION EPIDURAL; INFILTRATION; INTRACAUDAL; PERINEURAL
Status: COMPLETED | OUTPATIENT
Start: 2025-05-12 | End: 2025-05-12

## 2025-05-12 RX ORDER — TRIAMCINOLONE ACETONIDE 40 MG/ML
40 INJECTION, SUSPENSION INTRA-ARTICULAR; INTRAMUSCULAR
Status: COMPLETED | OUTPATIENT
Start: 2025-05-12 | End: 2025-05-12

## 2025-05-12 RX ADMIN — TRIAMCINOLONE ACETONIDE 40 MG: 40 INJECTION, SUSPENSION INTRA-ARTICULAR; INTRAMUSCULAR at 14:49

## 2025-05-12 RX ADMIN — LIDOCAINE HYDROCHLORIDE 4 ML: 10 INJECTION, SOLUTION EPIDURAL; INFILTRATION; INTRACAUDAL; PERINEURAL at 14:49

## 2025-05-12 RX ADMIN — BUPIVACAINE HYDROCHLORIDE 4 ML: 2.5 INJECTION, SOLUTION EPIDURAL; INFILTRATION; INTRACAUDAL; PERINEURAL at 14:49

## 2025-05-12 NOTE — PROGRESS NOTES
[facility] Female Office Visit      Patient Name: Lucia Stevens  : 1960   MRN: [id number]     Chief Complaint:  Lower Urinary Tract Symptoms.   Chief Complaint   Patient presents with    Recurrent UTI    Urinary Urgency    Urinary Frequency       Referring Provider: No ref. provider found    History of Present Illness: Ms. Stevens is a 64 y.o. female with history of recurrent UTI and OAB.     Patient last seen in our clinic on 2/3/25 with UTI related symptoms including dysuria, feeling of incomplete bladder emptying and suprapubic pressure. At this time we sent urinalysis for guidance PCR to further evaluate uropathogens. Guidance PCR positive for infection patient was covered with appropriate antibiotic coverage, Macrobid.      Patient denies UTI related symptoms at this time. Urinalysis today is negative for infection or blood. At her last visit patient was started on vaginal estrogen cream for UTI prophylaxis and vaginal dryness.    Patient reports increase in urinary frequency and urgency with urge incontinence. Patient reports she is on Jardiance to aid in type 2 diabetes. Patient wears depends daily.  Patient reports she has trialed multiple medications to aid in overactivity of the bladder and denies symptom improvement.    Patient is from [place], [place].    Subjective      Review of System: Review of Systems   Genitourinary:  Positive for frequency and urgency.        Urinary urgency with incontinence.   All other systems reviewed and are negative.     I have reviewed the ROS documented by my clinical staff, I have updated appropriately and I agree. YEIMI Lazcano    Past Medical History:  Past Medical History:   Diagnosis Date    Abdominal migraine, not intractable 2024    Allergic     Ankle sprain     Arthritis of neck     Cervical disc disorder     CHF (congestive heart failure)     Cholelithiasis     Colon polyp     Coronary artery disease     2 stents    CTS (carpal tunnel  syndrome)     Depression     Fibroid 7/22    Gout     Herpes 7/22    Would like retested    Hypercholesterolemia     Hypertension     Low back strain     Lumbosacral disc disease     Multiple gestation 5-5-82    I have 2 children    Neuropathy     Obesity     Osteoarthritis     Psoriasis     Rheumatoid arthritis     Scleroderma     Sjoegren syndrome     SOB (shortness of breath) on exertion     STD (sexually transmitted disease) 7/22    Thoracic disc disorder     Tobacco abuse 2011    cessation     Type 2 diabetes mellitus with diabetic neuropathy, without long-term current use of insulin 01/17/2024    Urinary incontinence     Urinary tract infection     Vaginal infection     Varicella     Wears dentures     full set    Wears glasses        Past Surgical History:  Past Surgical History:   Procedure Laterality Date    APPENDECTOMY      CARDIAC CATHETERIZATION      CARDIAC CATHETERIZATION N/A 11/30/2017    Procedure: Left Heart Cath;  Surgeon: Galina Leonard MD;  Location:  [facility];  Service:     CARDIAC ELECTROPHYSIOLOGY PROCEDURE N/A 03/28/2018    Procedure: Device Implant BiV ICD;  Surgeon: Sarbjit Ellison DO;  Location:  [facility];  Service: Cardiovascular    CAROTID STENT      CERVICAL DISCECTOMY ANTERIOR      CHOLECYSTECTOMY      COLONOSCOPY  09/2022    CORONARY ANGIOPLASTY      CORONARY ANGIOPLASTY WITH STENT PLACEMENT      Percutaneous transluminal balloon angioplasty with insertion of stent into coronary artery    CORONARY ANGIOPLASTY WITH STENT PLACEMENT      CORONARY STENT PLACEMENT      D & C HYSTEROSCOPY N/A 10/06/2022    Procedure: DILATATION AND CURETTAGE HYSTEROSCOPY;  Surgeon: Davide Durbin MD;  Location:  [facility];  Service: Obstetrics/Gynecology;  Laterality: N/A;    ENDOSCOPY  09/2022    INSERT / REPLACE / REMOVE PACEMAKER      LAPAROSCOPIC CHOLECYSTECTOMY  1980    NECK SURGERY      OTHER SURGICAL HISTORY      growth removed from small intestine as a  child    POLYPECTOMY      Colon polyps status    RECTOVAGINAL FISTULA REPAIR      SMALL INTESTINE SURGERY      TONSILLECTOMY      TUBAL ABDOMINAL LIGATION  1998?    Only 1    WISDOM TOOTH EXTRACTION         Medications:    Current Outpatient Medications:     Abatacept (Orencia ClickJect) 125 MG/ML solution auto-injector, Inject 1 mL under the skin into the appropriate area as directed Every 7 (Seven) Days., Disp: 4 mL, Rfl: 5    allopurinol (ZYLOPRIM) 100 MG tablet, Take 2 tablets by mouth Daily., Disp: 180 tablet, Rfl: 0    aspirin 81 MG EC tablet, Take 1 tablet by mouth Daily., Disp: , Rfl:     bisoprolol (ZEBeta) 5 MG tablet, TAKE 1/2 TABLET EVERY DAY, Disp: 45 tablet, Rfl: 1    Blood Glucose Monitoring Suppl (True Metrix Air Glucose Meter) w/Device kit, , Disp: , Rfl:     Continuous Glucose Sensor (FreeStyle Zafar 3 Plus Sensor), Use Every 15 (Fifteen) Days., Disp: 2 each, Rfl: 5    DULoxetine (CYMBALTA) 60 MG capsule, Take 1 capsule by mouth Daily., Disp: 90 capsule, Rfl: 1    empagliflozin (Jardiance) 25 MG tablet tablet, Take 1 tablet by mouth Daily., Disp: 90 tablet, Rfl: 1    fluconazole (DIFLUCAN) 150 MG tablet, , Disp: , Rfl:     folic acid (FOLVITE) 1 MG tablet, Take 1 tablet by mouth Daily., Disp: 90 tablet, Rfl: 0    furosemide (LASIX) 40 MG tablet, TAKE 1 TABLET BY MOUTH IN THE MORNING, Disp: 90 tablet, Rfl: 1    glimepiride (AMARYL) 2 MG tablet, Take 1 tablet by mouth Daily., Disp: 90 tablet, Rfl: 1    Insulin Glargine (LANTUS SOLOSTAR) 100 UNIT/ML injection pen, Inject 55 Units under the skin into the appropriate area as directed Every Morning., Disp: 35 mL, Rfl: 5    Insulin Pen Needle (Pen Needles) 31G X 6 MM misc, 1 daily for lantus, Disp: 100 each, Rfl: 5    lisinopril (PRINIVIL,ZESTRIL) 5 MG tablet, Take 1 tablet by mouth Daily., Disp: 90 tablet, Rfl: 1    metFORMIN (GLUCOPHAGE) 850 MG tablet, Take 1 tablet by mouth Daily With Breakfast., Disp: 180 tablet, Rfl: 1    methotrexate 2.5 MG tablet,  Take 8 tablets by mouth 1 (One) Time Per Week., Disp: 40 tablet, Rfl: 3    midodrine (PROAMATINE) 5 MG tablet, Take 2 tablets by mouth twice daily, Disp: 180 tablet, Rfl: 0    nitroglycerin (NITROSTAT) 0.4 MG SL tablet, 1 under the tongue as needed for angina, may repeat q5mins for up three doses, Disp: 25 tablet, Rfl: 3    nystatin (MYCOSTATIN) 100,000 unit/mL suspension, Take 2 mL by mouth 4 (Four) Times a Day. (Patient taking differently: Take 2 mL by mouth As Needed.), Disp: 60 mL, Rfl: 1    omeprazole (priLOSEC) 40 MG capsule, Take 1 capsule by mouth Every Morning., Disp: , Rfl:     ondansetron (ZOFRAN) 4 MG tablet, Take 1 tablet by mouth Every 8 (Eight) Hours As Needed for Nausea or Vomiting., Disp: 20 tablet, Rfl: 1    potassium chloride (MICRO-K) 10 MEQ CR capsule, Take 1 capsule by mouth once daily, Disp: 90 capsule, Rfl: 0    predniSONE (DELTASONE) 5 MG tablet, Take 0.5-1 tablets by mouth Daily., Disp: 30 tablet, Rfl: 3    pregabalin (LYRICA) 100 MG capsule, Take 1 capsule by mouth 3 (Three) Times a Day., Disp: 90 capsule, Rfl: 3    rizatriptan (MAXALT) 10 MG tablet, Take 1 tablet by mouth 1 (One) Time As Needed for Migraine. May repeat in 2 hours if needed, Disp: 9 tablet, Rfl: 5    rosuvastatin (CRESTOR) 20 MG tablet, Take 1 tablet by mouth Daily., Disp: 90 tablet, Rfl: 1    Semaglutide, 2 MG/DOSE, (Ozempic, 2 MG/DOSE,) 8 MG/3ML solution pen-injector, Inject 2 mg under the skin into the appropriate area as directed 1 (One) Time Per Week., Disp: 3 mL, Rfl: 3    spironolactone (ALDACTONE) 25 MG tablet, Take 1 tablet by mouth once daily, Disp: 90 tablet, Rfl: 0    topiramate (Topamax) 25 MG tablet, Take 2 tablets by mouth Daily for 90 days., Disp: 180 tablet, Rfl: 1    Vascepa 1 g capsule capsule, TAKE 2 CAPSULES BY MOUTH TWICE DAILY WITH MEALS, Disp: 360 capsule, Rfl: 0  No current facility-administered medications for this visit.    Allergies:  Allergies   Allergen Reactions    Sulfa Antibiotics  Anaphylaxis and Other (See Comments)     Lip swelling    Substance with sulfonamide structure and antibacterial mechanism of action (substance)       Social History:  Social History     Socioeconomic History    Marital status:     Number of children: 2    Years of education: 13    Highest education level: Some college, no degree   Tobacco Use    Smoking status: Former     Current packs/day: 0.00     Average packs/day: 0.5 packs/day for 20.0 years (10.0 ttl pk-yrs)     Types: Cigarettes     Start date: 1991     Quit date: 2011     Years since quittin.3     Passive exposure: Past    Smokeless tobacco: Never    Tobacco comments:     Tobacco use status: Occasional cigarette smoker; Smoking status: Heavy tobacco smoker.   Vaping Use    Vaping status: Never Used   Substance and Sexual Activity    Alcohol use: Yes     Alcohol/week: 1.0 standard drink of alcohol     Types: 1 Shots of liquor per week     Comment: Social drinker    Drug use: No    Sexual activity: Yes     Partners: Male     Birth control/protection: Post-menopausal       Family History:  Family History   Problem Relation Age of Onset    Heart disease Mother     Osteoporosis Mother     Hypertension Mother     Cancer Mother         bladder    Heart attack Father     Colon cancer Father     Prostate cancer Father     Coronary artery disease Father     Diabetes Father     Cancer Father     Arthritis Father     Heart disease Father         heart problems    No Known Problems Brother     Cancer Maternal Grandmother     Brain cancer Maternal Grandmother     Stomach cancer Maternal Grandfather     Heart failure Paternal Grandmother     Breast cancer Paternal Grandmother     Heart attack Paternal Grandfather      Bladder & Bowel Symptom Questionnaire    How often do you usually urinate during the day ?   3 - About every 1-2 hours   2.   How many timed do you urinate at night?   1 - 2 times at night   3.   What is the reason that you usually  urinate?   2 - Moderate urge (can delay 10-60 min)   4.   Once you get the urge to go, how long can you     comfortably delay?   3 - Less than 10 min   5.   How often do you get a sudden urge that makes you rush to the bathroom?   4 - At least once a day   6.   How often does a sudden urge to urinate result in you leaking urine or wetting pads?   4 - At least once a day   7.  In your opinion, how good is your bladder control?   3 - Poor   8.  Do you have accidental bowel leakage?   no   9.  Do you have difficulty fully emptying your bladder?   yes   10.  Do you experience accidental leakage when performing some physical activity such as coughing, sneezing, laughing or exercise?   yes   11. Have you tried medications to help improve your symptoms?   yes   12. Would you be interested in learning about a long-lasting option that may help you with your symptoms?   yes                                                                             Total Score   20     0-7 (Mild) 8-16 (Moderate) 17-28 (Severe)        Post void residual bladder scan:    12 cc     Objective     Physical Exam:   Vital Signs:   Vitals:    05/12/25 1548   BP: 116/68   Pulse: 76   SpO2: 97%     There is no height or weight on file to calculate BMI.     Physical Exam  Vitals and nursing note reviewed.   Constitutional:       Appearance: Normal appearance.   Pulmonary:      Effort: Pulmonary effort is normal.   Neurological:      Mental Status: She is alert and oriented to person, place, and time.   Psychiatric:         Mood and Affect: Mood normal.         Behavior: Behavior normal.         Labs:   Brief Urine Lab Results  (Last result in the past 365 days)        Color   Clarity   Blood   Leuk Est   Nitrite   Protein   CREAT   Urine HCG        05/12/25 1553 Straw   Clear   Negative   Negative   Negative   Negative                   Urine Culture          8/13/2024    13:49 1/13/2025    12:37   Urine Culture   Urine Culture No growth  Final report          Lab Results   Component Value Date    GLUCOSE 111 (H) 04/16/2025    CALCIUM 10.0 04/16/2025     04/16/2025    K 4.4 04/16/2025    CO2 22 04/16/2025     04/16/2025    BUN 24 04/16/2025    CREATININE 0.74 04/16/2025    EGFRIFNONA 71 02/05/2019    BCR 32 (H) 04/16/2025    ANIONGAP 13.5 03/20/2025       Lab Results   Component Value Date    WBC 8.8 04/16/2025    HGB 13.4 04/16/2025    HCT 40.0 04/16/2025    MCV 99 (H) 04/16/2025     04/16/2025       Images:   No Images in the past 120 days found..    Measures:   Tobacco:   Lucia Stevens  reports that she quit smoking about 14 years ago. Her smoking use included cigarettes. She started smoking about 34 years ago. She has a 10 pack-year smoking history. She has been exposed to tobacco smoke. She has never used smokeless tobacco.          Urine Incontinence: Patient reports that she is  currently experiencing  symptoms of urinary incontinence.       Assessment / Plan      Assessment:  Mrs. Stevens is a 64 y.o. female who presented today with lower urinary tract symptoms including increased urinary frequency, urinary urgency with incontinence and history of recurrent UTI. Last known UTI in February treated with course of Macrobid. Urinalysis is negative for infection or blood today. We will send urinalysis for urine culture to further assess for uropathogens. Will continue vaginal estrogen cream for UTI prophylaxis. Despite trialing multiple medications for overactivity of the bladder patient denies symptom improvement. We discussed a referral to pelvic floor physical therapy to aid in symptom improvement.  Patient would like to proceed with this referral at this time. Patient is understanding and agreeable plan of care.  She will alert questions or concerns in the interim.      Diagnoses and all orders for this visit:    1. Recurrent UTI (Primary)  -     POC Urinalysis Dipstick, Automated  -     Urine Culture - Urine, Urine, Clean Catch    2.  Urgency incontinence  -     Ambulatory Referral to Physical Therapy for Evaluation & Treatment           Follow Up:   Return in about 3 months (around 8/12/2025) for Next scheduled follow up.    I spent approximately 30 minutes providing clinical care for this patient; including review of patient's chart and provider documentation, face to face time spent with patient in examination room (obtaining history, performing physical exam, discussing diagnosis and management options), placing orders, and completing patient documentation.     YEIMI Lazcano  [facility]

## 2025-05-12 NOTE — PROGRESS NOTES
[facility] Note        Subjective     CC: Follow-up (2 week f/u; right knee pain/)      HPI    Lucia Stevens is a 64 y.o. female. ***     Overall, patient's symptoms are ***    ROS:    Constiutional:Pt denies fever, chills, nausea, or vomiting.  MSK:as above        Objective      Past Medical History  Past Medical History:   Diagnosis Date    Abdominal migraine, not intractable 2024    Allergic     Ankle sprain     Arthritis of neck     Cervical disc disorder     CHF (congestive heart failure)     Cholelithiasis     Colon polyp     Coronary artery disease     2 stents    CTS (carpal tunnel syndrome)     Depression     Fibroid     Gout     Herpes     Would like retested    Hypercholesterolemia     Hypertension     Low back strain     Lumbosacral disc disease     Multiple gestation 81    I have 2 children    Neuropathy     Obesity     Osteoarthritis     Psoriasis     Rheumatoid arthritis     Scleroderma     Sjoegren syndrome     SOB (shortness of breath) on exertion     STD (sexually transmitted disease)     Thoracic disc disorder     Tobacco abuse 2011    cessation     Type 2 diabetes mellitus with diabetic neuropathy, without long-term current use of insulin 2024    Urinary incontinence     Urinary tract infection     Vaginal infection     Varicella     Wears dentures     full set    Wears glasses      Social History     Socioeconomic History    Marital status:     Number of children: 2    Years of education: 13    Highest education level: Some college, no degree   Tobacco Use    Smoking status: Former     Current packs/day: 0.00     Average packs/day: 0.5 packs/day for 20.0 years (10.0 ttl pk-yrs)     Types: Cigarettes     Start date: 1991     Quit date: 2011     Years since quittin.3     Passive exposure: Past    Smokeless tobacco: Never    Tobacco comments:     Tobacco use status: Occasional cigarette smoker; Smoking status: Heavy tobacco  "smoker.   Vaping Use    Vaping status: Never Used   Substance and Sexual Activity    Alcohol use: Yes     Alcohol/week: 1.0 standard drink of alcohol     Types: 1 Shots of liquor per week     Comment: Social drinker    Drug use: No    Sexual activity: Yes     Partners: Male     Birth control/protection: Post-menopausal          Physical Exam  Ht 162.6 cm (64.02\")   Wt 94.8 kg (208 lb 15.9 oz)   BMI 35.86 kg/m²     Body mass index is 35.86 kg/m².    Patient is well nourished and well developed.        Ortho Exam  ***    Imaging/Labs/EMG Reviewed and Interpreted:  Imaging Results (Last 24 Hours)       ** No results found for the last 24 hours. **              Assessment    Assessment:  No diagnosis found.    Plan:  Recommend over the counter anti-inflammatories for pain and/or swelling  ***      MOSES Rojas(R)  05/12/25  14:51 EDT      Dictated Utilizing Dragon Dictation.  "

## 2025-05-12 NOTE — PROGRESS NOTES
Procedure   - Large Joint Arthrocentesis: R knee on 5/12/2025 2:49 PM  Indications: pain  Details: 21 G needle, anterolateral approach  Medications: 4 mL lidocaine PF 1% 1 %; 40 mg triamcinolone acetonide 40 MG/ML; 4 mL bupivacaine (PF) 0.25 %  Outcome: tolerated well, no immediate complications  Procedure, treatment alternatives, risks and benefits explained, specific risks discussed. Consent was given by the patient. Immediately prior to procedure a time out was called to verify the correct patient, procedure, equipment, support staff and site/side marked as required. Patient was prepped and draped in the usual sterile fashion.      I injected the right knee with cortisone.  I discussed with the patient the potential benefits of performing a therapeutic injection of the cortisone as well as potential risks including but not limited to infection, swelling, pain, bleeding, bruising, nerve/vessel damage, skin color changes, transient elevation in blood glucose levels, and fat atrophy. After informed consent and verifying correct patient, procedure site, and type of procedure, the area was prepped with Hibiclens, ethyl chloride was used to numb the skin. The patient tolerated the procedure well. There were no complications.

## 2025-05-13 LAB — BACTERIA SPEC AEROBE CULT: NO GROWTH

## 2025-05-19 ENCOUNTER — SPECIALTY PHARMACY (OUTPATIENT)
Age: 65
End: 2025-05-19
Payer: MEDICARE

## 2025-05-19 NOTE — PROGRESS NOTES
Specialty Pharmacy Patient Management Program  Refill Outreach     Lucia was contacted today regarding refills of their medication(s).    Refill Questions      Flowsheet Row Most Recent Value   Changes to allergies? No   Changes to medications? No   New conditions or infections since last clinic visit No   Unplanned office visit, urgent care, ED, or hospital admission in the last 4 weeks  No   How does patient/caregiver feel medication is working? Good   Financial problems or insurance changes  No   Since the previous refill, were any specialty medication doses or scheduled injections missed or delayed?  No   Does this patient require a clinical escalation to a pharmacist? No            Delivery Questions      Flowsheet Row Most Recent Value   Delivery method [employer]   Delivery address verified with patient/caregiver? Yes   Delivery address Home   Number of medications in delivery 1   Medication(s) being filled and delivered Abatacept (Orencia ClickJect)   Doses left of specialty medications 0   Copay verified? Yes   Copay amount 0   Copay form of payment No copayment ($0)   Delivery Date Selection 05/20/25   Signature Required No                 Follow-up: 21 day(s)     Irma Zhao, Pharmacy Technician  5/19/2025  11:00 EDT

## 2025-05-20 ENCOUNTER — OFFICE VISIT (OUTPATIENT)
Dept: FAMILY MEDICINE CLINIC | Facility: CLINIC | Age: 65
End: 2025-05-20
Payer: MEDICARE

## 2025-05-20 VITALS
RESPIRATION RATE: 18 BRPM | DIASTOLIC BLOOD PRESSURE: 80 MMHG | WEIGHT: 204 LBS | SYSTOLIC BLOOD PRESSURE: 132 MMHG | BODY MASS INDEX: 34.83 KG/M2 | HEIGHT: 64 IN | TEMPERATURE: 97.1 F | HEART RATE: 82 BPM | OXYGEN SATURATION: 98 %

## 2025-05-20 DIAGNOSIS — K11.20 PAROTIDITIS: Primary | ICD-10-CM

## 2025-05-20 DIAGNOSIS — M35.00 SECONDARY SJOGREN'S SYNDROME: ICD-10-CM

## 2025-05-20 PROBLEM — R93.89 THICKENED ENDOMETRIUM: Status: RESOLVED | Noted: 2022-10-05 | Resolved: 2025-05-20

## 2025-05-20 PROBLEM — N39.0 RECURRENT UTI: Status: RESOLVED | Noted: 2025-01-22 | Resolved: 2025-05-20

## 2025-05-20 PROBLEM — B37.31 VAGINAL YEAST INFECTION: Status: RESOLVED | Noted: 2025-02-10 | Resolved: 2025-05-20

## 2025-05-20 RX ORDER — HYDROCODONE BITARTRATE AND ACETAMINOPHEN 5; 325 MG/1; MG/1
1 TABLET ORAL EVERY 8 HOURS PRN
Qty: 12 TABLET | Refills: 0 | Status: SHIPPED | OUTPATIENT
Start: 2025-05-20

## 2025-05-20 RX ORDER — CLINDAMYCIN HYDROCHLORIDE 300 MG/1
300 CAPSULE ORAL 3 TIMES DAILY
Qty: 30 CAPSULE | Refills: 0 | Status: SHIPPED | OUTPATIENT
Start: 2025-05-20

## 2025-05-20 NOTE — PROGRESS NOTES
Subjective   Lucia Stevens is a 64 y.o. female.     History of Present Illness     She has had swelling in her right jaw  No fever, no nausea  Pain in the swollen area      Review of Systems    Objective   Physical Exam  Vitals and nursing note reviewed.   Constitutional:       General: She is not in acute distress.     Appearance: Normal appearance. She is well-developed.   HENT:      Head:     Cardiovascular:      Rate and Rhythm: Normal rate and regular rhythm.      Heart sounds: Normal heart sounds.   Pulmonary:      Effort: Pulmonary effort is normal.      Breath sounds: Normal breath sounds.   Neurological:      Mental Status: She is alert and oriented to person, place, and time.   Psychiatric:         Mood and Affect: Mood normal.         Behavior: Behavior normal.         Thought Content: Thought content normal.         Judgment: Judgment normal.         Assessment & Plan   Diagnoses and all orders for this visit:    1. Parotiditis (Primary)  -     HYDROcodone-acetaminophen (NORCO) 5-325 MG per tablet; Take 1 tablet by mouth Every 8 (Eight) Hours As Needed for Moderate Pain.  Dispense: 12 tablet; Refill: 0  -     clindamycin (Cleocin) 300 MG capsule; Take 1 capsule by mouth 3 (Three) Times a Day.  Dispense: 30 capsule; Refill: 0  -     Ambulatory Referral to ENT (Otolaryngology)    2. Secondary Sjogren's syndrome    This is likely related to her sjogrnens and is cold but will still treat with Abx, Sour candy, heat, and pain medicine  Will work on ENT for evaluation as well

## 2025-05-21 ENCOUNTER — TELEPHONE (OUTPATIENT)
Dept: FAMILY MEDICINE CLINIC | Facility: CLINIC | Age: 65
End: 2025-05-21
Payer: MEDICARE

## 2025-05-21 NOTE — TELEPHONE ENCOUNTER
Caller: Lucia Stevens    Relationship: Self    Best call back number: [phone]     What was the call regarding: PATIENT STATES THAT PAIN IN JAW IS STILL THE SAME,IT IS SWOLLEN AND PAIN MEDICINE AND HEAT AND COLD PACKS HAVE NOT WORKED    Is it okay if the provider responds through MyChart:

## 2025-05-22 NOTE — PROGRESS NOTES
Office Visit       Date: 09/04/2024   Patient Name: Lucia Stevens  MRN: [id number]  YOB: 1960    Referring Physician: Benjamin Beckwith MD     Chief Complaint:   Chief Complaint   Patient presents with    Scleroderma    Rheumatoid Arthritis    Osteoarthritis    Sjogren's syndrome       History of Present Illness: Lucia Stevens is a 64 y.o. female who is here today for follow-up of Sjogren's syndrome, scleroderma, rheumatoid arthritis, and osteoarthritis.    She established care with our office as of 2/16/24. We have prescribed her methotrexate, folic acid, and prednisone 5 mg/day.     No recent serious injuries or infections. No fever.    Today she reports she feels worse. She rates her pain as 6/10 in severity. She has 10 minutes/day of morning stiffness. No red or hot joints. She has limited mobility in her hands and difficult making a fist. She notes joint swelling in her hands often.    Subjective   Review of systems:  Review of Systems   Constitutional:  Positive for fatigue.   HENT:  Positive for rhinorrhea.         Dry mouth   Eyes:  Positive for visual disturbance.        Dry eyes   Respiratory:  Positive for shortness of breath.    Cardiovascular:  Positive for leg swelling.        Claudication   Endocrine: Positive for polydipsia.   Genitourinary:         Urinary incontinence   Musculoskeletal:  Positive for arthralgias, joint swelling and neck stiffness.   Skin:         Nail changes   Neurological:  Positive for dizziness, weakness, numbness and headaches.        Memory loss   Hematological:  Bruises/bleeds easily.   Psychiatric/Behavioral:  Positive for confusion. The patient is nervous/anxious.        Past Medical History:   Past Medical History:   Diagnosis Date    Abdominal migraine, not intractable 08/12/2024    CHF (congestive heart failure)     Coronary artery disease     2 stents    Depression     Gout     Hypercholesterolemia     Hypertension     Neuropathy      Goal Outcome Evaluation:  Plan of Care Reviewed With: patient, spouse           Outcome Evaluation: 54 y/o female post op day 0 R TKR due to OA. PMH includes htn. Patient lives with spouse and normally independent without using a.d. At time of eval, patient intact light touch sensation to RLE with active R knee flexion to 90 degrees. Patient able to perform supine<>sit activities with modified independence. SBA for transfers using rw. Ambulated 120 ft x 2 using rw with CG/SBA; gait slow with episodes of R LE tremulous initially however improved with time up. Patient had bowel/bladder incontinence and RN made aware. Patient also with elevated BP at start of session with report of slight headache and RN aware and allowed session to proceed. Patient and spouse issued handout for HEP and patient able to return demonstrate. Educated on activity modification and use of ice. Patient is safe to return home with OP or HH PT follow up.    Anticipated Discharge Disposition (PT): home with home health, home with outpatient therapy services                         Psoriasis     Rheumatoid arthritis     Scleroderma     Sjoegren syndrome     SOB (shortness of breath) on exertion     Tobacco abuse 2011    cessation     Type 2 diabetes mellitus with diabetic neuropathy, without long-term current use of insulin 01/17/2024    Wears dentures     full set    Wears glasses        Past Surgical History:   Past Surgical History:   Procedure Laterality Date    APPENDECTOMY      CARDIAC CATHETERIZATION      CARDIAC CATHETERIZATION N/A 11/30/2017    Procedure: Left Heart Cath;  Surgeon: Galina Leonard MD;  Location:  [facility];  Service:     CARDIAC ELECTROPHYSIOLOGY PROCEDURE N/A 03/28/2018    Procedure: Device Implant BiV ICD;  Surgeon: Sarbjit Ellison DO;  Location:  [facility];  Service: Cardiovascular    CERVICAL DISCECTOMY ANTERIOR      CHOLECYSTECTOMY      COLONOSCOPY  09/2022    CORONARY ANGIOPLASTY      CORONARY ANGIOPLASTY WITH STENT PLACEMENT      Percutaneous transluminal balloon angioplasty with insertion of stent into coronary artery    CORONARY ANGIOPLASTY WITH STENT PLACEMENT      CORONARY STENT PLACEMENT      D & C HYSTEROSCOPY N/A 10/06/2022    Procedure: DILATATION AND CURETTAGE HYSTEROSCOPY;  Surgeon: Davide Durbin MD;  Location:  [facility];  Service: Obstetrics/Gynecology;  Laterality: N/A;    ENDOSCOPY  09/2022    OTHER SURGICAL HISTORY      growth removed from small intestine as a child    POLYPECTOMY      Colon polyps status    RECTOVAGINAL FISTULA REPAIR      TONSILLECTOMY      TUBAL ABDOMINAL LIGATION  1998?    Only 1    WISDOM TOOTH EXTRACTION         Family History:   Family History   Problem Relation Age of Onset    Heart disease Mother     Osteoporosis Mother     Hypertension Mother     Cancer Mother         bladder    Heart attack Father     Colon cancer Father     Prostate cancer Father     Coronary artery disease Father     Diabetes Father     Cancer Father     Arthritis Father     Heart disease Father         heart  problems    No Known Problems Brother     Cancer Maternal Grandmother     Brain cancer Maternal Grandmother     Stomach cancer Maternal Grandfather     Heart failure Paternal Grandmother     Breast cancer Paternal Grandmother     Heart attack Paternal Grandfather        Social History:   Social History     Socioeconomic History    Marital status:     Number of children: 2    Years of education: 13    Highest education level: Some college, no degree   Tobacco Use    Smoking status: Former     Current packs/day: 0.00     Average packs/day: 0.5 packs/day for 20.0 years (10.0 ttl pk-yrs)     Types: Cigarettes     Start date: 1991     Quit date: 2011     Years since quittin.6     Passive exposure: Past    Smokeless tobacco: Never    Tobacco comments:     Tobacco use status: Occasional cigarette smoker; Smoking status: Heavy tobacco smoker.   Vaping Use    Vaping status: Never Used   Substance and Sexual Activity    Alcohol use: Yes     Alcohol/week: 1.0 standard drink of alcohol     Types: 1 Shots of liquor per week     Comment: Social drinker; There is a history of alcohol use. Consumed rarely.    Drug use: No    Sexual activity: Not Currently     Partners: Male       Medications:   Current Outpatient Medications:     allopurinol (ZYLOPRIM) 100 MG tablet, Take 2 tablets by mouth Daily., Disp: 180 tablet, Rfl: 1    aspirin 81 MG EC tablet, Take 1 tablet by mouth Daily., Disp: , Rfl:     bisoprolol (ZEBeta) 5 MG tablet, TAKE 1/2 TABLET EVERY DAY, Disp: 45 tablet, Rfl: 1    Blood Glucose Monitoring Suppl (True Metrix Air Glucose Meter) w/Device kit, , Disp: , Rfl:     clotrimazole (LOTRIMIN) 1 % cream, Apply 1 Application topically to the appropriate area as directed Every 12 (Twelve) Hours., Disp: 30 g, Rfl: 1    Continuous Glucose  (FreeStyle Zafar 3 [place]) device, Use 1 each Daily., Disp: 1 each, Rfl: 1    Continuous Glucose Sensor (FreeStyle Zafar 3 Sensor) misc, Use 1 each Every 14  (Fourteen) Days., Disp: 6 each, Rfl: 3    Continuous Glucose Sensor (FreeStyle Zafar 3 Sensor) misc, Use 1 each Every 14 (Fourteen) Days., Disp: 2 each, Rfl: 5    DULoxetine (CYMBALTA) 60 MG capsule, Take 1 capsule by mouth Daily., Disp: 90 capsule, Rfl: 1    empagliflozin (Jardiance) 25 MG tablet tablet, Take 1 tablet by mouth Daily., Disp: 90 tablet, Rfl: 1    folic acid (FOLVITE) 1 MG tablet, Take 1 tablet by mouth Daily., Disp: 90 tablet, Rfl: 1    furosemide (LASIX) 40 MG tablet, 1 PO QM (Patient taking differently: Take 1 tablet by mouth Daily.), Disp: 30 tablet, Rfl: 3    Insulin Glargine (LANTUS SOLOSTAR) 100 UNIT/ML injection pen, Inject 55 Units under the skin into the appropriate area as directed Every Morning., Disp: 35 mL, Rfl: 5    Insulin Pen Needle (Pen Needles) 31G X 6 MM misc, 1 daily for lantus, Disp: 100 each, Rfl: 5    metFORMIN (GLUCOPHAGE) 850 MG tablet, Take 1 tablet by mouth Daily With Breakfast., Disp: 180 tablet, Rfl: 1    methotrexate 2.5 MG tablet, Take 8 tablets by mouth 1 (One) Time Per Week., Disp: 40 tablet, Rfl: 3    midodrine (PROAMATINE) 5 MG tablet, Take 1 tablet by mouth Take As Directed. Take 2 tablet by oral route 2 times every day, Disp: , Rfl:     nitroglycerin (NITROSTAT) 0.4 MG SL tablet, 1 under the tongue as needed for angina, may repeat q5mins for up three doses, Disp: 25 tablet, Rfl: 3    nystatin (MYCOSTATIN) 644357 UNIT/GM ointment, Apply 1 Application topically to the appropriate area as directed 2 (Two) Times a Day., Disp: 90 g, Rfl: 2    omeprazole (priLOSEC) 40 MG capsule, Take 1 capsule by mouth Every Morning., Disp: , Rfl:     ondansetron (ZOFRAN) 4 MG tablet, Take 1 tablet by mouth Every 8 (Eight) Hours As Needed for Nausea or Vomiting., Disp: 20 tablet, Rfl: 1    potassium chloride (MICRO-K) 10 MEQ CR capsule, Take 1 capsule by mouth Daily., Disp: 90 capsule, Rfl: 1    predniSONE (DELTASONE) 5 MG tablet, Take 1 tablet by mouth Daily., Disp: 30 tablet, Rfl:  "3    pregabalin (LYRICA) 100 MG capsule, TAKE 1 CAPSULE BY MOUTH THREE TIMES DAILY, Disp: 90 capsule, Rfl: 2    rizatriptan (MAXALT) 10 MG tablet, Take 1 tablet by mouth 1 (One) Time As Needed for Migraine. May repeat in 2 hours if needed, Disp: 9 tablet, Rfl: 5    rosuvastatin (CRESTOR) 20 MG tablet, Take 1 tablet by mouth Daily., Disp: 90 tablet, Rfl: 1    Semaglutide, 2 MG/DOSE, (Ozempic, 2 MG/DOSE,) 8 MG/3ML solution pen-injector, Inject 2 mg under the skin into the appropriate area as directed 1 (One) Time Per Week., Disp: 3 mL, Rfl: 3    spironolactone (ALDACTONE) 25 MG tablet, Take 1 tablet by mouth Daily., Disp: 90 tablet, Rfl: 1    topiramate (Topamax) 25 MG tablet, Take 1 tablet by mouth Daily for 30 days., Disp: 30 tablet, Rfl: 1    True Metrix Blood Glucose Test test strip, , Disp: , Rfl:     Vascepa 1 g capsule capsule, TAKE 2 CAPSULES BY MOUTH TWICE DAILY WITH MEALS, Disp: 360 capsule, Rfl: 0    Vibegron 75 MG tablet, Take 1 tablet by mouth Daily., Disp: 30 tablet, Rfl: 5    Allergies:   Allergies   Allergen Reactions    Sulfa Antibiotics Anaphylaxis     Lip swelling       I reviewed the patient's chief complaint, history of present illness, review of systems, past medical history, surgical history, family history, social history, medications and allergy list.     Objective    Vital Signs:   Vitals:    09/04/24 1511   BP: 120/70   BP Location: Left arm   Patient Position: Sitting   Cuff Size: Large Adult   Pulse: 79   Temp: 97.3 °F (36.3 °C)   Weight: 93.3 kg (205 lb 11.2 oz)   Height: 162.6 cm (64.02\")   PainSc:   7     Body mass index is 35.29 kg/m².           Physical Exam:  Physical Exam  Constitutional:       Appearance: Normal appearance. She is obese.   HENT:      Head: Normocephalic and atraumatic.   Eyes:      Conjunctiva/sclera: Conjunctivae normal.      Pupils: Pupils are equal, round, and reactive to light.   Cardiovascular:      Rate and Rhythm: Normal rate and regular rhythm.      Heart " sounds: Normal heart sounds.   Pulmonary:      Effort: Pulmonary effort is normal.      Breath sounds: Normal breath sounds.   Musculoskeletal:         General: Normal range of motion.      Cervical back: Normal range of motion.      Comments: Synovitis noted- see joint man  She is unable to make a fist bilaterally. She has contractures in the left 5th and bilateral 4th fingers   Skin:     General: Skin is warm and dry.   Neurological:      General: No focal deficit present.      Mental Status: She is alert and oriented to person, place, and time.   Psychiatric:         Mood and Affect: Mood normal.         Behavior: Behavior normal.         Thought Content: Thought content normal.         Judgment: Judgment normal.            Metrics  GROVE-28 (ESR): 3.66 (Moderate disease activity)  GROVE-28 (CRP): 2.92 (Low disease activity)  Tender (GROVE-28): 0 / 28   Swollen (GROVE-28): 4 / 28     This Exam  Provider Global: 30 / 100  Patient Global: 50 / 100  ESR: 31 mm/hr  CRP: 6 mg/L        Results Review:   Imaging Results (Last 24 Hours)       ** No results found for the last 24 hours. **            Assessment / Plan    Assessment/Plan:   Diagnoses and all orders for this visit:    1. Rheumatoid arthritis with positive rheumatoid factor, involving unspecified site (Primary)  Assessment & Plan:  * 12/20/23: CBC normal, PREET positive, Centromere 2.4 (< 0.9), Chromatin normal, DS DNA normal, Genevieve 1 normal, Ribosomal P normal, RNP normal, SCL 70 normal, Clemente normal, SSA > 8.0 (<0.9), SSB negative, CCP 3.7 (<3.0 normal), CRP 1.37 (<0.50), RF negative, Uric acid normal, urinalysis showed elevated glucose but otherwise was ok, CBC was fine]  * Medications/treatments/interventions tried include: Tylenol, she is sulfa allergic (Cannot take sulfasalazine), pregabalin, Duloxetine, Aspirin, allopurinol, steroids, Advil, Aleve, indomethacin, she saw [facility], MTX/folic acid, prednisone.    1. She has previously seen  [facility]   2. Dr. Diaomnd wanted to avoid hydroxychloroquine because she has a history of CHF and coronary artery disease  3. Continue MTX 20 mg/week.  4. Continue prednisone 5 mg/day for now. Risks and benefits reviewed.  5. We will PA a biologic for her today. We will avoid TNF inhibitors with history of CHF.   5. Follow up in 3-4 months    Orders:  -     Comprehensive Metabolic Panel; Standing  -     C-reactive Protein; Standing  -     Sedimentation Rate; Standing  -     CBC Auto Differential; Standing  -     Urinalysis without microscopic (no culture) - Urine, Clean Catch; Future  -     methotrexate 2.5 MG tablet; Take 8 tablets by mouth 1 (One) Time Per Week.  Dispense: 40 tablet; Refill: 3  -     folic acid (FOLVITE) 1 MG tablet; Take 1 tablet by mouth Daily.  Dispense: 90 tablet; Refill: 1  -     predniSONE (DELTASONE) 5 MG tablet; Take 1 tablet by mouth Daily.  Dispense: 30 tablet; Refill: 3    2. Secondary Sjogren's syndrome  Assessment & Plan:  1. She needs regular dental/opthalmic examinations   2. Continue medications as above  3. Discussed conservative measures to help with dry mouth and dry eyes    Orders:  -     Comprehensive Metabolic Panel; Standing  -     C-reactive Protein; Standing  -     Sedimentation Rate; Standing  -     CBC Auto Differential; Standing  -     Urinalysis without microscopic (no culture) - Urine, Clean Catch; Future    3. Scleroderma  Assessment & Plan:  1. She is PREET and Centromere test positive.   2. 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 She sees a cardiologist  4. She has been having issues with reflux. She follows with gastroenterology. She has upcoming EGD with GI.  5. No history of renal crisis.  6. She denies any issues with Raynaud's  7. Continue/refill MTX & folic acid  8. Continue annual follow up with Dr. Gay pulmonology for ILD screening  9. We recommend patients' with scleroderma be screened for ILD and  pulmonary HTN once/year    Orders:  -     Comprehensive Metabolic Panel; Standing  -     C-reactive Protein; Standing  -     Sedimentation Rate; Standing  -     CBC Auto Differential; Standing  -     Urinalysis without microscopic (no culture) - Urine, Clean Catch; Future    4. Immunodeficiency due to treatment with immunosuppressive medication  Assessment & Plan:  No hepatitis. No diverticulitis. No DVT/MI. She has CAD. She has a history of skin cancer. No other cancers. No COPD. She does have a history of CHF.    We will work on PA for a biologic.  Update QTB and hepatitis panel today  CXR 2/16/24 normal    Orders:  -     QuantiFERON-TB Gold Plus; Future  -     Hepatitis Panel, Acute; Future    5. Osteoarthritis of multiple joints, unspecified osteoarthritis type  Assessment & Plan:  1. Tylenol PRN is ok as directed  2. She has tried taking NSAIDS like Advil, Aleve, and indomethacin   3. She is taking pregabalin for neuropathic pain  4. She is prescribed duloxetine from other provider      6. Encounter for long-term (current) use of high-risk medication  Assessment & Plan:  * Methotrexate 20 mg PO once/week for RA/Sjogren's/Scleroderma  * Started 2/16/24.    1. CBC and CMP every 8-12 weeks to monitor for medication toxicity.   2. Take folate supplements daily.  3. No recent serious infections    Orders:  -     Comprehensive Metabolic Panel; Standing  -     C-reactive Protein; Standing  -     Sedimentation Rate; Standing  -     CBC Auto Differential; Standing  -     Urinalysis without microscopic (no culture) - Urine, Clean Catch; Future  -     methotrexate 2.5 MG tablet; Take 8 tablets by mouth 1 (One) Time Per Week.  Dispense: 40 tablet; Refill: 3  -     folic acid (FOLVITE) 1 MG tablet; Take 1 tablet by mouth Daily.  Dispense: 90 tablet; Refill: 1    7. Long term current use of systemic steroids  Assessment & Plan:  * Prednisone 5 mg/day for RA/Sjogren's/Scleroderma.    Ideally she will taper off as her  condition improves/stabilizes. Today try stopping prednisone.    Orders:  -     Comprehensive Metabolic Panel; Standing  -     C-reactive Protein; Standing  -     Sedimentation Rate; Standing  -     CBC Auto Differential; Standing  -     predniSONE (DELTASONE) 5 MG tablet; Take 1 tablet by mouth Daily.  Dispense: 30 tablet; Refill: 3    8. Psoriasis  Assessment & Plan:  Currently stable      9. Encounter for medication monitoring  Assessment & Plan:  Update hepatitis panel today    Orders:  -     QuantiFERON-TB Gold Plus; Future  -     Hepatitis Panel, Acute; Future    10. Fatigue, unspecified type  Assessment & Plan:  Update QTB today    Orders:  -     QuantiFERON-TB Gold Plus; Future  -     Hepatitis Panel, Acute; Future    11. Gout, unspecified cause, unspecified chronicity, unspecified site  Assessment & Plan:  She has a history of gout. She takes allopurinol from other provider.          Follow Up:   Return in about 3 months (around 12/4/2024) for Dr. Diamond, TR[facility].        YEIMI Leon  [facility] [place]

## 2025-05-23 ENCOUNTER — TELEPHONE (OUTPATIENT)
Age: 65
End: 2025-05-23
Payer: MEDICARE

## 2025-05-23 NOTE — TELEPHONE ENCOUNTER
Called pt. Advised her to stop taking MTX while on Rx for infection and to restart taking MTX when finished with new Rx and infection symptoms are gone. Pt stated understanding.

## 2025-05-23 NOTE — TELEPHONE ENCOUNTER
Hub staff attempted to follow warm transfer process and was unsuccessful     Caller: Lucia Stevens    Relationship to patient: Self    Best call back number: [phone]    Patient is needing: PT WENT TO SEE DR. ORTIZ TODAY SHE HAS A SEVERE INFECTION  AND HE IS TRYING TO PUT HER ON A MEDICATION THAT WOULD INTERACT WITH WHAT SHE IS TAKING WHICH IS THE METHOTREXATE. ASKED FOR A CALL BACK OR FOR US TO CALL DR. ORTIZ OFFICE TO ADVISE . NEEDS A CALL BACK TODAY

## 2025-05-28 ENCOUNTER — TELEPHONE (OUTPATIENT)
Age: 65
End: 2025-05-28

## 2025-05-28 NOTE — TELEPHONE ENCOUNTER
Hub staff attempted to follow warm transfer process and was unsuccessful     Caller: Lucia Stevens    Relationship to patient: Self    Best call back number: [phone]    Patient is needing: PT IS NEEDING TO KNOW IF HER ORENCIA IS OKAY TO TAKE WITH AN ANTIBIOTIC SHE WAS PUT ON. PLEASE CALL BACK.

## 2025-05-28 NOTE — TELEPHONE ENCOUNTER
Spoke to pt. 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 Advised her not to take the Orencia and MTX.

## 2025-06-04 DIAGNOSIS — E11.40 TYPE 2 DIABETES MELLITUS WITH DIABETIC NEUROPATHY, WITHOUT LONG-TERM CURRENT USE OF INSULIN: ICD-10-CM

## 2025-06-05 RX ORDER — INSULIN GLARGINE 100 [IU]/ML
INJECTION, SOLUTION SUBCUTANEOUS
Qty: 15 ML | Refills: 0 | Status: SHIPPED | OUTPATIENT
Start: 2025-06-05

## 2025-06-16 RX ORDER — MIDODRINE HYDROCHLORIDE 5 MG/1
10 TABLET ORAL 2 TIMES DAILY
Qty: 180 TABLET | Refills: 0 | Status: SHIPPED | OUTPATIENT
Start: 2025-06-16

## 2025-06-29 DIAGNOSIS — E11.40 TYPE 2 DIABETES MELLITUS WITH DIABETIC NEUROPATHY, WITHOUT LONG-TERM CURRENT USE OF INSULIN: ICD-10-CM

## 2025-06-30 RX ORDER — ICOSAPENT ETHYL 1000 MG/1
2 CAPSULE ORAL 2 TIMES DAILY WITH MEALS
Qty: 360 CAPSULE | Refills: 0 | Status: SHIPPED | OUTPATIENT
Start: 2025-06-30

## 2025-07-01 DIAGNOSIS — E11.40 TYPE 2 DIABETES MELLITUS WITH DIABETIC NEUROPATHY, WITHOUT LONG-TERM CURRENT USE OF INSULIN: ICD-10-CM

## 2025-07-01 RX ORDER — INSULIN GLARGINE 100 [IU]/ML
INJECTION, SOLUTION SUBCUTANEOUS
Qty: 15 ML | Refills: 0 | Status: SHIPPED | OUTPATIENT
Start: 2025-07-01 | End: 2025-07-02

## 2025-07-02 RX ORDER — INSULIN GLARGINE 100 [IU]/ML
INJECTION, SOLUTION SUBCUTANEOUS
Qty: 15 ML | Refills: 2 | Status: SHIPPED | OUTPATIENT
Start: 2025-07-02

## 2025-07-07 ENCOUNTER — TELEPHONE (OUTPATIENT)
Dept: CARDIOLOGY | Facility: CLINIC | Age: 65
End: 2025-07-07
Payer: MEDICARE

## 2025-07-07 RX ORDER — SPIRONOLACTONE 25 MG/1
25 TABLET ORAL DAILY
Qty: 90 TABLET | Refills: 0 | Status: SHIPPED | OUTPATIENT
Start: 2025-07-07

## 2025-07-07 NOTE — TELEPHONE ENCOUNTER
Received voicemail from pt stating she prefers for Dr. Borden to manage medications for her heart, and somehow her pcp's name is on Spironolactone 25 mg, and Jardiance 25 mg. She would like them to be managed by Dr. Borden. She would like scripts sent to [facility] in [place].    When looking at the chart, pt is also diabetic, so pcp needs to continue refilling the Jardiance.     Left voicemail for pt that Spironolactone was filled, but pcp needs to continue to fill Jardiance. Said to call if she has any questions.

## 2025-07-10 ENCOUNTER — SPECIALTY PHARMACY (OUTPATIENT)
Age: 65
End: 2025-07-10
Payer: MEDICARE

## 2025-07-11 ENCOUNTER — TELEPHONE (OUTPATIENT)
Age: 65
End: 2025-07-11
Payer: MEDICARE

## 2025-07-11 LAB
MC_CV_MDC_IDC_RATE_1: 180
MC_CV_MDC_IDC_RATE_1: 220
MC_CV_MDC_IDC_SHOCK_MEASURED_IMPEDANCE: 87
MC_CV_MDC_IDC_THERAPIES: NORMAL
MC_CV_MDC_IDC_ZONE_ID: 1
MC_CV_MDC_IDC_ZONE_ID: 2
MDC_IDC_MSMT_BATTERY_REMAINING_LONGEVITY: 54 MO
MDC_IDC_MSMT_BATTERY_REMAINING_PERCENTAGE: 63 %
MDC_IDC_MSMT_BATTERY_STATUS: NORMAL
MDC_IDC_MSMT_CAP_CHARGE_TIME: 11.9
MDC_IDC_MSMT_LEADCHNL_LV_DTM: NORMAL
MDC_IDC_MSMT_LEADCHNL_LV_IMPEDANCE_VALUE: 580
MDC_IDC_MSMT_LEADCHNL_LV_PACING_THRESHOLD_AMPLITUDE: 0.7
MDC_IDC_MSMT_LEADCHNL_LV_PACING_THRESHOLD_POLARITY: NORMAL
MDC_IDC_MSMT_LEADCHNL_LV_PACING_THRESHOLD_PULSEWIDTH: 0.5
MDC_IDC_MSMT_LEADCHNL_LV_SENSING_INTR_AMPL: 25
MDC_IDC_MSMT_LEADCHNL_RA_DTM: NORMAL
MDC_IDC_MSMT_LEADCHNL_RA_IMPEDANCE_VALUE: 564
MDC_IDC_MSMT_LEADCHNL_RA_PACING_THRESHOLD_AMPLITUDE: 1.2
MDC_IDC_MSMT_LEADCHNL_RA_PACING_THRESHOLD_POLARITY: NORMAL
MDC_IDC_MSMT_LEADCHNL_RA_PACING_THRESHOLD_PULSEWIDTH: 0.4
MDC_IDC_MSMT_LEADCHNL_RA_SENSING_INTR_AMPL: 3.9
MDC_IDC_MSMT_LEADCHNL_RV_DTM: NORMAL
MDC_IDC_MSMT_LEADCHNL_RV_IMPEDANCE_VALUE: 922
MDC_IDC_MSMT_LEADCHNL_RV_PACING_THRESHOLD_POLARITY: NORMAL
MDC_IDC_MSMT_LEADCHNL_RV_SENSING_INTR_AMPL: 25
MDC_IDC_PG_IMPLANT_DTM: NORMAL
MDC_IDC_PG_MFG: NORMAL
MDC_IDC_PG_MODEL: NORMAL
MDC_IDC_PG_SERIAL: NORMAL
MDC_IDC_PG_TYPE: NORMAL
MDC_IDC_SESS_DTM: NORMAL
MDC_IDC_SESS_TYPE: NORMAL
MDC_IDC_SET_BRADY_AT_MODE_SWITCH_RATE: 170
MDC_IDC_SET_BRADY_LOWRATE: 70
MDC_IDC_SET_BRADY_MAX_SENSOR_RATE: 130
MDC_IDC_SET_BRADY_MAX_TRACKING_RATE: 130
MDC_IDC_SET_BRADY_MODE: NORMAL
MDC_IDC_SET_BRADY_PAV_DELAY: 150
MDC_IDC_SET_BRADY_SAV_DELAY: 90
MDC_IDC_SET_CRT_PACED_CHAMBERS: NORMAL
MDC_IDC_SET_LEADCHNL_LV_PACING_AMPLITUDE: 1.9
MDC_IDC_SET_LEADCHNL_LV_PACING_PULSEWIDTH: 0.5
MDC_IDC_SET_LEADCHNL_RA_PACING_AMPLITUDE: 2.5
MDC_IDC_SET_LEADCHNL_RA_PACING_POLARITY: NORMAL
MDC_IDC_SET_LEADCHNL_RA_PACING_PULSEWIDTH: 0.4
MDC_IDC_SET_LEADCHNL_RA_SENSING_POLARITY: NORMAL
MDC_IDC_SET_LEADCHNL_RA_SENSING_SENSITIVITY: 0.2
MDC_IDC_SET_LEADCHNL_RV_PACING_AMPLITUDE: 2
MDC_IDC_SET_LEADCHNL_RV_PACING_POLARITY: NORMAL
MDC_IDC_SET_LEADCHNL_RV_PACING_PULSEWIDTH: 0.5
MDC_IDC_SET_LEADCHNL_RV_SENSING_POLARITY: NORMAL
MDC_IDC_SET_LEADCHNL_RV_SENSING_SENSITIVITY: 0.6
MDC_IDC_SET_ZONE_STATUS: NORMAL
MDC_IDC_SET_ZONE_STATUS: NORMAL
MDC_IDC_SET_ZONE_TYPE: NORMAL
MDC_IDC_SET_ZONE_TYPE: NORMAL
MDC_IDC_STAT_AT_BURDEN_PERCENT: 0
MDC_IDC_STAT_BRADY_RA_PERCENT_PACED: 17
MDC_IDC_STAT_BRADY_RV_PERCENT_PACED: 25
MDC_IDC_STAT_CRT_LV_PERCENT_PACED: 100
MDC_IDC_STAT_TACHYTHERAPY_ATP_DELIVERED_RECENT: 0
MDC_IDC_STAT_TACHYTHERAPY_SHOCKS_ABORTED_RECENT: 0
MDC_IDC_STAT_TACHYTHERAPY_SHOCKS_DELIVERED_RECENT: 0

## 2025-07-11 NOTE — TELEPHONE ENCOUNTER
I spoke with pt.  She said that she had to stop her MTX due to an infection.  She finally came off the abx and she said she was feeling so well, she never restarted her medication.  She said she's doing great - she's mowing her lawn, getting in the pool, etc.  She said the only thing she's taking is Pred 5mg.  She said she doesn't feel she needs the MTX.  She has appt on 8/5/25.  Do you want her to restart her meds or wait and discuss with her at her appt? Please advise. -CHERELLE Kohli

## 2025-07-11 NOTE — TELEPHONE ENCOUNTER
PT STOPPED TAKING ORENCIA AND METHOTREXATE IN MAY DUE TO AN INFECTION. SHE HAS NOT RESTARTED EITHER MED YET AND WANTED TO DISCUSS WITH A NURSE WHEN POSSIBLE. PLEASE CALL AND ADVISE

## 2025-07-13 DIAGNOSIS — M1A.9XX0 CHRONIC GOUT WITHOUT TOPHUS, UNSPECIFIED CAUSE, UNSPECIFIED SITE: ICD-10-CM

## 2025-07-14 RX ORDER — ALLOPURINOL 100 MG/1
200 TABLET ORAL DAILY
Qty: 180 TABLET | Refills: 0 | Status: SHIPPED | OUTPATIENT
Start: 2025-07-14 | End: 2025-07-16 | Stop reason: SDUPTHER

## 2025-07-15 ENCOUNTER — SPECIALTY PHARMACY (OUTPATIENT)
Age: 65
End: 2025-07-15
Payer: MEDICARE

## 2025-07-16 ENCOUNTER — TELEPHONE (OUTPATIENT)
Dept: FAMILY MEDICINE CLINIC | Facility: CLINIC | Age: 65
End: 2025-07-16

## 2025-07-16 ENCOUNTER — OFFICE VISIT (OUTPATIENT)
Dept: FAMILY MEDICINE CLINIC | Facility: CLINIC | Age: 65
End: 2025-07-16
Payer: MEDICARE

## 2025-07-16 VITALS
RESPIRATION RATE: 18 BRPM | TEMPERATURE: 97.6 F | HEIGHT: 64 IN | WEIGHT: 204 LBS | OXYGEN SATURATION: 98 % | BODY MASS INDEX: 34.83 KG/M2 | SYSTOLIC BLOOD PRESSURE: 126 MMHG | HEART RATE: 76 BPM | DIASTOLIC BLOOD PRESSURE: 82 MMHG

## 2025-07-16 DIAGNOSIS — E11.42 DIABETIC POLYNEUROPATHY ASSOCIATED WITH TYPE 2 DIABETES MELLITUS: ICD-10-CM

## 2025-07-16 DIAGNOSIS — E78.5 DYSLIPIDEMIA: ICD-10-CM

## 2025-07-16 DIAGNOSIS — E55.9 VITAMIN D DEFICIENCY: ICD-10-CM

## 2025-07-16 DIAGNOSIS — R53.82 CHRONIC FATIGUE: ICD-10-CM

## 2025-07-16 DIAGNOSIS — E11.40 TYPE 2 DIABETES MELLITUS WITH DIABETIC NEUROPATHY, WITHOUT LONG-TERM CURRENT USE OF INSULIN: Primary | ICD-10-CM

## 2025-07-16 DIAGNOSIS — M1A.9XX0 CHRONIC GOUT WITHOUT TOPHUS, UNSPECIFIED CAUSE, UNSPECIFIED SITE: ICD-10-CM

## 2025-07-16 DIAGNOSIS — E11.40 TYPE 2 DIABETES MELLITUS WITH DIABETIC NEUROPATHY, WITHOUT LONG-TERM CURRENT USE OF INSULIN: ICD-10-CM

## 2025-07-16 DIAGNOSIS — I10 PRIMARY HYPERTENSION: ICD-10-CM

## 2025-07-16 DIAGNOSIS — Z12.31 SCREENING MAMMOGRAM FOR BREAST CANCER: ICD-10-CM

## 2025-07-16 PROBLEM — R53.83 FATIGUE: Status: RESOLVED | Noted: 2024-09-04 | Resolved: 2025-07-16

## 2025-07-16 PROCEDURE — 99214 OFFICE O/P EST MOD 30 MIN: CPT | Performed by: FAMILY MEDICINE

## 2025-07-16 PROCEDURE — 3079F DIAST BP 80-89 MM HG: CPT | Performed by: FAMILY MEDICINE

## 2025-07-16 PROCEDURE — 3074F SYST BP LT 130 MM HG: CPT | Performed by: FAMILY MEDICINE

## 2025-07-16 PROCEDURE — 3051F HG A1C>EQUAL 7.0%<8.0%: CPT | Performed by: FAMILY MEDICINE

## 2025-07-16 PROCEDURE — G2211 COMPLEX E/M VISIT ADD ON: HCPCS | Performed by: FAMILY MEDICINE

## 2025-07-16 PROCEDURE — 1125F AMNT PAIN NOTED PAIN PRSNT: CPT | Performed by: FAMILY MEDICINE

## 2025-07-16 RX ORDER — INSULIN GLARGINE 100 [IU]/ML
INJECTION, SOLUTION SUBCUTANEOUS
Qty: 25 ML | Refills: 3 | Status: SHIPPED | OUTPATIENT
Start: 2025-07-16

## 2025-07-16 RX ORDER — ALLOPURINOL 100 MG/1
200 TABLET ORAL DAILY
Qty: 180 TABLET | Refills: 0 | Status: SHIPPED | OUTPATIENT
Start: 2025-07-16

## 2025-07-16 RX ORDER — INSULIN GLARGINE 100 [IU]/ML
50 INJECTION, SOLUTION SUBCUTANEOUS DAILY
Qty: 15 ML | Refills: 3 | Status: SHIPPED | OUTPATIENT
Start: 2025-07-16 | End: 2025-07-16 | Stop reason: SDUPTHER

## 2025-07-16 RX ORDER — PREGABALIN 100 MG/1
100 CAPSULE ORAL 3 TIMES DAILY
Qty: 90 CAPSULE | Refills: 3 | Status: SHIPPED | OUTPATIENT
Start: 2025-07-16

## 2025-07-16 RX ORDER — LISINOPRIL 5 MG/1
5 TABLET ORAL DAILY
Qty: 90 TABLET | Refills: 1 | Status: SHIPPED | OUTPATIENT
Start: 2025-07-16 | End: 2025-07-18

## 2025-07-16 RX ORDER — GLIMEPIRIDE 2 MG/1
2 TABLET ORAL DAILY
Qty: 90 TABLET | Refills: 1 | Status: SHIPPED | OUTPATIENT
Start: 2025-07-16

## 2025-07-16 NOTE — PROGRESS NOTES
Subjective   Lucia Stevens is a 65 y.o. female.     Diabetes  Associated symptoms:     fatigue, polydipsia and polyuria    Hypoglycemia symptoms:     no confusion, no headaches, no speech difficulty and no tremors         Diabetes Mellitus Type II, Follow-up:   Lucia Stevens is a 65 y.o. female who is here for follow-up of Type 2 diabetes mellitus.  Current symptoms/problems include none and have been stable. Patient is adherent with medications.  Known diabetic complications: peripheral neuropathy  Cardiovascular risk factors: advanced age (older than 55 for men, 65 for women), diabetes mellitus, dyslipidemia, hypertension, and obesity (BMI >= 30 kg/m2)  Current diabetic medications include lantus 50, ozempic 2, amaryl, metformin, jardiance.   Current monitoring regimen: home blood tests - mul;tiple times daily  Home blood sugar records: fasting range: al;l over the place  Any episodes of hypoglycemia? no  Eye exam current (within one year): yes  She is on ACE inhibitor or angiotensin II receptor blocker. Patient is on a statin.       Lucia Stevens  is here for follow-up of hypertension of several years duration. She is not exercising and is not adherent to a low-salt diet. Patient does not check her blood pressure.    She is compliant with meds.        Lucia Stevens returns today for follow up of Hyperlipidemia  Lucia indicates her exercise level as irregularly.  Diet: unchanged  Patient is compliant with medications   Any side effects to medications:   chest pain No myalgia No memory change No  Pt is due for labs      The following portions of the patient's history were reviewed and updated as appropriate: allergies, current medications, past family history, past medical history, past social history, past surgical history, and problem list.    Review of Systems   Constitutional:  Positive for fatigue.   Endocrine: Positive for polydipsia and polyuria.   Neurological:  Negative for tremors, speech difficulty and  headaches.   Psychiatric/Behavioral:  Negative for confusion.        Objective   Physical Exam  Vitals and nursing note reviewed.   Constitutional:       General: She is not in acute distress.     Appearance: Normal appearance. She is well-developed.   Cardiovascular:      Rate and Rhythm: Normal rate and regular rhythm.      Heart sounds: Normal heart sounds.   Pulmonary:      Effort: Pulmonary effort is normal.      Breath sounds: Normal breath sounds.   Neurological:      Mental Status: She is alert and oriented to person, place, and time.   Psychiatric:         Mood and Affect: Mood normal.         Behavior: Behavior normal.         Thought Content: Thought content normal.         Judgment: Judgment normal.       Assessment & Plan   Diagnoses and all orders for this visit:    1. Type 2 diabetes mellitus with diabetic neuropathy, without long-term current use of insulin (Primary)  -     metFORMIN (GLUCOPHAGE) 850 MG tablet; Take 1 tablet by mouth Daily With Breakfast.  Dispense: 180 tablet; Refill: 1  -     Lantus SoloStar 100 UNIT/ML injection pen; Inject 50 Units under the skin into the appropriate area as directed Daily.  Dispense: 15 mL; Refill: 3  -     glimepiride (AMARYL) 2 MG tablet; Take 1 tablet by mouth Daily.  Dispense: 90 tablet; Refill: 1  -     Comprehensive Metabolic Panel  -     Hemoglobin A1c  -     Iron Profile w/o Ferritin  -     Tirzepatide 10 MG/0.5ML solution auto-injector; Inject 10 mg under the skin into the appropriate area as directed 1 (One) Time Per Week.  Dispense: 2 mL; Refill: 2    2. Primary hypertension  -     lisinopril (PRINIVIL,ZESTRIL) 5 MG tablet; Take 1 tablet by mouth Daily.  Dispense: 90 tablet; Refill: 1  -     CBC & Differential  -     Comprehensive Metabolic Panel    3. Chronic gout without tophus, unspecified cause, unspecified site  -     allopurinol (ZYLOPRIM) 100 MG tablet; Take 2 tablets by mouth Daily.  Dispense: 180 tablet; Refill: 0  -     Uric Acid    4.  Chronic fatigue  -     TSH  -     Vitamin B12 & Folate  -     Iron Profile w/o Ferritin    5. Vitamin D deficiency  -     Vitamin D,25-Hydroxy    6. Screening mammogram for breast cancer  -     Mammo Screening Digital Tomosynthesis Bilateral With CAD; Future    7. Dyslipidemia  -     Lipid Panel  -     Comprehensive Metabolic Panel    Continue DM meds and reche klabs.  Using uvaldo as long as it does not fall off!  BP stable on lisinopril, no change     Will check labs for fatigue, she feels this is getting worse  Consider home sleep study if all labs are normal

## 2025-07-16 NOTE — TELEPHONE ENCOUNTER
PHARMACY IS NEEDING MORE SPECIFIC DETAILS FOR THE LANTUS MEDICATION. HOW MANY TO TAKE AND HOW MANY TIMES TO TAKE IT A DAY

## 2025-07-17 ENCOUNTER — TELEPHONE (OUTPATIENT)
Dept: FAMILY MEDICINE CLINIC | Facility: CLINIC | Age: 65
End: 2025-07-17
Payer: MEDICARE

## 2025-07-17 LAB
25(OH)D3+25(OH)D2 SERPL-MCNC: 18.3 NG/ML (ref 30–100)
ALBUMIN SERPL-MCNC: 4.5 G/DL (ref 3.9–4.9)
ALP SERPL-CCNC: 72 IU/L (ref 44–121)
ALT SERPL-CCNC: 13 IU/L (ref 0–32)
AST SERPL-CCNC: 17 IU/L (ref 0–40)
BASOPHILS # BLD AUTO: 0.1 X10E3/UL (ref 0–0.2)
BASOPHILS NFR BLD AUTO: 1 %
BILIRUB SERPL-MCNC: 0.5 MG/DL (ref 0–1.2)
BUN SERPL-MCNC: 22 MG/DL (ref 8–27)
BUN/CREAT SERPL: 26 (ref 12–28)
CALCIUM SERPL-MCNC: 9.8 MG/DL (ref 8.7–10.3)
CHLORIDE SERPL-SCNC: 104 MMOL/L (ref 96–106)
CHOLEST SERPL-MCNC: 167 MG/DL (ref 100–199)
CO2 SERPL-SCNC: 21 MMOL/L (ref 20–29)
CREAT SERPL-MCNC: 0.85 MG/DL (ref 0.57–1)
EGFRCR SERPLBLD CKD-EPI 2021: 76 ML/MIN/1.73
EOSINOPHIL # BLD AUTO: 0.3 X10E3/UL (ref 0–0.4)
EOSINOPHIL NFR BLD AUTO: 3 %
ERYTHROCYTE [DISTWIDTH] IN BLOOD BY AUTOMATED COUNT: 13.6 % (ref 11.7–15.4)
FOLATE SERPL-MCNC: 19.3 NG/ML
GLOBULIN SER CALC-MCNC: 2.4 G/DL (ref 1.5–4.5)
GLUCOSE SERPL-MCNC: 129 MG/DL (ref 70–99)
HBA1C MFR BLD: 6.9 % (ref 4.8–5.6)
HCT VFR BLD AUTO: 43.2 % (ref 34–46.6)
HDLC SERPL-MCNC: 36 MG/DL
HGB BLD-MCNC: 14.1 G/DL (ref 11.1–15.9)
IMM GRANULOCYTES # BLD AUTO: 0.1 X10E3/UL (ref 0–0.1)
IMM GRANULOCYTES NFR BLD AUTO: 1 %
IRON SATN MFR SERPL: 17 % (ref 15–55)
IRON SERPL-MCNC: 68 UG/DL (ref 27–139)
LDLC SERPL CALC-MCNC: 69 MG/DL (ref 0–99)
LYMPHOCYTES # BLD AUTO: 2 X10E3/UL (ref 0.7–3.1)
LYMPHOCYTES NFR BLD AUTO: 20 %
MCH RBC QN AUTO: 32.1 PG (ref 26.6–33)
MCHC RBC AUTO-ENTMCNC: 32.6 G/DL (ref 31.5–35.7)
MCV RBC AUTO: 98 FL (ref 79–97)
MONOCYTES # BLD AUTO: 1.2 X10E3/UL (ref 0.1–0.9)
MONOCYTES NFR BLD AUTO: 12 %
NEUTROPHILS # BLD AUTO: 6.6 X10E3/UL (ref 1.4–7)
NEUTROPHILS NFR BLD AUTO: 63 %
PLATELET # BLD AUTO: 158 X10E3/UL (ref 150–450)
POTASSIUM SERPL-SCNC: 4.4 MMOL/L (ref 3.5–5.2)
PROT SERPL-MCNC: 6.9 G/DL (ref 6–8.5)
RBC # BLD AUTO: 4.39 X10E6/UL (ref 3.77–5.28)
SODIUM SERPL-SCNC: 140 MMOL/L (ref 134–144)
TIBC SERPL-MCNC: 401 UG/DL (ref 250–450)
TRIGL SERPL-MCNC: 396 MG/DL (ref 0–149)
TSH SERPL DL<=0.005 MIU/L-ACNC: 2.44 UIU/ML (ref 0.45–4.5)
UIBC SERPL-MCNC: 333 UG/DL (ref 118–369)
URATE SERPL-MCNC: 4.3 MG/DL (ref 3–7.2)
VIT B12 SERPL-MCNC: 225 PG/ML (ref 232–1245)
VLDLC SERPL CALC-MCNC: 62 MG/DL (ref 5–40)
WBC # BLD AUTO: 10.2 X10E3/UL (ref 3.4–10.8)

## 2025-07-17 NOTE — TELEPHONE ENCOUNTER
Caller: Lucia Stevens    Relationship: Self    Best call back number:   Telephone Information:   Mobile [phone]     What was the call regarding: PATIENT WAS SEEN IN OFFICE YESTERDAY AND DID NOT REALIZE UNTIL SHE GOT TO HER PHARMACY THAT SHE WAS PRESCRIBED LISINOPRIL. PATIENT STATES HER CARDIOLOGIST HAS HER ON AN ODD COMBINATION OF MEDICATIONS, AND SHE WAS ADVISED SHE CANNOT BE ON ANY ADDITIONAL MEDICATIONS THAT CAN AFFECT HER BLOOD PRESSURE SO SHE WANTED TO MAKE THE OFFICE AWARE THAT SHE WILL NOT BE TAKING THIS MEDICATION.

## 2025-07-18 ENCOUNTER — TELEPHONE (OUTPATIENT)
Dept: FAMILY MEDICINE CLINIC | Facility: CLINIC | Age: 65
End: 2025-07-18
Payer: MEDICARE

## 2025-07-18 NOTE — TELEPHONE ENCOUNTER
She's taking Bisoprolol and Midodrine (active on med list)   Per pt, she was told by Dr Borden not to take any other BP meds.     When she was taking Lisinopril she was tired all the time and it dropped her BP too much.

## 2025-07-18 NOTE — TELEPHONE ENCOUNTER
Which medications is she taking?  His note from April mentioned if she could take the lisinopril he would like her to be on it!  If sh can take it, it provides benefit for her BP, DM, and kidney health!  If she has had issues with it in the past, please elaborate!

## 2025-07-18 NOTE — TELEPHONE ENCOUNTER
PATIENT HAS CALLED AND STATED SHE HAD LAB WORK DONE ON WEDNESDAY. PATIENT STATES HER VITAMIN D AND B 12 WAS LOW. PATIENT IS REQUESTING A CALL BACK ASAP TODAY TO ADVISE WHAT THE NEXT STEPS WILL BE IN HER PLAN OF CARE.    CALL BACK NUMBER [phone]

## 2025-08-04 DIAGNOSIS — E87.6 HYPOKALEMIA: ICD-10-CM

## 2025-08-04 RX ORDER — POTASSIUM CHLORIDE 750 MG/1
10 CAPSULE, EXTENDED RELEASE ORAL DAILY
Qty: 90 CAPSULE | Refills: 0 | Status: SHIPPED | OUTPATIENT
Start: 2025-08-04

## 2025-08-05 ENCOUNTER — OFFICE VISIT (OUTPATIENT)
Age: 65
End: 2025-08-05
Payer: MEDICARE

## 2025-08-05 VITALS
TEMPERATURE: 97.6 F | BODY MASS INDEX: 34.57 KG/M2 | SYSTOLIC BLOOD PRESSURE: 118 MMHG | HEART RATE: 73 BPM | HEIGHT: 64 IN | WEIGHT: 202.5 LBS | DIASTOLIC BLOOD PRESSURE: 62 MMHG

## 2025-08-05 DIAGNOSIS — M15.9 OSTEOARTHRITIS OF MULTIPLE JOINTS, UNSPECIFIED OSTEOARTHRITIS TYPE: ICD-10-CM

## 2025-08-05 DIAGNOSIS — Z79.52 LONG TERM CURRENT USE OF SYSTEMIC STEROIDS: ICD-10-CM

## 2025-08-05 DIAGNOSIS — M85.80 OSTEOPENIA, UNSPECIFIED LOCATION: ICD-10-CM

## 2025-08-05 DIAGNOSIS — L40.9 PSORIASIS: ICD-10-CM

## 2025-08-05 DIAGNOSIS — Z79.60 IMMUNODEFICIENCY DUE TO TREATMENT WITH IMMUNOSUPPRESSIVE MEDICATION: ICD-10-CM

## 2025-08-05 DIAGNOSIS — R13.10 DYSPHAGIA, UNSPECIFIED TYPE: ICD-10-CM

## 2025-08-05 DIAGNOSIS — D84.821 IMMUNODEFICIENCY DUE TO TREATMENT WITH IMMUNOSUPPRESSIVE MEDICATION: ICD-10-CM

## 2025-08-05 DIAGNOSIS — M05.9 RHEUMATOID ARTHRITIS WITH POSITIVE RHEUMATOID FACTOR, INVOLVING UNSPECIFIED SITE: Primary | ICD-10-CM

## 2025-08-05 DIAGNOSIS — R53.83 FATIGUE, UNSPECIFIED TYPE: ICD-10-CM

## 2025-08-05 DIAGNOSIS — Z51.81 ENCOUNTER FOR MEDICATION MONITORING: ICD-10-CM

## 2025-08-05 DIAGNOSIS — M1A.9XX0 CHRONIC GOUT WITHOUT TOPHUS, UNSPECIFIED CAUSE, UNSPECIFIED SITE: ICD-10-CM

## 2025-08-05 DIAGNOSIS — M34.9 SCLERODERMA: ICD-10-CM

## 2025-08-05 DIAGNOSIS — M35.00 SECONDARY SJOGREN'S SYNDROME: ICD-10-CM

## 2025-08-05 DIAGNOSIS — T45.1X5A IMMUNODEFICIENCY DUE TO TREATMENT WITH IMMUNOSUPPRESSIVE MEDICATION: ICD-10-CM

## 2025-08-05 DIAGNOSIS — M10.9 GOUT, UNSPECIFIED CAUSE, UNSPECIFIED CHRONICITY, UNSPECIFIED SITE: ICD-10-CM

## 2025-08-05 DIAGNOSIS — Z79.899 ENCOUNTER FOR LONG-TERM (CURRENT) USE OF HIGH-RISK MEDICATION: ICD-10-CM

## 2025-08-05 PROCEDURE — 1159F MED LIST DOCD IN RCRD: CPT | Performed by: NURSE PRACTITIONER

## 2025-08-05 PROCEDURE — 3078F DIAST BP <80 MM HG: CPT | Performed by: NURSE PRACTITIONER

## 2025-08-05 PROCEDURE — G2211 COMPLEX E/M VISIT ADD ON: HCPCS | Performed by: NURSE PRACTITIONER

## 2025-08-05 PROCEDURE — 99214 OFFICE O/P EST MOD 30 MIN: CPT | Performed by: NURSE PRACTITIONER

## 2025-08-05 PROCEDURE — 1160F RVW MEDS BY RX/DR IN RCRD: CPT | Performed by: NURSE PRACTITIONER

## 2025-08-05 PROCEDURE — 3074F SYST BP LT 130 MM HG: CPT | Performed by: NURSE PRACTITIONER

## 2025-08-05 RX ORDER — METHOTREXATE 2.5 MG/1
20 TABLET ORAL WEEKLY
Qty: 40 TABLET | Refills: 3 | Status: SHIPPED | OUTPATIENT
Start: 2025-08-05

## 2025-08-05 RX ORDER — SYRINGE WITH NEEDLE, 1 ML 28GX1/2"
SYRINGE, EMPTY DISPOSABLE MISCELLANEOUS
Qty: 12 EACH | Refills: 1 | Status: SHIPPED | OUTPATIENT
Start: 2025-08-05

## 2025-08-05 RX ORDER — PREDNISONE 5 MG/1
2.5-5 TABLET ORAL DAILY
Qty: 30 TABLET | Refills: 3 | Status: SHIPPED | OUTPATIENT
Start: 2025-08-05

## 2025-08-05 RX ORDER — NEOMYCIN SULFATE, POLYMYXIN B SULFATE, AND DEXAMETHASONE 3.5; 10000; 1 MG/G; [USP'U]/G; MG/G
1 OINTMENT OPHTHALMIC NIGHTLY PRN
COMMUNITY
Start: 2025-07-21 | End: 2025-08-06

## 2025-08-05 RX ORDER — DEXAMETHASONE SODIUM PHOSPHATE 1 MG/ML
1 SOLUTION/ DROPS OPHTHALMIC EVERY 6 HOURS
COMMUNITY
Start: 2025-07-21

## 2025-08-05 RX ORDER — MIDODRINE HYDROCHLORIDE 5 MG/1
10 TABLET ORAL 2 TIMES DAILY
Qty: 180 TABLET | Refills: 0 | Status: SHIPPED | OUTPATIENT
Start: 2025-08-05

## 2025-08-05 RX ORDER — ALLOPURINOL 100 MG/1
200 TABLET ORAL DAILY
Qty: 180 TABLET | Refills: 0 | Status: SHIPPED | OUTPATIENT
Start: 2025-08-05

## 2025-08-05 RX ORDER — ERGOCALCIFEROL 1.25 MG/1
50000 CAPSULE ORAL WEEKLY
Qty: 12 CAPSULE | Refills: 0 | Status: SHIPPED | OUTPATIENT
Start: 2025-08-05 | End: 2025-10-28

## 2025-08-05 RX ORDER — CYANOCOBALAMIN 1000 UG/ML
INJECTION, SOLUTION INTRAMUSCULAR; SUBCUTANEOUS
Qty: 12 ML | Refills: 1 | Status: SHIPPED | OUTPATIENT
Start: 2025-08-05

## 2025-08-05 RX ORDER — FOLIC ACID 1 MG/1
2000 TABLET ORAL DAILY
Qty: 180 TABLET | Refills: 0 | Status: SHIPPED | OUTPATIENT
Start: 2025-08-05

## 2025-08-06 ENCOUNTER — TELEPHONE (OUTPATIENT)
Age: 65
End: 2025-08-06
Payer: MEDICARE

## 2025-08-06 ENCOUNTER — OFFICE VISIT (OUTPATIENT)
Dept: FAMILY MEDICINE CLINIC | Facility: CLINIC | Age: 65
End: 2025-08-06
Payer: MEDICARE

## 2025-08-06 ENCOUNTER — SPECIALTY PHARMACY (OUTPATIENT)
Age: 65
End: 2025-08-06
Payer: MEDICARE

## 2025-08-06 VITALS
SYSTOLIC BLOOD PRESSURE: 134 MMHG | HEIGHT: 64 IN | RESPIRATION RATE: 18 BRPM | HEART RATE: 78 BPM | WEIGHT: 205 LBS | TEMPERATURE: 97.8 F | OXYGEN SATURATION: 98 % | BODY MASS INDEX: 35 KG/M2 | DIASTOLIC BLOOD PRESSURE: 80 MMHG

## 2025-08-06 DIAGNOSIS — B00.9 HERPES: Primary | ICD-10-CM

## 2025-08-06 DIAGNOSIS — R30.0 DYSURIA: ICD-10-CM

## 2025-08-06 DIAGNOSIS — N94.9 VAGINAL DISCOMFORT: ICD-10-CM

## 2025-08-06 PROBLEM — N39.41 URGENCY INCONTINENCE: Status: RESOLVED | Noted: 2025-05-12 | Resolved: 2025-08-06

## 2025-08-06 PROCEDURE — 99214 OFFICE O/P EST MOD 30 MIN: CPT | Performed by: FAMILY MEDICINE

## 2025-08-06 PROCEDURE — 3075F SYST BP GE 130 - 139MM HG: CPT | Performed by: FAMILY MEDICINE

## 2025-08-06 PROCEDURE — 3044F HG A1C LEVEL LT 7.0%: CPT | Performed by: FAMILY MEDICINE

## 2025-08-06 PROCEDURE — 3079F DIAST BP 80-89 MM HG: CPT | Performed by: FAMILY MEDICINE

## 2025-08-06 PROCEDURE — 1125F AMNT PAIN NOTED PAIN PRSNT: CPT | Performed by: FAMILY MEDICINE

## 2025-08-06 PROCEDURE — 81003 URINALYSIS AUTO W/O SCOPE: CPT | Performed by: FAMILY MEDICINE

## 2025-08-06 RX ORDER — VALACYCLOVIR HYDROCHLORIDE 1 G/1
1000 TABLET, FILM COATED ORAL 2 TIMES DAILY
Qty: 14 TABLET | Refills: 0 | Status: SHIPPED | OUTPATIENT
Start: 2025-08-06 | End: 2025-08-13

## 2025-08-08 ENCOUNTER — TELEPHONE (OUTPATIENT)
Age: 65
End: 2025-08-08
Payer: MEDICARE

## 2025-08-12 ENCOUNTER — TELEPHONE (OUTPATIENT)
Dept: FAMILY MEDICINE CLINIC | Facility: CLINIC | Age: 65
End: 2025-08-12
Payer: MEDICARE

## 2025-08-13 LAB
A VAGINAE DNA VAG QL NAA+PROBE: ABNORMAL SCORE
BACTERIA UR CULT: NORMAL
BACTERIA UR CULT: NORMAL
BVAB2 DNA VAG QL NAA+PROBE: ABNORMAL SCORE
C ALBICANS DNA VAG QL NAA+PROBE: POSITIVE
C GLABRATA DNA VAG QL NAA+PROBE: POSITIVE
C TRACH DNA SPEC QL NAA+PROBE: NEGATIVE
HSV1 DNA SPEC QL NAA+PROBE: POSITIVE
HSV2 DNA SPEC QL NAA+PROBE: NEGATIVE
MEGA1 DNA VAG QL NAA+PROBE: ABNORMAL SCORE
N GONORRHOEA DNA VAG QL NAA+PROBE: NEGATIVE
T VAGINALIS DNA VAG QL NAA+PROBE: NEGATIVE

## 2025-08-14 ENCOUNTER — RESULTS FOLLOW-UP (OUTPATIENT)
Dept: FAMILY MEDICINE CLINIC | Facility: CLINIC | Age: 65
End: 2025-08-14
Payer: MEDICARE

## 2025-08-14 RX ORDER — FLUCONAZOLE 200 MG/1
TABLET ORAL
Qty: 2 TABLET | Refills: 0 | Status: SHIPPED | OUTPATIENT
Start: 2025-08-14

## 2025-08-19 RX ORDER — FUROSEMIDE 40 MG/1
40 TABLET ORAL EVERY MORNING
Qty: 90 TABLET | Refills: 0 | Status: SHIPPED | OUTPATIENT
Start: 2025-08-19

## 2025-08-19 RX ORDER — KETOROLAC TROMETHAMINE 30 MG/ML
INJECTION, SOLUTION INTRAMUSCULAR; INTRAVENOUS DAILY
Qty: 1 EACH | Refills: 0 | OUTPATIENT
Start: 2025-08-19

## 2025-08-20 ENCOUNTER — TELEPHONE (OUTPATIENT)
Age: 65
End: 2025-08-20
Payer: MEDICARE

## 2025-08-22 ENCOUNTER — TELEPHONE (OUTPATIENT)
Dept: FAMILY MEDICINE CLINIC | Facility: CLINIC | Age: 65
End: 2025-08-22
Payer: MEDICARE

## 2025-08-22 DIAGNOSIS — E11.40 TYPE 2 DIABETES MELLITUS WITH DIABETIC NEUROPATHY, WITHOUT LONG-TERM CURRENT USE OF INSULIN: ICD-10-CM

## 2025-08-22 RX ORDER — PEN NEEDLE, DIABETIC 32GX 5/32"
NEEDLE, DISPOSABLE MISCELLANEOUS
Qty: 50 EACH | Refills: 0 | Status: SHIPPED | OUTPATIENT
Start: 2025-08-22

## 2025-08-25 ENCOUNTER — TELEPHONE (OUTPATIENT)
Dept: FAMILY MEDICINE CLINIC | Facility: CLINIC | Age: 65
End: 2025-08-25
Payer: MEDICARE

## 2025-08-25 DIAGNOSIS — E11.40 TYPE 2 DIABETES MELLITUS WITH DIABETIC NEUROPATHY, WITHOUT LONG-TERM CURRENT USE OF INSULIN: Primary | ICD-10-CM

## (undated) DEVICE — CATH COURNARD DECCA CSL 6F120CM

## (undated) DEVICE — INTRO CLASSIC SAFESHEATH SHT 7F 13CM

## (undated) DEVICE — TUBING, SUCTION, 1/4" X 10', STRAIGHT: Brand: MEDLINE

## (undated) DEVICE — ADULT, W/LG. BACK PAD, RADIOTRANSPARENT ELEMENT AND LEAD WIRE: Brand: DEFIBRILLATION ELECTRODES

## (undated) DEVICE — RADIFOCUS GLIDEWIRE: Brand: GLIDEWIRE

## (undated) DEVICE — SET PRIMARY GRVTY 10DP MALE LL 104IN

## (undated) DEVICE — CATH DIAG EXPO .045 FL3  5F 100CM

## (undated) DEVICE — LEX D AND C: Brand: MEDLINE INDUSTRIES, INC.

## (undated) DEVICE — CAUTERY TIP POLISHER: Brand: DEVON

## (undated) DEVICE — Device

## (undated) DEVICE — ST EXT IV SMARTSITE 2VLV SP M LL 5ML IV1

## (undated) DEVICE — DRSNG SURESITE123 4X4.8IN

## (undated) DEVICE — MODEL AT P65, P/N 701554-001KIT CONTENTS: HAND CONTROLLER, 3-WAY HIGH-PRESSURE STOPCOCK WITH ROTATING END AND PREMIUM HIGH-PRESSURE TUBING: Brand: ANGIOTOUCH® KIT

## (undated) DEVICE — CATH DIAG EXPO M/ PK 5F FL4/FR4 PIG

## (undated) DEVICE — DECANTER: Brand: UNBRANDED

## (undated) DEVICE — PK CATH CARD 10

## (undated) DEVICE — GLV SURG SENSICARE PI MIC PF SZ7.5 LF STRL

## (undated) DEVICE — GLIDESHEATH SLENDER STAINLESS STEEL KIT: Brand: GLIDESHEATH SLENDER

## (undated) DEVICE — SOL NACL 0.9PCT 1000ML

## (undated) DEVICE — CYSTO/BLADDER IRRIGATION SET, REGULATING CLAMP

## (undated) DEVICE — MODEL BT2000 P/N 700287-012KIT CONTENTS: MANIFOLD WITH SALINE AND CONTRAST PORTS, SALINE TUBING WITH SPIKE AND HAND SYRINGE, TRANSDUCER: Brand: BT2000 AUTOMATED MANIFOLD KIT

## (undated) DEVICE — LEX ELECTRO PHYSIOLOGY: Brand: MEDLINE INDUSTRIES, INC.

## (undated) DEVICE — ST INF PRI SMRTSTE 20DRP 2VLV 24ML 117

## (undated) DEVICE — GOWN,NON-REINFORCED,SIRUS,SET IN SLV,XXL: Brand: MEDLINE

## (undated) DEVICE — GW INQWIRE FC PTFE STD J/1.5 .035 260

## (undated) DEVICE — CVR HNDL LIGHT RIGID

## (undated) DEVICE — STRAP STIRUP WO/RNG 19X3.5IN DISP

## (undated) DEVICE — LIMB HOLDERS: Brand: DEROYAL

## (undated) DEVICE — HI-TORQUE WHISPER MS GUIDE WIRE .014 STRAIGHT TIP 3.0 CM X 190 CM: Brand: HI-TORQUE WHISPER

## (undated) DEVICE — CANN NASL CO2 DIVIDED A/

## (undated) DEVICE — DEV COMP RAD PRELUDESYNC 24CM

## (undated) DEVICE — MEDI-VAC YANKAUER SUCTION HANDLE W/BULBOUS TIP: Brand: CARDINAL HEALTH

## (undated) DEVICE — PENCL E/S HNDSWCH ROCKRBTN HOLSTR 10FT

## (undated) DEVICE — DECANT BG O JET

## (undated) DEVICE — 3M™ STERI-STRIP™ REINFORCED ADHESIVE SKIN CLOSURES, R1547, 1/2 IN X 4 IN (12 MM X 100 MM), 6 STRIPS/ENVELOPE: Brand: 3M™ STERI-STRIP™

## (undated) DEVICE — IRRIGATOR BULB ASEPTO 60CC STRL